# Patient Record
Sex: FEMALE | Race: ASIAN | NOT HISPANIC OR LATINO | Employment: UNEMPLOYED | URBAN - METROPOLITAN AREA
[De-identification: names, ages, dates, MRNs, and addresses within clinical notes are randomized per-mention and may not be internally consistent; named-entity substitution may affect disease eponyms.]

---

## 2017-01-04 ENCOUNTER — TRANSFERRED RECORDS (OUTPATIENT)
Dept: HEALTH INFORMATION MANAGEMENT | Facility: CLINIC | Age: 52
End: 2017-01-04

## 2017-01-04 ENCOUNTER — TELEPHONE (OUTPATIENT)
Dept: FAMILY MEDICINE | Facility: CLINIC | Age: 52
End: 2017-01-04

## 2017-01-04 DIAGNOSIS — N83.291 COMPLEX CYST OF RIGHT OVARY: ICD-10-CM

## 2017-01-04 DIAGNOSIS — D25.9 UTERINE LEIOMYOMA, UNSPECIFIED LOCATION: Primary | ICD-10-CM

## 2017-01-04 NOTE — TELEPHONE ENCOUNTER
Pelvic ultrasound shows uterine fibroids and right ovary cyst.   I advise she consult with a gyn. I can refer if she doesn't have one.

## 2017-01-05 NOTE — TELEPHONE ENCOUNTER
Patient given the result message below per Yulisa Gibson.  Patient states that she will be traveling. Would it be OK to wait until the first week of February to follow up with gyn? Patient states that she does not want to be worrying while she is traveling.  Please advise. Triage to call the patient back.  Ruth Yi RN

## 2017-01-05 NOTE — TELEPHONE ENCOUNTER
Your provider has referred you to:  [facility]: [facility] [phone]  [email or web address]    Patient notified of test results and PCP's message, no further questions.  Isa Mccarthy RN  [facility]  [phone]

## 2017-01-09 ENCOUNTER — MYC MEDICAL ADVICE (OUTPATIENT)
Dept: FAMILY MEDICINE | Facility: CLINIC | Age: 52
End: 2017-01-09

## 2017-01-09 DIAGNOSIS — K21.9 GASTROESOPHAGEAL REFLUX DISEASE, ESOPHAGITIS PRESENCE NOT SPECIFIED: Primary | ICD-10-CM

## 2017-01-10 NOTE — TELEPHONE ENCOUNTER
omeprazole      Last Written Prescription Date: 6/29/16  Last Fill Quantity: 90,  # refills: 1   Last Office Visit with [facility], P or [facility] prescribing provider: 11/29/16                                         Next 5 appointments (look out 90 days)     Feb 09, 2017  8:30 AM   Return Visit with Rosa Chowdary LP   [facility] ([facility])    [address] [place] [id number] [phone]                  Patient is currently taking pantoprazole. luciernat message sent to patient clarifying which med she would like filled and if she is wanted to switch back to omeprazole. Will await response.   Aura Wright RN   [facility] - Triage

## 2017-01-10 NOTE — TELEPHONE ENCOUNTER
Please also see 1/9/17 refill encounter.   Aura Wright RN   [facility] - Triage

## 2017-01-11 RX ORDER — OMEPRAZOLE 40 MG/1
CAPSULE, DELAYED RELEASE ORAL
Qty: 90 CAPSULE | Refills: 1 | Status: SHIPPED | OUTPATIENT
Start: 2017-01-11 | End: 2017-08-30

## 2017-01-11 NOTE — TELEPHONE ENCOUNTER
Patient has not checked [employer] message.     Attempted to call patient, no answer and voicemail box full. Will try later.     Aura Wright RN   [facility] - Triage

## 2017-01-11 NOTE — TELEPHONE ENCOUNTER
Patient called the clinic back. She state that pantoprazole did not help her with her acidity. States that omeprazole really helps. Wants rx for omeprazole.   Patient is out of medication.  Ruth Yi RN

## 2017-01-13 ENCOUNTER — OFFICE VISIT (OUTPATIENT)
Dept: PSYCHOLOGY | Facility: CLINIC | Age: 52
End: 2017-01-13
Payer: COMMERCIAL

## 2017-01-13 DIAGNOSIS — F43.23 ADJUSTMENT DISORDER WITH MIXED ANXIETY AND DEPRESSED MOOD: Primary | ICD-10-CM

## 2017-01-13 PROCEDURE — 90791 PSYCH DIAGNOSTIC EVALUATION: CPT | Performed by: PSYCHOLOGIST

## 2017-01-13 NOTE — Clinical Note
Dulce,  I am looking forward to working with Ijeoma. Planning to start by teaching her some relaxation breathing exercises and stress management techniques. Will keep you posted with any relevant information. Thanks for the referral.  Rosa

## 2017-01-16 ASSESSMENT — ANXIETY QUESTIONNAIRES
1. FEELING NERVOUS, ANXIOUS, OR ON EDGE: NOT AT ALL
3. WORRYING TOO MUCH ABOUT DIFFERENT THINGS: SEVERAL DAYS
6. BECOMING EASILY ANNOYED OR IRRITABLE: SEVERAL DAYS
5. BEING SO RESTLESS THAT IT IS HARD TO SIT STILL: NOT AT ALL
2. NOT BEING ABLE TO STOP OR CONTROL WORRYING: SEVERAL DAYS
7. FEELING AFRAID AS IF SOMETHING AWFUL MIGHT HAPPEN: SEVERAL DAYS
GAD7 TOTAL SCORE: 5

## 2017-01-16 ASSESSMENT — PATIENT HEALTH QUESTIONNAIRE - PHQ9: 5. POOR APPETITE OR OVEREATING: SEVERAL DAYS
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
## 2017-01-17 ASSESSMENT — PATIENT HEALTH QUESTIONNAIRE - PHQ9: SUM OF ALL RESPONSES TO PHQ QUESTIONS 1-9: 5

## 2017-01-17 ASSESSMENT — ANXIETY QUESTIONNAIRES: GAD7 TOTAL SCORE: 5

## 2017-02-09 ENCOUNTER — OFFICE VISIT (OUTPATIENT)
Dept: PSYCHOLOGY | Facility: CLINIC | Age: 52
End: 2017-02-09
Payer: COMMERCIAL

## 2017-02-09 DIAGNOSIS — F43.23 ADJUSTMENT DISORDER WITH MIXED ANXIETY AND DEPRESSED MOOD: Primary | ICD-10-CM

## 2017-02-09 PROCEDURE — 90834 PSYTX W PT 45 MINUTES: CPT | Performed by: PSYCHOLOGIST

## 2017-02-09 NOTE — PROGRESS NOTES
Progress Note    Client Name: Ijeoma Avalos  Date: 2/9/17         Service Type: Individual      Session Start Time: 08:30  Session End Time: 09:20      Session Length: 45-50     Session #: 2     Attendees: Client attended alone    Treatment Plan Last Reviewed: 2/9/17  PHQ-9 / SOSA-7 Last Reviewed: 1/16/17     DATA      Progress Since Last Session (Related to Symptoms / Goals / Homework):   Symptoms: Stress, fatigue    Homework: N/A      Episode of Care Goals: New objectives established today     Current / Ongoing Stressors and Concerns:   Physical symptoms   Phase of life issues (full-time work and parent of young child)   Work demands   Family overseas     Treatment Objective(s) Addressed in This Session:   Build and make use of skills for stress management     Intervention:   Discussed and agreed upon goals for treatment.  Today's session focused on the physical component of stress. Provided rationale for self-regulation strategies such as deep breathing. Taught a basic relaxation breathing exercise in session; Client reported initial positive response. Provided online resources for further practice.         ASSESSMENT: Current Emotional / Mental Status (status of significant symptoms):   Risk status (Self / Other harm or suicidal ideation)   Client denies current fears or concerns for personal safety.   Client denies current or recent suicidal ideation or behaviors.   Client denies current or recent homicidal ideation or behaviors.   Client denies current or recent self injurious behavior or ideation.   Client denies other safety concerns.   A safety and risk management plan has not been developed at this time, however client was given the after-hours number / 911 should there be a change in any of these risk factors.     Appearance:   Appropriate    Eye Contact:   Good    Psychomotor Behavior: Normal    Attitude:   Cooperative     Orientation:   All   Speech    Rate / Production: Normal     Volume:  Normal    Mood:    Drained, tired    Affect:    Appropriate    Thought Content:  Clear    Thought Form:  Coherent  Logical    Insight:    Fair      Medication Review:   No current psychiatric medications prescribed     Medication Compliance:   NA     Changes in Health Issues:   None reported     Chemical Use Review:   Substance Use: Chemical use reviewed, no active concerns identified      Tobacco Use: No current tobacco use.       Collateral Reports Completed:   Not Applicable    PLAN: (Client Tasks / Therapist Tasks / Other)  Client will practice deep breathing exercise at least once/day and during times of increased stress. Online resources for further practice were provided. Plan to discuss cognitive component of stress next session. Return appointments scheduled.      Rosa Chowdary LP                                                   _______________________________________________________________________    Treatment Plan    Client's Name: Ijeoma Avalos  YOB: 1965    Date: 2/9/17    DSM-V Diagnoses: Adjustment Disorders  309.28 (F43.23) With mixed anxiety and depressed mood  Psychosocial / Contextual Factors: physical symptoms; phase of life stressors (full-time work and parent of young child); work demands; family overseas  WHODAS: 16    Referral / Collaboration:  Referral to another professional/service is not indicated at this time.    Anticipated number of session or this episode of care: will re-evaluate every 90 days      MeasurableTreatment Goal(s) related to diagnosis / functional impairment(s)  Goal 1: Client will build skills for stress management.    I will know I've met my goal when my blood pressure is lower and my reflux symptoms have decreased.      Objective #A (Client Action)    Client will use at least 2 coping skills for anxiety management in the next 6 weeks.  Status: New - Date: 2/9/17      Intervention(s)  Therapist will teach self-regulation strategies, CBT skills, mindfulness techniques.    Objective #B  Client will use cognitive strategies identified in therapy to challenge anxious thoughts.  Status: New - Date: 2/9/17     Intervention(s)  Therapist will teach cognitive strategies, provide education.      Goal 2: Client will increase self-confidence.    I will know I've met my goal when I think more positively about myself.      Objective #A (Client Action)    Status: New - Date: 2/9/17     Client will increase assertive communication.    Intervention(s)  Therapist will teach assertiveness skills.    Objective #B  Client will Identify negative self-talk and behaviors: challenge core beliefs, myths, and actions.    Status: New - Date: 2/9/17     Intervention(s)  Therapist will provide education, teach CBT skills.      Goal 3: Client will increase connection in close relationships.    I will know I've met my goal when I feel more close with loved ones.      Objective #A (Client Action)    Status: New - Date: 2/9/17     Client will prioritize time for meaningful relationships.    Intervention(s)  Therapist will provide support and accountability.    Objective #B  Client will make use of effective interpersonal communication skills.    Status: New - Date: 2/9/17     Intervention(s)  Therapist will teach communication skills.      Client has reviewed and agreed to the above plan.      Rosa Chowdary, KATHARINE  February 9, 2017

## 2017-02-21 ENCOUNTER — OFFICE VISIT (OUTPATIENT)
Dept: PSYCHOLOGY | Facility: CLINIC | Age: 52
End: 2017-02-21
Payer: COMMERCIAL

## 2017-02-21 DIAGNOSIS — F43.23 ADJUSTMENT DISORDER WITH MIXED ANXIETY AND DEPRESSED MOOD: Primary | ICD-10-CM

## 2017-02-21 PROCEDURE — 90834 PSYTX W PT 45 MINUTES: CPT | Performed by: PSYCHOLOGIST

## 2017-02-21 NOTE — MR AVS SNAPSHOT
MRN:[id number]                      After Visit Summary   2/21/2017    Ijeoma Avalos    MRN: [id number]           Visit Information        Provider Department      2/21/2017 2:00 PM Rosa Chowdary LP [facility]      Your next 10 appointments already scheduled     Mar 07, 2017  2:00 PM CST   Return Visit with Rosa Chowdary LP   [facility] ([facility])    [address] [place] [id number] [phone]            Mar 30, 2017  2:00 PM CDT   Return Visit with Rosa Chowdary LP   [facility] ([facility])    [address] [place] [phone] [phone]              MyChart Information     [employer] gives you secure access to your electronic health record. If you see a primary care provider, you can also send messages to your care team and make appointments. If you have questions, please call your primary care clinic.  If you do not have a primary care provider, please call [phone] and they will assist you.        Care EveryWhere ID     This is your Care EveryWhere ID. This could be used by other organizations to access your Likely medical records  [id number]

## 2017-02-21 NOTE — PROGRESS NOTES
Progress Note    Client Name: Ijeoma Avalos  Date: 2/21/17         Service Type: Individual      Session Start Time: 14:00  Session End Time: 14:50      Session Length: 45-50     Session #: 3     Attendees: Client attended alone    Treatment Plan Last Reviewed: 2/9/17  PHQ-9 / SOSA-7 Last Reviewed: 1/16/17     DATA      Progress Since Last Session (Related to Symptoms / Goals / Homework):   Symptoms: Slight improvement    Homework: Good progress      Episode of Care Goals: Satisfactory progress - ACTION (Actively working towards change); Intervened by reinforcing change plan / affirming steps taken     Current / Ongoing Stressors and Concerns:   Physical symptoms   Phase of life issues (full-time work and parent of young child)   Work demands   Family overseas     Treatment Objective(s) Addressed in This Session:   Build and make use of skills for stress management     Intervention:   Client reported good progress with practicing deep breathing and release of muscle tension for stress management. She has been able to use these skills in a variety of settings, often at work. She agreed to continue to practice. During today's session, introduced CBT concepts, particularly the central importance of thoughts on emotions and actions. Emphasized that more important than any external situation or circumstance is the interpretation that one makes about that situation. Talked about the impact of modifying interpretations or patterns of thoughts that have become distorted. I encouraged Client to begin increasing awareness of her internal monologue. Discussed Client's overall mindset at work and how this contributes to feelings of low confidence. Identified more neutral, adaptive self-talk that would change her emotional experience and likely her behavior as well. These statements (I feel good about the work I do; I have a lot to contribute, etc.) were written for Client to  take with her and review regularly.        ASSESSMENT: Current Emotional / Mental Status (status of significant symptoms):   Risk status (Self / Other harm or suicidal ideation)   Client denies current fears or concerns for personal safety.   Client denies current or recent suicidal ideation or behaviors.   Client denies current or recent homicidal ideation or behaviors.   Client denies current or recent self injurious behavior or ideation.   Client denies other safety concerns.   A safety and risk management plan has not been developed at this time, however client was given the after-hours number / 911 should there be a change in any of these risk factors.     Appearance:   Appropriate    Eye Contact:   Good    Psychomotor Behavior: Normal    Attitude:   Cooperative , engaged   Orientation:   All   Speech    Rate / Production: Normal     Volume:  Normal    Mood:    Slight improvement--less stressed & drained    Affect:    Appropriate    Thought Content:  Clear    Thought Form:  Coherent  Logical    Insight:    Fair      Medication Review:   No current psychiatric medications prescribed     Medication Compliance:   NA     Changes in Health Issues:   None reported     Chemical Use Review:   Substance Use: Chemical use reviewed, no active concerns identified      Tobacco Use: No current tobacco use.       Collateral Reports Completed:   Not Applicable    PLAN: (Client Tasks / Therapist Tasks / Other)  Handout on common thought distortions was provided. Will discuss in a future session. Client will work on increasing awareness of her internal monologue. She will make use of adaptive self-statements to cultivate a more positive mindset about her work. Continue to practice deep breathing exercise. Client is interested in working on assertive communication in future sessions. Return appointments scheduled.      Rosa Chowdary LP                                                    _______________________________________________________________________    Treatment Plan    Client's Name: Ijeoma Avalos  YOB: 1965    Date: 2/9/17    DSM-V Diagnoses: Adjustment Disorders  309.28 (F43.23) With mixed anxiety and depressed mood  Psychosocial / Contextual Factors: physical symptoms; phase of life stressors (full-time work and parent of young child); work demands; family overseas  WHODAS: 16    Referral / Collaboration:  Referral to another professional/service is not indicated at this time.    Anticipated number of session or this episode of care: will re-evaluate every 90 days      MeasurableTreatment Goal(s) related to diagnosis / functional impairment(s)  Goal 1: Client will build skills for stress management.    I will know I've met my goal when my blood pressure is lower and my reflux symptoms have decreased.      Objective #A (Client Action)    Client will use at least 2 coping skills for anxiety management in the next 6 weeks.  Status: New - Date: 2/9/17     Intervention(s)  Therapist will teach self-regulation strategies, CBT skills, mindfulness techniques.    Objective #B  Client will use cognitive strategies identified in therapy to challenge anxious thoughts.  Status: New - Date: 2/9/17     Intervention(s)  Therapist will teach cognitive strategies, provide education.      Goal 2: Client will increase self-confidence.    I will know I've met my goal when I think more positively about myself.      Objective #A (Client Action)    Status: New - Date: 2/9/17     Client will increase assertive communication.    Intervention(s)  Therapist will teach assertiveness skills.    Objective #B  Client will Identify negative self-talk and behaviors: challenge core beliefs, myths, and actions.    Status: New - Date: 2/9/17     Intervention(s)  Therapist will provide education, teach CBT skills.      Goal 3: Client will increase connection in close relationships.    I will know I've  met my goal when I feel more close with loved ones.      Objective #A (Client Action)    Status: New - Date: 2/9/17     Client will prioritize time for meaningful relationships.    Intervention(s)  Therapist will provide support and accountability.    Objective #B  Client will make use of effective interpersonal communication skills.    Status: New - Date: 2/9/17     Intervention(s)  Therapist will teach communication skills.      Client has reviewed and agreed to the above plan.      Rosa Chowdary, KATHARINE  February 9, 2017

## 2017-03-07 ENCOUNTER — OFFICE VISIT (OUTPATIENT)
Dept: PSYCHOLOGY | Facility: CLINIC | Age: 52
End: 2017-03-07
Payer: COMMERCIAL

## 2017-03-07 ENCOUNTER — TRANSFERRED RECORDS (OUTPATIENT)
Dept: HEALTH INFORMATION MANAGEMENT | Facility: CLINIC | Age: 52
End: 2017-03-07

## 2017-03-07 DIAGNOSIS — F43.23 ADJUSTMENT DISORDER WITH MIXED ANXIETY AND DEPRESSED MOOD: Primary | ICD-10-CM

## 2017-03-07 PROCEDURE — 90834 PSYTX W PT 45 MINUTES: CPT | Performed by: PSYCHOLOGIST

## 2017-03-07 NOTE — MR AVS SNAPSHOT
MRN:[id number]                      After Visit Summary   3/7/2017    Ijeoma Avalos    MRN: [id number]           Visit Information        Provider Department      3/7/2017 2:00 PM Rosa Chowdary LP [facility]      Your next 10 appointments already scheduled     Mar 30, 2017  2:00 PM CDT   Return Visit with Rosa Chowdary LP   [facility] ([facility])    [address] [place] [id number] [phone]              MyChart Information     [employer] gives you secure access to your electronic health record. If you see a primary care provider, you can also send messages to your care team and make appointments. If you have questions, please call your primary care clinic.  If you do not have a primary care provider, please call [phone] and they will assist you.        Care EveryWhere ID     This is your Care EveryWhere ID. This could be used by other organizations to access your [facility] medical records  [id number]

## 2017-03-07 NOTE — PROGRESS NOTES
"                                             Progress Note    Client Name: Ijeoma Avalos  Date: 3/7/17       Service Type: Individual      Session Start Time: 14:00  Session End Time: 14:55      Session Length: 55     Session #: 4     Attendees: Client attended alone    Treatment Plan Last Reviewed: 2/9/17  PHQ-9 / SOSA-7 Last Reviewed: 1/16/17     DATA      Progress Since Last Session (Related to Symptoms / Goals / Homework):   Symptoms: Slight improvement    Homework: Good progress      Episode of Care Goals: Satisfactory progress - ACTION (Actively working towards change); Intervened by reinforcing change plan / affirming steps taken     Current / Ongoing Stressors and Concerns:   Physical symptoms   Phase of life issues (full-time work and parent of young child)   Work demands   Family overseas     Treatment Objective(s) Addressed in This Session:   Build and make use of skills for stress management     Intervention:   Client stated that she has not yet read the handout on common thought distortions, although she still intends to. She reported that she had been able to gain some insight into her internal monologue during meetings at work and this was quite helpful in reducing her tendency to react emotionally. She was pleased to find that as she chose to respond differently, the overall interaction was more productive. Validated these good efforts. She continues to use deep breathing exercises with good results. Today's session focused on Client's difficulty prioritizing her own needs. Talked about working past \"all-or nothing\" thinking and the desire for perfect conditions and just taking steps in the direction she wishes to go. She identified exercise as her first priority for self-care. Discussed current barriers (including her belief that I'll exercise when x ends and I have more time--acknowledging that such a time never presents itself) and how to move beyond these. Client shared information about a " "potential medical issue currently causing her some concern. Worked on anxiety management around this uncertain situation. Encouraged Client to avoid \"pre-worrying\" about a problem that may never materialize. Talked about a visual strategy for \"putting away\" the worry and reminding herself that she does not yet have all of the necessary information. Also taught a strategy for \"changing the channel\" on repetitive worries. Client was open to all of these suggestions.      ASSESSMENT: Current Emotional / Mental Status (status of significant symptoms):   Risk status (Self / Other harm or suicidal ideation)   Client denies current fears or concerns for personal safety.   Client denies current or recent suicidal ideation or behaviors.   Client denies current or recent homicidal ideation or behaviors.   Client denies current or recent self injurious behavior or ideation.   Client denies other safety concerns.   A safety and risk management plan has not been developed at this time, however client was given the after-hours number / 911 should there be a change in any of these risk factors.     Appearance:   Appropriate    Eye Contact:   Good    Psychomotor Behavior: Normal    Attitude:   Cooperative , thoughtful   Orientation:   All   Speech    Rate / Production: Normal     Volume:  Normal    Mood:    Worry about medical issue    Affect:    Appropriate    Thought Content:  Clear    Thought Form:  Coherent  Logical    Insight:    Fair      Medication Review:   No current psychiatric medications prescribed     Medication Compliance:   NA     Changes in Health Issues:   None reported     Chemical Use Review:   Substance Use: Chemical use reviewed, no active concerns identified      Tobacco Use: No current tobacco use.       Collateral Reports Completed:   Not Applicable    PLAN: (Client Tasks / Therapist Tasks / Other)  Client will make use of anxiety management strategies during time of uncertainty about a medical issue. She " will move past perfectionism and all or nothing thinking about self-care in order to incorporate more physical activity into her usual routine. She will begin by looking into options for exercise classes. Client will read handout on common thought distortions as desired. Continue to practice deep breathing exercise. She is interested in working on assertive communication in future sessions. Return appointments scheduled.      Rosa Chowdary, LP                                                   _______________________________________________________________________    Treatment Plan    Client's Name: Ijeoma Avalos  YOB: 1965    Date: 2/9/17    DSM-V Diagnoses: Adjustment Disorders  309.28 (F43.23) With mixed anxiety and depressed mood  Psychosocial / Contextual Factors: physical symptoms; phase of life stressors (full-time work and parent of young child); work demands; family overseas  WHODAS: 16    Referral / Collaboration:  Referral to another professional/service is not indicated at this time.    Anticipated number of session or this episode of care: will re-evaluate every 90 days      MeasurableTreatment Goal(s) related to diagnosis / functional impairment(s)  Goal 1: Client will build skills for stress management.    I will know I've met my goal when my blood pressure is lower and my reflux symptoms have decreased.      Objective #A (Client Action)    Client will use at least 2 coping skills for anxiety management in the next 6 weeks.  Status: New - Date: 2/9/17     Intervention(s)  Therapist will teach self-regulation strategies, CBT skills, mindfulness techniques.    Objective #B  Client will use cognitive strategies identified in therapy to challenge anxious thoughts.  Status: New - Date: 2/9/17     Intervention(s)  Therapist will teach cognitive strategies, provide education.      Goal 2: Client will increase self-confidence.    I will know I've met my goal when I think more positively  about myself.      Objective #A (Client Action)    Status: New - Date: 2/9/17     Client will increase assertive communication.    Intervention(s)  Therapist will teach assertiveness skills.    Objective #B  Client will Identify negative self-talk and behaviors: challenge core beliefs, myths, and actions.    Status: New - Date: 2/9/17     Intervention(s)  Therapist will provide education, teach CBT skills.      Goal 3: Client will increase connection in close relationships.    I will know I've met my goal when I feel more close with loved ones.      Objective #A (Client Action)    Status: New - Date: 2/9/17     Client will prioritize time for meaningful relationships.    Intervention(s)  Therapist will provide support and accountability.    Objective #B  Client will make use of effective interpersonal communication skills.    Status: New - Date: 2/9/17     Intervention(s)  Therapist will teach communication skills.      Client has reviewed and agreed to the above plan.      Rosa Chowdary, KATHARINE  February 9, 2017

## 2017-03-23 ENCOUNTER — TRANSFERRED RECORDS (OUTPATIENT)
Dept: HEALTH INFORMATION MANAGEMENT | Facility: CLINIC | Age: 52
End: 2017-03-23

## 2017-03-30 ENCOUNTER — OFFICE VISIT (OUTPATIENT)
Dept: PSYCHOLOGY | Facility: CLINIC | Age: 52
End: 2017-03-30
Payer: COMMERCIAL

## 2017-03-30 DIAGNOSIS — F43.23 ADJUSTMENT DISORDER WITH MIXED ANXIETY AND DEPRESSED MOOD: Primary | ICD-10-CM

## 2017-03-30 PROCEDURE — 90834 PSYTX W PT 45 MINUTES: CPT | Performed by: PSYCHOLOGIST

## 2017-03-30 NOTE — MR AVS SNAPSHOT
MRN:[id number]                      After Visit Summary   3/30/2017    Ijeoma Avalos    MRN: [id number]           Visit Information        Provider Department      3/30/2017 2:00 PM Rosa Chowdary LP [facility]      Your next 10 appointments already scheduled     Apr 18, 2017  4:00 PM CDT   [facility] Dulce Hernandez [facility]   [facility]    [address] [place] [address]   [phone]            Apr 19, 2017  2:00 PM CDT   Return Visit with Rosa Chowdary LP   [facility] ([facility]    [address] [place] [id number]   [phone]            Apr 21, 2017   Procedure with Ru Cano MD   [facility] (--)    [address]  [facility] [address]   [phone]            May 11, 2017  8:30 AM CDT   Return Visit with Rosa Chowdary LP   [facility] ([facility])    [address] [place] [id number]   [phone]              MyChart Information     [employer] gives you secure access to your electronic health record. If you see a primary care provider, you can also send messages to your care team and make appointments. If you have questions, please call your primary care clinic.  If you do not have a primary care provider, please call [phone] and they will assist you.        Care EveryWhere ID     This is your Care EveryWhere ID. This could be used by other organizations to access your [facility] medical records  [id number]

## 2017-03-30 NOTE — PROGRESS NOTES
"                                             Progress Note    Client Name: Ijeoma Avalos  Date: 3/30/17       Service Type: Individual      Session Start Time: 14:00  Session End Time: 14:55      Session Length: 55     Session #: 5     Attendees: Client attended alone    Treatment Plan Last Reviewed: 2/9/17  PHQ-9 / SOSA-7 Last Reviewed: 1/16/17     DATA      Progress Since Last Session (Related to Symptoms / Goals / Homework):   Symptoms: Slight improvement    Homework: Good progress      Episode of Care Goals: Satisfactory progress - ACTION (Actively working towards change); Intervened by reinforcing change plan / affirming steps taken     Current / Ongoing Stressors and Concerns:   Physical symptoms   Phase of life issues (full-time work and parent of young child)   Work demands   Family overseas     Treatment Objective(s) Addressed in This Session:   Build and make use of skills for stress management     Intervention:   Client provided update on medical condition--hysterectomy and removal of 1 ovary scheduled for next month. She reported anxiety and mild distress about the upcoming procedure, particularly about the possibility that some other disease process will be discovered upon pathology. Acknowledged the stress associated with this time of waiting and uncertainty. Talked about creating a positive intention for Client to use in directing her thoughts in this interim period before surgery. She was open to this and decided on a present-focused intention (we have a good plan; today I can just focus on what I'm doing right now; all will be well). Client stated that although she did not start a regular exercise routine, she has made progress with increasing physical activity (e.g., taking a 15 minute walk for a break at work). Validated her efforts at moving past \"all or nothing\" thinking around exercise. At Client's request, we ended the session with a deep breathing exercise. Client reported positive " response.     ASSESSMENT: Current Emotional / Mental Status (status of significant symptoms):   Risk status (Self / Other harm or suicidal ideation)   Client denies current fears or concerns for personal safety.   Client denies current or recent suicidal ideation or behaviors.   Client denies current or recent homicidal ideation or behaviors.   Client denies current or recent self injurious behavior or ideation.   Client denies other safety concerns.   A safety and risk management plan has not been developed at this time, however client was given the after-hours number / 911 should there be a change in any of these risk factors.     Appearance:   Appropriate    Eye Contact:   Good    Psychomotor Behavior: Normal    Attitude:   Cooperative    Orientation:   All   Speech    Rate / Production: Normal     Volume:  Normal    Mood:    Anxious , drained   Affect:    Appropriate    Thought Content:  Frequent worries around medical issue    Thought Form:  Coherent  Logical    Insight:    Fair      Medication Review:   No current psychiatric medications prescribed     Medication Compliance:   NA     Changes in Health Issues:   None reported     Chemical Use Review:   Substance Use: Chemical use reviewed, no active concerns identified      Tobacco Use: No current tobacco use.       Collateral Reports Completed:   Not Applicable    PLAN: (Client Tasks / Therapist Tasks / Other)  As Client prepares for surgery in the coming weeks, she will make use of a positive intention to direct her thoughts (we have a good plan; today I can just focus on what I'm doing right now; all will be well). She will continue to practice deep breathing for relaxation and stress management. Continue with efforts to incorporate more physical activity into her day, avoiding all or nothing patterns around exercise. She is interested in working on assertive communication in future sessions. Return appointments scheduled.      Rosa Chowdary, KATHARINE                                                    _______________________________________________________________________    Treatment Plan    Client's Name: Ijeoma Avalos  YOB: 1965    Date: 2/9/17    DSM-V Diagnoses: Adjustment Disorders  309.28 (F43.23) With mixed anxiety and depressed mood  Psychosocial / Contextual Factors: physical symptoms; phase of life stressors (full-time work and parent of young child); work demands; family overseas  WHODAS: 16    Referral / Collaboration:  Referral to another professional/service is not indicated at this time.    Anticipated number of session or this episode of care: will re-evaluate every 90 days      MeasurableTreatment Goal(s) related to diagnosis / functional impairment(s)  Goal 1: Client will build skills for stress management.    I will know I've met my goal when my blood pressure is lower and my reflux symptoms have decreased.      Objective #A (Client Action)    Client will use at least 2 coping skills for anxiety management in the next 6 weeks.  Status: New - Date: 2/9/17     Intervention(s)  Therapist will teach self-regulation strategies, CBT skills, mindfulness techniques.    Objective #B  Client will use cognitive strategies identified in therapy to challenge anxious thoughts.  Status: New - Date: 2/9/17     Intervention(s)  Therapist will teach cognitive strategies, provide education.      Goal 2: Client will increase self-confidence.    I will know I've met my goal when I think more positively about myself.      Objective #A (Client Action)    Status: New - Date: 2/9/17     Client will increase assertive communication.    Intervention(s)  Therapist will teach assertiveness skills.    Objective #B  Client will Identify negative self-talk and behaviors: challenge core beliefs, myths, and actions.    Status: New - Date: 2/9/17     Intervention(s)  Therapist will provide education, teach CBT skills.      Goal 3: Client will increase connection in close  relationships.    I will know I've met my goal when I feel more close with loved ones.      Objective #A (Client Action)    Status: New - Date: 2/9/17     Client will prioritize time for meaningful relationships.    Intervention(s)  Therapist will provide support and accountability.    Objective #B  Client will make use of effective interpersonal communication skills.    Status: New - Date: 2/9/17     Intervention(s)  Therapist will teach communication skills.      Client has reviewed and agreed to the above plan.      Rosa Chowdary LP  February 9, 2017

## 2017-04-04 DIAGNOSIS — Z13.0 SCREENING FOR DISORDER OF BLOOD AND BLOOD-FORMING ORGANS: Primary | ICD-10-CM

## 2017-04-04 LAB — HGB BLD-MCNC: 11 G/DL (ref 11.7–15.7)

## 2017-04-04 PROCEDURE — 85018 HEMOGLOBIN: CPT | Performed by: FAMILY MEDICINE

## 2017-04-04 PROCEDURE — 36415 COLL VENOUS BLD VENIPUNCTURE: CPT | Performed by: FAMILY MEDICINE

## 2017-04-10 ENCOUNTER — TRANSFERRED RECORDS (OUTPATIENT)
Dept: HEALTH INFORMATION MANAGEMENT | Facility: CLINIC | Age: 52
End: 2017-04-10

## 2017-04-18 ENCOUNTER — OFFICE VISIT (OUTPATIENT)
Dept: FAMILY MEDICINE | Facility: CLINIC | Age: 52
End: 2017-04-18
Payer: COMMERCIAL

## 2017-04-18 VITALS
BODY MASS INDEX: 28.86 KG/M2 | HEART RATE: 79 BPM | SYSTOLIC BLOOD PRESSURE: 130 MMHG | OXYGEN SATURATION: 98 % | WEIGHT: 147 LBS | TEMPERATURE: 98.6 F | HEIGHT: 60 IN | DIASTOLIC BLOOD PRESSURE: 87 MMHG

## 2017-04-18 DIAGNOSIS — D25.9 UTERINE LEIOMYOMA, UNSPECIFIED LOCATION: ICD-10-CM

## 2017-04-18 DIAGNOSIS — Z01.818 PREOP GENERAL PHYSICAL EXAM: Primary | ICD-10-CM

## 2017-04-18 DIAGNOSIS — N94.6 DYSMENORRHEA: ICD-10-CM

## 2017-04-18 DIAGNOSIS — D64.9 LOW HEMOGLOBIN: ICD-10-CM

## 2017-04-18 PROCEDURE — 99214 OFFICE O/P EST MOD 30 MIN: CPT | Performed by: FAMILY MEDICINE

## 2017-04-18 NOTE — PROGRESS NOTES
[facility] [place]  [address] [facility] [address]  [phone]  Dept: [phone]    PRE-OP EVALUATION:  Today's date: 2017    Ijeoma Avalos (: 1965) presents for pre-operative evaluation assessment as requested by Dr. Cano.  She requires evaluation and anesthesia risk assessment prior to undergoing surgery/procedure for treatment of total hysterectomy .  Proposed procedure: DAVINCI SINGLE SITE TOTAL HYSTERECTOMY    Date of Surgery/ Procedure: 2017  Time of Surgery/ Procedure: 1:25PM  Hospital[facility]Surgical [facility]   Primary Physician: Dulce Hernandez  Type of Anesthesia Anticipated: General    Patient has a Health Care Directive or Living Will:  NO    1. NO - Do you have a history of heart attack, stroke, stent, bypass or surgery on an artery in the head, neck, heart or legs?  2. NO - Do you ever have any pain or discomfort in your chest?  3. NO - Do you have a history of  Heart Failure?  4. NO - Are you troubled by shortness of breath when: walking on the level, up a slight hill or at night?  5. NO - Do you currently have a cold, bronchitis or other respiratory infection?  6. NO - Do you have a cough, shortness of breath or wheezing?  7. NO - Do you sometimes get pains in the calves of your legs when you walk?  8. NO - Do you or anyone in your family have previous history of blood clots?  9. NO - Do you or does anyone in your family have a serious bleeding problem such as prolonged bleeding following surgeries or cuts?  10. YES - Have you ever had problems with anemia or been told to take iron pills?   11. NO - Have you had any abnormal blood loss such as black, tarry or bloody stools, or abnormal vaginal bleeding?  12. NO - Have you ever had a blood transfusion?  13. NO - Have you or any of your relatives ever had problems with anesthesia?  14. NO - Do you have sleep apnea, excessive snoring or daytime drowsiness?  15.  NO - Do you have any prosthetic heart valves?  16. NO - Do you have prosthetic joints?  17. NO - Is there any chance that you may be pregnant?      HPI:                                                      Brief HPI related to upcoming procedure: Has ongoing  issue with uterine fibroid and also had possible complex RT. ovarian cyst , so now scheduled for surgery       See problem list for active medical problems.  Problems all longstanding and stable, except as noted/documented.  See ROS for pertinent symptoms related to these conditions.                                                                                                  .    MEDICAL HISTORY:                                                      Patient Active Problem List    Diagnosis Date Noted     Low hemoglobin 11/07/2016     Priority: Medium     Dysmenorrhea 11/07/2016     Priority: Medium     likely related to fibroid        Right knee pain, unspecified chronicity 11/07/2016     Priority: Medium     medial knee strain        Uterine leiomyoma, unspecified location 06/29/2016     Priority: Medium     Seeing gyn for that        Adjustment disorder with mixed anxiety and depressed mood 06/29/2016     Priority: Medium     Gastroesophageal reflux disease, esophagitis presence not specified 06/01/2016     Priority: Medium     TMJ (temporomandibular joint syndrome) 06/01/2016     Priority: Medium     Neck pain 06/01/2016     Priority: Medium     Other headache syndrome 06/01/2016     Priority: Medium     related to neck/ tmj        Hyperlipidemia LDL goal <130 10/20/2015     Priority: Medium     Plantar fasciitis 09/02/2015     Priority: Medium     Other specified hypothyroidism 08/04/2015     Priority: Medium     CARDIOVASCULAR SCREENING; LDL GOAL LESS THAN 160 12/04/2012     Gastric acidity       Past Medical History:   Diagnosis Date     Gastric acidity      Gestational diabetes mellitus     DIET CONTROL     Hx of previous reproductive problem     IVF      Hypothyroid      Past Surgical History:   Procedure Laterality Date     APPENDECTOMY        SECTION  2014    Procedure:  SECTION;  Surgeon: Ru Cano MD;  Location: [facility]     CHOLECYSTECTOMY  2007     cyst removed      left  lower leg on bone sheath     GASTROSCOPY,FL       MYOMECTOMY UTERUS  2012     Current Outpatient Prescriptions   Medication Sig Dispense Refill     omeprazole (PRILOSEC) 40 MG capsule TAKE 1 CAPSULE BY MOUTH DAILY, 30 TO 60 MINUTES BEFORE A MEAL. 90 capsule 1     guaiFENesin-codeine (ROBITUSSIN AC) 100-10 MG/5ML SOLN Take 5 mLs by mouth every 4 hours as needed for cough 120 mL 0     levothyroxine (SYNTHROID, LEVOTHROID) 112 MCG tablet Take 1 tablet (112 mcg) by mouth daily 90 tablet 3     ranitidine (ZANTAC) 300 MG tablet Take 1 tablet (300 mg) by mouth At Bedtime 90 tablet 0     nabumetone (RELAFEN) 500 MG tablet Take 1-2 tablets (500-1,000 mg) by mouth 2 times daily as needed for moderate pain 30 tablet 1     Multiple Vitamins-Minerals (MULTIVITAMIN PO) Take by mouth daily       OTC products: None, except as noted above    Allergies   Allergen Reactions     Cephalexin Hives     Hives on hands, face and stomach.       Latex Allergy: NO    Social History   Substance Use Topics     Smoking status: Never Smoker     Smokeless tobacco: Never Used     Alcohol use No      Comment: social     History   Drug Use No       REVIEW OF SYSTEMS:                                                    C: NEGATIVE for fever, chills, change in weight  I: NEGATIVE for worrisome rashes, moles or lesions  E: NEGATIVE for vision changes or irritation  E/M: NEGATIVE for ear, mouth and throat problems  R: NEGATIVE for significant cough or SOB  CV: NEGATIVE for chest pain, palpitations or peripheral edema  GI: NEGATIVE for nausea, abdominal pain, heartburn, or change in bowel habits  : NEGATIVE for frequency, dysuria, or hematuria  M: NEGATIVE for significant arthralgias or  myalgia  N: NEGATIVE for weakness, dizziness or paresthesias  E: NEGATIVE for temperature intolerance, skin/hair changes  H: NEGATIVE for bleeding problems  P: NEGATIVE for changes in mood or affect    EXAM:                                                    There were no vitals taken for this visit.    GENERAL APPEARANCE: healthy, alert and no distress     EYES: EOMI, PERRL     HENT: ear canals and TM's normal and nose and mouth without ulcers or lesions     NECK: no adenopathy, no asymmetry, masses, or scars and thyroid normal to palpation     RESP: lungs clear to auscultation - no rales, rhonchi or wheezes     CV: regular rates and rhythm, normal S1 S2, no S3 or S4 and no murmur,      ABDOMEN:  soft, nontender, no HSM or masses and bowel sounds normal     MS: no edema     SKIN: no suspicious lesions or rashes     NEURO: Normal strength and tone, sensory exam grossly normal, mentation intact and speech normal     PSYCH: mentation appears normal. and affect normal/bright    DIAGNOSTICS:                                                        Recent Labs   Lab Test  04/04/17   1415  11/07/16   0919  10/05/16   1114   08/05/15   0757  04/07/14   HGB  11.0*  11.7  11.6*   < >  13.0   < >   --    PLT   --   379  360   < >  408   < >   --    NA   --    --   137   --   138   < >   --    POTASSIUM   --    --   4.1   --   3.7   < >   --    CR   --    --   0.65   --   0.60   < >   --    A1C   --    --    --    --    --    --   5.5    < > = values in this interval not displayed.        IMPRESSION:                                                        The proposed surgical procedure is considered INTERMEDIATE risk.    REVISED CARDIAC RISK INDEX  The patient has the following serious cardiovascular risks for perioperative complications such as (MI, PE, VFib and 3  AV Block):  No serious cardiac risks  INTERPRETATION: 1 risks: Class II (low risk - 0.9% complication rate)    The patient has the following additional risks for  perioperative complications:  No identified additional risks        RECOMMENDATIONS:                                                      (Z01.818) Preop general physical exam  (primary encounter diagnosis)  Comment:   Plan:     (D25.9) Uterine leiomyoma, unspecified location  Comment:   Plan:     (N94.6) Dysmenorrhea  Comment:   Plan:     (D64.9) Low hemoglobin  Comment:   Plan: she is taking iron supplement         APPROVAL GIVEN to proceed with proposed procedure, without further diagnostic evaluation       Signed Electronically by: Dulce Hernandez MD    Copy of this evaluation report is provided to requesting physician.    [facility] Preop Guidelines

## 2017-04-18 NOTE — NURSING NOTE
Chief Complaint   Patient presents with     Pre-Op Exam       Initial /87  Pulse 79  Temp 98.6  F (37  C) (Oral)  Ht 5' (1.524 m)  Wt 147 lb (66.7 kg)  LMP 04/14/2017  SpO2 98%  BMI 28.71 kg/m2 Estimated body mass index is 28.71 kg/(m^2) as calculated from the following:    Height as of this encounter: 5' (1.524 m).    Weight as of this encounter: 147 lb (66.7 kg).  Medication Reconciliation: complete

## 2017-04-18 NOTE — MR AVS SNAPSHOT
After Visit Summary   4/18/2017    Ijeoma Avalos    MRN: [id number]           Patient Information     Date Of Birth          1965        Visit Information        Provider Department      4/18/2017 4:00 PM Dulce Hernandze MD [facility] [place]        Today's Diagnoses     Preop general physical exam    -  1    Uterine leiomyoma, unspecified location        Dysmenorrhea        Low hemoglobin          Care Instructions      Before Your Surgery      Call your surgeon if there is any change in your health. This includes signs of a cold or flu (such as a sore throat, runny nose, cough, rash or fever).    Do not smoke, drink alcohol or take over the counter medicine (unless your surgeon or primary care doctor tells you to) for the 24 hours before and after surgery.    If you take prescribed drugs: Follow your doctor s orders about which medicines to take and which to stop until after surgery.    Eating and drinking prior to surgery: follow the instructions from your surgeon    Take a shower or bath the night before surgery. Use the soap your surgeon gave you to gently clean your skin. If you do not have soap from your surgeon, use your regular soap. Do not shave or scrub the surgery site.  Wear clean pajamas and have clean sheets on your bed.         Follow-ups after your visit        Your next 10 appointments already scheduled     Apr 19, 2017  2:00 PM CDT   [place] Visit with Rosa Chowdary LP   [facility] [place] [address]   [phone]            Apr 21, 2017   Procedure with Ru Cano MD   [facility] (--)    [address]  [facility] [address]   [phone]            May 11, 2017  8:30 AM CDT   Return Visit with Rosa Chowdary LP   [facility] [place] [address]    [phone]              Who to contact     If you have questions or need follow up information about today's clinic visit or your schedule please contact [facility] [place] directly at [phone].  Normal or non-critical lab and imaging results will be communicated to you by MyChart, letter or phone within 4 business days after the clinic has received the results. If you do not hear from us within 7 days, please contact the clinic through MyChart or phone. If you have a critical or abnormal lab result, we will notify you by phone as soon as possible.  Submit refill requests through [employer] or call your pharmacy and they will forward the refill request to us. Please allow 3 business days for your refill to be completed.          Additional Information About Your Visit        [employer] Information     [employer] gives you secure access to your electronic health record. If you see a primary care provider, you can also send messages to your care team and make appointments. If you have questions, please call your primary care clinic.  If you do not have a primary care provider, please call [phone] and they will assist you.        Care EveryWhere ID     This is your Care EveryWhere ID. This could be used by other organizations to access your [place] medical records  [id number]        Your Vitals Were     Pulse Temperature Height Last Period Pulse Oximetry BMI (Body Mass Index)    79 98.6  F (37  C) (Oral) 5' (1.524 m) 04/14/2017 98% 28.71 kg/m2       Blood Pressure from Last 3 Encounters:   04/18/17 130/87   11/29/16 140/90   11/07/16 120/85    Weight from Last 3 Encounters:   04/18/17 147 lb (66.7 kg)   11/29/16 145 lb (65.8 kg)   11/07/16 143 lb (64.9 kg)              Today, you had the following     No orders found for display       Primary Care Provider Office Phone # Fax #    Dulce Hernandez [phone]       Lindsay [place] [address] [place] [address] DR  [place]  [address]        Thank you!     Thank you for choosing [facility] [place]  for your care. Our goal is always to provide you with excellent care. Hearing back from our patients is one way we can continue to improve our services. Please take a few minutes to complete the written survey that you may receive in the mail after your visit with us. Thank you!             Your Updated Medication List - Protect others around you: Learn how to safely use, store and throw away your medicines at www.disposemymeds.org.          This list is accurate as of: 4/18/17  4:45 PM.  Always use your most recent med list.                   Brand Name Dispense Instructions for use    levothyroxine 112 MCG tablet    SYNTHROID/LEVOTHROID    90 tablet    Take 1 tablet (112 mcg) by mouth daily       MULTIVITAMIN PO      Take by mouth daily       omeprazole 40 MG capsule    priLOSEC    90 capsule    TAKE 1 CAPSULE BY MOUTH DAILY, 30 TO 60 MINUTES BEFORE A MEAL.

## 2017-04-19 ENCOUNTER — OFFICE VISIT (OUTPATIENT)
Dept: PSYCHOLOGY | Facility: CLINIC | Age: 52
End: 2017-04-19
Payer: COMMERCIAL

## 2017-04-19 DIAGNOSIS — F43.23 ADJUSTMENT DISORDER WITH MIXED ANXIETY AND DEPRESSED MOOD: Primary | ICD-10-CM

## 2017-04-19 PROCEDURE — 90834 PSYTX W PT 45 MINUTES: CPT | Performed by: PSYCHOLOGIST

## 2017-04-19 NOTE — MR AVS SNAPSHOT
MRN:[id number]                      After Visit Summary   4/19/2017    Ijeoma Avalos    MRN: [id number]           Visit Information        Provider Department      4/19/2017 2:00 PM Rosa Chowdary LP [facility] [place] [facility]      Your next 10 appointments already scheduled     Apr 21, 2017   Procedure with Ru Cano MD   [facility] (--)    [address]  [facility] [id number] [phone]            May 11, 2017  8:30 AM CDT   Return Visit with Rosa Chowdary LP   [facility] [place] [facility])    [address] [facility] [address] [phone]              MyChart Information     [employer] gives you secure access to your electronic health record. If you see a primary care provider, you can also send messages to your care team and make appointments. If you have questions, please call your primary care clinic.  If you do not have a primary care provider, please call [phone] and they will assist you.        Care EveryWhere ID     This is your Care EveryWhere ID. This could be used by other organizations to access your [place] medical records  [id number]

## 2017-04-19 NOTE — PROGRESS NOTES
Progress Note    Client Name: Ijeoma Avalos  Date: 4/19/17       Service Type: Individual      Session Start Time: 14:00  Session End Time: 14:50      Session Length: 50     Session #: 6     Attendees: Client attended alone    Treatment Plan Last Reviewed: 2/9/17  PHQ-9 / SOSA-7 Last Reviewed: 1/16/17     DATA      Progress Since Last Session (Related to Symptoms / Goals / Homework):   Symptoms: Anxiety    Homework: Good progress      Episode of Care Goals: Satisfactory progress - ACTION (Actively working towards change); Intervened by reinforcing change plan / affirming steps taken     Current / Ongoing Stressors and Concerns:   Physical symptoms   Phase of life issues (full-time work and parent of young child)   Work demands   Family overseas     Treatment Objective(s) Addressed in This Session:   Build and make use of skills for stress management     Intervention:   Client reported increased anxiety, particularly over the past week, centered around her upcoming surgery. She stated that she has not been able to access the new coping skills she had been practicing--the anxiety has been too strong. Acknowledged that the lengthy anticipation presents some challenges for anxiety management. Worked on some basic coping in session--finding calming thoughts, using a visualization to help guide slow breathing. She will imagine the best possible outcome of her surgery and allow this to be the thought she brings her mind back to when the worries begin multiplying. Discussed a plan for how she would like to spend her time over the next 2 nights (until surgery) in order to distract herself in a helpful way. She decided that she will engage in activities with her daughter. Encouraged continued practice with deep breathing, even if she is able to concentrate for only a short time.     ASSESSMENT: Current Emotional / Mental Status (status of significant symptoms):   Risk status  (Self / Other harm or suicidal ideation)   Client denies current fears or concerns for personal safety.   Client denies current or recent suicidal ideation or behaviors.   Client denies current or recent homicidal ideation or behaviors.   Client denies current or recent self injurious behavior or ideation.   Client denies other safety concerns.   A safety and risk management plan has not been developed at this time, however client was given the after-hours number / 911 should there be a change in any of these risk factors.     Appearance:   Appropriate    Eye Contact:   Good    Psychomotor Behavior: Normal    Attitude:   Cooperative    Orientation:   All   Speech    Rate / Production: Normal     Volume:  Normal    Mood:    Anxious , worried, fatigued   Affect:    Appropriate    Thought Content:  Frequent worries around medical issue ; difficulty accessing coping thoughts   Thought Form:  Coherent  Logical    Insight:    Fair      Medication Review:   No current psychiatric medications prescribed     Medication Compliance:   NA     Changes in Health Issues:   None reported     Chemical Use Review:   Substance Use: Chemical use reviewed, no active concerns identified      Tobacco Use: No current tobacco use.       Collateral Reports Completed:   Not Applicable    PLAN: (Client Tasks / Therapist Tasks / Other)  Focus on coping with anxiety around upcoming surgery. Encouraged use of deep breathing and calming self-talk. She will actively engage with her daughter during the next 2 evenings as a way of directing her thoughts away from worries about the surgery. Plan to meet again in a few weeks. Client will contact me in the meantime to adjust that plan as needed. She is interested in working on assertive communication in future sessions. Return appointments scheduled.      Rosa Chowdary LP                                                    _______________________________________________________________________    Treatment Plan    Client's Name: Ijeoma Avalos  YOB: 1965    Date: 2/9/17    DSM-V Diagnoses: Adjustment Disorders  309.28 (F43.23) With mixed anxiety and depressed mood  Psychosocial / Contextual Factors: physical symptoms; phase of life stressors (full-time work and parent of young child); work demands; family overseas  WHODAS: 18    Referral / Collaboration:  Referral to another professional/service is not indicated at this time.    Anticipated number of session or this episode of care: will re-evaluate every 90 days      MeasurableTreatment Goal(s) related to diagnosis / functional impairment(s)  Goal 1: Client will build skills for stress management.    I will know I've met my goal when my blood pressure is lower and my reflux symptoms have decreased.      Objective #A (Client Action)    Client will use at least 2 coping skills for anxiety management in the next 6 weeks.  Status: New - Date: 2/9/17     Intervention(s)  Therapist will teach self-regulation strategies, CBT skills, mindfulness techniques.    Objective #B  Client will use cognitive strategies identified in therapy to challenge anxious thoughts.  Status: New - Date: 2/9/17     Intervention(s)  Therapist will teach cognitive strategies, provide education.      Goal 2: Client will increase self-confidence.    I will know I've met my goal when I think more positively about myself.      Objective #A (Client Action)    Status: New - Date: 2/9/17     Client will increase assertive communication.    Intervention(s)  Therapist will teach assertiveness skills.    Objective #B  Client will Identify negative self-talk and behaviors: challenge core beliefs, myths, and actions.    Status: New - Date: 2/9/17     Intervention(s)  Therapist will provide education, teach CBT skills.      Goal 3: Client will increase connection in close relationships.    I will know I've  met my goal when I feel more close with loved ones.      Objective #A (Client Action)    Status: New - Date: 2/9/17     Client will prioritize time for meaningful relationships.    Intervention(s)  Therapist will provide support and accountability.    Objective #B  Client will make use of effective interpersonal communication skills.    Status: New - Date: 2/9/17     Intervention(s)  Therapist will teach communication skills.      Client has reviewed and agreed to the above plan.      Rosa Chowdary, KATHARINE  February 9, 2017

## 2017-04-19 NOTE — H&P (VIEW-ONLY)
[facility]  [address] [address]  [phone]  Dept: [phone]    PRE-OP EVALUATION:  Today's date: 2017    Ijeoma Avalos (: 1965) presents for pre-operative evaluation assessment as requested by Dr. Cano.  She requires evaluation and anesthesia risk assessment prior to undergoing surgery/procedure for treatment of total hysterectomy .  Proposed procedure: DAVINCI SINGLE SITE TOTAL HYSTERECTOMY    Date of Surgery/ Procedure: 2017  Time of Surgery/ Procedure: 1:25PM  Hospital[facility]Surgical [facility]   Primary Physician: Dulce Hernandez  Type of Anesthesia Anticipated: General    Patient has a Health Care Directive or Living Will:  NO    1. NO - Do you have a history of heart attack, stroke, stent, bypass or surgery on an artery in the head, neck, heart or legs?  2. NO - Do you ever have any pain or discomfort in your chest?  3. NO - Do you have a history of  Heart Failure?  4. NO - Are you troubled by shortness of breath when: walking on the level, up a slight hill or at night?  5. NO - Do you currently have a cold, bronchitis or other respiratory infection?  6. NO - Do you have a cough, shortness of breath or wheezing?  7. NO - Do you sometimes get pains in the calves of your legs when you walk?  8. NO - Do you or anyone in your family have previous history of blood clots?  9. NO - Do you or does anyone in your family have a serious bleeding problem such as prolonged bleeding following surgeries or cuts?  10. YES - Have you ever had problems with anemia or been told to take iron pills?   11. NO - Have you had any abnormal blood loss such as black, tarry or bloody stools, or abnormal vaginal bleeding?  12. NO - Have you ever had a blood transfusion?  13. NO - Have you or any of your relatives ever had problems with anesthesia?  14. NO - Do you have sleep apnea, excessive snoring or daytime drowsiness?  15.  NO - Do you have any prosthetic heart valves?  16. NO - Do you have prosthetic joints?  17. NO - Is there any chance that you may be pregnant?      HPI:                                                      Brief HPI related to upcoming procedure: Has ongoing  issue with uterine fibroid and also had possible complex RT. ovarian cyst , so now scheduled for surgery       See problem list for active medical problems.  Problems all longstanding and stable, except as noted/documented.  See ROS for pertinent symptoms related to these conditions.                                                                                                  .    MEDICAL HISTORY:                                                      Patient Active Problem List    Diagnosis Date Noted     Low hemoglobin 11/07/2016     Priority: Medium     Dysmenorrhea 11/07/2016     Priority: Medium     likely related to fibroid        Right knee pain, unspecified chronicity 11/07/2016     Priority: Medium     medial knee strain        Uterine leiomyoma, unspecified location 06/29/2016     Priority: Medium     Seeing gyn for that        Adjustment disorder with mixed anxiety and depressed mood 06/29/2016     Priority: Medium     Gastroesophageal reflux disease, esophagitis presence not specified 06/01/2016     Priority: Medium     TMJ (temporomandibular joint syndrome) 06/01/2016     Priority: Medium     Neck pain 06/01/2016     Priority: Medium     Other headache syndrome 06/01/2016     Priority: Medium     related to neck/ tmj        Hyperlipidemia LDL goal <130 10/20/2015     Priority: Medium     Plantar fasciitis 09/02/2015     Priority: Medium     Other specified hypothyroidism 08/04/2015     Priority: Medium     CARDIOVASCULAR SCREENING; LDL GOAL LESS THAN 160 12/04/2012     Gastric acidity       Past Medical History:   Diagnosis Date     Gastric acidity      Gestational diabetes mellitus     DIET CONTROL     Hx of previous reproductive problem     IVF      Hypothyroid      Past Surgical History:   Procedure Laterality Date     APPENDECTOMY        SECTION  2014    Procedure:  SECTION;  Surgeon: Ru Cano MD;  Location: [facility]     CHOLECYSTECTOMY  2007     cyst removed      left  lower leg on bone sheath     GASTROSCOPY,FL       MYOMECTOMY UTERUS  2012     Current Outpatient Prescriptions   Medication Sig Dispense Refill     omeprazole (PRILOSEC) 40 MG capsule TAKE 1 CAPSULE BY MOUTH DAILY, 30 TO 60 MINUTES BEFORE A MEAL. 90 capsule 1     guaiFENesin-codeine (ROBITUSSIN AC) 100-10 MG/5ML SOLN Take 5 mLs by mouth every 4 hours as needed for cough 120 mL 0     levothyroxine (SYNTHROID, LEVOTHROID) 112 MCG tablet Take 1 tablet (112 mcg) by mouth daily 90 tablet 3     ranitidine (ZANTAC) 300 MG tablet Take 1 tablet (300 mg) by mouth At Bedtime 90 tablet 0     nabumetone (RELAFEN) 500 MG tablet Take 1-2 tablets (500-1,000 mg) by mouth 2 times daily as needed for moderate pain 30 tablet 1     Multiple Vitamins-Minerals (MULTIVITAMIN PO) Take by mouth daily       OTC products: None, except as noted above    Allergies   Allergen Reactions     Cephalexin Hives     Hives on hands, face and stomach.       Latex Allergy: NO    Social History   Substance Use Topics     Smoking status: Never Smoker     Smokeless tobacco: Never Used     Alcohol use No      Comment: social     History   Drug Use No       REVIEW OF SYSTEMS:                                                    C: NEGATIVE for fever, chills, change in weight  I: NEGATIVE for worrisome rashes, moles or lesions  E: NEGATIVE for vision changes or irritation  E/M: NEGATIVE for ear, mouth and throat problems  R: NEGATIVE for significant cough or SOB  CV: NEGATIVE for chest pain, palpitations or peripheral edema  GI: NEGATIVE for nausea, abdominal pain, heartburn, or change in bowel habits  : NEGATIVE for frequency, dysuria, or hematuria  M: NEGATIVE for significant arthralgias or  myalgia  N: NEGATIVE for weakness, dizziness or paresthesias  E: NEGATIVE for temperature intolerance, skin/hair changes  H: NEGATIVE for bleeding problems  P: NEGATIVE for changes in mood or affect    EXAM:                                                    There were no vitals taken for this visit.    GENERAL APPEARANCE: healthy, alert and no distress     EYES: EOMI, PERRL     HENT: ear canals and TM's normal and nose and mouth without ulcers or lesions     NECK: no adenopathy, no asymmetry, masses, or scars and thyroid normal to palpation     RESP: lungs clear to auscultation - no rales, rhonchi or wheezes     CV: regular rates and rhythm, normal S1 S2, no S3 or S4 and no murmur,      ABDOMEN:  soft, nontender, no HSM or masses and bowel sounds normal     MS: no edema     SKIN: no suspicious lesions or rashes     NEURO: Normal strength and tone, sensory exam grossly normal, mentation intact and speech normal     PSYCH: mentation appears normal. and affect normal/bright    DIAGNOSTICS:                                                        Recent Labs   Lab Test  04/04/17   1415  11/07/16   0919  10/05/16   1114   08/05/15   0757  04/07/14   HGB  11.0*  11.7  11.6*   < >  13.0   < >   --    PLT   --   379  360   < >  408   < >   --    NA   --    --   137   --   138   < >   --    POTASSIUM   --    --   4.1   --   3.7   < >   --    CR   --    --   0.65   --   0.60   < >   --    A1C   --    --    --    --    --    --   5.5    < > = values in this interval not displayed.        IMPRESSION:                                                        The proposed surgical procedure is considered INTERMEDIATE risk.    REVISED CARDIAC RISK INDEX  The patient has the following serious cardiovascular risks for perioperative complications such as (MI, PE, VFib and 3  AV Block):  No serious cardiac risks  INTERPRETATION: 1 risks: Class II (low risk - 0.9% complication rate)    The patient has the following additional risks for  perioperative complications:  No identified additional risks        RECOMMENDATIONS:                                                      (Z01.818) Preop general physical exam  (primary encounter diagnosis)  Comment:   Plan:     (D25.9) Uterine leiomyoma, unspecified location  Comment:   Plan:     (N94.6) Dysmenorrhea  Comment:   Plan:     (D64.9) Low hemoglobin  Comment:   Plan: she is taking iron supplement         APPROVAL GIVEN to proceed with proposed procedure, without further diagnostic evaluation       Signed Electronically by: Dulce Hernandez MD    Copy of this evaluation report is provided to requesting physician.    [facility] Preop Guidelines

## 2017-04-21 ENCOUNTER — ANESTHESIA (OUTPATIENT)
Dept: SURGERY | Facility: CLINIC | Age: 52
End: 2017-04-21
Payer: COMMERCIAL

## 2017-04-21 ENCOUNTER — ANESTHESIA EVENT (OUTPATIENT)
Dept: SURGERY | Facility: CLINIC | Age: 52
End: 2017-04-21
Payer: COMMERCIAL

## 2017-04-21 ENCOUNTER — HOSPITAL ENCOUNTER (OUTPATIENT)
Facility: CLINIC | Age: 52
Discharge: HOME | End: 2017-04-22
Attending: OBSTETRICS & GYNECOLOGY | Admitting: OBSTETRICS & GYNECOLOGY
Payer: COMMERCIAL

## 2017-04-21 DIAGNOSIS — Z90.710 S/P HYSTERECTOMY: ICD-10-CM

## 2017-04-21 DIAGNOSIS — D64.9 LOW HEMOGLOBIN: Primary | ICD-10-CM

## 2017-04-21 DIAGNOSIS — N83.202 OVARIAN CYST, LEFT: ICD-10-CM

## 2017-04-21 DIAGNOSIS — N94.6 DYSMENORRHEA: ICD-10-CM

## 2017-04-21 DIAGNOSIS — D25.9 UTERINE LEIOMYOMA, UNSPECIFIED LOCATION: ICD-10-CM

## 2017-04-21 PROBLEM — R11.2 POST-OPERATIVE NAUSEA AND VOMITING: Status: ACTIVE | Noted: 2017-04-21

## 2017-04-21 PROBLEM — Z98.890 POST-OPERATIVE NAUSEA AND VOMITING: Status: ACTIVE | Noted: 2017-04-21

## 2017-04-21 LAB
ABO + RH BLD: NORMAL
ABO + RH BLD: NORMAL
BLD GP AB SCN SERPL QL: NORMAL
BLOOD BANK CMNT PATIENT-IMP: NORMAL
HCG SERPL QL: NEGATIVE
HGB BLD-MCNC: 12.1 G/DL (ref 11.7–15.7)
SPECIMEN EXP DATE BLD: NORMAL

## 2017-04-21 PROCEDURE — 86900 BLOOD TYPING SEROLOGIC ABO: CPT | Performed by: OBSTETRICS & GYNECOLOGY

## 2017-04-21 PROCEDURE — 86901 BLOOD TYPING SEROLOGIC RH(D): CPT | Performed by: OBSTETRICS & GYNECOLOGY

## 2017-04-21 PROCEDURE — 85018 HEMOGLOBIN: CPT | Performed by: OBSTETRICS & GYNECOLOGY

## 2017-04-21 PROCEDURE — 25000128 H RX IP 250 OP 636: Performed by: ANESTHESIOLOGY

## 2017-04-21 PROCEDURE — 84703 CHORIONIC GONADOTROPIN ASSAY: CPT | Performed by: OBSTETRICS & GYNECOLOGY

## 2017-04-21 PROCEDURE — 40000936 ZZH STATISTIC OUTPATIENT (NON-OBS) NIGHT

## 2017-04-21 PROCEDURE — 36000087 ZZH SURGERY LEVEL 8 EA 15 ADDTL MIN: Performed by: OBSTETRICS & GYNECOLOGY

## 2017-04-21 PROCEDURE — 37000008 ZZH ANESTHESIA TECHNICAL FEE, 1ST 30 MIN: Performed by: OBSTETRICS & GYNECOLOGY

## 2017-04-21 PROCEDURE — 25000128 H RX IP 250 OP 636: Performed by: OBSTETRICS & GYNECOLOGY

## 2017-04-21 PROCEDURE — 25000125 ZZHC RX 250: Performed by: OBSTETRICS & GYNECOLOGY

## 2017-04-21 PROCEDURE — 71000013 ZZH RECOVERY PHASE 1 LEVEL 1 EA ADDTL HR: Performed by: OBSTETRICS & GYNECOLOGY

## 2017-04-21 PROCEDURE — 88307 TISSUE EXAM BY PATHOLOGIST: CPT | Mod: 26 | Performed by: OBSTETRICS & GYNECOLOGY

## 2017-04-21 PROCEDURE — 88307 TISSUE EXAM BY PATHOLOGIST: CPT | Performed by: OBSTETRICS & GYNECOLOGY

## 2017-04-21 PROCEDURE — 40000170 ZZH STATISTIC PRE-PROCEDURE ASSESSMENT II: Performed by: OBSTETRICS & GYNECOLOGY

## 2017-04-21 PROCEDURE — 36415 COLL VENOUS BLD VENIPUNCTURE: CPT | Performed by: OBSTETRICS & GYNECOLOGY

## 2017-04-21 PROCEDURE — 25000128 H RX IP 250 OP 636: Performed by: NURSE ANESTHETIST, CERTIFIED REGISTERED

## 2017-04-21 PROCEDURE — 27210995 ZZH RX 272: Performed by: OBSTETRICS & GYNECOLOGY

## 2017-04-21 PROCEDURE — 25000125 ZZHC RX 250: Performed by: NURSE ANESTHETIST, CERTIFIED REGISTERED

## 2017-04-21 PROCEDURE — 36000085 ZZH SURGERY LEVEL 8 1ST 30 MIN: Performed by: OBSTETRICS & GYNECOLOGY

## 2017-04-21 PROCEDURE — S0077 INJECTION, CLINDAMYCIN PHOSP: HCPCS | Performed by: OBSTETRICS & GYNECOLOGY

## 2017-04-21 PROCEDURE — 86850 RBC ANTIBODY SCREEN: CPT | Performed by: OBSTETRICS & GYNECOLOGY

## 2017-04-21 PROCEDURE — 25800025 ZZH RX 258: Performed by: OBSTETRICS & GYNECOLOGY

## 2017-04-21 PROCEDURE — 25800025 ZZH RX 258: Performed by: NURSE ANESTHETIST, CERTIFIED REGISTERED

## 2017-04-21 PROCEDURE — 40000935 ZZH STATISTIC OUTPATIENT (NON-OBS) EVE

## 2017-04-21 PROCEDURE — 25000125 ZZHC RX 250: Performed by: ANESTHESIOLOGY

## 2017-04-21 PROCEDURE — 37000009 ZZH ANESTHESIA TECHNICAL FEE, EACH ADDTL 15 MIN: Performed by: OBSTETRICS & GYNECOLOGY

## 2017-04-21 PROCEDURE — 71000012 ZZH RECOVERY PHASE 1 LEVEL 1 FIRST HR: Performed by: OBSTETRICS & GYNECOLOGY

## 2017-04-21 PROCEDURE — 27210794 ZZH OR GENERAL SUPPLY STERILE: Performed by: OBSTETRICS & GYNECOLOGY

## 2017-04-21 PROCEDURE — 25000566 ZZH SEVOFLURANE, EA 15 MIN: Performed by: OBSTETRICS & GYNECOLOGY

## 2017-04-21 RX ORDER — IBUPROFEN 600 MG/1
600 TABLET, FILM COATED ORAL EVERY 6 HOURS PRN
Qty: 60 TABLET | Refills: 0 | Status: SHIPPED | OUTPATIENT
Start: 2017-04-21 | End: 2017-07-03

## 2017-04-21 RX ORDER — SODIUM CHLORIDE, SODIUM LACTATE, POTASSIUM CHLORIDE, CALCIUM CHLORIDE 600; 310; 30; 20 MG/100ML; MG/100ML; MG/100ML; MG/100ML
INJECTION, SOLUTION INTRAVENOUS CONTINUOUS PRN
Status: DISCONTINUED | OUTPATIENT
Start: 2017-04-21 | End: 2017-04-21

## 2017-04-21 RX ORDER — IBUPROFEN 600 MG/1
600 TABLET, FILM COATED ORAL
Status: DISCONTINUED | OUTPATIENT
Start: 2017-04-21 | End: 2017-04-22 | Stop reason: HOSPADM

## 2017-04-21 RX ORDER — ONDANSETRON 4 MG/1
4 TABLET, ORALLY DISINTEGRATING ORAL EVERY 30 MIN PRN
Status: DISCONTINUED | OUTPATIENT
Start: 2017-04-21 | End: 2017-04-21 | Stop reason: HOSPADM

## 2017-04-21 RX ORDER — KETOROLAC TROMETHAMINE 30 MG/ML
30 INJECTION, SOLUTION INTRAMUSCULAR; INTRAVENOUS EVERY 6 HOURS PRN
Status: DISCONTINUED | OUTPATIENT
Start: 2017-04-21 | End: 2017-04-22 | Stop reason: HOSPADM

## 2017-04-21 RX ORDER — ONDANSETRON 2 MG/ML
4 INJECTION INTRAMUSCULAR; INTRAVENOUS ONCE
Status: COMPLETED | OUTPATIENT
Start: 2017-04-21 | End: 2017-04-21

## 2017-04-21 RX ORDER — GENTAMICIN SULFATE 100 MG/100ML
2 INJECTION, SOLUTION INTRAVENOUS
Status: COMPLETED | OUTPATIENT
Start: 2017-04-21 | End: 2017-04-21

## 2017-04-21 RX ORDER — SODIUM CHLORIDE, SODIUM LACTATE, POTASSIUM CHLORIDE, CALCIUM CHLORIDE 600; 310; 30; 20 MG/100ML; MG/100ML; MG/100ML; MG/100ML
INJECTION, SOLUTION INTRAVENOUS CONTINUOUS
Status: DISCONTINUED | OUTPATIENT
Start: 2017-04-21 | End: 2017-04-21 | Stop reason: HOSPADM

## 2017-04-21 RX ORDER — MEPERIDINE HYDROCHLORIDE 25 MG/ML
12.5 INJECTION INTRAMUSCULAR; INTRAVENOUS; SUBCUTANEOUS ONCE
Status: COMPLETED | OUTPATIENT
Start: 2017-04-21 | End: 2017-04-21

## 2017-04-21 RX ORDER — OXYCODONE AND ACETAMINOPHEN 5; 325 MG/1; MG/1
1-2 TABLET ORAL EVERY 4 HOURS PRN
Qty: 40 TABLET | Refills: 0 | Status: SHIPPED | OUTPATIENT
Start: 2017-04-21 | End: 2017-07-03

## 2017-04-21 RX ORDER — NALOXONE HYDROCHLORIDE 0.4 MG/ML
.1-.4 INJECTION, SOLUTION INTRAMUSCULAR; INTRAVENOUS; SUBCUTANEOUS
Status: DISCONTINUED | OUTPATIENT
Start: 2017-04-21 | End: 2017-04-22 | Stop reason: HOSPADM

## 2017-04-21 RX ORDER — KETOROLAC TROMETHAMINE 30 MG/ML
INJECTION, SOLUTION INTRAMUSCULAR; INTRAVENOUS PRN
Status: DISCONTINUED | OUTPATIENT
Start: 2017-04-21 | End: 2017-04-21

## 2017-04-21 RX ORDER — ONDANSETRON 2 MG/ML
INJECTION INTRAMUSCULAR; INTRAVENOUS
Status: DISPENSED
Start: 2017-04-21 | End: 2017-04-22

## 2017-04-21 RX ORDER — ONDANSETRON 2 MG/ML
INJECTION INTRAMUSCULAR; INTRAVENOUS PRN
Status: DISCONTINUED | OUTPATIENT
Start: 2017-04-21 | End: 2017-04-21

## 2017-04-21 RX ORDER — VECURONIUM BROMIDE 1 MG/ML
INJECTION, POWDER, LYOPHILIZED, FOR SOLUTION INTRAVENOUS PRN
Status: DISCONTINUED | OUTPATIENT
Start: 2017-04-21 | End: 2017-04-21

## 2017-04-21 RX ORDER — MAGNESIUM HYDROXIDE 1200 MG/15ML
LIQUID ORAL PRN
Status: DISCONTINUED | OUTPATIENT
Start: 2017-04-21 | End: 2017-04-21 | Stop reason: HOSPADM

## 2017-04-21 RX ORDER — DEXAMETHASONE SODIUM PHOSPHATE 4 MG/ML
INJECTION, SOLUTION INTRA-ARTICULAR; INTRALESIONAL; INTRAMUSCULAR; INTRAVENOUS; SOFT TISSUE PRN
Status: DISCONTINUED | OUTPATIENT
Start: 2017-04-21 | End: 2017-04-21

## 2017-04-21 RX ORDER — GLYCOPYRROLATE 0.2 MG/ML
INJECTION, SOLUTION INTRAMUSCULAR; INTRAVENOUS PRN
Status: DISCONTINUED | OUTPATIENT
Start: 2017-04-21 | End: 2017-04-21

## 2017-04-21 RX ORDER — SODIUM CHLORIDE, SODIUM LACTATE, POTASSIUM CHLORIDE, CALCIUM CHLORIDE 600; 310; 30; 20 MG/100ML; MG/100ML; MG/100ML; MG/100ML
INJECTION, SOLUTION INTRAVENOUS CONTINUOUS
Status: DISCONTINUED | OUTPATIENT
Start: 2017-04-21 | End: 2017-04-22 | Stop reason: HOSPADM

## 2017-04-21 RX ORDER — ONDANSETRON 4 MG/1
4 TABLET, ORALLY DISINTEGRATING ORAL EVERY 6 HOURS PRN
Status: DISCONTINUED | OUTPATIENT
Start: 2017-04-21 | End: 2017-04-22 | Stop reason: HOSPADM

## 2017-04-21 RX ORDER — OXYCODONE AND ACETAMINOPHEN 5; 325 MG/1; MG/1
1-2 TABLET ORAL
Status: DISCONTINUED | OUTPATIENT
Start: 2017-04-21 | End: 2017-04-21

## 2017-04-21 RX ORDER — ONDANSETRON 2 MG/ML
4 INJECTION INTRAMUSCULAR; INTRAVENOUS EVERY 30 MIN PRN
Status: DISCONTINUED | OUTPATIENT
Start: 2017-04-21 | End: 2017-04-21 | Stop reason: HOSPADM

## 2017-04-21 RX ORDER — ONDANSETRON 2 MG/ML
4 INJECTION INTRAMUSCULAR; INTRAVENOUS EVERY 6 HOURS PRN
Status: DISCONTINUED | OUTPATIENT
Start: 2017-04-21 | End: 2017-04-22 | Stop reason: HOSPADM

## 2017-04-21 RX ORDER — BUPIVACAINE HYDROCHLORIDE AND EPINEPHRINE 2.5; 5 MG/ML; UG/ML
INJECTION, SOLUTION EPIDURAL; INFILTRATION; INTRACAUDAL; PERINEURAL PRN
Status: DISCONTINUED | OUTPATIENT
Start: 2017-04-21 | End: 2017-04-21 | Stop reason: HOSPADM

## 2017-04-21 RX ORDER — OXYCODONE AND ACETAMINOPHEN 5; 325 MG/1; MG/1
1-2 TABLET ORAL EVERY 4 HOURS PRN
Status: DISCONTINUED | OUTPATIENT
Start: 2017-04-21 | End: 2017-04-22 | Stop reason: HOSPADM

## 2017-04-21 RX ORDER — FENTANYL CITRATE 50 UG/ML
INJECTION, SOLUTION INTRAMUSCULAR; INTRAVENOUS PRN
Status: DISCONTINUED | OUTPATIENT
Start: 2017-04-21 | End: 2017-04-21

## 2017-04-21 RX ORDER — NEOSTIGMINE METHYLSULFATE 1 MG/ML
VIAL (ML) INJECTION PRN
Status: DISCONTINUED | OUTPATIENT
Start: 2017-04-21 | End: 2017-04-21

## 2017-04-21 RX ORDER — LIDOCAINE HYDROCHLORIDE 20 MG/ML
INJECTION, SOLUTION INFILTRATION; PERINEURAL PRN
Status: DISCONTINUED | OUTPATIENT
Start: 2017-04-21 | End: 2017-04-21

## 2017-04-21 RX ORDER — PROPOFOL 10 MG/ML
INJECTION, EMULSION INTRAVENOUS CONTINUOUS PRN
Status: DISCONTINUED | OUTPATIENT
Start: 2017-04-21 | End: 2017-04-21

## 2017-04-21 RX ORDER — NALOXONE HYDROCHLORIDE 0.4 MG/ML
.1-.4 INJECTION, SOLUTION INTRAMUSCULAR; INTRAVENOUS; SUBCUTANEOUS
Status: DISCONTINUED | OUTPATIENT
Start: 2017-04-21 | End: 2017-04-21 | Stop reason: HOSPADM

## 2017-04-21 RX ORDER — PROPOFOL 10 MG/ML
INJECTION, EMULSION INTRAVENOUS PRN
Status: DISCONTINUED | OUTPATIENT
Start: 2017-04-21 | End: 2017-04-21

## 2017-04-21 RX ORDER — HYDROMORPHONE HYDROCHLORIDE 1 MG/ML
.3-.5 INJECTION, SOLUTION INTRAMUSCULAR; INTRAVENOUS; SUBCUTANEOUS
Status: DISCONTINUED | OUTPATIENT
Start: 2017-04-21 | End: 2017-04-22 | Stop reason: HOSPADM

## 2017-04-21 RX ORDER — CLINDAMYCIN PHOSPHATE 900 MG/50ML
900 INJECTION, SOLUTION INTRAVENOUS
Status: COMPLETED | OUTPATIENT
Start: 2017-04-21 | End: 2017-04-21

## 2017-04-21 RX ORDER — FENTANYL CITRATE 0.05 MG/ML
25-50 INJECTION, SOLUTION INTRAMUSCULAR; INTRAVENOUS
Status: DISCONTINUED | OUTPATIENT
Start: 2017-04-21 | End: 2017-04-21 | Stop reason: HOSPADM

## 2017-04-21 RX ORDER — HYDROMORPHONE HYDROCHLORIDE 1 MG/ML
INJECTION, SOLUTION INTRAMUSCULAR; INTRAVENOUS; SUBCUTANEOUS
Status: DISPENSED
Start: 2017-04-21 | End: 2017-04-22

## 2017-04-21 RX ORDER — CLINDAMYCIN PHOSPHATE 900 MG/50ML
900 INJECTION, SOLUTION INTRAVENOUS SEE ADMIN INSTRUCTIONS
Status: DISCONTINUED | OUTPATIENT
Start: 2017-04-21 | End: 2017-04-21 | Stop reason: HOSPADM

## 2017-04-21 RX ADMIN — KETOROLAC TROMETHAMINE 30 MG: 30 INJECTION, SOLUTION INTRAMUSCULAR at 21:49

## 2017-04-21 RX ADMIN — LIDOCAINE HYDROCHLORIDE 80 MG: 20 INJECTION, SOLUTION INFILTRATION; PERINEURAL at 13:58

## 2017-04-21 RX ADMIN — ONDANSETRON 4 MG: 2 SOLUTION INTRAMUSCULAR; INTRAVENOUS at 19:08

## 2017-04-21 RX ADMIN — FENTANYL CITRATE 100 MCG: 50 INJECTION, SOLUTION INTRAMUSCULAR; INTRAVENOUS at 14:16

## 2017-04-21 RX ADMIN — MEPERIDINE HYDROCHLORIDE 12.5 MG: 25 INJECTION, SOLUTION INTRAMUSCULAR; INTRAVENOUS; SUBCUTANEOUS at 16:24

## 2017-04-21 RX ADMIN — FENTANYL CITRATE 50 MCG: 50 INJECTION, SOLUTION INTRAMUSCULAR; INTRAVENOUS at 16:57

## 2017-04-21 RX ADMIN — CLINDAMYCIN PHOSPHATE 900 MG: 18 INJECTION, SOLUTION INTRAVENOUS at 14:05

## 2017-04-21 RX ADMIN — HYDROMORPHONE HYDROCHLORIDE 0.3 MG: 1 INJECTION, SOLUTION INTRAMUSCULAR; INTRAVENOUS; SUBCUTANEOUS at 20:47

## 2017-04-21 RX ADMIN — PROPOFOL 170 MG: 10 INJECTION, EMULSION INTRAVENOUS at 13:58

## 2017-04-21 RX ADMIN — FENTANYL CITRATE 50 MCG: 50 INJECTION, SOLUTION INTRAMUSCULAR; INTRAVENOUS at 17:15

## 2017-04-21 RX ADMIN — PHENYLEPHRINE HYDROCHLORIDE 100 MCG: 10 INJECTION, SOLUTION INTRAMUSCULAR; INTRAVENOUS; SUBCUTANEOUS at 14:39

## 2017-04-21 RX ADMIN — ONDANSETRON 4 MG: 2 INJECTION INTRAMUSCULAR; INTRAVENOUS at 20:48

## 2017-04-21 RX ADMIN — FENTANYL CITRATE 100 MCG: 50 INJECTION, SOLUTION INTRAMUSCULAR; INTRAVENOUS at 13:58

## 2017-04-21 RX ADMIN — GENTAMICIN SULFATE 100 MG: 100 INJECTION, SOLUTION INTRAVENOUS at 14:15

## 2017-04-21 RX ADMIN — PROPOFOL 50 MCG/KG/MIN: 10 INJECTION, EMULSION INTRAVENOUS at 14:05

## 2017-04-21 RX ADMIN — SODIUM CHLORIDE, POTASSIUM CHLORIDE, SODIUM LACTATE AND CALCIUM CHLORIDE: 600; 310; 30; 20 INJECTION, SOLUTION INTRAVENOUS at 13:54

## 2017-04-21 RX ADMIN — KETOROLAC TROMETHAMINE 30 MG: 30 INJECTION, SOLUTION INTRAMUSCULAR at 15:33

## 2017-04-21 RX ADMIN — GLYCOPYRROLATE 0.6 MG: 0.2 INJECTION, SOLUTION INTRAMUSCULAR; INTRAVENOUS at 15:43

## 2017-04-21 RX ADMIN — ROCURONIUM BROMIDE 50 MG: 10 INJECTION INTRAVENOUS at 13:58

## 2017-04-21 RX ADMIN — VECURONIUM BROMIDE 1 MG: 1 INJECTION, POWDER, LYOPHILIZED, FOR SOLUTION INTRAVENOUS at 15:15

## 2017-04-21 RX ADMIN — SODIUM CHLORIDE, POTASSIUM CHLORIDE, SODIUM LACTATE AND CALCIUM CHLORIDE: 600; 310; 30; 20 INJECTION, SOLUTION INTRAVENOUS at 15:24

## 2017-04-21 RX ADMIN — NEOSTIGMINE METHYLSULFATE 4 MG: 1 INJECTION INTRAMUSCULAR; INTRAVENOUS; SUBCUTANEOUS at 15:43

## 2017-04-21 RX ADMIN — ONDANSETRON 4 MG: 2 SOLUTION INTRAMUSCULAR; INTRAVENOUS at 17:11

## 2017-04-21 RX ADMIN — ONDANSETRON 4 MG: 2 INJECTION INTRAMUSCULAR; INTRAVENOUS at 15:30

## 2017-04-21 RX ADMIN — SODIUM CHLORIDE, POTASSIUM CHLORIDE, SODIUM LACTATE AND CALCIUM CHLORIDE: 600; 310; 30; 20 INJECTION, SOLUTION INTRAVENOUS at 21:50

## 2017-04-21 RX ADMIN — DEXAMETHASONE SODIUM PHOSPHATE 4 MG: 4 INJECTION, SOLUTION INTRA-ARTICULAR; INTRALESIONAL; INTRAMUSCULAR; INTRAVENOUS; SOFT TISSUE at 14:33

## 2017-04-21 RX ADMIN — MIDAZOLAM HYDROCHLORIDE 2 MG: 1 INJECTION, SOLUTION INTRAMUSCULAR; INTRAVENOUS at 13:55

## 2017-04-21 ASSESSMENT — ENCOUNTER SYMPTOMS: SEIZURES: 0

## 2017-04-21 NOTE — ANESTHESIA CARE TRANSFER NOTE
Patient: Ijeoma Avalos    Procedure(s):  DAVINCI SINGLE SITE HYSTERECTOMY, BILATERAL SALPINGECTOMY, LEFT OOPHORECTOMY, LYSIS OF ADHESIONS (LAPAROSCOPIC BAG TO RETREIVE OVARY) - Wound Class: II-Clean Contaminated   - Wound Class: II-Clean Contaminated   - Wound Class: II-Clean Contaminated    Diagnosis: excessive bleeding, ovarian cysts  Diagnosis Additional Information: No value filed.    Anesthesia Type:   General, ETT     Note:  Airway :Face Mask  Patient transferred to:PACU  Comments: To \[facility]\"" PACU: Arouses to stimuli, good airway, 02 face mask, VSS  Report to RN      Vitals: (Last set prior to Anesthesia Care Transfer)    CRNA VITALS  4/21/2017 1524 - 4/21/2017 1602      4/21/2017             Pulse: 87    SpO2: 100 %                Electronically Signed By: TINA Robins CRNA  April 21, 2017  4:02 PM

## 2017-04-21 NOTE — DISCHARGE INSTRUCTIONS
Same Day Surgery Discharge Instructions for  Sedation and General Anesthesia       It's not unusual to feel dizzy, light-headed or faint for up to 24 hours after surgery or while taking pain medication.  If you have these symptoms: sit for a few minutes before standing and have someone assist you when you get up to walk or use the bathroom.      You should rest and relax for the next 24 hours. We recommend you make arrangements to have an adult stay with you for at least 24 hours after your discharge.  Avoid hazardous and strenuous activity.      DO NOT DRIVE any vehicle or operate mechanical equipment for 24 hours following the end of your surgery.  Even though you may feel normal, your reactions may be affected by the medication you have received.      Do not drink alcoholic beverages for 24 hours following surgery.       Slowly progress to your regular diet as you feel able. It's not unusual to feel nauseated and/or vomit after receiving anesthesia.  If you develop these symptoms, drink clear liquids (apple juice, ginger ale, broth, 7-up, etc. ) until you feel better.  If your nausea and vomiting persists for 24 hours, please notify your surgeon.        All narcotic pain medications, along with inactivity and anesthesia, can cause constipation. Drinking plenty of liquids and increasing fiber intake will help.      For any questions of a medical nature, call your surgeon.      Do not make important decisions for 24 hours.      If you had general anesthesia, you may have a sore throat for a couple of days related to the breathing tube used during surgery.  You may use Cepacol lozenges to help with this discomfort.  If it worsens or if you develop a fever, contact your surgeon.       If you feel your pain is not well managed with the pain medications prescribed by your surgeon, please contact your surgeon's office to let them know so they can address your concerns.     Discharge Instructions for Laparoscopic or  Davinci Hysterectomy    Incisional Care  A small amount of drainage from your incisions is normal. You may wear Band Aids until the drainage stops. The day after surgery, if your incisions are dry, remove the Band Aids. Leave the Steri-Strips (small pieces of tape) in place. Let them fall off on their own, or gently remove them after 7 days.  Once your have removed your Band Aids, you may shower as usual. Wash your incisions with mild soap and water. Pat dry.  Don't use oils, powders, or lotions on your incisions.  General Care  Take your medications exactly as directed by your doctor.    You may have vaginal bleeding that can last for several weeks. Use pads only. Don't put anything in your vagina (tampons, douches or have sexual intercourse) until your doctor says it's safe to do so.  Avoid constipation.  Eat fruits, vegetables, and whole grains.  Drink 6-8 glasses of water a day, unless told to do otherwise.  Use a laxative or a mild stool softener if your doctor says it's okay.  Activity  Don't drive while you are still taking narcotic pain medications.  Don t do strenuous activities until the doctor says it's okay.  Walk as often as you feel able.    Pain  Your incisions may be tender or sore. You may also have pain in your upper back or shoulders. This is from the gas used to enlarge your abdomen to allow your doctor to see inside your pelvis and perform the procedure. This pain usually goes away in a day or two.   When to Call Your Doctor  Call your doctor right away if you have any of the following:  Fever above 100.4 F (38 C) or chills  Vaginal bleeding that soaks more than one sanitary pad per hour  A foul smelling discharge from the vagina  Difficulty urinating  Severe pain   Redness, swelling, or drainage at your incision sites  Follow-Up  Make a follow-up appointment as directed by your surgeon.      While you were at the hospital today you received Toradol, an antiinflammatory medication similar to  Ibuprofen.  You should not take other antiinflammatory medication, such as Ibuprofen, Motrin, Advil, Aleve, Naprosyn, etc, until 11:30 p.m.       **If you have questions or concerns about your procedure,   call Dr. Cano [phone]**

## 2017-04-21 NOTE — IP AVS SNAPSHOT
MRN:[id number]                      After Visit Summary   4/21/2017    Ijeoma Avalos    MRN: [id number]           Thank you!     Thank you for choosing [facility] for your care. Our goal is always to provide you with excellent care. Hearing back from our patients is one way we can continue to improve our services. Please take a few minutes to complete the written survey that you may receive in the mail after you visit with us. Thank you!        Patient Information     Date Of Birth          1965        About your hospital stay     You were admitted on:  April 21, 2017 You last received care in the:  [facility] Observation Unit    You were discharged on:  April 22, 2017       Who to Call     For medical emergencies, please call 911.  For non-urgent questions about your medical care, please call your primary care provider or clinic, [phone]  For questions related to your surgery, please call your surgery clinic        Attending Provider     Provider Ru Lima MD OB/Gyn       Primary Care Provider Office Phone # Fax #    Dulce Hernandez [phone]       [facility] [address] [place] [address] [address]  [place] [address]        After Care Instructions     Discharge Instructions       Resume pre procedure diet            Discharge Instructions       Pelvic Rest. No tampons, douching or intercourse for  6  weeks.            Ice to affected area       PRN as tolerated            No alcohol       NO ALCOHOL for 24 hours post procedure            No driving or operating machinery       No driving or operating machinery until day after procedure            No lifting       No lifting over 20 pounds and no strenuous physical activity.  For 6 weeks                  Your next 10 appointments already scheduled     May 11, 2017  8:30 AM CDT   Return Visit with Rosa Chowdary LP   [facility] [place] ([facility] [place]  [address]  [place] [address] [phone]              Further instructions from your care team       Same Day Surgery Discharge Instructions for  Sedation and General Anesthesia       It's not unusual to feel dizzy, light-headed or faint for up to 24 hours after surgery or while taking pain medication.  If you have these symptoms: sit for a few minutes before standing and have someone assist you when you get up to walk or use the bathroom.      You should rest and relax for the next 24 hours. We recommend you make arrangements to have an adult stay with you for at least 24 hours after your discharge.  Avoid hazardous and strenuous activity.      DO NOT DRIVE any vehicle or operate mechanical equipment for 24 hours following the end of your surgery.  Even though you may feel normal, your reactions may be affected by the medication you have received.      Do not drink alcoholic beverages for 24 hours following surgery.       Slowly progress to your regular diet as you feel able. It's not unusual to feel nauseated and/or vomit after receiving anesthesia.  If you develop these symptoms, drink clear liquids (apple juice, ginger ale, broth, 7-up, etc. ) until you feel better.  If your nausea and vomiting persists for 24 hours, please notify your surgeon.        All narcotic pain medications, along with inactivity and anesthesia, can cause constipation. Drinking plenty of liquids and increasing fiber intake will help.      For any questions of a medical nature, call your surgeon.      Do not make important decisions for 24 hours.      If you had general anesthesia, you may have a sore throat for a couple of days related to the breathing tube used during surgery.  You may use Cepacol lozenges to help with this discomfort.  If it worsens or if you develop a fever, contact your surgeon.       If you feel your pain is not well managed with the pain medications prescribed by your surgeon, please contact your  surgeon's office to let them know so they can address your concerns.     Discharge Instructions for Laparoscopic or Davinci Hysterectomy    Incisional Care  A small amount of drainage from your incisions is normal. You may wear Band Aids until the drainage stops. The day after surgery, if your incisions are dry, remove the Band Aids. Leave the Steri-Strips (small pieces of tape) in place. Let them fall off on their own, or gently remove them after 7 days.  Once your have removed your Band Aids, you may shower as usual. Wash your incisions with mild soap and water. Pat dry.  Don't use oils, powders, or lotions on your incisions.  General Care  Take your medications exactly as directed by your doctor.    You may have vaginal bleeding that can last for several weeks. Use pads only. Don't put anything in your vagina (tampons, douches or have sexual intercourse) until your doctor says it's safe to do so.  Avoid constipation.  Eat fruits, vegetables, and whole grains.  Drink 6-8 glasses of water a day, unless told to do otherwise.  Use a laxative or a mild stool softener if your doctor says it's okay.  Activity  Don't drive while you are still taking narcotic pain medications.  Don t do strenuous activities until the doctor says it's okay.  Walk as often as you feel able.    Pain  Your incisions may be tender or sore. You may also have pain in your upper back or shoulders. This is from the gas used to enlarge your abdomen to allow your doctor to see inside your pelvis and perform the procedure. This pain usually goes away in a day or two.   When to Call Your Doctor  Call your doctor right away if you have any of the following:  Fever above 100.4 F (38 C) or chills  Vaginal bleeding that soaks more than one sanitary pad per hour  A foul smelling discharge from the vagina  Difficulty urinating  Severe pain   Redness, swelling, or drainage at your incision sites  Follow-Up  Make a follow-up appointment as directed by your  surgeon.      While you were at the hospital today you received Toradol, an antiinflammatory medication similar to Ibuprofen.  You should not take other antiinflammatory medication, such as Ibuprofen, Motrin, Advil, Aleve, Naprosyn, etc, until 11:30 p.m.       **If you have questions or concerns about your procedure,   call Dr. Cano [phone]**    Pending Results     Date and Time Order Name Status Description    4/21/2017 1530 Surgical pathology exam In process             Statement of Approval     Ordered          04/22/17 1008  I have reviewed and agree with all the recommendations and orders detailed in this document.  EFFECTIVE NOW     Approved and electronically signed by:  Florencia Musa MD             Admission Information     Date & Time Provider Department Dept. Phone    4/21/2017 Ru Cano MD [facility] [phone]      Your Vitals Were     Blood Pressure Temperature Respirations Height Weight Last Period    137/83 (BP Location: Right arm) 97.4  F (36.3  C) (Oral) 18 1.524 m (5') 65.3 kg (144 lb) 04/14/2017    Pulse Oximetry BMI (Body Mass Index)                97% 28.12 kg/m2          [employer] Information     [employer] gives you secure access to your electronic health record. If you see a primary care provider, you can also send messages to your care team and make appointments. If you have questions, please call your primary care clinic.  If you do not have a primary care provider, please call [phone] and they will assist you.        Care EveryWhere ID     This is your Care EveryWhere ID. This could be used by other organizations to access your [facility] medical records  [id number]           Review of your medicines      START taking        Dose / Directions    ibuprofen 600 MG tablet   Commonly known as:  ADVIL/MOTRIN   Used for:  Low hemoglobin, Dysmenorrhea, Uterine leiomyoma, unspecified location, Ovarian cyst, left        Dose:  600 mg   Take 1 tablet (600 mg) by  mouth every 6 hours as needed for pain (mild)   Quantity:  60 tablet   Refills:  0       oxyCODONE-acetaminophen 5-325 MG per tablet   Commonly known as:  PERCOCET   Used for:  Low hemoglobin, Dysmenorrhea, Uterine leiomyoma, unspecified location        Dose:  1-2 tablet   Take 1-2 tablets by mouth every 4 hours as needed for pain (moderate to severe)   Quantity:  40 tablet   Refills:  0         CONTINUE these medicines which have NOT CHANGED        Dose / Directions    IRON SUPPLEMENT PO        Take by mouth daily (with breakfast)   Refills:  0       levothyroxine 112 MCG tablet   Commonly known as:  SYNTHROID/LEVOTHROID   Used for:  Other specified hypothyroidism        Dose:  112 mcg   Take 1 tablet (112 mcg) by mouth daily   Quantity:  90 tablet   Refills:  3       MULTIVITAMIN PO        Take by mouth daily   Refills:  0       omeprazole 40 MG capsule   Commonly known as:  priLOSEC   Used for:  Gastroesophageal reflux disease, esophagitis presence not specified        TAKE 1 CAPSULE BY MOUTH DAILY, 30 TO 60 MINUTES BEFORE A MEAL.   Quantity:  90 capsule   Refills:  1       VITAMIN C PO        Take by mouth daily   Refills:  0            Where to get your medicines      Some of these will need a paper prescription and others can be bought over the counter. Ask your nurse if you have questions.     Bring a paper prescription for each of these medications     ibuprofen 600 MG tablet    oxyCODONE-acetaminophen 5-325 MG per tablet                Protect others around you: Learn how to safely use, store and throw away your medicines at www.disposemymeds.org.             Medication List: This is a list of all your medications and when to take them. Check marks below indicate your daily home schedule. Keep this list as a reference.      Medications           Morning Afternoon Evening Bedtime As Needed    ibuprofen 600 MG tablet   Commonly known as:  ADVIL/MOTRIN   Take 1 tablet (600 mg) by mouth every 6 hours as needed  for pain (mild)                            Available again at 420pm       IRON SUPPLEMENT PO   Take by mouth daily (with breakfast)                                levothyroxine 112 MCG tablet   Commonly known as:  SYNTHROID/LEVOTHROID   Take 1 tablet (112 mcg) by mouth daily                                MULTIVITAMIN PO   Take by mouth daily                                omeprazole 40 MG capsule   Commonly known as:  priLOSEC   TAKE 1 CAPSULE BY MOUTH DAILY, 30 TO 60 MINUTES BEFORE A MEAL.                                oxyCODONE-acetaminophen 5-325 MG per tablet   Commonly known as:  PERCOCET   Take 1-2 tablets by mouth every 4 hours as needed for pain (moderate to severe)   Last time this was given:  1 tablet on 4/22/2017  9:23 AM                            1 tablet available again anytime.        VITAMIN C PO   Take by mouth daily

## 2017-04-21 NOTE — OR NURSING
Pt up to bathroom to void again.  Pt feeling more awake now.  Does not wish to take a pain pill at this time because it will make her sleepy.  Will progress to phase II.

## 2017-04-21 NOTE — IP AVS SNAPSHOT
[facility] Observation Unit    [address] [place] [id number]    Phone:  [phone]                                       After Visit Summary   4/21/2017    Ijeoma Avalos    MRN: [id number]           After Visit Summary Signature Page     I have received my discharge instructions, and my questions have been answered. I have discussed any challenges I see with this plan with the nurse or doctor.    ..........................................................................................................................................  Patient/Patient Representative Signature      ..........................................................................................................................................  Patient Representative Print Name and Relationship to Patient    ..................................................               ................................................  Date                                            Time    ..........................................................................................................................................  Reviewed by Signature/Title    ...................................................              ..............................................  Date                                                            Time

## 2017-04-21 NOTE — BRIEF OP NOTE
[facility] Brief Operative Note    Pre-operative diagnosis: excessive bleeding, ovarian cysts   Post-operative diagnosis Multi-fibroid uterus, adhesions of left ovary and bowel.     Procedure: Procedure(s):  DAVINCI SINGLE SITE HYSTERECTOMY, BILATERAL SALPINGECTOMY, LEFT OOPHORECTOMY, LYSIS OF ADHESIONS (LAPAROSCOPIC BAG TO RETREIVE OVARY) - Wound Class: II-Clean Contaminated   - Wound Class: II-Clean Contaminated   - Wound Class: II-Clean Contaminated   Surgeon(s): Surgeon(s) and Role:     * Ru Cano MD - Primary     * Loretta Heller PA-C - Assisting   Estimated blood loss: * No values recorded between 4/21/2017  2:14 PM and 4/21/2017  3:36 PM *    Specimens:   ID Type Source Tests Collected by Time Destination   A : UTERUS, CERVIX, BILATERAL FALLOPIN TUBES, LEFT OVARY Tissue Uterus, Cervix and Bilateral Fallopian Tubes SURGICAL PATHOLOGY EXAM Ru Cano MD 4/21/2017  3:29 PM       Findings: Left ovary enwrapped in adhesions with some peritoneal fluid entrapped as well. Right ovary looked fine so I suspected the imaging may have reversed sides? Left was so much more suspicious in appearance that I took it. Bagged and morcellated.

## 2017-04-21 NOTE — OR NURSING
Pt up to bathroom to void.  Pt able to urinate without difficulty.  Pt dizzy when up.  Assisted baack to bed.  Will allow pt to rest and then try to progress to phase II.

## 2017-04-21 NOTE — ANESTHESIA PREPROCEDURE EVALUATION
Anesthesia Evaluation     . Pt has had prior anesthetic.     No history of anesthetic complications          ROS/MED HX    ENT/Pulmonary:      (-) sleep apnea   Neurologic:      (-) seizures and CVA   Cardiovascular:         METS/Exercise Tolerance:     Hematologic:         Musculoskeletal:         GI/Hepatic:     (+) GERD Asymptomatic on medication,       Renal/Genitourinary:         Endo:     (+) type II DM (gestational) thyroid problem hypothyroidism, .      Psychiatric:         Infectious Disease:         Malignancy:         Other:                     Physical Exam  Normal systems: dental    Airway   Mallampati: II  TM distance: >3 FB  Neck ROM: full    Dental     Cardiovascular   Rhythm and rate: regular      Pulmonary    breath sounds clear to auscultation                    Anesthesia Plan      History & Physical Review  History and physical reviewed and following examination; no interval change.    ASA Status:  2 .        Plan for General and ETT with Intravenous induction. Maintenance will be Balanced.    PONV prophylaxis:  Ondansetron (or other 5HT-3) and Dexamethasone or Solumedrol  Additional equipment: Videolaryngoscope Zofran, decadron, propofol infusion      Postoperative Care  Postoperative pain management:  IV analgesics.      Consents  Anesthetic plan, risks, benefits and alternatives discussed with:  Patient.  Use of blood products discussed: Yes.   Consented to blood products.  .                          .

## 2017-04-22 VITALS
OXYGEN SATURATION: 97 % | TEMPERATURE: 97.4 F | DIASTOLIC BLOOD PRESSURE: 83 MMHG | WEIGHT: 144 LBS | HEIGHT: 60 IN | BODY MASS INDEX: 28.27 KG/M2 | SYSTOLIC BLOOD PRESSURE: 137 MMHG | RESPIRATION RATE: 18 BRPM

## 2017-04-22 LAB — GLUCOSE BLDC GLUCOMTR-MCNC: 145 MG/DL (ref 70–99)

## 2017-04-22 PROCEDURE — 40000934 ZZH STATISTIC OUTPATIENT (NON-OBS) DAY

## 2017-04-22 PROCEDURE — 25000128 H RX IP 250 OP 636: Performed by: OBSTETRICS & GYNECOLOGY

## 2017-04-22 PROCEDURE — 82962 GLUCOSE BLOOD TEST: CPT

## 2017-04-22 PROCEDURE — 25000132 ZZH RX MED GY IP 250 OP 250 PS 637: Performed by: OBSTETRICS & GYNECOLOGY

## 2017-04-22 RX ADMIN — KETOROLAC TROMETHAMINE 30 MG: 30 INJECTION, SOLUTION INTRAMUSCULAR at 03:17

## 2017-04-22 RX ADMIN — KETOROLAC TROMETHAMINE 30 MG: 30 INJECTION, SOLUTION INTRAMUSCULAR at 10:17

## 2017-04-22 RX ADMIN — OXYCODONE HYDROCHLORIDE AND ACETAMINOPHEN 1 TABLET: 5; 325 TABLET ORAL at 09:23

## 2017-04-22 NOTE — OR NURSING
Report called to observation unit.  Pt. Transferred to room #17.  Patient's friend Emilio has signed for and received pt's discharge medications and discharge instructions from [facility].

## 2017-04-22 NOTE — ANESTHESIA POSTPROCEDURE EVALUATION
Patient: Ijeoma Avalos    Procedure(s):  DAVINCI SINGLE SITE HYSTERECTOMY, BILATERAL SALPINGECTOMY, LEFT OOPHORECTOMY, LYSIS OF ADHESIONS (LAPAROSCOPIC BAG TO RETREIVE OVARY) - Wound Class: II-Clean Contaminated   - Wound Class: II-Clean Contaminated   - Wound Class: II-Clean Contaminated    Diagnosis:excessive bleeding, ovarian cysts  Diagnosis Additional Information: No value filed.    Anesthesia Type:  General, ETT    Note:  Anesthesia Post Evaluation    Patient location during evaluation: PACU  Patient participation: Able to fully participate in evaluation  Level of consciousness: awake, awake and alert and responsive to verbal stimuli  Pain management: adequate  Airway patency: patent  Cardiovascular status: acceptable  Respiratory status: acceptable  Hydration status: acceptable  PONV: none     Anesthetic complications: None          Last vitals:  Vitals:    04/21/17 2000 04/21/17 2028 04/21/17 2053   BP:  149/87 (!) 162/96   Resp:  20 18   Temp:  36.2  C (97.1  F) 36.6  C (97.8  F)   SpO2: 97% 97% 96%         Electronically Signed By: Jennifer Machuca  April 21, 2017  9:08 PM

## 2017-04-22 NOTE — PROGRESS NOTES
[facility] Gynecology Post-Op / Progress Note    Subjective:   Post-operative day #1  Davinci single site hysterectomy, bilateral salpingectomy, left oophorectomy, lysis of adhesions     Doing well.  No immediate surgical complications identified.  No excessive bleeding  Pain well-controlled.          Interval History:   Doing well.  Continues to improve.  Pain is well-controlled.  No fevers.  Unable to void last night.  Straight cath x 2.  Now able to void spontaneously.  Bladder scan 450 - voided 300+ and continues to void normally.          Significant Problems:    None          Review of Systems:    The patient denies any chest pain, shortness of breath, excessive pain, fever, chills, purulent drainage from the wound, nausea or vomiting.          Medications:   All medications related to the patient's surgery have been reviewed          Physical Exam:   All vitals stable  Temp: 97.4  F (36.3  C) Temp src: Oral BP: 137/83   Heart Rate: 84 Resp: 18 SpO2: 97 % O2 Device: None (Room air) Oxygen Delivery: 1 LPM   GEN: NAD  CHEST: bilat CTA  HEART: RRR nml S1, S2  Abd: Wound clean and dry with minimal or no drainage.  Surrounding skin with minimal erythema.  Ext: No edema, no CT          Data:     All laboratory data related to this surgery reviewed  Hemoglobin   Date Value Ref Range Status   04/21/2017 12.1 11.7 - 15.7 g/dL Final   04/04/2017 11.0 (L) 11.7 - 15.7 g/dL Final   11/07/2016 11.7 11.7 - 15.7 g/dL Final   10/05/2016 11.6 (L) 11.7 - 15.7 g/dL Final   12/23/2015 13.0 11.7 - 15.7 g/dL Final     No imaging studies have been ordered         Assessment and Plan:    Assessment:   Post-operative day #1  Davinci single site hysterectomy, bilateral salpingectomy, left oophorectomy, lysis of adhesions     Doing well.  No immediate surgical complications identified.  No excessive bleeding  Pain well-controlled.      Plan:   Discharge home.  F/U 2 wks for incision check  Discharge instructions reviewed with the  patient.        Florencia Musa MD

## 2017-04-22 NOTE — OP NOTE
DATE OF PROCEDURE:  04/21/2017.      PREOPERATIVE DIAGNOSES:   1.  Symptomatic fibroid uterus.   2.  Dysmenorrhea.   3.  Low hemoglobin.   4.  Complex adnexal mass.      POSTOPERATIVE DIAGNOSES:   1.  Symptomatic fibroid uterus.   2.  Dysmenorrhea.   3.  Low hemoglobin.   4.  Complex adnexal mass.      PROCEDURES:   1.  Da Zoltan single-site total laparoscopic hysterectomy.   2.  Bilateral salpingectomy.   3.  Left oophorectomy.      SURGEON:  Ru Cano MD      ASSISTANT:  Loretta Heller PA-C      ANESTHESIA:  General.      ESTIMATED BLOOD LOSS:  20 mL.      COMPLICATIONS:  None.      SPECIMENS:   1.  Left ovary.   2.  Bilateral fallopian tubes.   3.  Uterus and cervix sent to pathology.      FINDINGS:  On ultrasound, we felt Ijeoma Avalos had a complex right adnexal mass, but it ended up being a posterior cul-de-sac collection of adhesions with some peritoneal fluid entrapped.  Her left ovary actually seemed to be more of a potential for carcinoma, so I ended up taking the left ovary and bagging the ovary and the uterus and morcellating through the abdominal wall.  I palpated the ovary and I will await pathology, but it does not seem intensely diseased.      DESCRIPTION OF OPERATIVE PROCEDURE:  After obtaining informed consent, the patient was prepped and draped in the usual manner for abdominal vaginal procedure.  A VCare device and a Salinas catheter were inserted in the lower field.  Gloves were changed, attention turned to the abdomen.  We created a 2.5 cm umbilical skin incision and carried the dissection through to the fascia.  Fascia was nicked, undermined and extended superiorly and inferiorly.  Parietoperitoneum was entered bluntly.  We placed an Tim retractor and the silicone Silastic port.  Camera port was placed.  The patient was placed in steep Trendelenburg and the da Zoltan robot was docked.  I placed my #2 and #1 single-site arms through the port.  We used a fenestrated bipolar and a  permanent cautery hook to begin the case.  We cut down filmy adhesions to separate the rectum from the posterior aspect of the uterus.  There were some omental adhesions at the umbilical site that were stripped free with my finger.  I believe this is from a prior gallbladder removal the patient had.  She also seemed to have other associated adhesions in the lower pelvis.  We were able to cut the fallopian tube at the right ovary and separate it from the mesosalpinx, leaving the right ovary behind.  We used a fenestrated bipolar grasper to electro-coagulate the round ligament on the right, the utero-ovarian ligament on the right and we worked our way through the broad ligament,  the anterior and posterior leaves cutting down to the cervical isthmus, thus denuding the right uterine vasculature.  This was electrocoagulated at the cuff x2 and cut with a permanent cautery hook.  We performed a similar procedure after cutting through a number of adhesions that attached the left fallopian tube to the bowel.  We eventually electrocoagulated the left infundibulopelvic artery and vein and cut with a permanent cautery hook.  We cut the left round ligament after electrocoagulating with a fenestrated bipolar.  We then split the broad ligament, anterior and posterior leaves and we cut the anterior leaf down to the apex of the bladder.  We cut the posterior aspect on the cervical isthmus.  The left uterine artery and vein were electrocoagulated x2 and cut with a permanent cautery hook.  The cuff was then denuded and we cut all the way around, cutting over the VCare cup until the specimen was freed.  We placed a Pastor bag through the vagina and I placed the specimen inside.  We were able to pull the ovary through the vagina, but the rest of the uterus was much too bulky.  We tied the Pastor bag shut and left it in the abdomen.  At this point, I took my place back at the operative console and we switched out to wristed  needle .  With 2-0 Covidien V-Loc 180 stitch, I closed the cuff in a running stitch going left to right, then I imbricated and closed peritoneum right to left.  Excess stitch was cut.  The needle was sent off the field.  I then passed the Pastor bag string to my assistant who grabbed it with a locking laparoscopic trocar.  I then scrubbed back into the case, took my place at the patient's bedside.      Removed the silicone Silastic port and pulled the Pastor bag through the opening of the Tim retractor.  The Tim was then removed, placed inside the Pastor bag and repositioned in the abdominal wall of the patient.  We then used a Pastor grasper to grab the specimen and we used 11 blade scalpel to morcellate the uterus until the remaining tissue was removed.  The Tim and the bag were removed without incident.  The fascia was closed with 0 looped Maxon in a running stitch.  Skin was closed with 4-0 Vicryl in a subcuticular stitch and the wound was sealed with Dermabond.  Needle, sponge and instrument counts were correct.  The patient tolerated the procedure well.      DISPOSITION:  The patient is in satisfactory stable condition and is in recovery.         EFRA SAUER MD             D: 2017 15:45   T: 2017 10:42   MT: TS      Name:     SEN KELSEY   MRN:      [id number]        Account:        [id number]   :      1965           Procedure Date: 2017      Document: [id number]       cc: Efra Sauer MD

## 2017-04-22 NOTE — PLAN OF CARE
"Problem: Goal Outcome Summary  Goal: Goal Outcome Summary  Vss, RA.  Pt is alert and oriented.  LS clear. BS present.  Pt c/o intermittent nausea at beginning of shift and \"tiredness.\"  Zofran given.  Nausea has since resolved.  Toradol controlling pain well.  Pt declining to take oral analgesics until able to eat a full meal due to her acid reflux.  Urinary retention, straight cath for 1000ml at 2200.  Due to void this AM.  IV fluids infusing.  Belly button incision intact with liquid bandage.  No obdulio drainage present.  Possible d/c today.        "

## 2017-04-22 NOTE — OR NURSING
Pt up and dressed.  Had moved onto phase II.  Pt up to  Bathroom to void.  Pt became very weak and nauseas.  Pt assisted back into recliner.  Anti emetic given.  Dr. Machuca at bedside to check on pt.  Dr. Cano contacted to obtain admission orders.  See new orders.

## 2017-04-24 ENCOUNTER — TELEPHONE (OUTPATIENT)
Dept: FAMILY MEDICINE | Facility: CLINIC | Age: 52
End: 2017-04-24

## 2017-04-24 LAB — COPATH REPORT: NORMAL

## 2017-04-24 NOTE — TELEPHONE ENCOUNTER
Patient is calling in because she had a hysterectomy completed on 04/21/17. She is now having some symptoms after surgery and questions. Please call patient back on cell phone at [phone], if she does not answer there please call her home at [phone]. OK to leave a detailed message on both lines.    Harriet Ellis  Patient Rep

## 2017-04-24 NOTE — TELEPHONE ENCOUNTER
Spoke with patient who report that she has not able to have a proper BM since post surgery on Friday.     Having cramps and feeling bloated.   Patient currently taking oxycodone Q4 hours.   Advised that she contact surgeon office for further recommendations.    Agree with plan    Christel Hung RN

## 2017-06-07 ENCOUNTER — OFFICE VISIT (OUTPATIENT)
Dept: PSYCHOLOGY | Facility: CLINIC | Age: 52
End: 2017-06-07
Payer: COMMERCIAL

## 2017-06-07 DIAGNOSIS — F43.23 ADJUSTMENT DISORDER WITH MIXED ANXIETY AND DEPRESSED MOOD: Primary | ICD-10-CM

## 2017-06-07 PROCEDURE — 90834 PSYTX W PT 45 MINUTES: CPT | Performed by: PSYCHOLOGIST

## 2017-06-07 NOTE — MR AVS SNAPSHOT
MRN:[id number]                      After Visit Summary   6/7/2017    Ijeoma Avalos    MRN: [id number]           Visit Information        Provider Department      6/7/2017 2:00 PM Rosa Chowdary LP [facility]      MyChart Information     MyChart gives you secure access to your electronic health record. If you see a primary care provider, you can also send messages to your care team and make appointments. If you have questions, please call your primary care clinic.  If you do not have a primary care provider, please call [phone] and they will assist you.        Care EveryWhere ID     This is your Care EveryWhere ID. This could be used by other organizations to access your [facility] medical records  [id number]

## 2017-06-07 NOTE — PROGRESS NOTES
"                                             Progress Note    Client Name: Ijeoma Avalos  Date: 6/7/17       Service Type: Individual      Session Start Time: 14:00  Session End Time: 14:50      Session Length: 50     Session #: 7     Attendees: Client attended alone    Treatment Plan Last Reviewed: 2/9/17  PHQ-9 / SOSA-7 Last Reviewed: 1/16/17     DATA      Progress Since Last Session (Related to Symptoms / Goals / Homework):   Symptoms: Worry    Homework: Good progress      Episode of Care Goals: Satisfactory progress - ACTION (Actively working towards change); Intervened by reinforcing change plan / affirming steps taken     Current / Ongoing Stressors and Concerns:   Physical symptoms   Phase of life issues (full-time work and parent of young child)   Work demands   Family overseas     Treatment Objective(s) Addressed in This Session:   Build and make use of skills for stress management     Intervention:   Discussed Client's surgery and recovery thus far. She has made the transition back to work and noted that confidence at work remains solid, even after her medical leave. She did note overall increased worry, focused primarily around her health and her 's health. She has been ruminating about how she would make it as a single parent, what will happen to their young daughter if something happens to both of them, etc. Her recent surgery seems to have heightened this anxiety. Discussed some of the typical patterns around worry--can be a superstitious \"warding off\" of problems by not letting down her guard; worry as a cognitive \"habit\" that develops. Also reviewed the cost of worry: missing out on enjoyment in the present and expending time and energy on something that may never come to pass. Worked on strategies for catching ruminative worry as it is happening and then refocusing on the present moment. I suggested that Client may find some relief from creating contingency plans (e.g., who could help " "with our daughter if one of us is sick?); then she can feel more at ease knowing that she has a plan if needed. We also talked about activities that allow Client to be more in her body (walking, yoga, hot bath, gardening) than in her mind. These can be helpful for shifting past ruminations as well.     ASSESSMENT: Current Emotional / Mental Status (status of significant symptoms):   Risk status (Self / Other harm or suicidal ideation)   Client denies current fears or concerns for personal safety.   Client denies current or recent suicidal ideation or behaviors.   Client denies current or recent homicidal ideation or behaviors.   Client denies current or recent self injurious behavior or ideation.   Client denies other safety concerns.   A safety and risk management plan has not been developed at this time, however client was given the after-hours number / 911 should there be a change in any of these risk factors.     Appearance:   Appropriate    Eye Contact:   Good    Psychomotor Behavior: Normal    Attitude:   Cooperative    Orientation:   All   Speech    Rate / Production: Normal     Volume:  Normal    Mood:    Anxious    Affect:    Appropriate    Thought Content:  Frequent repetitive worries     Thought Form:  Coherent  Logical    Insight:    Fair      Medication Review:   No current psychiatric medications prescribed     Medication Compliance:   NA     Changes in Health Issues:   None reported     Chemical Use Review:   Substance Use: Chemical use reviewed, no active concerns identified      Tobacco Use: No current tobacco use.       Collateral Reports Completed:   Not Applicable    PLAN: (Client Tasks / Therapist Tasks / Other)  Practice strategies for shifting away from patterns of habitual worry. Client will work on catching herself in \"worry mode\" and then refocusing in the present. She will consider activities that allow her to be more in her body (walking, yoga, hot bath, gardening) than in her mind. She " "may wish to develop contingency plans for her fears about what she would do in the case of significant health concerns in herself or her ; then she can ease her mind from spinning through \"what if\" scenarios. She is interested in working on assertive communication in future sessions. She will call to schedule future appointments.      Rosa Chowdary, LP                                                   _______________________________________________________________________    Treatment Plan    Client's Name: Ijeoma Avalos  YOB: 1965    Date: 2/9/17    DSM-V Diagnoses: Adjustment Disorders  309.28 (F43.23) With mixed anxiety and depressed mood  Psychosocial / Contextual Factors: physical symptoms; phase of life stressors (full-time work and parent of young child); work demands; family overseas  WHODAS: 16    Referral / Collaboration:  Referral to another professional/service is not indicated at this time.    Anticipated number of session or this episode of care: will re-evaluate every 90 days      MeasurableTreatment Goal(s) related to diagnosis / functional impairment(s)  Goal 1: Client will build skills for stress management.    I will know I've met my goal when my blood pressure is lower and my reflux symptoms have decreased.      Objective #A (Client Action)    Client will use at least 2 coping skills for anxiety management in the next 6 weeks.  Status: New - Date: 2/9/17     Intervention(s)  Therapist will teach self-regulation strategies, CBT skills, mindfulness techniques.    Objective #B  Client will use cognitive strategies identified in therapy to challenge anxious thoughts.  Status: New - Date: 2/9/17     Intervention(s)  Therapist will teach cognitive strategies, provide education.      Goal 2: Client will increase self-confidence.    I will know I've met my goal when I think more positively about myself.      Objective #A (Client Action)    Status: New - Date: 2/9/17     Client " will increase assertive communication.    Intervention(s)  Therapist will teach assertiveness skills.    Objective #B  Client will Identify negative self-talk and behaviors: challenge core beliefs, myths, and actions.    Status: New - Date: 2/9/17     Intervention(s)  Therapist will provide education, teach CBT skills.      Goal 3: Client will increase connection in close relationships.    I will know I've met my goal when I feel more close with loved ones.      Objective #A (Client Action)    Status: New - Date: 2/9/17     Client will prioritize time for meaningful relationships.    Intervention(s)  Therapist will provide support and accountability.    Objective #B  Client will make use of effective interpersonal communication skills.    Status: New - Date: 2/9/17     Intervention(s)  Therapist will teach communication skills.      Client has reviewed and agreed to the above plan.      Rosa Chowdary LP  February 9, 2017

## 2017-07-03 ENCOUNTER — OFFICE VISIT (OUTPATIENT)
Dept: FAMILY MEDICINE | Facility: CLINIC | Age: 52
End: 2017-07-03
Payer: COMMERCIAL

## 2017-07-03 VITALS
DIASTOLIC BLOOD PRESSURE: 80 MMHG | OXYGEN SATURATION: 98 % | HEART RATE: 82 BPM | BODY MASS INDEX: 28.86 KG/M2 | WEIGHT: 147 LBS | HEIGHT: 60 IN | TEMPERATURE: 98.7 F | SYSTOLIC BLOOD PRESSURE: 118 MMHG

## 2017-07-03 DIAGNOSIS — M25.561 ACUTE PAIN OF BOTH KNEES: ICD-10-CM

## 2017-07-03 DIAGNOSIS — M25.562 ACUTE PAIN OF BOTH KNEES: ICD-10-CM

## 2017-07-03 DIAGNOSIS — M26.69 TMJ INFLAMMATION: Primary | ICD-10-CM

## 2017-07-03 PROCEDURE — 99214 OFFICE O/P EST MOD 30 MIN: CPT | Performed by: PHYSICIAN ASSISTANT

## 2017-07-03 NOTE — NURSING NOTE
Chief Complaint   Patient presents with     Jaw Pain       Initial /80 (BP Location: Left arm, Patient Position: Chair, Cuff Size: Adult Regular)  Pulse 82  Temp 98.7  F (37.1  C) (Tympanic)  Ht 5' (1.524 m)  Wt 147 lb (66.7 kg)  SpO2 98%  BMI 28.71 kg/m2 Estimated body mass index is 28.71 kg/(m^2) as calculated from the following:    Height as of this encounter: 5' (1.524 m).    Weight as of this encounter: 147 lb (66.7 kg).  Medication Reconciliation: complete     Negar Loza CMA

## 2017-07-03 NOTE — PATIENT INSTRUCTIONS
Self-Care for Temporomandibular Disorders (TMD)  You have temporomandibular disorder (TMD). This term describes a group of problems related to the temporomandibular joint (TMJ) and nearby muscles. The TMJ is located where the upper and lower jaws meet. Treatment will get your jaw back to normal function. But your care doesn t end there. Once you ve had TMD, it s important to avoid reinjury. Get in the habit of doing self-checks. This can make you aware of any symptoms that begin to return, so you can take action right away.    Doing self-checks  Make it a habit to assess your body a few times each day. Try writing yourself a reminder. Or set an alarm on your watch or computer. When doing a self-check, ask yourself:    Do I feel stressed?    Are my muscles tense?    Am I grinding or clenching my teeth?    Is my posture healthy for my body?    Is there anything I can do to make myself more comfortable?  If you answer  yes  to any of the questions above, you need to take action. Adjusting your posture or taking a short break can help prevent or relieve TMD symptoms.  Listening to your body  Many people get used to ignoring pain. But pain is a signal that your body needs care. To maintain your TMJ health:    Avoid hard or chewy foods. Even if you feel fine, eating such foods can trigger symptoms again.    Be aware of your body. Don t ignore TMD symptoms. The nagging pain in your neck or jaw may be a sign that you need care.    Be sure to keep follow-up appointments with your health care team.  Managing stress  Stress is a key factor in TMD. Stress can cause you to clench your muscles or grind your teeth. It can also affect your sleep, reducing your body s ability to heal. Here are a few tips to manage stress:    Learn ways to relax. Try listening to music or gently stretching. Take a few slow deep breaths. Or, close your eyes and imagine a place or object that is calming.    Get plenty of rest and sleep.    Set goals  you know you can attain.    Make time for people and things you enjoy.    Ask for help if you need it. Friends and family can run errands and cook meals for you.   Staying active  Activity helps the body in many ways. You stay looser and more relaxed. It also helps keep muscles and tissues conditioned. That way you can heal faster and make reinjury less likely. Here are some tips to get you started:    Talk to your health care provider before starting an exercise program.    Always warm up and stretch before each activity. This helps prevent injury.    Try walking or swimming. These activities are easy on your joints. They also benefit your heart and lungs.    Try yoga or jose maria chi. These are relaxing activities known for reducing stress.  Date Last Reviewed: 7/13/2015 2000-2017 [employer]. [address] [place] [address], [place] [address]. All rights reserved. This information is not intended as a substitute for professional medical care. Always follow your healthcare professional's instructions.

## 2017-07-03 NOTE — MR AVS SNAPSHOT
After Visit Summary   7/3/2017    Ijeoma Avalos    MRN: [id number]           Patient Information     Date Of Birth          1965        Visit Information        Provider Department      7/3/2017 3:40 PM Jessenia Glover PA-C [facility] [place]        Today's Diagnoses     TMJ inflammation    -  1      Care Instructions      Self-Care for Temporomandibular Disorders (TMD)  You have temporomandibular disorder (TMD). This term describes a group of problems related to the temporomandibular joint (TMJ) and nearby muscles. The TMJ is located where the upper and lower jaws meet. Treatment will get your jaw back to normal function. But your care doesn t end there. Once you ve had TMD, it s important to avoid reinjury. Get in the habit of doing self-checks. This can make you aware of any symptoms that begin to return, so you can take action right away.    Doing self-checks  Make it a habit to assess your body a few times each day. Try writing yourself a reminder. Or set an alarm on your watch or computer. When doing a self-check, ask yourself:    Do I feel stressed?    Are my muscles tense?    Am I grinding or clenching my teeth?    Is my posture healthy for my body?    Is there anything I can do to make myself more comfortable?  If you answer  yes  to any of the questions above, you need to take action. Adjusting your posture or taking a short break can help prevent or relieve TMD symptoms.  Listening to your body  Many people get used to ignoring pain. But pain is a signal that your body needs care. To maintain your TMJ health:    Avoid hard or chewy foods. Even if you feel fine, eating such foods can trigger symptoms again.    Be aware of your body. Don t ignore TMD symptoms. The nagging pain in your neck or jaw may be a sign that you need care.    Be sure to keep follow-up appointments with your health care team.  Managing stress  Stress is a key factor in TMD. Stress can cause  you to clench your muscles or grind your teeth. It can also affect your sleep, reducing your body s ability to heal. Here are a few tips to manage stress:    Learn ways to relax. Try listening to music or gently stretching. Take a few slow deep breaths. Or, close your eyes and imagine a place or object that is calming.    Get plenty of rest and sleep.    Set goals you know you can attain.    Make time for people and things you enjoy.    Ask for help if you need it. Friends and family can run errands and cook meals for you.   Staying active  Activity helps the body in many ways. You stay looser and more relaxed. It also helps keep muscles and tissues conditioned. That way you can heal faster and make reinjury less likely. Here are some tips to get you started:    Talk to your health care provider before starting an exercise program.    Always warm up and stretch before each activity. This helps prevent injury.    Try walking or swimming. These activities are easy on your joints. They also benefit your heart and lungs.    Try yoga or jose maria chi. These are relaxing activities known for reducing stress.  Date Last Reviewed: 7/13/2015 2000-2017 [employer]. [address] [place], [place] [address]. All rights reserved. This information is not intended as a substitute for professional medical care. Always follow your healthcare professional's instructions.                Follow-ups after your visit        Who to contact     If you have questions or need follow up information about today's clinic visit or your schedule please contact [facility] [place] directly at [phone].  Normal or non-critical lab and imaging results will be communicated to you by MyChart, letter or phone within 4 business days after the clinic has received the results. If you do not hear from us within 7 days, please contact the clinic through MyChart or phone. If you have a critical or abnormal lab result, we will notify  you by phone as soon as possible.  Submit refill requests through [employer] or call your pharmacy and they will forward the refill request to us. Please allow 3 business days for your refill to be completed.          Additional Information About Your Visit        [employer] Information     [employer] gives you secure access to your electronic health record. If you see a primary care provider, you can also send messages to your care team and make appointments. If you have questions, please call your primary care clinic.  If you do not have a primary care provider, please call [phone] and they will assist you.        Care EveryWhere ID     This is your Care EveryWhere ID. This could be used by other organizations to access your [facility] medical records  [id number]        Your Vitals Were     Pulse Temperature Height Pulse Oximetry BMI (Body Mass Index)       82 98.7  F (37.1  C) (Tympanic) 5' (1.524 m) 98% 28.71 kg/m2        Blood Pressure from Last 3 Encounters:   07/03/17 118/80   04/22/17 137/83   04/18/17 130/87    Weight from Last 3 Encounters:   07/03/17 147 lb (66.7 kg)   04/21/17 144 lb (65.3 kg)   04/18/17 147 lb (66.7 kg)              Today, you had the following     No orders found for display       Primary Care Provider Office Phone # Fax #    Dulce Terrence Hernandez [phone]       [place] [address]  [place] [address]        Equal Access to Services     [facility]: Hadii aad ku hadasho Soomaali, waaxda luqadaha, qaybta kaalmada adeegyada, wax[employer] pavanin hayleonie adetavo dooley'leonie . So [facility] [phone].    ATENCIÓN: Si habla giovanyañol, tiene a irene disposición servicios gratuitos de asistencia lingüística. Llame al [phone].    We comply with applicable federal civil rights laws and [place] laws. We do not discriminate on the basis of race, color, national origin, age, disability sex, sexual orientation or gender identity.            Thank you!     Thank  you for choosing [facility] [place]  for your care. Our goal is always to provide you with excellent care. Hearing back from our patients is one way we can continue to improve our services. Please take a few minutes to complete the written survey that you may receive in the mail after your visit with us. Thank you!             Your Updated Medication List - Protect others around you: Learn how to safely use, store and throw away your medicines at www.disposemymeds.org.          This list is accurate as of: 7/3/17  3:58 PM.  Always use your most recent med list.                   Brand Name Dispense Instructions for use Diagnosis    levothyroxine 112 MCG tablet    SYNTHROID/LEVOTHROID    90 tablet    Take 1 tablet (112 mcg) by mouth daily    Other specified hypothyroidism       MULTIVITAMIN PO      Take by mouth daily        omeprazole 40 MG capsule    priLOSEC    90 capsule    TAKE 1 CAPSULE BY MOUTH DAILY, 30 TO 60 MINUTES BEFORE A MEAL.    Gastroesophageal reflux disease, esophagitis presence not specified

## 2017-07-03 NOTE — PROGRESS NOTES
SUBJECTIVE:                                                    Ijeoma Avalos is a 51 year old female who presents to clinic today for the following health issues:      Concern - left side jaw pain  Onset: x one day    Description:   Left side jaw pain pretty painful ear is bothering her too    Intensity: severe    Progression of Symptoms:  same    Accompanying Signs & Symptoms:  none    Previous history of similar problem:   never    Precipitating factors:   Worsened by: opening mouth    Alleviating factors:  Improved by: ibuprofen helped a small amount    Therapies Tried and outcome: ibuprofen    Wondering if it is her ear causing the pain. No injury, fever. Has a dentist apptmt on Wednesday.     #2- Both knees are sore, no injury, feels the make a crunching noise when bending. No numbness, tingling, weakness.     -------------------------------------    Problem list and histories reviewed & adjusted, as indicated.  Additional history: as documented    Patient Active Problem List   Diagnosis     Gastric acidity     CARDIOVASCULAR SCREENING; LDL GOAL LESS THAN 160     Other specified hypothyroidism     Plantar fasciitis     Hyperlipidemia LDL goal <130     Gastroesophageal reflux disease, esophagitis presence not specified     TMJ (temporomandibular joint syndrome)     Neck pain     Other headache syndrome     Uterine leiomyoma, unspecified location     Adjustment disorder with mixed anxiety and depressed mood     Low hemoglobin     Dysmenorrhea     Right knee pain, unspecified chronicity     Post-operative nausea and vomiting     Past Surgical History:   Procedure Laterality Date     APPENDECTOMY        SECTION  2014    Procedure:  SECTION;  Surgeon: Ru Cano MD;  Location:  L+D     CHOLECYSTECTOMY       cyst removed      left  lower leg on bone sheath     DAVINCI HYSTERECTOMY TOTAL, SALPINGECTOMY BILATERAL N/A 2017    Procedure: DAVINCI HYSTERECTOMY TOTAL,  SALPINGECTOMY BILATERAL;  DAVINCI SINGLE SITE HYSTERECTOMY, BILATERAL SALPINGECTOMY, LEFT OOPHORECTOMY, LYSIS OF ADHESIONS (LAPAROSCOPIC BAG TO RETREIVE OVARY);  Surgeon: Ru Cano MD;  Location:  OR     DAVINCI LYSIS OF ADHESIONS N/A 4/21/2017    Procedure: DAVINCI LYSIS OF ADHESIONS;;  Surgeon: Ru Cano MD;  Location: [facility] OR     DAVINCI OOPHORECTOMY N/A 4/21/2017    Procedure: DAVINCI OOPHORECTOMY;;  Surgeon: Ru Cano MD;  Location: [facility] OR     GASTROSCOPY,FL       GI SURGERY       MYOMECTOMY UTERUS  sept 2012       Social History   Substance Use Topics     Smoking status: Never Smoker     Smokeless tobacco: Never Used     Alcohol use No      Comment: social     Family History   Problem Relation Age of Onset     Hypertension Father          Current Outpatient Prescriptions   Medication Sig Dispense Refill     omeprazole (PRILOSEC) 40 MG capsule TAKE 1 CAPSULE BY MOUTH DAILY, 30 TO 60 MINUTES BEFORE A MEAL. 90 capsule 1     levothyroxine (SYNTHROID, LEVOTHROID) 112 MCG tablet Take 1 tablet (112 mcg) by mouth daily 90 tablet 3     Multiple Vitamins-Minerals (MULTIVITAMIN PO) Take by mouth daily       Allergies   Allergen Reactions     Cephalexin Hives     Hives on hands, face and stomach.      Labs reviewed in EPIC    Reviewed and updated as needed this visit by clinical staff       Reviewed and updated as needed this visit by Provider         Social History     Social History     Marital status:      Spouse name: N/A     Number of children: 1     Years of education: N/A     Occupational History           Social History Main Topics     Smoking status: Never Smoker     Smokeless tobacco: Never Used     Alcohol use No      Comment: social     Drug use: No     Sexual activity: Yes     Partners: Male     Other Topics Concern     None     Social History Narrative       10 point review of systems negative other than symptoms noted above.   Constitutional, HEENT, CV,  pulmonary, GI, , MS, Endo, Psych systems are all negative, except as otherwise noted.       OBJECTIVE:  /80 (BP Location: Left arm, Patient Position: Chair, Cuff Size: Adult Regular)  Pulse 82  Temp 98.7  F (37.1  C) (Tympanic)  Ht 5' (1.524 m)  Wt 147 lb (66.7 kg)  SpO2 98%  BMI 28.71 kg/m2  CONSTITUTIONAL: Alert, well-nourished, well-groomed, NAD  RESP: Lungs CTA. No wheeze, rhonchi, rales. Normal effort on room air. Equal lung sounds bilaterally.   CV: HRRR, normal S1, S2. No MRG. No peripheral edema.  DERM: No rashes or suspicious lesions  Head: Normocephalic, atraumatic.  Eyes: Conjunctiva clear, non icteric. PERRLA.  Ears: External ears and TMs normal BL.  Nose: Septum midline, nasal mucosa pink and moist. No discharge.  Mouth / Throat: Normal dentition.  No oral lesions. Pharynx non erythematous, tonsils without hypertrophy. Left TMJ mildly sore. Able to fully open mouth, no dental tenderness. No trismus.   Neck: Supple, no enlarged LN, trachea midline.  MUSCULOSKELETAL:  KNEE PAIN:  Appearance: No visible abnormalities.   Palpation: No palpable abnormalities. With flexion while standing, can feel some crepitus.   Tenderness: bilateral knee bilateral joint lines.   Movement: Full range of motion intact.  Circulation: Distal circulation intact, pulses palpable and good capillary refill.  Strength: 5/5  Sensation: Sensation grossly intact.  Normal gait; negative ant/post drawer, neg lachmans and valgus/varus stress testing.         Diagnostic Tests:  none    ASSESSMENT/PLAN:  (M26.69) TMJ inflammation  (primary encounter diagnosis)  Comment:   Plan: Left TMJ irritated. She noted improvement w/ 2 motrin - recommend 600 mg TID with food. Cool compresses and avoid chewy foods.     (M25.561,  M25.562) Acute pain of both knees  Comment: Likely OA, offered Xrays, she declines at this time. She is interested in trying physical therapy. Can try otc chondroitin and motrin will help as above.    Plan: [facility]  PT, HAND, AND CHIROPRACTIC REFERRAL              FOLLOW-UP: Routinely and sooner as needed.  The patient agrees with this assessment and plan and agrees to call or return to the clinic with any questions or concerns or if their condition worsens.    PEYTON Oliva, PA-C  [facility]

## 2017-07-13 ENCOUNTER — OFFICE VISIT (OUTPATIENT)
Dept: ORTHOPEDICS | Facility: CLINIC | Age: 52
End: 2017-07-13
Payer: COMMERCIAL

## 2017-07-13 ENCOUNTER — RADIANT APPOINTMENT (OUTPATIENT)
Dept: GENERAL RADIOLOGY | Facility: CLINIC | Age: 52
End: 2017-07-13
Attending: FAMILY MEDICINE
Payer: COMMERCIAL

## 2017-07-13 VITALS
BODY MASS INDEX: 28.86 KG/M2 | WEIGHT: 147 LBS | HEIGHT: 60 IN | DIASTOLIC BLOOD PRESSURE: 88 MMHG | SYSTOLIC BLOOD PRESSURE: 136 MMHG

## 2017-07-13 DIAGNOSIS — M25.562 ARTHRALGIA OF BOTH LOWER LEGS: ICD-10-CM

## 2017-07-13 DIAGNOSIS — M22.2X2 PATELLOFEMORAL SYNDROME OF BOTH KNEES: Primary | ICD-10-CM

## 2017-07-13 DIAGNOSIS — M25.561 ARTHRALGIA OF BOTH LOWER LEGS: ICD-10-CM

## 2017-07-13 DIAGNOSIS — M22.2X1 PATELLOFEMORAL SYNDROME OF BOTH KNEES: Primary | ICD-10-CM

## 2017-07-13 PROCEDURE — 99213 OFFICE O/P EST LOW 20 MIN: CPT | Performed by: FAMILY MEDICINE

## 2017-07-13 PROCEDURE — 73562 X-RAY EXAM OF KNEE 3: CPT | Mod: RT | Performed by: FAMILY MEDICINE

## 2017-07-13 NOTE — NURSING NOTE
Chief Complaint   Patient presents with     Knee Pain     bilateral L>R       Initial /88  Ht 5' (1.524 m)  Wt 147 lb (66.7 kg)  BMI 28.71 kg/m2 Estimated body mass index is 28.71 kg/(m^2) as calculated from the following:    Height as of this encounter: 5' (1.524 m).    Weight as of this encounter: 147 lb (66.7 kg).  Medication Reconciliation: complete

## 2017-07-13 NOTE — MR AVS SNAPSHOT
After Visit Summary   7/13/2017    Ijeoma Avalos    MRN: [id number]           Patient Information     Date Of Birth          1965        Visit Information        Provider Department      7/13/2017 4:20 PM Sandor Porter MD [facility] [facility]        Today's Diagnoses     Patellofemoral syndrome of both knees    -  1    Arthralgia of both lower legs           Follow-ups after your visit        Additional Services     [facility] PT, HAND, AND CHIROPRACTIC REFERRAL       **This order will print in the [facility] Scheduling Office**    Physical Therapy, Hand Therapy and Chiropractic Care are available through:    *[facility]  *[facility]  *[facility]    Call one number to schedule at any of the above locations: [phone].    Your provider has referred you to: Physical Therapy at [facility] or [facility]    Indication/Reason for Referral:    Patellofemoral syndrome of both knees  Hypermobile patella    Onset of Illness:   Therapy Orders: Evaluate and Treat  Special Programs: None  Special Request: None    Tayo Cr      Additional Comments for the Therapist or Chiropractor:     Please be aware that coverage of these services is subject to the terms and limitations of your health insurance plan.  Call member services at your health plan with any benefit or coverage questions.      Please bring the following to your appointment:    *Your personal calendar for scheduling future appointments  *Comfortable clothing                  Your next 10 appointments already scheduled     Jul 17, 2017  5:10 PM CDT   [facility] Extremity with Obi Shay PT   [facility] [place] [facility] ([facility] [place])    [address] [place] [id number]  [place] [id number] [phone]              Who to contact     If you have questions or need follow up information about today's clinic visit or your  schedule please contact [place] [facility] [facility] directly at [phone].  Normal or non-critical lab and imaging results will be communicated to you by MyChart, letter or phone within 4 business days after the clinic has received the results. If you do not hear from us within 7 days, please contact the clinic through [employer] or phone. If you have a critical or abnormal lab result, we will notify you by phone as soon as possible.  Submit refill requests through [employer] or call your pharmacy and they will forward the refill request to us. Please allow 3 business days for your refill to be completed.          Additional Information About Your Visit        [employer] Information     [employer] gives you secure access to your electronic health record. If you see a primary care provider, you can also send messages to your care team and make appointments. If you have questions, please call your primary care clinic.  If you do not have a primary care provider, please call [phone] and they will assist you.        Care EveryWhere ID     This is your Care EveryWhere ID. This could be used by other organizations to access your [facility] medical records  [id number]        Your Vitals Were     Height BMI (Body Mass Index)                5' (1.524 m) 28.71 kg/m2           Blood Pressure from Last 3 Encounters:   07/13/17 136/88   07/03/17 118/80   04/22/17 137/83    Weight from Last 3 Encounters:   07/13/17 147 lb (66.7 kg)   07/03/17 147 lb (66.7 kg)   04/21/17 144 lb (65.3 kg)              We Performed the Following     [facility] PT, HAND, AND CHIROPRACTIC REFERRAL        Primary Care Provider Office Phone # Fax #    Dulce Hernandez [phone]       [place] [address]  [place] [address]        Equal Access to Services     [facility]EL[facility] VIVAS [facility]: Hadii mario armaso Soomaali, waaxda luqadaha, qaybta kaalmada adetavoyakami, shannon thurman  ah. So [facility] [phone].    ATENCIÓN: Si lisette mai, tiene a irene disposición servicios gratuitos de asistencia lingüística. Lilian al [phone].    We comply with applicable federal civil rights laws and [place] laws. We do not discriminate on the basis of race, color, national origin, age, disability sex, sexual orientation or gender identity.            Thank you!     Thank you for choosing [facility]  for your care. Our goal is always to provide you with excellent care. Hearing back from our patients is one way we can continue to improve our services. Please take a few minutes to complete the written survey that you may receive in the mail after your visit with us. Thank you!             Your Updated Medication List - Protect others around you: Learn how to safely use, store and throw away your medicines at www.disposemymeds.org.          This list is accurate as of: 7/13/17 11:59 PM.  Always use your most recent med list.                   Brand Name Dispense Instructions for use Diagnosis    Glucosamine-Chondroitin--200-150 MG Tabs           levothyroxine 112 MCG tablet    SYNTHROID/LEVOTHROID    90 tablet    Take 1 tablet (112 mcg) by mouth daily    Other specified hypothyroidism       MULTIVITAMIN PO      Take by mouth daily        omeprazole 40 MG capsule    priLOSEC    90 capsule    TAKE 1 CAPSULE BY MOUTH DAILY, 30 TO 60 MINUTES BEFORE A MEAL.    Gastroesophageal reflux disease, esophagitis presence not specified

## 2017-07-13 NOTE — PROGRESS NOTES
[facility]   Clinic Visit s Jul 13, 2017    PCP: Dulce Hernandez Sikander    Ijeoma is a 51 year old female who is seen in consultation at the request of Dr. Jessenia Glover PA-C   Chief Complaint   Patient presents with     Knee Pain     bilateral L>R     Injury: Reports insidious onset without acute precipitating event. patient reports she has had a 10# increases in weight over the past year.        Location of Pain: bilateral inferolateral patella  Duration of Pain: 6 month(s)  Rating of Pain at worst: 6/10  Rating of Pain Currently: 5/10  Pain is worse with: mornings, squatting, going up stairs  Pain is better with: rest  Treatment so far consists of: no treatment tried to date  Associated symptoms: no distal numbness or tingling; denies swelling or warmth  Prior History of related problems: none    Social History: works at [employer]     Past Medical History:   Diagnosis Date     Gastric acidity      Gestational diabetes mellitus     DIET CONTROL     Hx of previous reproductive problem     IVF     Hypothyroid      Motion sickness      Ovarian cyst      Post-operative nausea and vomiting 4/21/2017       Patient Active Problem List    Diagnosis Date Noted     Post-operative nausea and vomiting 04/21/2017     Priority: Medium     Low hemoglobin 11/07/2016     Priority: Medium     Dysmenorrhea 11/07/2016     Priority: Medium     likely related to fibroid        Right knee pain, unspecified chronicity 11/07/2016     Priority: Medium     medial knee strain        Uterine leiomyoma, unspecified location 06/29/2016     Priority: Medium     Seeing gyn for that        Adjustment disorder with mixed anxiety and depressed mood 06/29/2016     Priority: Medium     Gastroesophageal reflux disease, esophagitis presence not specified 06/01/2016     Priority: Medium     TMJ (temporomandibular joint syndrome) 06/01/2016     Priority: Medium     Neck pain 06/01/2016     Priority: Medium     Other  headache syndrome 2016     Priority: Medium     related to neck/ tmj        Hyperlipidemia LDL goal <130 10/20/2015     Priority: Medium     Plantar fasciitis 2015     Priority: Medium     Other specified hypothyroidism 2015     Priority: Medium     CARDIOVASCULAR SCREENING; LDL GOAL LESS THAN 160 2012     Priority: Medium     Gastric acidity      Priority: Medium       Family History   Problem Relation Age of Onset     Hypertension Father        Social History     Social History     Marital status:      Spouse name: N/A     Number of children: 1     Years of education: N/A     Occupational History           Social History Main Topics     Smoking status: Never Smoker     Smokeless tobacco: Never Used     Alcohol use No      Comment: social       Past Surgical History:   Procedure Laterality Date     APPENDECTOMY        SECTION  2014    Procedure:  SECTION;  Surgeon: Ru Cano MD;  Location:  L+D     CHOLECYSTECTOMY       cyst removed      left  lower leg on bone sheath     DAVINCI HYSTERECTOMY TOTAL, SALPINGECTOMY BILATERAL N/A 2017    Procedure: DAVINCI HYSTERECTOMY TOTAL, SALPINGECTOMY BILATERAL;  DAVINCI SINGLE SITE HYSTERECTOMY, BILATERAL SALPINGECTOMY, LEFT OOPHORECTOMY, LYSIS OF ADHESIONS (LAPAROSCOPIC BAG TO RETREIVE OVARY);  Surgeon: Ru Cano MD;  Location:  OR     DAVINCI LYSIS OF ADHESIONS N/A 2017    Procedure: DAVINCI LYSIS OF ADHESIONS;;  Surgeon: Ru Cano MD;  Location:  OR     DAVINCI OOPHORECTOMY N/A 2017    Procedure: DAVINCI OOPHORECTOMY;;  Surgeon: Ru Cano MD;  Location:  OR     GASTROSCOPY,FL       GI SURGERY       MYOMECTOMY UTERUS  2012       Review of Systems   Musculoskeletal: Positive for joint pain.   All other systems reviewed and are negative.        Physical Exam  /88  Ht 5' (1.524 m)  Wt 147 lb (66.7 kg)  BMI 28.71  kg/m2  Constitutional:well-developed, well-nourished, and in no distress.   Cardiovascular: Intact distal pulses.    Neurological: alert. Gait Normal:   Gait, station, stance, and balance appear normal for age  Skin: Skin is warm and dry.   Psychiatric: Mood and affect normal.   Respiratory: unlabored, speaks in full sentences  Lymph: no LAD, no lymphangitis    Left Knee Exam   Swelling: None  Effusion: No    Tenderness   The patient is experiencing tenderness in the medial retinaculum, patella facets.    Range of Motion   Extension: -20  Flexion:     Normal    Tests   McMurrays:  Medial - Negative      Lateral - Negative  Lachman:  Anterior - Negative    Posterior - Negative  Drawer:       Anterior - Negative     Posterior - Negative  Varus:  Negative  Valgus: Negative  Pivot Shift: Negative  Patellar Apprehension: No    Comments:  Crepitus with range of motion      Right Knee Exam   Swelling: None  Effusion: No    Tenderness   The patient is experiencing tenderness in the patella facets.    Range of Motion   Extension: -20  Flexion:     Normal    Tests   McMurrays:  Medial - Negative      Lateral - Negative  Lachman:  Anterior - Negative    Posterior - Negative  Drawer:       Anterior - Negative    Posterior - Negative  Varus:  Negative  Valgus: Negative  Pivot Shift: Negative  Patellar Apprehension: No    Comments:  Crepitus with ROM          X-ray images Ordered and independently reviewed by me in the office today with the patient. X-ray shows:   Recent Results (from the past 48 hour(s))   XR Knee Bilateral 3 Views    Narrative    7/13/2017    Minimal peripatellar osteophyte formation, otherwise unremarkable knees.        ASSESSMENT/PLAN    ICD-10-CM    1. Patellofemoral syndrome of both knees M22.2X1 [facility] PT, HAND, AND CHIROPRACTIC REFERRAL    M22.2X2    2. Arthralgia of both lower legs M25.561 XR Knee Bilateral 3 Views    M25.562        Explained nature of syndrome as a patella maltracking issue, now seemingly  more related to poor hip mechanics affecting the biomechanical function of the entire leg, leaving the patella to move in an inappropriate or dysfunctional manner in the patellar groove. Genu recurvatum and patella hypermobility contributing.  Physical therapy aimed at restoring correct patella tracking by restoring overall normal leg function recommended.  This includes avoidance of quad exercises especially leg extensions and emphasis on hip abductor and extensor strengthening. Noted mechanism for hip instability causing altered patellofemoral mechanics.  Recheck 4-6 weeks if not better.

## 2017-07-17 ENCOUNTER — THERAPY VISIT (OUTPATIENT)
Dept: PHYSICAL THERAPY | Facility: CLINIC | Age: 52
End: 2017-07-17
Payer: COMMERCIAL

## 2017-07-17 DIAGNOSIS — M22.2X2 BILATERAL PATELLOFEMORAL SYNDROME: Primary | ICD-10-CM

## 2017-07-17 DIAGNOSIS — M22.2X1 BILATERAL PATELLOFEMORAL SYNDROME: Primary | ICD-10-CM

## 2017-07-17 PROCEDURE — 97110 THERAPEUTIC EXERCISES: CPT | Mod: GP | Performed by: PHYSICAL THERAPIST

## 2017-07-17 PROCEDURE — 97161 PT EVAL LOW COMPLEX 20 MIN: CPT | Mod: GP | Performed by: PHYSICAL THERAPIST

## 2017-07-17 ASSESSMENT — ACTIVITIES OF DAILY LIVING (ADL)
SQUAT: ACTIVITY IS FAIRLY DIFFICULT
GIVING WAY, BUCKLING OR SHIFTING OF KNEE: I DO NOT HAVE THE SYMPTOM
KNEE_ACTIVITY_OF_DAILY_LIVING_SCORE: 78.57
KNEE_ACTIVITY_OF_DAILY_LIVING_SUM: 55
STIFFNESS: THE SYMPTOM AFFECTS MY ACTIVITY SLIGHTLY
HOW_WOULD_YOU_RATE_THE_OVERALL_FUNCTION_OF_YOUR_KNEE_DURING_YOUR_USUAL_DAILY_ACTIVITIES?: NEARLY NORMAL
SWELLING: I DO NOT HAVE THE SYMPTOM
STAND: ACTIVITY IS NOT DIFFICULT
RAW_SCORE: 55
WALK: ACTIVITY IS MINIMALLY DIFFICULT
KNEEL ON THE FRONT OF YOUR KNEE: ACTIVITY IS MINIMALLY DIFFICULT
HOW_WOULD_YOU_RATE_THE_CURRENT_FUNCTION_OF_YOUR_KNEE_DURING_YOUR_USUAL_DAILY_ACTIVITIES_ON_A_SCALE_FROM_0_TO_100_WITH_100_BEING_YOUR_LEVEL_OF_KNEE_FUNCTION_PRIOR_TO_YOUR_INJURY_AND_0_BEING_THE_INABILITY_TO_PERFORM_ANY_OF_YOUR_USUAL_DAILY_ACTIVITIES?: 50
AS_A_RESULT_OF_YOUR_KNEE_INJURY,_HOW_WOULD_YOU_RATE_YOUR_CURRENT_LEVEL_OF_DAILY_ACTIVITY?: NEARLY NORMAL
GO UP STAIRS: ACTIVITY IS SOMEWHAT DIFFICULT
SIT WITH YOUR KNEE BENT: ACTIVITY IS NOT DIFFICULT
WEAKNESS: I HAVE THE SYMPTOM BUT IT DOES NOT AFFECT MY ACTIVITY
LIMPING: I DO NOT HAVE THE SYMPTOM
GO DOWN STAIRS: ACTIVITY IS MINIMALLY DIFFICULT
PAIN: THE SYMPTOM AFFECTS MY ACTIVITY MODERATELY
RISE FROM A CHAIR: ACTIVITY IS MINIMALLY DIFFICULT

## 2017-07-17 NOTE — MR AVS SNAPSHOT
After Visit Summary   7/17/2017    Ijeoma Avalos    MRN: [id number]           Patient Information     Date Of Birth          1965        Visit Information        Provider Department      7/17/2017 5:10 PM Obi Shay PT [facility] [place] [facility]        Today's Diagnoses     Bilateral patellofemoral syndrome    -  1       Follow-ups after your visit        Your next 10 appointments already scheduled     Jul 21, 2017  1:10 PM CDT   [facility] Extremity with Obi Shay PT   [facility] [place] [facility] ([facility] [place])    [address] [place] [address]  [address]  [place] [address]   [phone]            Aug 02, 2017  4:30 PM CDT   FAVIO Extremity with Obi Shay PT   [facility] [place] [facility] ([facility] [place])    [address] [place] [address]  [address]  [place] [address]   [phone]              Who to contact     If you have questions or need follow up information about today's clinic visit or your schedule please contact [facility] directly at [phone].  Normal or non-critical lab and imaging results will be communicated to you by PDVhart, letter or phone within 4 business days after the clinic has received the results. If you do not hear from us within 7 days, please contact the clinic through PDVhart or phone. If you have a critical or abnormal lab result, we will notify you by phone as soon as possible.  Submit refill requests through Narvii or call your pharmacy and they will forward the refill request to us. Please allow 3 business days for your refill to be completed.          Additional Information About Your Visit        PDVhart Information     Narvii gives you secure access to your electronic health record. If you see a primary care provider, you can also send messages to your care team and make appointments. If you have  questions, please call your primary care clinic.  If you do not have a primary care provider, please call [phone] and they will assist you.        Care EveryWhere ID     This is your Care EveryWhere ID. This could be used by other organizations to access your [place] medical records  [id number]         Blood Pressure from Last 3 Encounters:   07/13/17 136/88   07/03/17 118/80   04/22/17 137/83    Weight from Last 3 Encounters:   07/13/17 66.7 kg (147 lb)   07/03/17 66.7 kg (147 lb)   04/21/17 65.3 kg (144 lb)              We Performed the Following     HC PT EVAL, LOW COMPLEXITY     [facility] INITIAL EVAL REPORT     THERAPEUTIC EXERCISES        Primary Care Provider Office Phone # Fax #    Dulce Hernandez [phone]       [facility] [address]  [place] [address]        Equal Access to Services     ADELINE VIVAS AH: Hadii aad ku hadasho Soomaali, waaxda luqadaha, qaybta kaalmada adeegyada, waxay idiin haydeborahn nichole thurman . So [facility] [phone].    ATENCIÓN: Si lisette mai, tiene a irene disposición servicios gratuitos de asistencia lingüística. Llame al [phone].    We comply with applicable federal civil rights laws and [place] laws. We do not discriminate on the basis of race, color, national origin, age, disability sex, sexual orientation or gender identity.            Thank you!     Thank you for choosing [facility]  for your care. Our goal is always to provide you with excellent care. Hearing back from our patients is one way we can continue to improve our services. Please take a few minutes to complete the written survey that you may receive in the mail after your visit with us. Thank you!             Your Updated Medication List - Protect others around you: Learn how to safely use, store and throw away your medicines at www.disposemymeds.org.          This list is accurate as of: 7/17/17 11:59 PM.   Always use your most recent med list.                   Brand Name Dispense Instructions for use Diagnosis    Glucosamine-Chondroitin--200-150 MG Tabs           levothyroxine 112 MCG tablet    SYNTHROID/LEVOTHROID    90 tablet    Take 1 tablet (112 mcg) by mouth daily    Other specified hypothyroidism       MULTIVITAMIN PO      Take by mouth daily        omeprazole 40 MG capsule    priLOSEC    90 capsule    TAKE 1 CAPSULE BY MOUTH DAILY, 30 TO 60 MINUTES BEFORE A MEAL.    Gastroesophageal reflux disease, esophagitis presence not specified

## 2017-07-18 ENCOUNTER — OFFICE VISIT (OUTPATIENT)
Dept: FAMILY MEDICINE | Facility: CLINIC | Age: 52
End: 2017-07-18
Payer: COMMERCIAL

## 2017-07-18 VITALS
TEMPERATURE: 98.6 F | SYSTOLIC BLOOD PRESSURE: 127 MMHG | BODY MASS INDEX: 29.25 KG/M2 | OXYGEN SATURATION: 98 % | HEIGHT: 60 IN | HEART RATE: 85 BPM | WEIGHT: 149 LBS | DIASTOLIC BLOOD PRESSURE: 86 MMHG

## 2017-07-18 DIAGNOSIS — M25.512 ACUTE PAIN OF LEFT SHOULDER: Primary | ICD-10-CM

## 2017-07-18 DIAGNOSIS — G56.92 NEUROPATHY, ARM, LEFT: ICD-10-CM

## 2017-07-18 PROBLEM — M22.2X2 BILATERAL PATELLOFEMORAL SYNDROME: Status: ACTIVE | Noted: 2017-07-18

## 2017-07-18 PROBLEM — M22.2X1 BILATERAL PATELLOFEMORAL SYNDROME: Status: ACTIVE | Noted: 2017-07-18

## 2017-07-18 PROCEDURE — 99214 OFFICE O/P EST MOD 30 MIN: CPT | Performed by: FAMILY MEDICINE

## 2017-07-18 RX ORDER — CYCLOBENZAPRINE HCL 10 MG
5 TABLET ORAL
Qty: 20 TABLET | Refills: 0 | Status: SHIPPED | OUTPATIENT
Start: 2017-07-18 | End: 2017-09-25

## 2017-07-18 NOTE — MR AVS SNAPSHOT
After Visit Summary   7/18/2017    Ijeoma Avalos    MRN: [id number]           Patient Information     Date Of Birth          1965        Visit Information        Provider Department      7/18/2017 2:40 PM Melany Ramos MD [facility] [place]        Today's Diagnoses     Acute pain of left shoulder    -  1    Neuropathy, arm, left           Follow-ups after your visit        Additional Services     [facility] PT, HAND, AND CHIROPRACTIC REFERRAL       **This order will print in the [facility] Scheduling Office**    Physical Therapy, Hand Therapy and Chiropractic Care are available through:    *[facility]  *[facility]  *[facility]    Call one number to schedule at any of the above locations: [phone].    Your provider has referred you to: Hand Therapy    Indication/Reason for Referral: left shoulder and upper back pain and radiating pain in the left arm.   Onset of Illness: chronic  Therapy Orders: Evaluate and Treat  Special Programs:   Special Request:     Tayo Cr      Additional Comments for the Therapist or Chiropractor:     Please be aware that coverage of these services is subject to the terms and limitations of your health insurance plan.  Call member services at your health plan with any benefit or coverage questions.      Please bring the following to your appointment:    *Your personal calendar for scheduling future appointments  *Comfortable clothing                  Your next 10 appointments already scheduled     Jul 21, 2017  1:10 PM CDT   FAVIO Extremity with Obi Shay PT   [facility] [place] [facility] ([facility] [place])    [address] [place] [address]  [address]  [place] [address]   [phone]            Aug 02, 2017  4:30 PM CDT   FAVIO Extremity with Obi Shay PT   [facility] [place] [facility] ([facility] [place])    [address] [place] [address]   [address]  [place] [id number] [phone]              Who to contact     If you have questions or need follow up information about today's clinic visit or your schedule please contact [facility] [place] directly at [phone].  Normal or non-critical lab and imaging results will be communicated to you by MyChart, letter or phone within 4 business days after the clinic has received the results. If you do not hear from us within 7 days, please contact the clinic through SureSpeakhart or phone. If you have a critical or abnormal lab result, we will notify you by phone as soon as possible.  Submit refill requests through [employer] or call your pharmacy and they will forward the refill request to us. Please allow 3 business days for your refill to be completed.          Additional Information About Your Visit        MyChart Information     [employer] gives you secure access to your electronic health record. If you see a primary care provider, you can also send messages to your care team and make appointments. If you have questions, please call your primary care clinic.  If you do not have a primary care provider, please call [phone] and they will assist you.        Care EveryWhere ID     This is your Care EveryWhere ID. This could be used by other organizations to access your [place] medical records  [id number]        Your Vitals Were     Pulse Temperature Height Last Period Pulse Oximetry BMI (Body Mass Index)    85 98.6  F (37  C) (Tympanic) 5' (1.524 m) 04/14/2017 98% 29.1 kg/m2       Blood Pressure from Last 3 Encounters:   07/18/17 127/86   07/13/17 136/88   07/03/17 118/80    Weight from Last 3 Encounters:   07/18/17 149 lb (67.6 kg)   07/13/17 147 lb (66.7 kg)   07/03/17 147 lb (66.7 kg)              We Performed the Following     [facility] PT, HAND, AND CHIROPRACTIC REFERRAL          Today's Medication Changes          These changes are accurate as of: 7/18/17  3:01 PM.  If you have any questions, ask  your nurse or doctor.               Start taking these medicines.        Dose/Directions    cyclobenzaprine 10 MG tablet   Commonly known as:  FLEXERIL   Used for:  Acute pain of left shoulder   Started by:  Melany Ramos MD        Dose:  5 mg   Take 0.5 tablets (5 mg) by mouth nightly as needed for muscle spasms   Quantity:  20 tablet   Refills:  0            Where to get your medicines      These medications were sent to [facility] [place] - [place], [place] - [address]  [address], [place] [address]     Phone:  [phone]     cyclobenzaprine 10 MG tablet                Primary Care Provider Office Phone # Fax #    Dulce Hernandez [phone]       [facility] [address] [place] [address] [address]  [place] [address]        Equal Access to Services     [facility]: Hadii aad ku hadasho Soomaali, waaxda luqadaha, qaybta kaalmada adeegyada, waxay idiin hayaan nichole thurman . So [facility] [phone].    ATENCIÓN: Si habla español, tiene a irene disposición servicios gratuitos de asistencia lingüística. Llame al [phone].    We comply with applicable federal civil rights laws and [place] laws. We do not discriminate on the basis of race, color, national origin, age, disability sex, sexual orientation or gender identity.            Thank you!     Thank you for choosing [facility] [place]  for your care. Our goal is always to provide you with excellent care. Hearing back from our patients is one way we can continue to improve our services. Please take a few minutes to complete the written survey that you may receive in the mail after your visit with us. Thank you!             Your Updated Medication List - Protect others around you: Learn how to safely use, store and throw away your medicines at www.disposemymeds.org.          This list is accurate as of: 7/18/17  3:01 PM.  Always use your most recent med list.                   Brand  Name Dispense Instructions for use Diagnosis    cyclobenzaprine 10 MG tablet    FLEXERIL    20 tablet    Take 0.5 tablets (5 mg) by mouth nightly as needed for muscle spasms    Acute pain of left shoulder       Glucosamine-Chondroitin--200-150 MG Tabs           levothyroxine 112 MCG tablet    SYNTHROID/LEVOTHROID    90 tablet    Take 1 tablet (112 mcg) by mouth daily    Other specified hypothyroidism       MULTIVITAMIN PO      Take by mouth daily        omeprazole 40 MG capsule    priLOSEC    90 capsule    TAKE 1 CAPSULE BY MOUTH DAILY, 30 TO 60 MINUTES BEFORE A MEAL.    Gastroesophageal reflux disease, esophagitis presence not specified

## 2017-07-18 NOTE — NURSING NOTE
Chief Complaint   Patient presents with     Tingling       Initial /86  Pulse 85  Temp 98.6  F (37  C) (Tympanic)  Ht 5' (1.524 m)  Wt 149 lb (67.6 kg)  LMP 04/14/2017  SpO2 98%  BMI 29.1 kg/m2 Estimated body mass index is 29.1 kg/(m^2) as calculated from the following:    Height as of this encounter: 5' (1.524 m).    Weight as of this encounter: 149 lb (67.6 kg).  Medication Reconciliation: complete

## 2017-07-18 NOTE — PROGRESS NOTES
Subjective:    Patient is a 51 year old female presenting with rehab right knee hpi.   Ijeoma Avalos is a 51 year old female with a bilateral knees condition.  Condition occurred with:  Repetition/overuse.  Condition occurred: for unknown reasons.  This is a new condition  Patient is referred with a 2-3 month hx of B anterior knee pain, L>R. Pain is worse with moving sit to stand, squatting, kneeling, walking up hills and ascending stairs. She started working with a  a while ago to improve her fitness and feels that some of those exercises may have exacerbated her knee pain. Her activity level has decreased over the past few years. She notes crepitus in both knees..    Patient reports pain:  Anterior and sub patellar.    Pain is described as aching and is intermittent and reported as 6/10.  Associated symptoms:  Loss of motion/stiffness and loss of strength. Pain is worse during the day.  Symptoms are exacerbated by activity, transfers, bending/squatting, kneeling and ascending stairs and relieved by rest.  Since onset symptoms are unchanged.  Special tests:  X-ray.      General health as reported by patient is fair.  Pertinent medical history includes:  Overweight and thyroid problems.    Surgical history: .    Current occupation is .  Patient is working in normal job without restrictions.  Primary job tasks include:  Prolonged sitting.    Barriers include:  None as reported by the patient.    Red flags:  None as reported by the patient.                        Objective:    Standing Alignment:                Ankle/Foot:  Pes planus L and pes planus R      Non-Weight Bearing:        Knee:  Patellar lateral tilt L and patellar lateral tilt R                                                      Knee Evaluation:  ROM:      PROM    Hyperextension: Left: 17    Right:  15    Flexion: Left: 140    Right:  135    Endfeel: Extreme hypermobility noted.  Strength:     Extension:  Left: 4/5     Pain:+      Right: 4/5    Pain:+  Flexion:  Left: 5/5   Pain:      Right: 5/5   Pain:    Quad Set Left:  Good    Pain: +   Quad Set Right:  Good    Pain: +  Ligament Testing:  Not Assessed                Special Tests:   Left knee positive for the following special tests:  Patellar Compression  Right knee positive for the following tests:  Patellar Compression  Palpation:  Normal      Edema:  Normal    Mobility Testing:      Patellofemoral Medial:  Left: hypermobile    Right: hypermobile  Patellofemoral Lateral:  Left: hypermobile    Right: hypermobile  Patellofemoral Superior:  Left: hypermobile    Right: hypermobile  Patellofemoral Inferior:  Left: hypermobile    Right: hypermobile  Functional Testing:          Quad:    Single Leg Squat:  Left:       Significant loss of control, excessive anterior knee excursion and femoral IR  Right:       Significant loss of control, femoral IR and excessive anterior knee excursion  Bilateral Leg Squat:   Moderate loss of control and excessive anterior knee excursion              General     ROS    Assessment/Plan:      Patient is a 51 year old female with both sides knee complaints.    Patient has the following significant findings with corresponding treatment plan.                Diagnosis 1:  Bilateral patellofemoral pain  Pain -  hot/cold therapy, splint/taping/bracing/orthotics, self management, education and home program  Decreased ROM/flexibility - therapeutic exercise and home program  Decreased strength - therapeutic exercise, therapeutic activities and home program  Decreased proprioception - neuro re-education, therapeutic activities and home program  Impaired muscle performance - neuro re-education and home program  Decreased function - therapeutic activities and home program  Instability -  Therapeutic Activity  Therapeutic Exercise  home program    Therapy Evaluation Codes:   1) History comprised of:   Personal factors that impact the plan of care:      None.     Comorbidity factors that impact the plan of care are:      None.     Medications impacting care: None.  2) Examination of Body Systems comprised of:   Body structures and functions that impact the plan of care:      Knee.   Activity limitations that impact the plan of care are:      Squatting/kneeling and Stairs.  3) Clinical presentation characteristics are:   Stable/Uncomplicated.  4) Decision-Making    Low complexity using standardized patient assessment instrument and/or measureable assessment of functional outcome.  Cumulative Therapy Evaluation is: Low complexity.    Previous and current functional limitations:  (See Goal Flow Sheet for this information)    Short term and Long term goals: (See Goal Flow Sheet for this information)     Communication ability:  Patient appears to be able to clearly communicate and understand verbal and written communication and follow directions correctly.  Treatment Explanation - The following has been discussed with the patient:   RX ordered/plan of care  Anticipated outcomes  Possible risks and side effects  This patient would benefit from PT intervention to resume normal activities.   Rehab potential is good.    Frequency:  2 X week, once daily  Duration:  for 4 weeks  Discharge Plan:  Achieve all LTG.  Independent in home treatment program.  Reach maximal therapeutic benefit.    Please refer to the daily flowsheet for treatment today, total treatment time and time spent performing 1:1 timed codes.

## 2017-07-18 NOTE — PROGRESS NOTES
SUBJECTIVE:                                                    Ijeoma Avalos is a 51 year old female who presents to clinic today for the following health issues:      Concern - Tingling Sensation   Onset: this AM     Description:   Left arm.     Intensity: moderate    Progression of Symptoms:  same    Accompanying Signs & Symptoms:  Pain and tightness in the left shoulder.         Problem list and histories reviewed & adjusted, as indicated.  Additional history: as documented    Patient Active Problem List   Diagnosis     Gastric acidity     CARDIOVASCULAR SCREENING; LDL GOAL LESS THAN 160     Other specified hypothyroidism     Plantar fasciitis     Hyperlipidemia LDL goal <130     Gastroesophageal reflux disease, esophagitis presence not specified     TMJ (temporomandibular joint syndrome)     Neck pain     Other headache syndrome     Uterine leiomyoma, unspecified location     Adjustment disorder with mixed anxiety and depressed mood     Low hemoglobin     Dysmenorrhea     Right knee pain, unspecified chronicity     Post-operative nausea and vomiting     Bilateral patellofemoral syndrome     Past Surgical History:   Procedure Laterality Date     APPENDECTOMY        SECTION  2014    Procedure:  SECTION;  Surgeon: Ru Cano MD;  Location:  L+D     CHOLECYSTECTOMY       cyst removed      left  lower leg on bone sheath     DAVINCI HYSTERECTOMY TOTAL, SALPINGECTOMY BILATERAL N/A 2017    Procedure: DAVINCI HYSTERECTOMY TOTAL, SALPINGECTOMY BILATERAL;  DAVINCI SINGLE SITE HYSTERECTOMY, BILATERAL SALPINGECTOMY, LEFT OOPHORECTOMY, LYSIS OF ADHESIONS (LAPAROSCOPIC BAG TO RETREIVE OVARY);  Surgeon: Ru Cano MD;  Location:  OR     DAVINCI LYSIS OF ADHESIONS N/A 2017    Procedure: DAVINCI LYSIS OF ADHESIONS;;  Surgeon: Ru Cano MD;  Location:  OR     DAVINCI OOPHORECTOMY N/A 2017    Procedure: DAVINCI OOPHORECTOMY;;  Surgeon: Ru Cano  MD Pascual;  Location:  OR     GASTROSCOPY,FL       GI SURGERY       MYOMECTOMY UTERUS  sept 2012       Social History   Substance Use Topics     Smoking status: Never Smoker     Smokeless tobacco: Never Used     Alcohol use No      Comment: social     Family History   Problem Relation Age of Onset     Hypertension Father          Current Outpatient Prescriptions   Medication Sig Dispense Refill     cyclobenzaprine (FLEXERIL) 10 MG tablet Take 0.5 tablets (5 mg) by mouth nightly as needed for muscle spasms 20 tablet 0     Glucosamine-Chondroitin--200-150 MG TABS        omeprazole (PRILOSEC) 40 MG capsule TAKE 1 CAPSULE BY MOUTH DAILY, 30 TO 60 MINUTES BEFORE A MEAL. 90 capsule 1     levothyroxine (SYNTHROID, LEVOTHROID) 112 MCG tablet Take 1 tablet (112 mcg) by mouth daily 90 tablet 3     Multiple Vitamins-Minerals (MULTIVITAMIN PO) Take by mouth daily       Allergies   Allergen Reactions     Cephalexin Hives     Hives on hands, face and stomach.          Reviewed and updated as needed this visit by clinical staff       Reviewed and updated as needed this visit by Provider         ROS:  C: NEGATIVE for fever, chills, change in weight  E/M: NEGATIVE for ear, mouth and throat problems  R: NEGATIVE for significant cough or SOB  CV: NEGATIVE for chest pain, palpitations or peripheral edema    OBJECTIVE:                                                    /86  Pulse 85  Temp 98.6  F (37  C) (Tympanic)  Ht 5' (1.524 m)  Wt 149 lb (67.6 kg)  LMP 04/14/2017  SpO2 98%  BMI 29.1 kg/m2  Body mass index is 29.1 kg/(m^2).   GENERAL: healthy, alert, well nourished, well hydrated, no distress  HENT: ear canals- normal; TMs- normal; Nose- normal; Mouth- no ulcers, no lesions  NECK: no tenderness, no adenopathy, no asymmetry, no masses, no stiffness; thyroid- normal to palpation  RESP: lungs clear to auscultation - no rales, no rhonchi, no wheezes  CV: regular rates and rhythm, normal S1 S2, no S3 or S4 and no  murmur, no click or rub -  MS: ROM at the left shoulder is normal. Mild tenderness to palpate over the posterior left shoulder. No ecchymosis or erythema noted.  NEURO: strength and tone- normal, sensory exam- grossly normal, mentation- intact, speech- normal, reflexes- symmetric. Negative Tinels and Phalens tests         ASSESSMENT/PLAN:                                                        ICD-10-CM    1. Acute pain of left shoulder M25.512 cyclobenzaprine (FLEXERIL) 10 MG tablet     [facility]   2. Neuropathy, arm, left G56.92 [facility]     Symptoms are likely due to inflammation in the left shoulder area, causing some neuropathy symptoms.   Recommended to use ibuprofen prn pain. Flexeril prn may be tried. Side effect profile of the medication discussed.   Hand therapy referral given.   If any worsening noted, instructed to contact us back.   Follow up with Provider - as needed     Melany Ramos MD  [facility]

## 2017-08-02 ENCOUNTER — THERAPY VISIT (OUTPATIENT)
Dept: PHYSICAL THERAPY | Facility: CLINIC | Age: 52
End: 2017-08-02
Payer: COMMERCIAL

## 2017-08-02 DIAGNOSIS — M22.2X1 BILATERAL PATELLOFEMORAL SYNDROME: ICD-10-CM

## 2017-08-02 DIAGNOSIS — M22.2X2 BILATERAL PATELLOFEMORAL SYNDROME: ICD-10-CM

## 2017-08-02 PROCEDURE — 97112 NEUROMUSCULAR REEDUCATION: CPT | Mod: GP | Performed by: PHYSICAL THERAPIST

## 2017-08-02 PROCEDURE — 97110 THERAPEUTIC EXERCISES: CPT | Mod: GP | Performed by: PHYSICAL THERAPIST

## 2017-08-30 DIAGNOSIS — K21.9 GASTROESOPHAGEAL REFLUX DISEASE, ESOPHAGITIS PRESENCE NOT SPECIFIED: ICD-10-CM

## 2017-08-30 RX ORDER — OMEPRAZOLE 40 MG/1
CAPSULE, DELAYED RELEASE ORAL
Qty: 90 CAPSULE | Refills: 3 | Status: SHIPPED | OUTPATIENT
Start: 2017-08-30 | End: 2017-09-26

## 2017-08-30 NOTE — TELEPHONE ENCOUNTER
Prescription approved per [facility], [facility] or [facility] refill protocol.  Marylou Shields RN - Triage  [facility]

## 2017-08-30 NOTE — TELEPHONE ENCOUNTER
Omeprazole      Last Written Prescription Date: 1/11/17  Last Fill Quantity: 90,  # refills: 1   Last Office Visit with [facility], [facility] or [facility] prescribing provider: 7/18/17                                         Next 5 appointments (look out 90 days)     Sep 19, 2017 10:30 AM CDT   Return Visit with Rosa Chowdary LP   [facility] ([facility])    [address] [place] [id number] [phone]                  Negar Loza CMA

## 2017-09-12 ENCOUNTER — OFFICE VISIT (OUTPATIENT)
Dept: PSYCHOLOGY | Facility: CLINIC | Age: 52
End: 2017-09-12
Payer: COMMERCIAL

## 2017-09-12 DIAGNOSIS — F43.23 ADJUSTMENT DISORDER WITH MIXED ANXIETY AND DEPRESSED MOOD: Primary | ICD-10-CM

## 2017-09-12 PROCEDURE — 90834 PSYTX W PT 45 MINUTES: CPT | Performed by: PSYCHOLOGIST

## 2017-09-12 NOTE — MR AVS SNAPSHOT
MRN:[id number]                      After Visit Summary   9/12/2017    Ijeoma Avalos    MRN: [id number]           Visit Information        Provider Department      9/12/2017 2:00 PM Rosa Chowdary LP [facility]      Your next 10 appointments already scheduled     Oct 10, 2017  9:30 AM CDT   Return Visit with Rosa Chowdary LP   [facility])    [address] [place] [id number]   [phone]            Oct 24, 2017  2:00 PM CDT   Return Visit with Rosa Chowdary LP   [facility])    [address] [place] [id number]   [phone]            Nov 14, 2017  2:00 PM CST   Return Visit with Rosa Chowdary LP   [facility])    [address] [place] [id number]   [phone]              MyChart Information     Mr. Number gives you secure access to your electronic health record. If you see a primary care provider, you can also send messages to your care team and make appointments. If you have questions, please call your primary care clinic.  If you do not have a primary care provider, please call [phone] and they will assist you.        Care EveryWhere ID     This is your Care EveryWhere ID. This could be used by other organizations to access your [facility] medical records  [id number]        Equal Access to Services     [facility] HospitalGLENN : Hadii aad ku hadasho Soomaali, waaxda luqadaha, qaybta kaalmada adeegyakami, shannon thurman . So [facility] [phone].    ATENCIÓN: Si habla español, tiene a irene disposición servicios gratuitos de asistencia lingüística. Llame al [phone].    We comply with applicable federal civil rights laws and [place] laws. We do not discriminate on the basis of race, color, national origin, age, disability sex,  sexual orientation or gender identity.

## 2017-09-12 NOTE — PROGRESS NOTES
Progress Note    Client Name: Ijeoma Avalos  Date: 9/12/17       Service Type: Individual      Session Start Time: 14:00  Session End Time: 14:50      Session Length: 50     Session #: 8     Attendees: Client attended alone    Treatment Plan Last Reviewed: 2/9/17  PHQ-9 / SOSA-7 Last Reviewed: 1/16/17     DATA      Progress Since Last Session (Related to Symptoms / Goals / Homework):   Symptoms: Worry, anxiety    Homework: Good progress      Episode of Care Goals: Satisfactory progress - ACTION (Actively working towards change); Intervened by reinforcing change plan / affirming steps taken     Current / Ongoing Stressors and Concerns:   Physical symptoms   Phase of life issues (full-time work and parent of young child)   Work demands   Family overseas     Treatment Objective(s) Addressed in This Session:   Build and make use of skills for stress management     Intervention:   This was my first meeting with Client in ~3 months. She reported that she has completely recovered from surgery and her only lingering concern is a desire to lose the weight she gained after the procedure. She believes she is making progress toward this goal. Session focused on managing stressors at work. Client acknowledged that her current workload is unmanageable and impacts her mood, energy, ability to enjoy the positive things in her life. Discussed the reasons why she is reluctant to speak up about this imbalance and acknowledge her limitations. Talked about issues related to external vs. internal validation. If Client can use her own internal standards to decide if she has done a good job, then she is not at the mercy of her environment which may or may not recognize her accomplishments. This becomes less important if Client can internally acknowledge them. Discussed how she would choose to reconfigure her life and what conversations at work could move her in that direction. Continue to  encourage Client to make time for activities that allow her to be more in her body (walking, yoga, hot bath, gardening) than in her mind.      ASSESSMENT: Current Emotional / Mental Status (status of significant symptoms):   Risk status (Self / Other harm or suicidal ideation)   Client denies current fears or concerns for personal safety.   Client denies current or recent suicidal ideation or behaviors.   Client denies current or recent homicidal ideation or behaviors.   Client denies current or recent self injurious behavior or ideation.   Client denies other safety concerns.   A safety and risk management plan has not been developed at this time, however client was given the after-hours number / 911 should there be a change in any of these risk factors.     Appearance:   Appropriate    Eye Contact:   Good    Psychomotor Behavior: Normal    Attitude:   Cooperative , engaged   Orientation:   All   Speech    Rate / Production: Normal     Volume:  Normal    Mood:    Anxious    Affect:    Appropriate    Thought Content:  Frequent repetitive worries     Thought Form:  Coherent  Logical    Insight:    Fair      Medication Review:   No current psychiatric medications prescribed     Medication Compliance:   NA     Changes in Health Issues:   None reported     Chemical Use Review:   Substance Use: Chemical use reviewed, no active concerns identified      Tobacco Use: No current tobacco use.       Collateral Reports Completed:   Not Applicable    PLAN: (Client Tasks / Therapist Tasks / Other)  Client will work on shifting away from reliance on external validation and instead use her own standards and values to evaluate herself, building her skill for internal validation. She is thinking about options in the workplace that might result in a more manageable life overall. She will consider activities that allow her to be more in her body (walking, yoga, hot bath, gardening) than in her mind. She may wish to develop contingency  "plans for her fears about what she would do in the case of significant health concerns in herself or her ; then she can ease her mind from spinning through \"what if\" scenarios. She is interested in working on assertive communication in future sessions. Return appointments scheduled.      Rosa Chowdary, LP                                                   _______________________________________________________________________    Treatment Plan    Client's Name: Ijeoma Avalos  YOB: 1965    Date: 2/9/17    DSM-V Diagnoses: Adjustment Disorders  309.28 (F43.23) With mixed anxiety and depressed mood  Psychosocial / Contextual Factors: physical symptoms; phase of life stressors (full-time work and parent of young child); work demands; family overseas  WHODAS: 16    Referral / Collaboration:  Referral to another professional/service is not indicated at this time.    Anticipated number of session or this episode of care: will re-evaluate every 90 days      MeasurableTreatment Goal(s) related to diagnosis / functional impairment(s)  Goal 1: Client will build skills for stress management.    I will know I've met my goal when my blood pressure is lower and my reflux symptoms have decreased.      Objective #A (Client Action)    Client will use at least 2 coping skills for anxiety management in the next 6 weeks.  Status: New - Date: 2/9/17     Intervention(s)  Therapist will teach self-regulation strategies, CBT skills, mindfulness techniques.    Objective #B  Client will use cognitive strategies identified in therapy to challenge anxious thoughts.  Status: New - Date: 2/9/17     Intervention(s)  Therapist will teach cognitive strategies, provide education.      Goal 2: Client will increase self-confidence.    I will know I've met my goal when I think more positively about myself.      Objective #A (Client Action)    Status: New - Date: 2/9/17     Client will increase assertive " communication.    Intervention(s)  Therapist will teach assertiveness skills.    Objective #B  Client will Identify negative self-talk and behaviors: challenge core beliefs, myths, and actions.    Status: New - Date: 2/9/17     Intervention(s)  Therapist will provide education, teach CBT skills.      Goal 3: Client will increase connection in close relationships.    I will know I've met my goal when I feel more close with loved ones.      Objective #A (Client Action)    Status: New - Date: 2/9/17     Client will prioritize time for meaningful relationships.    Intervention(s)  Therapist will provide support and accountability.    Objective #B  Client will make use of effective interpersonal communication skills.    Status: New - Date: 2/9/17     Intervention(s)  Therapist will teach communication skills.      Client has reviewed and agreed to the above plan.      Rosa Chowdary LP  February 9, 2017

## 2017-09-25 ENCOUNTER — OFFICE VISIT (OUTPATIENT)
Dept: FAMILY MEDICINE | Facility: CLINIC | Age: 52
End: 2017-09-25
Payer: COMMERCIAL

## 2017-09-25 VITALS
HEIGHT: 60 IN | HEART RATE: 87 BPM | TEMPERATURE: 99 F | OXYGEN SATURATION: 98 % | WEIGHT: 146 LBS | DIASTOLIC BLOOD PRESSURE: 90 MMHG | BODY MASS INDEX: 28.66 KG/M2 | SYSTOLIC BLOOD PRESSURE: 132 MMHG

## 2017-09-25 DIAGNOSIS — M54.2 NECK PAIN: ICD-10-CM

## 2017-09-25 DIAGNOSIS — E03.8 OTHER SPECIFIED HYPOTHYROIDISM: ICD-10-CM

## 2017-09-25 DIAGNOSIS — G44.89 OTHER HEADACHE SYNDROME: Primary | ICD-10-CM

## 2017-09-25 DIAGNOSIS — K21.9 GASTROESOPHAGEAL REFLUX DISEASE, ESOPHAGITIS PRESENCE NOT SPECIFIED: ICD-10-CM

## 2017-09-25 DIAGNOSIS — F43.23 ADJUSTMENT DISORDER WITH MIXED ANXIETY AND DEPRESSED MOOD: ICD-10-CM

## 2017-09-25 DIAGNOSIS — M26.609 TMJ (TEMPOROMANDIBULAR JOINT SYNDROME): ICD-10-CM

## 2017-09-25 PROCEDURE — 99214 OFFICE O/P EST MOD 30 MIN: CPT | Performed by: FAMILY MEDICINE

## 2017-09-25 RX ORDER — LORAZEPAM 0.5 MG/1
0.5 TABLET ORAL EVERY 8 HOURS PRN
Qty: 20 TABLET | Refills: 0 | Status: SHIPPED | OUTPATIENT
Start: 2017-09-25 | End: 2018-02-21

## 2017-09-25 RX ORDER — NABUMETONE 500 MG/1
500-1000 TABLET, FILM COATED ORAL 2 TIMES DAILY PRN
Qty: 30 TABLET | Refills: 0 | Status: SHIPPED | OUTPATIENT
Start: 2017-09-25 | End: 2018-12-24

## 2017-09-25 ASSESSMENT — ANXIETY QUESTIONNAIRES
2. NOT BEING ABLE TO STOP OR CONTROL WORRYING: SEVERAL DAYS
7. FEELING AFRAID AS IF SOMETHING AWFUL MIGHT HAPPEN: SEVERAL DAYS
5. BEING SO RESTLESS THAT IT IS HARD TO SIT STILL: NOT AT ALL
GAD7 TOTAL SCORE: 4
1. FEELING NERVOUS, ANXIOUS, OR ON EDGE: NOT AT ALL
IF YOU CHECKED OFF ANY PROBLEMS ON THIS QUESTIONNAIRE, HOW DIFFICULT HAVE THESE PROBLEMS MADE IT FOR YOU TO DO YOUR WORK, TAKE CARE OF THINGS AT HOME, OR GET ALONG WITH OTHER PEOPLE: NOT DIFFICULT AT ALL
6. BECOMING EASILY ANNOYED OR IRRITABLE: NOT AT ALL
3. WORRYING TOO MUCH ABOUT DIFFERENT THINGS: SEVERAL DAYS

## 2017-09-25 ASSESSMENT — PATIENT HEALTH QUESTIONNAIRE - PHQ9
SUM OF ALL RESPONSES TO PHQ QUESTIONS 1-9: 4
5. POOR APPETITE OR OVEREATING: SEVERAL DAYS

## 2017-09-25 NOTE — NURSING NOTE
Chief Complaint   Patient presents with     Headache       Initial BP (!) 144/99  Pulse 87  Temp 99  F (37.2  C) (Tympanic)  Ht 5' (1.524 m)  Wt 146 lb (66.2 kg)  LMP 04/14/2017  SpO2 98%  BMI 28.51 kg/m2 Estimated body mass index is 28.51 kg/(m^2) as calculated from the following:    Height as of this encounter: 5' (1.524 m).    Weight as of this encounter: 146 lb (66.2 kg).  Medication Reconciliation: complete

## 2017-09-25 NOTE — PROGRESS NOTES
SUBJECTIVE:   Ijeoma Avalos is a 51 year old female who presents to clinic today for the following health issues:    Headaches      Duration: this weekend     Description  Location: bilateral in the frontal area, bilateral in the temporal area , back of head/ neck   Character: squeezing pain  Frequency:  Constant throughout the whole weekend   Duration:  Constant     Intensity:  moderate, severe    Accompanying signs and symptoms:    Precipitating or Alleviating factors:  Nausea/vomiting:  Always nausea , no vomiting , but has stomach upset feeling   Dizziness: sometimes  Weakness or numbness: no  Visual changes: none  Fever: no   Sinus or URI symptoms no     History  Head trauma: no   Family history of migraines: no   Previous tests for headaches: no   Neurologist evaluations: no   Able to do daily activities when headache present: YES  Wake with headaches: YES  Daily pain medication use: YES- tylenol  for a short period of time 3x last 24 hours   Any changes in: n/a    Precipitating or Alleviating factors (light/sound/sleep/caffeine):     Therapies tried and outcome: Tylenol    Outcome - not effective  Frequent/daily pain medication use: YES          Depression and Anxiety Follow-Up    Status since last visit: , seeing psychologist so it helps.  was sick recently so may have made her more stressed although he she  thinks she is better but sometimes feels very anxious. reluctant to take med's.     Has TMJ and neck issues, psychologist also though it could be stress related     Other associated symptoms:None    Complicating factors:     Significant life event: No     Current substance abuse: None      PROBLEMS TO ADD ON...  Has GERD , but her gi sx have been ok except nausea and not sure if could be related to HA/ anxiety etc. , but no heart burn or other gi sx  , Prilosec works well for her GERD      PHQ-9 SCORE 6/29/2016 1/16/2017   Total Score 6 5     SOSA-7 SCORE 6/29/2016 1/16/2017   Total Score 9  5       PHQ-9  English  PHQ-9   Any Language  GAD7  Hypothyroidism Follow-up      Since last visit, patient describes the following symptoms: Weight stable, no hair loss, no skin changes, no constipation, no loose stools        Problem list and histories reviewed & adjusted, as indicated.  Additional history: as documented    Patient Active Problem List   Diagnosis     Gastric acidity     CARDIOVASCULAR SCREENING; LDL GOAL LESS THAN 160     Other specified hypothyroidism     Plantar fasciitis     Hyperlipidemia LDL goal <130     Gastroesophageal reflux disease, esophagitis presence not specified     TMJ (temporomandibular joint syndrome)     Neck pain     Other headache syndrome     Uterine leiomyoma, unspecified location     Adjustment disorder with mixed anxiety and depressed mood     Low hemoglobin     Dysmenorrhea     Right knee pain, unspecified chronicity     Post-operative nausea and vomiting     Bilateral patellofemoral syndrome     Past Surgical History:   Procedure Laterality Date     APPENDECTOMY        SECTION  2014    Procedure:  SECTION;  Surgeon: Ru Cano MD;  Location:  L+D     CHOLECYSTECTOMY       cyst removed      left  lower leg on bone sheath     DAVINCI HYSTERECTOMY TOTAL, SALPINGECTOMY BILATERAL N/A 2017    Procedure: DAVINCI HYSTERECTOMY TOTAL, SALPINGECTOMY BILATERAL;  DAVINCI SINGLE SITE HYSTERECTOMY, BILATERAL SALPINGECTOMY, LEFT OOPHORECTOMY, LYSIS OF ADHESIONS (LAPAROSCOPIC BAG TO RETREIVE OVARY);  Surgeon: Ru Cano MD;  Location:  OR     DAVINCI LYSIS OF ADHESIONS N/A 2017    Procedure: DAVINCI LYSIS OF ADHESIONS;;  Surgeon: Ru Cano MD;  Location:  OR     DAVINCI OOPHORECTOMY N/A 2017    Procedure: DAVINCI OOPHORECTOMY;;  Surgeon: Ru Cano MD;  Location:  OR     GASTROSCOPY,FL       GI SURGERY       MYOMECTOMY UTERUS  2012       Social History   Substance Use Topics     Smoking status:  Never Smoker     Smokeless tobacco: Never Used     Alcohol use No      Comment: social     Family History   Problem Relation Age of Onset     Hypertension Father              Reviewed and updated as needed this visit by clinical staff     Reviewed and updated as needed this visit by Provider         ROS:  Constitutional, HEENT, cardiovascular, pulmonary, GI, , musculoskeletal, neuro, skin, endocrine and psych systems are negative, except as otherwise noted.      OBJECTIVE:   BP (!) 144/99  Pulse 87  Temp 99  F (37.2  C) (Tympanic)  Ht 5' (1.524 m)  Wt 146 lb (66.2 kg)  LMP 04/14/2017  SpO2 98%  BMI 28.51 kg/m2  Body mass index is 28.51 kg/(m^2).  GENERAL: healthy, alert and no distress  EYES: Eyes grossly normal to inspection, PERRL and conjunctivae and sclerae normal  HENT: ear canals and TM's normal, nose and mouth without ulcers or lesions  NECK: no adenopathy, no asymmetry, masses, or scars and thyroid normal to palpation  RESP: lungs clear to auscultation - no rales, rhonchi or wheezes  CV: regular rate and rhythm, normal S1 S2, no S3 or S4,  ABDOMEN: soft, nontender, no hepatosplenomegaly, no masses and bowel sounds normal  MS: neck with decreased range of motion  and tenderness to palpation in paracervical and trapezius area bilaterally,  , also TMJ is tender bilaterally   NEURO: Normal strength and tone, mentation intact and speech normal  PSYCH: mentation appears normal, affect uptight  anxious, fatigued, speech pressured, judgement and insight intact and appearance well groomed        ASSESSMENT/PLAN:       (G44.89) Other headache syndrome  (primary encounter diagnosis)  Comment: multifactorial , neck// TMJ, stress related   Plan: nabumetone (RELAFEN) 500 MG tablet      (M54.2) Neck pain  Comment:   Plan: nabumetone (RELAFEN) 500 MG tablet           discussed  neck cares and symptomatic treatment including  adequate pain control, heat,  stretches etc.      she will do follow up if no improvement  or problem . Consider further evaluation and  physical therapy if needed.       (M26.609) TMJ (temporomandibular joint syndrome)  Comment:   Plan: nabumetone (RELAFEN) 500 MG tablet            (F43.23) Adjustment disorder with mixed anxiety and depressed mood  Comment:   Plan: nabumetone (RELAFEN) 500 MG tablet, LORazepam         (ATIVAN) 0.5 MG tablet        Discussed possible differential diagnosis for her symptoms. Gave few lorazepam to help since she is reluctant to start any  Other long term med's . Will see psychologist . F/u in 4-6 weeks sooner if problem     (K21.9) Gastroesophageal reflux disease, esophagitis presence not specified  Comment: stress could be aggravating sx   Plan: continue with Prilosec 40,     Patient expressed understanding and agreement with treatment plan. All patient's questions were answered, will let me know if has more later.  Medications: Rx's: Reviewed the potential side effects/complications of medications prescribed.     Dulce Hernandez MD  [facility]

## 2017-09-25 NOTE — MR AVS SNAPSHOT
After Visit Summary   9/25/2017    Ijeoma Avalos    MRN: [id number]           Patient Information     Date Of Birth          1965        Visit Information        Provider Department      9/25/2017 3:00 PM Dulce Hernandez MD [facility]        Today's Diagnoses     Other headache syndrome    -  1    Gastroesophageal reflux disease, esophagitis presence not specified        Other specified hypothyroidism        Neck pain        TMJ (temporomandibular joint syndrome)        Adjustment disorder with mixed anxiety and depressed mood          Care Instructions    Take medications as directed.  Cares and symptomatic cares discussed   Follow up in 3-4 weeks sooner ,  if problem or concern             Follow-ups after your visit        Your next 10 appointments already scheduled     Oct 10, 2017  9:30 AM CDT   Return Visit with Rosa Chowdary LP   [facility] [address]   [phone]            Oct 24, 2017  2:00 PM CDT   Return Visit with Rosa Chowdary LP   [facility]    [address] [place] [id number]   [phone]            Nov 14, 2017  2:00 PM CST   Return Visit with Rosa Chowdary LP   [facility]    [address] [place] [id number]   [phone]              Who to contact     If you have questions or need follow up information about today's clinic visit or your schedule please contact [facility] directly at [phone].  Normal or non-critical lab and imaging results will be communicated to you by MyChart, letter or phone within 4 business days after the clinic has received the results. If you do not hear from us within 7 days, please contact the clinic through MyChart or phone. If you have a critical or abnormal lab  result, we will notify you by phone as soon as possible.  Submit refill requests through SAVO or call your pharmacy and they will forward the refill request to us. Please allow 3 business days for your refill to be completed.          Additional Information About Your Visit        Slinghart Information     SAVO gives you secure access to your electronic health record. If you see a primary care provider, you can also send messages to your care team and make appointments. If you have questions, please call your primary care clinic.  If you do not have a primary care provider, please call [phone] and they will assist you.        Care EveryWhere ID     This is your Care EveryWhere ID. This could be used by other organizations to access your [place] medical records  [id number]        Your Vitals Were     Pulse Temperature Height Last Period Pulse Oximetry BMI (Body Mass Index)    87 99  F (37.2  C) (Tympanic) 5' (1.524 m) 04/14/2017 98% 28.51 kg/m2       Blood Pressure from Last 3 Encounters:   09/25/17 132/90   07/18/17 127/86   07/13/17 136/88    Weight from Last 3 Encounters:   09/25/17 146 lb (66.2 kg)   07/18/17 149 lb (67.6 kg)   07/13/17 147 lb (66.7 kg)              Today, you had the following     No orders found for display         Today's Medication Changes          These changes are accurate as of: 9/25/17  3:56 PM.  If you have any questions, ask your nurse or doctor.               Start taking these medicines.        Dose/Directions    LORazepam 0.5 MG tablet   Commonly known as:  ATIVAN   Used for:  Adjustment disorder with mixed anxiety and depressed mood   Started by:  Dulce Hernandez MD        Dose:  0.5 mg   Take 1 tablet (0.5 mg) by mouth every 8 hours as needed for anxiety   Quantity:  20 tablet   Refills:  0       nabumetone 500 MG tablet   Commonly known as:  RELAFEN   Used for:  Neck pain, TMJ (temporomandibular joint syndrome), Adjustment disorder with mixed anxiety and  depressed mood   Started by:  Dulce Hernandez MD        Dose:  500-1000 mg   Take 1-2 tablets (500-1,000 mg) by mouth 2 times daily as needed for moderate pain   Quantity:  30 tablet   Refills:  0            Where to get your medicines      These medications were sent to [facility] [place] - [place] - [address] [place] [address] Drive  [address], [place] [address]     Phone:  [phone]     nabumetone 500 MG tablet         Some of these will need a paper prescription and others can be bought over the counter.  Ask your nurse if you have questions.     Bring a paper prescription for each of these medications     LORazepam 0.5 MG tablet                Primary Care Provider Office Phone # Fax #    Dulce Hernandez [phone]       [address] [place] [address] [address]  [place] [address]        Equal Access to Services     [facility] AH: Hadii mario mendez hadasho Soomaali, waaxda luqadaha, qaybta kaalmada adeegyada, waxay pavanin haydeborahn nichole thurman . So [facility] [phone].    ATENCIÓN: Si habla español, tiene a irene disposición servicios gratuitos de asistencia lingüística. Llame al [phone].    We comply with applicable federal civil rights laws and [place] laws. We do not discriminate on the basis of race, color, national origin, age, disability sex, sexual orientation or gender identity.            Thank you!     [facility]. Our goal is always to provide you with excellent care. Hearing back from our patients is one way we can continue to improve our services. Please take a few minutes to complete the written survey that you may receive in the mail after your visit with us. Thank you!             Your Updated Medication List - Protect others around you: Learn how to safely use, store and throw away your medicines at www.disposemymeds.org.          This list is accurate as of: 9/25/17  3:56 PM.  Always use  your most recent med list.                   Brand Name Dispense Instructions for use Diagnosis    Glucosamine-Chondroitin--200-150 MG Tabs           levothyroxine 112 MCG tablet    SYNTHROID/LEVOTHROID    90 tablet    Take 1 tablet (112 mcg) by mouth daily    Other specified hypothyroidism       LORazepam 0.5 MG tablet    ATIVAN    20 tablet    Take 1 tablet (0.5 mg) by mouth every 8 hours as needed for anxiety    Adjustment disorder with mixed anxiety and depressed mood       MULTIVITAMIN PO      Take by mouth daily        nabumetone 500 MG tablet    RELAFEN    30 tablet    Take 1-2 tablets (500-1,000 mg) by mouth 2 times daily as needed for moderate pain    Neck pain, TMJ (temporomandibular joint syndrome), Adjustment disorder with mixed anxiety and depressed mood       omeprazole 40 MG capsule    priLOSEC    90 capsule    TAKE ONE CAPSULE BY MOUTH EVERY DAY 30 TO 60 MINUTES BEFORE A MEAL    Gastroesophageal reflux disease, esophagitis presence not specified

## 2017-09-25 NOTE — PATIENT INSTRUCTIONS
Take medications as directed.  Cares and symptomatic cares discussed   Follow up in 3-4 weeks sooner ,  if problem or concern

## 2017-09-26 RX ORDER — OMEPRAZOLE 40 MG/1
40 CAPSULE, DELAYED RELEASE ORAL DAILY
Qty: 90 CAPSULE | Refills: 3
Start: 2017-09-26 | End: 2018-09-14

## 2017-09-26 ASSESSMENT — ANXIETY QUESTIONNAIRES: GAD7 TOTAL SCORE: 4

## 2017-10-05 NOTE — PLAN OF CARE
Problem: Discharge Planning  Goal: Discharge Planning (Adult, OB, Behavioral, Peds)  Outcome: Adequate for Discharge Date Met:  04/22/17  A&Ox4. Up independently. Tolerating regular diet, denies nausea. VSS on RA. C/o 3-5/10 low abd pain, given percocet and toradol prn. Incision site closed with liquid bandage, C/D/I. No vaginal bleeding present per pt. Up to BR voiding spontaneously.      Pt given discharge instructions. Questions answered. Discharge medications reviewed with patient. Follow up appointment scheduled per pt. Pt to DC home with .            no

## 2017-10-10 ENCOUNTER — OFFICE VISIT (OUTPATIENT)
Dept: PSYCHOLOGY | Facility: CLINIC | Age: 52
End: 2017-10-10
Payer: COMMERCIAL

## 2017-10-10 DIAGNOSIS — F41.9 ANXIETY DISORDER: Primary | ICD-10-CM

## 2017-10-10 PROCEDURE — 90834 PSYTX W PT 45 MINUTES: CPT | Performed by: PSYCHOLOGIST

## 2017-10-10 NOTE — MR AVS SNAPSHOT
MRN:[id number]                      After Visit Summary   10/10/2017    Ijeoma Avalos    MRN: [id number]           Visit Information        Provider Department      10/10/2017 9:30 AM Rosa Chowdary LP [facility]      Your next 10 appointments already scheduled     Nov 14, 2017  2:00 PM CST   Return Visit with Rosa Chowdary LP   [facility] [place] [facility]    [address] [place] [id number] [phone]            Dec 12, 2017  1:00 PM CST   Return Visit with Rosa ANGELA Chowdary LP   [facility] ([facility])    [address] [place] [id number] [phone]              MyChart Information     MetaCDNhart gives you secure access to your electronic health record. If you see a primary care provider, you can also send messages to your care team and make appointments. If you have questions, please call your primary care clinic.  If you do not have a primary care provider, please call [phone] and they will assist you.        Care EveryWhere ID     This is your Care EveryWhere ID. This could be used by other organizations to access your [place] medical records  [id number]        Equal Access to Services     ADELINE VIVAS : Hadii mario wright Sojaclyn, waaxda luqadaha, qaybta kaalmada adeegyada, shannon blackwood. So [facility] [phone].    ATENCIÓN: Si habla español, tiene a irene disposición servicios gratuitos de asistencia lingüística. Llame al [phone].    We comply with applicable federal civil rights laws and [place] laws. We do not discriminate on the basis of race, color, national origin, age, disability, sex, sexual orientation, or gender identity.

## 2017-10-10 NOTE — PROGRESS NOTES
"                                             Progress Note    Client Name: Ijeoma Avalos  Date: 10/10/17       Service Type: Individual      Session Start Time: 09:35  Session End Time: 10:25      Session Length: 50     Session #: 9     Attendees: Client attended alone    Treatment Plan Last Reviewed: 2/9/17  PHQ-9 / SOSA-7 Last Reviewed: 9/25/17     DATA      Progress Since Last Session (Related to Symptoms / Goals / Homework):   Symptoms: Worry, anxiety    Homework: Good progress      Episode of Care Goals: Satisfactory progress - ACTION (Actively working towards change); Intervened by reinforcing change plan / affirming steps taken     Current / Ongoing Stressors and Concerns:   Physical symptoms   Phase of life issues (full-time work and parent of young child)   Work demands   Family overseas     Treatment Objective(s) Addressed in This Session:   Build and make use of skills for stress management     Intervention:   Client reported good progress with making better boundaries around work tasks. She stated that she is no longer bringing work home to do in the evening most nights. Instead she is taking time to spend on the creative writing she enjoys. She was surprised to find that no one at work seems to have even noticed that she is no longer working \"around the clock.\" Discussed the liberation that she has found as a result of this change. She did report an episode of significant reflux and gastric pain since our last session. This has motivated her to increase exercise, and she has already located a program she believes will be a good fit. Reviewed how deep breathing can be helpful during times when the body is very activated. Discussed communication issues in the marriage. Client will work on not personalizing her 's moods, also on communicating directly about her perceptions.        ASSESSMENT: Current Emotional / Mental Status (status of significant symptoms):   Risk status (Self / Other harm " or suicidal ideation)   Client denies current fears or concerns for personal safety.   Client denies current or recent suicidal ideation or behaviors.   Client denies current or recent homicidal ideation or behaviors.   Client denies current or recent self injurious behavior or ideation.   Client denies other safety concerns.   A safety and risk management plan has not been developed at this time, however client was given the after-hours number / 911 should there be a change in any of these risk factors.     Appearance:   Appropriate    Eye Contact:   Good    Psychomotor Behavior: Normal    Attitude:   Cooperative , engaged   Orientation:   All   Speech    Rate / Production: Normal     Volume:  Normal    Mood:    Anxious , but outlook more positive overall   Affect:    Appropriate    Thought Content:  More balanced    Thought Form:  Coherent  Logical    Insight:    Fair      Medication Review:   No current psychiatric medications prescribed     Medication Compliance:   NA     Changes in Health Issues:   None reported     Chemical Use Review:   Substance Use: Chemical use reviewed, no active concerns identified      Tobacco Use: No current tobacco use.       Collateral Reports Completed:   Not Applicable    PLAN: (Client Tasks / Therapist Tasks / Other)  Client plans to start regular exercise routine. She will continue to use self-regulation strategies to help with anxiety and calming physical symptoms of stress. She will practice not personalizing the moods of others. Continue with shift away from external validation, striving instead to build her skill for internal validation. Return appointments scheduled.      Rosa Chowdary, KATHARINE                                                   _______________________________________________________________________    Treatment Plan    Client's Name: Ijeoma Avalos  YOB: 1965    Date: 2/9/17    DSM-V Diagnoses: Adjustment Disorders  309.28 (F43.23) With mixed  anxiety and depressed mood  Psychosocial / Contextual Factors: physical symptoms; phase of life stressors (full-time work and parent of young child); work demands; family overseas  WHODAS: 16    Referral / Collaboration:  Referral to another professional/service is not indicated at this time.    Anticipated number of session or this episode of care: will re-evaluate every 90 days      MeasurableTreatment Goal(s) related to diagnosis / functional impairment(s)  Goal 1: Client will build skills for stress management.    I will know I've met my goal when my blood pressure is lower and my reflux symptoms have decreased.      Objective #A (Client Action)    Client will use at least 2 coping skills for anxiety management in the next 6 weeks.  Status: Continued - Date(s):10/10/17     Intervention(s)  Therapist will teach self-regulation strategies, CBT skills, mindfulness techniques.    Objective #B  Client will use cognitive strategies identified in therapy to challenge anxious thoughts.  Status: Continued - Date(s):10/10/17     Intervention(s)  Therapist will teach cognitive strategies, provide education.      Goal 2: Client will increase self-confidence.    I will know I've met my goal when I think more positively about myself.      Objective #A (Client Action)    Status: Continued - Date(s):10/10/17     Client will increase assertive communication.    Intervention(s)  Therapist will teach assertiveness skills.    Objective #B  Client will Identify negative self-talk and behaviors: challenge core beliefs, myths, and actions.    Status: Continued - Date(s):10/10/17     Intervention(s)  Therapist will provide education, teach CBT skills.      Goal 3: Client will increase connection in close relationships.    I will know I've met my goal when I feel more close with loved ones.      Objective #A (Client Action)    Status: Continued - Date(s):10/10/17     Client will prioritize time for meaningful  relationships.    Intervention(s)  Therapist will provide support and accountability.    Objective #B  Client will make use of effective interpersonal communication skills.    Status: Continued - Date(s):10/10/17     Intervention(s)  Therapist will teach communication skills.      Client has reviewed and agreed to the above plan.      Rosa Chowdary LP  February 9, 2017

## 2017-11-08 ENCOUNTER — TELEPHONE (OUTPATIENT)
Dept: FAMILY MEDICINE | Facility: CLINIC | Age: 52
End: 2017-11-08

## 2017-11-14 ENCOUNTER — OFFICE VISIT (OUTPATIENT)
Dept: PSYCHOLOGY | Facility: CLINIC | Age: 52
End: 2017-11-14
Payer: COMMERCIAL

## 2017-11-14 DIAGNOSIS — F41.9 ANXIETY DISORDER: Primary | ICD-10-CM

## 2017-11-14 PROCEDURE — 90834 PSYTX W PT 45 MINUTES: CPT | Performed by: PSYCHOLOGIST

## 2017-11-14 NOTE — MR AVS SNAPSHOT
MRN:[id number]                      After Visit Summary   11/14/2017    Ijeoma Avalos    MRN: [id number]           Visit Information        Provider Department      11/14/2017 2:00 PM [facility]      Your next 10 appointments already scheduled     Dec 12, 2017  1:00 PM CST   Return Visit with Rosa MILLER KATHARINE Chowdary   [facility] [place] ([facility])    [address] [place] [id number] [phone]            Jan 16, 2018  2:00 PM CST   Return Visit with Rosa MILLER EpiKATHARINE   [facility]    [address] [place] [id number] [phone]              MyChart Information     NORCAThart gives you secure access to your electronic health record. If you see a primary care provider, you can also send messages to your care team and make appointments. If you have questions, please call your primary care clinic.  If you do not have a primary care provider, please call [phone] and they will assist you.        Care EveryWhere ID     This is your Care EveryWhere ID. This could be used by other organizations to access your [place] medical records  [id number]        Equal Access to Services     ADELINE VIVAS : Hadii mario wright Sojaclyn, waaxda luqadaha, qaybta kaalmada adeegyada, shannon blackwood. So [facility] [phone].    ATENCIÓN: Si habla español, tiene a irene disposición servicios gratuitos de asistencia lingüística. Llame al [phone].    We comply with applicable federal civil rights laws and [place] laws. We do not discriminate on the basis of race, color, national origin, age, disability, sex, sexual orientation, or gender identity.

## 2017-11-15 NOTE — PROGRESS NOTES
"                                             Progress Note    Client Name: Ijeoma Avalos  Date: 11/14/17       Service Type: Individual      Session Start Time: 14:00  Session End Time: 14:50      Session Length: 50     Session #: 10     Attendees: Client attended alone    Treatment Plan Last Reviewed: 2/9/17  PHQ-9 / SOSA-7 Last Reviewed: 9/25/17     DATA      Progress Since Last Session (Related to Symptoms / Goals / Homework):   Symptoms: Worry, anxiety    Homework: Good progress      Episode of Care Goals: Satisfactory progress - ACTION (Actively working towards change); Intervened by reinforcing change plan / affirming steps taken     Current / Ongoing Stressors and Concerns:   Physical symptoms   Phase of life issues (full-time work and parent of young child)   Work demands   Family overseas     Treatment Objective(s) Addressed in This Session:   Build and make use of skills for stress management     Intervention:   Client continues to make progress with therapeutic goals. She has found benefit in remembering to separate from the emotions of others (not personalizing her 's moods, for example). 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 Work stressors persist. She feels frustrated at her difficulty finding better balance between work/home. Discussed how she can be more intentional in her choices, rather than drifting along with the default or \"how it's always been.\" I also encouraged Client to begin dreaming and pondering what her ideal life and ideal job would look like (e.g., I want to find a book club or I want to be able to go on field trips with my daughter's class or I want to have 2 vacations per year, etc.). This will help her in making those intentional choices.        ASSESSMENT: Current Emotional / Mental Status (status of significant symptoms):   Risk status (Self / Other harm or suicidal ideation)   Client denies current " fears or concerns for personal safety.   Client denies current or recent suicidal ideation or behaviors.   Client denies current or recent homicidal ideation or behaviors.   Client denies current or recent self injurious behavior or ideation.   Client denies other safety concerns.   A safety and risk management plan has not been developed at this time, however client was given the after-hours number / 911 should there be a change in any of these risk factors.     Appearance:   Appropriate    Eye Contact:   Good    Psychomotor Behavior: Normal    Attitude:   Cooperative    Orientation:   All   Speech    Rate / Production: Normal     Volume:  Normal    Mood:    Anxious , frustrated when she does not meet high expectations for herself   Affect:    Appropriate    Thought Content:  More balanced , less likely to personalize   Thought Form:  Coherent  Logical    Insight:    Fair      Medication Review:   No current psychiatric medications prescribed     Medication Compliance:   NA     Changes in Health Issues:   None reported     Chemical Use Review:   Substance Use: Chemical use reviewed, no active concerns identified      Tobacco Use: No current tobacco use.       Collateral Reports Completed:   Not Applicable    PLAN: (Client Tasks / Therapist Tasks / Other)  Work toward more intentional choices about how she spends her time, what is important to have in her life. Begin imagining what her ideal day/job/life would look like. Client is walking more regularly--considering joining a gym as well. She will continue to use self-regulation strategies to help with anxiety and calming physical symptoms of stress. Continue with shift away from external validation, striving instead to build her skill for internal validation. Return appointments scheduled.      Rosa Chowdary LP                                                   _______________________________________________________________________    Treatment Plan    Client's  Name: Ijeoma Avalos  YOB: 1965    Date: 2/9/17    DSM-V Diagnoses: Adjustment Disorders  309.28 (F43.23) With mixed anxiety and depressed mood  Psychosocial / Contextual Factors: physical symptoms; phase of life stressors (full-time work and parent of young child); work demands; family overseas  WHODAS: 18    Referral / Collaboration:  Referral to another professional/service is not indicated at this time.    Anticipated number of session or this episode of care: will re-evaluate every 90 days      MeasurableTreatment Goal(s) related to diagnosis / functional impairment(s)  Goal 1: Client will build skills for stress management.    I will know I've met my goal when my blood pressure is lower and my reflux symptoms have decreased.      Objective #A (Client Action)    Client will use at least 2 coping skills for anxiety management in the next 6 weeks.  Status: Continued - Date(s):10/10/17     Intervention(s)  Therapist will teach self-regulation strategies, CBT skills, mindfulness techniques.    Objective #B  Client will use cognitive strategies identified in therapy to challenge anxious thoughts.  Status: Continued - Date(s):10/10/17     Intervention(s)  Therapist will teach cognitive strategies, provide education.      Goal 2: Client will increase self-confidence.    I will know I've met my goal when I think more positively about myself.      Objective #A (Client Action)    Status: Continued - Date(s):10/10/17     Client will increase assertive communication.    Intervention(s)  Therapist will teach assertiveness skills.    Objective #B  Client will Identify negative self-talk and behaviors: challenge core beliefs, myths, and actions.    Status: Continued - Date(s):10/10/17     Intervention(s)  Therapist will provide education, teach CBT skills.      Goal 3: Client will increase connection in close relationships.    I will know I've met my goal when I feel more close with loved ones.       Objective #A (Client Action)    Status: Continued - Date(s):10/10/17     Client will prioritize time for meaningful relationships.    Intervention(s)  Therapist will provide support and accountability.    Objective #B  Client will make use of effective interpersonal communication skills.    Status: Continued - Date(s):10/10/17     Intervention(s)  Therapist will teach communication skills.      Client has reviewed and agreed to the above plan.      Rosa Chowdary, KATHARINE  February 9, 2017

## 2017-11-16 NOTE — TELEPHONE ENCOUNTER
Looks like last couple of visit her BP was higher, so best is to have her do follow up check to review form and complete after evaluation  If she is absolutely feeling well, have her at least come for BP check  , then I feel more comfortable filling out for her fitness program, to make sure she will be ok

## 2017-11-21 NOTE — TELEPHONE ENCOUNTER
Form received again via fax and TC couldn't find original on Provider desk  2nd form placed with Provider Rooming sheets on MA desk  Ivory Sow TC

## 2017-11-22 ENCOUNTER — OFFICE VISIT (OUTPATIENT)
Dept: FAMILY MEDICINE | Facility: CLINIC | Age: 52
End: 2017-11-22
Payer: COMMERCIAL

## 2017-11-22 VITALS
WEIGHT: 144 LBS | OXYGEN SATURATION: 98 % | DIASTOLIC BLOOD PRESSURE: 84 MMHG | TEMPERATURE: 99.5 F | SYSTOLIC BLOOD PRESSURE: 126 MMHG | HEART RATE: 88 BPM | BODY MASS INDEX: 28.27 KG/M2 | HEIGHT: 60 IN

## 2017-11-22 DIAGNOSIS — E03.8 OTHER SPECIFIED HYPOTHYROIDISM: Primary | ICD-10-CM

## 2017-11-22 DIAGNOSIS — J02.9 SORE THROAT: ICD-10-CM

## 2017-11-22 DIAGNOSIS — M25.561 RIGHT KNEE PAIN, UNSPECIFIED CHRONICITY: ICD-10-CM

## 2017-11-22 LAB
DEPRECATED S PYO AG THROAT QL EIA: NORMAL
SPECIMEN SOURCE: NORMAL

## 2017-11-22 PROCEDURE — 36415 COLL VENOUS BLD VENIPUNCTURE: CPT | Performed by: FAMILY MEDICINE

## 2017-11-22 PROCEDURE — 87880 STREP A ASSAY W/OPTIC: CPT | Performed by: FAMILY MEDICINE

## 2017-11-22 PROCEDURE — 84443 ASSAY THYROID STIM HORMONE: CPT | Performed by: FAMILY MEDICINE

## 2017-11-22 PROCEDURE — 87081 CULTURE SCREEN ONLY: CPT | Performed by: FAMILY MEDICINE

## 2017-11-22 PROCEDURE — 99214 OFFICE O/P EST MOD 30 MIN: CPT | Performed by: FAMILY MEDICINE

## 2017-11-22 RX ORDER — LEVOTHYROXINE SODIUM 112 UG/1
112 TABLET ORAL DAILY
Qty: 90 TABLET | Refills: 3 | Status: SHIPPED | OUTPATIENT
Start: 2017-11-22 | End: 2018-12-16

## 2017-11-22 NOTE — NURSING NOTE
Chief Complaint   Patient presents with     Forms     medical clearance        Initial BP (!) 135/91  Pulse 88  Temp 99.5  F (37.5  C) (Tympanic)  Ht 5' (1.524 m)  Wt 144 lb (65.3 kg)  LMP 04/14/2017  SpO2 98%  BMI 28.12 kg/m2 Estimated body mass index is 28.12 kg/(m^2) as calculated from the following:    Height as of this encounter: 5' (1.524 m).    Weight as of this encounter: 144 lb (65.3 kg).  Medication Reconciliation: complete

## 2017-11-22 NOTE — PROGRESS NOTES
SUBJECTIVE:   Ijeoma Avalos is a 51 year old female who presents to clinic today for the following health issues:      Concern - Medical Clearance Form       Description:   To  Exercise  . She is trying to loose weight and just wants to feel better, so she will be working with /  to help her, so need form completed   She was having knee issue and she thins she is feeling much better now although still hurts some times but she is comfortable starting her exercises bc she will be supervised         PROBLEMS TO ADD ON...  Has sore throat for couple of days, neck  glands feel tender, no fever Ro chills or other uri sx. She is around people bu no know exposure to  Strep, but  just worried , so wish to get checked     Hypothyroidism Follow-up      Since last visit, patient describes the following symptoms: Weight stable, no hair loss, no skin changes, no constipation, no loose stools      Due for labs and need refill       Problem list and histories reviewed & adjusted, as indicated.  Additional history: as documented    Patient Active Problem List   Diagnosis     Gastric acidity     CARDIOVASCULAR SCREENING; LDL GOAL LESS THAN 160     Other specified hypothyroidism     Plantar fasciitis     Hyperlipidemia LDL goal <130     Gastroesophageal reflux disease, esophagitis presence not specified     TMJ (temporomandibular joint syndrome)     Neck pain     Other headache syndrome     Uterine leiomyoma, unspecified location     Adjustment disorder with mixed anxiety and depressed mood     Low hemoglobin     Dysmenorrhea     Right knee pain, unspecified chronicity     Post-operative nausea and vomiting     Bilateral patellofemoral syndrome     Past Surgical History:   Procedure Laterality Date     APPENDECTOMY        SECTION  2014    Procedure:  SECTION;  Surgeon: Ru Cano MD;  Location:  [facility]     CHOLECYSTECTOMY       cyst removed      left  lower leg on bone sheath      DAVINCI HYSTERECTOMY TOTAL, SALPINGECTOMY BILATERAL N/A 4/21/2017    Procedure: DAVINCI HYSTERECTOMY TOTAL, SALPINGECTOMY BILATERAL;  DAVINCI SINGLE SITE HYSTERECTOMY, BILATERAL SALPINGECTOMY, LEFT OOPHORECTOMY, LYSIS OF ADHESIONS (LAPAROSCOPIC BAG TO RETREIVE OVARY);  Surgeon: Ru Cano MD;  Location[facility]     DAVINCI LYSIS OF ADHESIONS N/A 4/21/2017    Procedure: DAVINCI LYSIS OF ADHESIONS;;  Surgeon: Ru Cano MD;  Location[facility]     DAVINCI OOPHORECTOMY N/A 4/21/2017    Procedure: DAVINCI OOPHORECTOMY;;  Surgeon: Ru Cano MD;  Location: [facility]     GASTROSCOPY,FL       GI SURGERY       MYOMECTOMY UTERUS  sept 2012       Social History   Substance Use Topics     Smoking status: Never Smoker     Smokeless tobacco: Never Used     Alcohol use No      Comment: social     Family History   Problem Relation Age of Onset     Hypertension Father              Reviewed and updated as needed this visit by clinical staff     Reviewed and updated as needed this visit by Provider         ROS:  Constitutional, HEENT, cardiovascular, pulmonary, GI, , musculoskeletal, neuro, skin, endocrine and psych systems are negative, except as otherwise noted.      OBJECTIVE:   BP (!) 135/91  Pulse 88  Temp 99.5  F (37.5  C) (Tympanic)  Ht 5' (1.524 m)  Wt 144 lb (65.3 kg)  LMP 04/14/2017  SpO2 98%  BMI 28.12 kg/m2  Body mass index is 28.12 kg/(m^2).  GENERAL: healthy, alert and no distress  HE ENT , TM , normal  Throat with mild pharyngeal erythema, no sinus  tenderness  NECK: no adenopathy, although slightly tender along anterior cervical area especially on left but no asymmetry, masses, or scars and thyroid normal to palpation  RESP: lungs clear to auscultation - no rales, rhonchi or wheezes  CV: regular rate and rhythm, normal S1 S2, no S3 or S4,  ABDOMEN: soft, nontender, no hepatosplenomegaly, no masses and bowel sounds normal  MS: knee with good  range of motion  And no acute  tenderness to  palpation   NEURO: Normal strength and tone,   PSYCH: mentation appears normal, affect normal        ASSESSMENT/PLAN:     (E03.8) Other specified hypothyroidism  (primary encounter diagnosis)  Comment:   Plan: TSH with free T4 reflex        Check labs. adjust med's if needed     (J02.9) Sore throat  Comment:   Plan: Strep, Rapid Screen     Rapid strep negative, strep culture pending. Call and treat only if positive. In the meantime cares and symptomatic treatment discussed follow up if problem       (M25.561) Right knee pain, unspecified chronicity  Comment:   Plan: improved     Form completed fro her health club       Patient expressed understanding and agreement with treatment plan. All patient's questions were answered, will let me know if has more later.  Medications: Rx's: Reviewed the potential side effects/complications of medications prescribed.       Dulce Hernandez MD  [facility]

## 2017-11-22 NOTE — MR AVS SNAPSHOT
After Visit Summary   11/22/2017    Ijeoma Avalos    MRN: [id number]           Patient Information     Date Of Birth          1965        Visit Information        Provider Department      11/22/2017 4:00 PM Dulce Hernandez MD [facility]        Today's Diagnoses     Other specified hypothyroidism    -  1    Sore throat        Right knee pain, unspecified chronicity          Care Instructions    Check labs  Follow up as needed           Follow-ups after your visit        Your next 10 appointments already scheduled     Dec 12, 2017  1:00 PM CST   Return Visit with Rosa Chowdary LP   [facility]    [address] [place] [id number] [phone]            Jan 16, 2018  2:00 PM CST   Return Visit with Rosa Chowdary LP   [facility] ([facility])    [address] [place] [id number] [phone]              Who to contact     If you have questions or need follow up information about today's clinic visit or your schedule please contact [facility] directly at [phone].  Normal or non-critical lab and imaging results will be communicated to you by MyChart, letter or phone within 4 business days after the clinic has received the results. If you do not hear from us within 7 days, please contact the clinic through MyChart or phone. If you have a critical or abnormal lab result, we will notify you by phone as soon as possible.  Submit refill requests through [employer] or call your pharmacy and they will forward the refill request to us. Please allow 3 business days for your refill to be completed.          Additional Information About Your Visit        MyChart Information     [employer] gives you secure access to your electronic health record. If you see a primary care provider, you can also send messages to your care team and make  appointments. If you have questions, please call your primary care clinic.  If you do not have a primary care provider, please call [phone] and they will assist you.        Care EveryWhere ID     This is your Care EveryWhere ID. This could be used by other organizations to access your [place] medical records  [id number]        Your Vitals Were     Pulse Temperature Height Last Period Pulse Oximetry BMI (Body Mass Index)    88 99.5  F (37.5  C) (Tympanic) 5' (1.524 m) 04/14/2017 98% 28.12 kg/m2       Blood Pressure from Last 3 Encounters:   11/22/17 126/84   09/25/17 132/90   07/18/17 127/86    Weight from Last 3 Encounters:   11/22/17 144 lb (65.3 kg)   09/25/17 146 lb (66.2 kg)   07/18/17 149 lb (67.6 kg)              We Performed the Following     Strep, Rapid Screen     TSH with free T4 reflex        Primary Care Provider Office Phone # Fax #    Dulce Terrence Hernandez [phone]       [address]  [place] [address]        Equal Access to Services     [facility] AH: Hadii aad ku hadasho Soomaali, waaxda luqadaha, qaybta kaalmada adeegyada, waxay pavanin haydeborahn nichole mejia latreen ah. So [facility] [phone].    ATENCIÓN: Si habla español, tiene a irene disposición servicios gratuitos de asistencia lingüística. Llame al [phone].    We comply with applicable federal civil rights laws and [place] laws. We do not discriminate on the basis of race, color, national origin, age, disability, sex, sexual orientation, or gender identity.            Thank you!     Thank you for choosing [facility] [place]  for your care. Our goal is always to provide you with excellent care. Hearing back from our patients is one way we can continue to improve our services. Please take a few minutes to complete the written survey that you may receive in the mail after your visit with us. Thank you!             Your Updated Medication List - Protect others around you: Learn how to safely use,  store and throw away your medicines at www.disposemymeds.org.          This list is accurate as of: 11/22/17  4:35 PM.  Always use your most recent med list.                   Brand Name Dispense Instructions for use Diagnosis    Glucosamine-Chondroitin--200-150 MG Tabs           levothyroxine 112 MCG tablet    SYNTHROID/LEVOTHROID    90 tablet    Take 1 tablet (112 mcg) by mouth daily    Other specified hypothyroidism       LORazepam 0.5 MG tablet    ATIVAN    20 tablet    Take 1 tablet (0.5 mg) by mouth every 8 hours as needed for anxiety    Adjustment disorder with mixed anxiety and depressed mood       MULTIVITAMIN PO      Take by mouth daily        nabumetone 500 MG tablet    RELAFEN    30 tablet    Take 1-2 tablets (500-1,000 mg) by mouth 2 times daily as needed for moderate pain    Neck pain, TMJ (temporomandibular joint syndrome), Adjustment disorder with mixed anxiety and depressed mood       omeprazole 40 MG capsule    priLOSEC    90 capsule    Take 1 capsule (40 mg) by mouth daily    Gastroesophageal reflux disease, esophagitis presence not specified

## 2017-11-23 DIAGNOSIS — E03.8 OTHER SPECIFIED HYPOTHYROIDISM: ICD-10-CM

## 2017-11-24 LAB
BACTERIA SPEC CULT: NORMAL
SPECIMEN SOURCE: NORMAL
TSH SERPL DL<=0.005 MIU/L-ACNC: 0.63 MU/L (ref 0.4–4)

## 2017-11-24 RX ORDER — LEVOTHYROXINE SODIUM 112 UG/1
TABLET ORAL
Qty: 30 TABLET | Refills: 2 | OUTPATIENT
Start: 2017-11-24

## 2017-11-24 NOTE — TELEPHONE ENCOUNTER
TSH lab on 11/22/17 still in process at this time  Last OV 11/22/17 and med refilled  levothyroxine (SYNTHROID/LEVOTHROID) 112 MCG tablet 90 tablet 3 11/22/2017  Edith Hung RN

## 2017-12-01 ENCOUNTER — OFFICE VISIT (OUTPATIENT)
Dept: FAMILY MEDICINE | Facility: CLINIC | Age: 52
End: 2017-12-01
Payer: COMMERCIAL

## 2017-12-01 DIAGNOSIS — B96.89 BACTERIAL VAGINOSIS: ICD-10-CM

## 2017-12-01 DIAGNOSIS — N76.0 BACTERIAL VAGINOSIS: ICD-10-CM

## 2017-12-01 DIAGNOSIS — N89.8 VAGINAL ITCHING: Primary | ICD-10-CM

## 2017-12-01 LAB
SPECIMEN SOURCE: ABNORMAL
WET PREP SPEC: ABNORMAL

## 2017-12-01 PROCEDURE — 99213 OFFICE O/P EST LOW 20 MIN: CPT | Performed by: PHYSICIAN ASSISTANT

## 2017-12-01 PROCEDURE — 87210 SMEAR WET MOUNT SALINE/INK: CPT | Performed by: PHYSICIAN ASSISTANT

## 2017-12-01 RX ORDER — METRONIDAZOLE 7.5 MG/G
1 GEL VAGINAL 2 TIMES DAILY
Qty: 70 G | Refills: 0 | Status: SHIPPED | OUTPATIENT
Start: 2017-12-01 | End: 2017-12-06

## 2017-12-01 NOTE — MR AVS SNAPSHOT
After Visit Summary   12/1/2017    Ijeoma Avalos    MRN: [id number]           Patient Information     Date Of Birth          1965        Visit Information        Provider Department      12/1/2017 2:40 PM David Waldrop PA-C [facility]        Today's Diagnoses     Vaginal itching    -  1    Bacterial vaginosis           Follow-ups after your visit        Follow-up notes from your care team     Return if symptoms worsen or fail to improve.      Your next 10 appointments already scheduled     Dec 11, 2017  7:40 AM CST   PHYSICAL with Dulce Hernandez MD   [facility]    [address] [place] [id number]   [phone]            Dec 12, 2017  1:00 PM CST   Return Visit with Rosa Chowdary LP   [facility] [place] ([facility] [place])    [address] [place] [id number]   [phone]            Jan 16, 2018  2:00 PM CST   Return Visit with Rosa Chowdary LP   [facility] [place] ([facility] [place])    [address] [place] [address] [id number] [phone]              Who to contact     If you have questions or need follow up information about today's clinic visit or your schedule please contact [facility] directly at [phone].  Normal or non-critical lab and imaging results will be communicated to you by MyChart, letter or phone within 4 business days after the clinic has received the results. If you do not hear from us within 7 days, please contact the clinic through MyChart or phone. If you have a critical or abnormal lab result, we will notify you by phone as soon as possible.  Submit refill requests through [employer] or call your pharmacy and they will forward the refill request to us. Please allow 3 business days for your refill to be completed.          Additional Information About Your Visit         [employer] Information     [employer] gives you secure access to your electronic health record. If you see a primary care provider, you can also send messages to your care team and make appointments. If you have questions, please call your primary care clinic.  If you do not have a primary care provider, please call [phone] and they will assist you.        Care EveryWhere ID     This is your Care EveryWhere ID. This could be used by other organizations to access your [place] medical records  [id number]        Your Vitals Were     Last Period                   04/14/2017            Blood Pressure from Last 3 Encounters:   11/22/17 126/84   09/25/17 132/90   07/18/17 127/86    Weight from Last 3 Encounters:   11/22/17 144 lb (65.3 kg)   09/25/17 146 lb (66.2 kg)   07/18/17 149 lb (67.6 kg)              We Performed the Following     Wet prep          Today's Medication Changes          These changes are accurate as of: 12/1/17  3:10 PM.  If you have any questions, ask your nurse or doctor.               Start taking these medicines.        Dose/Directions    metroNIDAZOLE 0.75 % vaginal gel   Commonly known as:  METROGEL   Used for:  Bacterial vaginosis        Dose:  1 applicator   Place 1 applicator (5 g) vaginally 2 times daily for 5 days   Quantity:  70 g   Refills:  0            Where to get your medicines      These medications were sent to [facility] [place] [facility] [place], [place] [address]  [address] [facility] [place] [address]     Phone:  [phone]     metroNIDAZOLE 0.75 % vaginal gel                Primary Care Provider Office Phone # Fax #    Dulce Hernandez [phone]       [address] [place] [address] [address]  [place] [address]        Equal Access to Services     CHRIST[facility] VIVAS AH: Hadii mario armaso Sojaclyn, waaxda luqadaha, qaybta kaalmada nicholeyakami, shannon blackwood. So [facility] [phone].    ATENCIÓN: yazmin Henson  irene disposición servicios gratuitos de asistencia lingüística. Lilian sharma [phone].    We comply with applicable federal civil rights laws and [place] laws. We do not discriminate on the basis of race, color, national origin, age, disability, sex, sexual orientation, or gender identity.            Thank you!     Thank you for choosing [facility] [place]  for your care. Our goal is always to provide you with excellent care. Hearing back from our patients is one way we can continue to improve our services. Please take a few minutes to complete the written survey that you may receive in the mail after your visit with us. Thank you!             Your Updated Medication List - Protect others around you: Learn how to safely use, store and throw away your medicines at www.disposemymeds.org.          This list is accurate as of: 12/1/17  3:10 PM.  Always use your most recent med list.                   Brand Name Dispense Instructions for use Diagnosis    Glucosamine-Chondroitin--200-150 MG Tabs           levothyroxine 112 MCG tablet    SYNTHROID/LEVOTHROID    90 tablet    Take 1 tablet (112 mcg) by mouth daily    Other specified hypothyroidism       LORazepam 0.5 MG tablet    ATIVAN    20 tablet    Take 1 tablet (0.5 mg) by mouth every 8 hours as needed for anxiety    Adjustment disorder with mixed anxiety and depressed mood       metroNIDAZOLE 0.75 % vaginal gel    METROGEL    70 g    Place 1 applicator (5 g) vaginally 2 times daily for 5 days    Bacterial vaginosis       MULTIVITAMIN PO      Take by mouth daily        nabumetone 500 MG tablet    RELAFEN    30 tablet    Take 1-2 tablets (500-1,000 mg) by mouth 2 times daily as needed for moderate pain    Neck pain, TMJ (temporomandibular joint syndrome), Adjustment disorder with mixed anxiety and depressed mood       omeprazole 40 MG capsule    priLOSEC    90 capsule    Take 1 capsule (40 mg) by mouth daily    Gastroesophageal reflux disease,  esophagitis presence not specified

## 2017-12-01 NOTE — PROGRESS NOTES
SUBJECTIVE:   Ijeoma Avalos is a 51 year old female who presents to clinic today for the following health issues:      Vaginal Symptoms  Onset: x 1 week    Description:  Vaginal Discharge: white not sure if it was otc medication  Itching (Pruritis): YES  Burning sensation:  YES  Odor: no     Accompanying Signs & Symptoms:  Pain with Urination: no   Abdominal Pain: no   Fever: no     History:   Sexually active: YES  New Partner: no   Possibility of Pregnancy:  No    Precipitating factors:   Recent Antibiotic Use: no     Alleviating factors:  None    Therapies Tried and outcome: OTC monistat, Vagisil    Patient with 1 week of white vaginal discharge, vaginal burning and itching. No vaginal bleeding, vaginal odor, urinary symptoms, abdominal pain. No concern for STD or pregnancy. Tried Vagisil with some temporary improvement, used OTC monistat last night which made the vaginal burning worse.      Problem list and histories reviewed & adjusted, as indicated.  Additional history: as documented    Patient Active Problem List   Diagnosis     Gastric acidity     CARDIOVASCULAR SCREENING; LDL GOAL LESS THAN 160     Other specified hypothyroidism     Plantar fasciitis     Hyperlipidemia LDL goal <130     Gastroesophageal reflux disease, esophagitis presence not specified     TMJ (temporomandibular joint syndrome)     Neck pain     Other headache syndrome     Uterine leiomyoma, unspecified location     Adjustment disorder with mixed anxiety and depressed mood     Low hemoglobin     Dysmenorrhea     Right knee pain, unspecified chronicity     Post-operative nausea and vomiting     Bilateral patellofemoral syndrome     Past Surgical History:   Procedure Laterality Date     APPENDECTOMY        SECTION  2014    Procedure:  SECTION;  Surgeon: Ru Cano MD;  Location:  [facility]D     CHOLECYSTECTOMY       cyst removed      left  lower leg on bone sheath     DAVINCI HYSTERECTOMY TOTAL,  SALPINGECTOMY BILATERAL N/A 4/21/2017    Procedure: DAVINCI HYSTERECTOMY TOTAL, SALPINGECTOMY BILATERAL;  DAVINCI SINGLE SITE HYSTERECTOMY, BILATERAL SALPINGECTOMY, LEFT OOPHORECTOMY, LYSIS OF ADHESIONS (LAPAROSCOPIC BAG TO RETREIVE OVARY);  Surgeon: Ru Cano MD;  Location: [facility]     DAVINCI LYSIS OF ADHESIONS N/A 4/21/2017    Procedure: DAVINCI LYSIS OF ADHESIONS;;  Surgeon: Ru Cano MD;  Location[facility]     DAVINCI OOPHORECTOMY N/A 4/21/2017    Procedure: DAVINCI OOPHORECTOMY;;  Surgeon: Ru Cano MD;  Location: [facility]     GASTROSCOPY,FL       GI SURGERY       MYOMECTOMY UTERUS  sept 2012       Social History   Substance Use Topics     Smoking status: Never Smoker     Smokeless tobacco: Never Used     Alcohol use No      Comment: social     Family History   Problem Relation Age of Onset     Hypertension Father          Current Outpatient Prescriptions   Medication Sig Dispense Refill     metroNIDAZOLE (METROGEL) 0.75 % vaginal gel Place 1 applicator (5 g) vaginally 2 times daily for 5 days 70 g 0     levothyroxine (SYNTHROID/LEVOTHROID) 112 MCG tablet Take 1 tablet (112 mcg) by mouth daily 90 tablet 3     omeprazole (PRILOSEC) 40 MG capsule Take 1 capsule (40 mg) by mouth daily 90 capsule 3     nabumetone (RELAFEN) 500 MG tablet Take 1-2 tablets (500-1,000 mg) by mouth 2 times daily as needed for moderate pain 30 tablet 0     LORazepam (ATIVAN) 0.5 MG tablet Take 1 tablet (0.5 mg) by mouth every 8 hours as needed for anxiety 20 tablet 0     Glucosamine-Chondroitin--200-150 MG TABS        Multiple Vitamins-Minerals (MULTIVITAMIN PO) Take by mouth daily       Allergies   Allergen Reactions     Cephalexin Hives     Hives on hands, face and stomach.          Reviewed and updated as needed this visit by clinical staff       Reviewed and updated as needed this visit by Provider         ROS:  Constitutional, HEENT, cardiovascular, pulmonary, gi and gu systems are negative, except as  otherwise noted.      OBJECTIVE:   LMP 04/14/2017  There is no height or weight on file to calculate BMI.  GENERAL: healthy, alert and no distress  ABDOMEN: soft, nontender, no hepatosplenomegaly, no masses and bowel sounds normal  PSYCH: mentation appears normal, affect normal/bright  ;  Self wet prep done  Diagnostic Test Results:  Results for orders placed or performed in visit on 12/01/17 (from the past 24 hour(s))   Wet prep   Result Value Ref Range    Specimen Description Vagina     Wet Prep No Trichomonas seen     Wet Prep No yeast seen     Wet Prep Clue cells seen (A)        ASSESSMENT/PLAN:     1. Bacterial vaginosis  - metroNIDAZOLE (METROGEL) 0.75 % vaginal gel; Place 1 applicator (5 g) vaginally 2 times daily for 5 days  Dispense: 70 g; Refill: 0    2. Vaginal itching  - Wet prep    See Patient Instructions    David Waldrop PA-C  [facility]

## 2017-12-13 ENCOUNTER — MYC MEDICAL ADVICE (OUTPATIENT)
Dept: FAMILY MEDICINE | Facility: CLINIC | Age: 52
End: 2017-12-13

## 2017-12-13 ENCOUNTER — OFFICE VISIT (OUTPATIENT)
Dept: FAMILY MEDICINE | Facility: CLINIC | Age: 52
End: 2017-12-13
Payer: COMMERCIAL

## 2017-12-13 VITALS
OXYGEN SATURATION: 98 % | BODY MASS INDEX: 27.48 KG/M2 | WEIGHT: 140 LBS | HEART RATE: 75 BPM | DIASTOLIC BLOOD PRESSURE: 95 MMHG | HEIGHT: 60 IN | TEMPERATURE: 98.4 F | SYSTOLIC BLOOD PRESSURE: 136 MMHG

## 2017-12-13 DIAGNOSIS — N89.8 VAGINAL ITCHING: ICD-10-CM

## 2017-12-13 DIAGNOSIS — E78.5 HYPERLIPIDEMIA LDL GOAL <130: ICD-10-CM

## 2017-12-13 DIAGNOSIS — Z00.00 ROUTINE HISTORY AND PHYSICAL EXAMINATION OF ADULT: Primary | ICD-10-CM

## 2017-12-13 DIAGNOSIS — R73.09 ELEVATED GLUCOSE: Primary | ICD-10-CM

## 2017-12-13 DIAGNOSIS — Z12.11 COLON CANCER SCREENING: ICD-10-CM

## 2017-12-13 DIAGNOSIS — Z12.39 SCREENING FOR BREAST CANCER: ICD-10-CM

## 2017-12-13 LAB
CHOLEST SERPL-MCNC: 212 MG/DL
GLUCOSE SERPL-MCNC: 265 MG/DL (ref 70–99)
HDLC SERPL-MCNC: 48 MG/DL
LDLC SERPL CALC-MCNC: 126 MG/DL
NONHDLC SERPL-MCNC: 164 MG/DL
SPECIMEN SOURCE: NORMAL
TRIGL SERPL-MCNC: 192 MG/DL
WET PREP SPEC: NORMAL

## 2017-12-13 PROCEDURE — 82947 ASSAY GLUCOSE BLOOD QUANT: CPT | Performed by: FAMILY MEDICINE

## 2017-12-13 PROCEDURE — 87210 SMEAR WET MOUNT SALINE/INK: CPT | Performed by: FAMILY MEDICINE

## 2017-12-13 PROCEDURE — 99396 PREV VISIT EST AGE 40-64: CPT | Performed by: FAMILY MEDICINE

## 2017-12-13 PROCEDURE — 36415 COLL VENOUS BLD VENIPUNCTURE: CPT | Performed by: FAMILY MEDICINE

## 2017-12-13 PROCEDURE — 80061 LIPID PANEL: CPT | Performed by: FAMILY MEDICINE

## 2017-12-13 PROCEDURE — 99212 OFFICE O/P EST SF 10 MIN: CPT | Mod: 25 | Performed by: FAMILY MEDICINE

## 2017-12-13 ASSESSMENT — ANXIETY QUESTIONNAIRES
1. FEELING NERVOUS, ANXIOUS, OR ON EDGE: NOT AT ALL
6. BECOMING EASILY ANNOYED OR IRRITABLE: NOT AT ALL
GAD7 TOTAL SCORE: 2
2. NOT BEING ABLE TO STOP OR CONTROL WORRYING: NOT AT ALL
7. FEELING AFRAID AS IF SOMETHING AWFUL MIGHT HAPPEN: NOT AT ALL
3. WORRYING TOO MUCH ABOUT DIFFERENT THINGS: SEVERAL DAYS
5. BEING SO RESTLESS THAT IT IS HARD TO SIT STILL: NOT AT ALL

## 2017-12-13 ASSESSMENT — PATIENT HEALTH QUESTIONNAIRE - PHQ9
SUM OF ALL RESPONSES TO PHQ QUESTIONS 1-9: 3
5. POOR APPETITE OR OVEREATING: SEVERAL DAYS

## 2017-12-13 NOTE — PROGRESS NOTES
SUBJECTIVE:   CC: Ijeoma Avalos is an 52 year old woman who presents for preventive health visit.     Physical   Annual:     Getting at least 3 servings of Calcium per day::  NO    Bi-annual eye exam::  Yes    Dental care twice a year::  Yes    Sleep apnea or symptoms of sleep apnea::  None    Diet::  Regular (no restrictions)    Frequency of exercise::  1 day/week    Duration of exercise::  15-30 minutes    Taking medications regularly::  Yes    Medication side effects::  None                  PROBLEMS TO ADD ON...  Was treated for BV recently. Felt slightly better but now has itch and burning again and worried about yeast infection. no urinary sx otherwise . No abdominal  pain back ate         Today's PHQ-2 Score: PHQ-2 ( 1999 [employer]) 12/13/2017   Q1: Little interest or pleasure in doing things 0   Q2: Feeling down, depressed or hopeless 1   PHQ-2 Score 1   Q1: Little interest or pleasure in doing things Not at all   Q2: Feeling down, depressed or hopeless Several days   PHQ-2 Score 1     Answers for HPI/ROS submitted by the patient on 12/13/2017   PHQ-2 Score: 1    Abuse: Current or Past(Physical, Sexual or Emotional)- no  Do you feel safe in your environment - Yes    Social History   Substance Use Topics     Smoking status: Never Smoker     Smokeless tobacco: Never Used     Alcohol use No      Comment: social     The patient does not drink >3 drinks per day nor >7 drinks per week.    Reviewed orders with patient.  Reviewed health maintenance and updated orders accordingly - Yes  BP Readings from Last 3 Encounters:   12/13/17 (!) 146/91   11/22/17 126/84   09/25/17 132/90    Wt Readings from Last 3 Encounters:   12/13/17 140 lb (63.5 kg)   11/22/17 144 lb (65.3 kg)   09/25/17 146 lb (66.2 kg)                      Patient over age 50, mutual decision to screen reflected in health maintenance.      Pertinent mammograms are reviewed under the imaging tab.  History of abnormal Pap smear: NO - age 30- 65 PAP  every 3 years recommended    Reviewed and updated as needed this visit by clinical staff         Reviewed and updated as needed this visit by Provider        Past Medical History:   Diagnosis Date     Gastric acidity      Gestational diabetes mellitus     DIET CONTROL     Hx of previous reproductive problem     IVF     Hypothyroid      Motion sickness      Ovarian cyst      Post-operative nausea and vomiting 4/21/2017    Review of Systems  C: NEGATIVE for fever, chills, change in weight  I: NEGATIVE for worrisome rashes, moles or lesions  E: NEGATIVE for vision changes or irritation  ENT: NEGATIVE for ear, mouth and throat problems  R: NEGATIVE for significant cough or SOB  B: NEGATIVE for masses, tenderness or discharge  CV: NEGATIVE for chest pain, palpitations or peripheral edema  GI: NEGATIVE for nausea, abdominal pain, heartburn, or change in bowel habits  : NEGATIVE for unusual urinary or vaginal symptoms. Periods are regular.  M: NEGATIVE for significant arthralgias or myalgia  N: NEGATIVE for weakness, dizziness or paresthesias  E: NEGATIVE for temperature intolerance, skin/hair changes  H: NEGATIVE for bleeding problems  P: NEGATIVE for changes in mood or affect     OBJECTIVE:   LMP 04/14/2017  Physical Exam  GENERAL: healthy, alert and no distress  EYES: Eyes grossly normal to inspection, PERRL and conjunctivae and sclerae normal  HENT: ear canals and TM's normal, nose and mouth without ulcers or lesions  NECK: no adenopathy, no asymmetry, masses, or scars and thyroid normal to palpation  RESP: lungs clear to auscultation - no rales, rhonchi or wheezes  BREAST: normal without masses, tenderness or nipple discharge and no palpable axillary masses or adenopathy  CV: regular rate and rhythm, normal S1 S2, no S3 or S4, no murmur, click or rub, no peripheral edema and peripheral pulses strong  ABDOMEN: soft, nontender, no hepatosplenomegaly, no masses and bowel sounds normal   (female): normal female  external genitalia, normal urethral meatus , vaginal mucosa pink, moist, well rugated and normal cervix, adnexae, and uterus without masses. Minimal normal discharge   MS: no gross musculoskeletal defects noted, no edema  SKIN: no suspicious lesions or rashes  NEURO: Normal strength and tone, mentation intact and speech normal  PSYCH: mentation appears normal, affect normal/bright    ASSESSMENT/PLAN:   (Z00.00) Routine history and physical examination of adult  (primary encounter diagnosis)  Comment:   Plan:         Glucose, *MA Screening Digital Bilateral,         DEPRESSION ACTION PLAN (DAP)            (L29.8) Vaginal itching  Comment:   Plan: Wet prep        Treat if +ve     (Z12.11) Colon cancer screening  Comment:   Plan: Fecal colorectal cancer screen (FIT),         GASTROENTEROLOGY ADULT REF PROCEDURE ONLY            (Z12.31) Screening for breast cancer  Comment:   Plan: *MA Screening Digital Bilateral            (E78.5) Hyperlipidemia LDL goal <130  Comment:   Plan: Lipid Profile (Chol, Trig, HDL, LDL calc),     COUNSELING:  Reviewed preventive health counseling, as reflected in patient instructions       Regular exercise       Healthy diet/nutrition       Vision screening         reports that she has never smoked. She has never used smokeless tobacco.    Estimated body mass index is 28.12 kg/(m^2) as calculated from the following:    Height as of 11/22/17: 5' (1.524 m).    Weight as of 11/22/17: 144 lb (65.3 kg).   Weight management plan: Discussed healthy diet and exercise guidelines and patient will follow up in 12 months in clinic to re-evaluate.    Counseling Resources:  ATP IV Guidelines  Pooled Cohorts Equation Calculator  Breast Cancer Risk Calculator  FRAX Risk Assessment  ICSI Preventive Guidelines  Dietary Guidelines for Americans, 2010  USDA's MyPlate  ASA Prophylaxis  Lung CA Screening    Dulce Hernandez MD  [facility] [place]

## 2017-12-13 NOTE — NURSING NOTE
Chief Complaint   Patient presents with     Physical       Initial BP (!) 146/91  Pulse 75  Temp 98.4  F (36.9  C) (Tympanic)  Ht 5' (1.524 m)  Wt 140 lb (63.5 kg)  LMP 04/14/2017  SpO2 98%  BMI 27.34 kg/m2 Estimated body mass index is 27.34 kg/(m^2) as calculated from the following:    Height as of this encounter: 5' (1.524 m).    Weight as of this encounter: 140 lb (63.5 kg).  Medication Reconciliation: complete

## 2017-12-13 NOTE — LETTER
My Depression Action Plan  Name: Ijeoma Avalos   Date of Birth 1965  Date: 12/13/2017    My doctor: Dulce Hernandez   My clinic: [address] [address]  [phone]          GREEN    ZONE   Good Control    What it looks like:     Things are going generally well. You have normal up s and down s. You may even feel depressed from time to time, but bad moods usually last less than a day.   What you need to do:  1. Continue to care for yourself (see self care plan)  2. Check your depression survival kit and update it as needed  3. Follow your physician s recommendations including any medication.  4. Do not stop taking medication unless you consult with your physician first.           YELLOW         ZONE Getting Worse    What it looks like:     Depression is starting to interfere with your life.     It may be hard to get out of bed; you may be starting to isolate yourself from others.    Symptoms of depression are starting to last most all day and this has happened for several days.     You may have suicidal thoughts but they are not constant.   What you need to do:     1. Call your care team, your response to treatment will improve if you keep your care team informed of your progress. Yellow periods are signs an adjustment may need to be made.     2. Continue your self-care, even if you have to fake it!    3. Talk to someone in your support network    4. Open up your depression survival kit           RED    ZONE Medical Alert - Get Help    What it looks like:     Depression is seriously interfering with your life.     You may experience these or other symptoms: You can t get out of bed most days, can t work or engage in other necessary activities, you have trouble taking care of basic hygiene, or basic responsibilities, thoughts of suicide or death that will not go away, self-injurious behavior.     What you need to do:  1. Call  your care team and request a same-day appointment. If they are not available (weekends or after hours) call your local crisis line, emergency room or 911.      Electronically signed by: Dulce Hernandez, December 13, 2017    Depression Self Care Plan / Survival Kit    Self-Care for Depression  Here s the deal. Your body and mind are really not as separate as most people think.  What you do and think affects how you feel and how you feel influences what you do and think. This means if you do things that people who feel good do, it will help you feel better.  Sometimes this is all it takes.  There is also a place for medication and therapy depending on how severe your depression is, so be sure to consult with your medical provider and/ or Behavioral Health Consultant if your symptoms are worsening or not improving.     In order to better manage my stress, I will:    Exercise  Get some form of exercise, every day. This will help reduce pain and release endorphins, the  feel good  chemicals in your brain. This is almost as good as taking antidepressants!  This is not the same as joining a gym and then never going! (they count on that by the way ) It can be as simple as just going for a walk or doing some gardening, anything that will get you moving.      Hygiene   Maintain good hygiene (Get out of bed in the morning, Make your bed, Brush your teeth, Take a shower, and Get dressed like you were going to work, even if you are unemployed).  If your clothes don't fit try to get ones that do.    Diet  I will strive to eat foods that are good for me, drink plenty of water, and avoid excessive sugar, caffeine, alcohol, and other mood-altering substances.  Some foods that are helpful in depression are: complex carbohydrates, B vitamins, flaxseed, fish or fish oil, fresh fruits and vegetables.    Psychotherapy  I agree to participate in Individual Therapy (if recommended).    Medication  If prescribed medications, I agree to  take them.  Missing doses can result in serious side effects.  I understand that drinking alcohol, or other illicit drug use, may cause potential side effects.  I will not stop my medication abruptly without first discussing it with my provider.    Staying Connected With Others  I will stay in touch with my friends, family members, and my primary care provider/team.    Use your imagination  Be creative.  We all have a creative side; it doesn t matter if it s oil painting, sand castles, or mud pies! This will also kick up the endorphins.    Witness Beauty  (AKA stop and smell the roses) Take a look outside, even in mid-winter. Notice colors, textures. Watch the squirrels and birds.     Service to others  Be of service to others.  There is always someone else in need.  By helping others we can  get out of ourselves  and remember the really important things.  This also provides opportunities for practicing all the other parts of the program.    Humor  Laugh and be silly!  Adjust your TV habits for less news and crime-drama and more comedy.    Control your stress  Try breathing deep, massage therapy, biofeedback, and meditation. Find time to relax each day.     My support system    Clinic Contact:  Phone number:    Contact 1:  Phone number:    Contact 2:  Phone number:    Restoration/:  Phone number:    Therapist:  Phone number:    Local crisis center:    Phone number:    Other community support:  Phone number:

## 2017-12-13 NOTE — MR AVS SNAPSHOT
After Visit Summary   12/13/2017    Ijeoma Avalos    MRN: [id number]           Patient Information     Date Of Birth          1965        Visit Information        Provider Department      12/13/2017 7:40 AM Dulce Hernandez MD [facility]        Today's Diagnoses     Routine history and physical examination of adult    -  1    Vaginal itching        Colon cancer screening        Screening for breast cancer        Hyperlipidemia LDL goal <130          Care Instructions      Prevention Guidelines, Women Ages 40 to 49  Screening tests and vaccines are an important part of managing your health. Health counseling is essential, too. Below are guidelines for these, for women ages 40 to 49. Talk with your healthcare provider to make sure you re up-to-date on what you need.  Screening Who needs it How often   Type 2 diabetes or prediabetes All adults beginning at age 45 and adults without symptoms at any age who are overweight or obese and have 1 or more additional risk factors for diabetes At least every 3 years   Alcohol misuse All women in this age group At routine exams   Blood pressure All women in this age group Every 2 years if your blood pressure is less than 120/80 mm Hg; yearly if your systolic blood pressure is 120 to 139 mm Hg, or your diastolic blood pressure reading is 80 to 89 mm Hg   Breast cancer All women in this age group Yearly mammogram and clinical breast exam2  Each woman should make her own decision. If a woman decides to have mammograms before age 50, they should be done every 2 years.3   Cervical cancer All women in this age group, except women who have had a complete hysterectomy Pap test every 3 years or Pap test plus human papilloma virus (HPV) test every 5 years   Chlamydia Women at increased risk for infection At routine exams if you're at risk or have symptoms   Depression All women in this age group At routine exams   Gonorrhea Sexually active  women at increased risk for infection At routine exams   Hepatitis C Anyone at increased risk; 1 time for those born between 1945 and 1965 At routine exams   High cholesterol or triglycerides All women ages 45 and older who are at risk for coronary artery disease; younger women, talk with your healthcare provider At least every 5 years   HIV All women At routine exams   Obesity All women in this age group At routine exams   Syphilis Women at increased risk for infection - talk with your healthcare provider At routine exams   Tuberculosis Women at increased risk for infection - talk with your healthcare provider Ask your healthcare provider   Vision All women in this age group Complete exam at age 40 and eye exams every 2 to 4 years. If you have a chronic disease, ask your healthcare provider how often you should have your eyes examined.4   Vaccine Who needs it How often   Chickenpox (varicella) All women in this age group who have no record of this infection or vaccine 2 doses; the second dose should be given at least 4 weeks after the first dose   Hepatitis A Women at increased risk for infection - talk with your healthcare provider 2 doses given 6 months apart   Hepatitis B Women at increased risk for infection - talk with your healthcare provider 3 doses over 6 months; second dose should be given 1 month after the first dose; the third dose should be given at least 2 months after the second dose and at least 4 months after the first dose   Haemophilus influenzae Type B (HIB) Women at increased risk 1 to 3 doses   Influenza (flu) All women in this age group Once a year   Measles, mumps, rubella (MMR) All women in this age group who have no record of these infections or vaccines 1 or 2 doses   Meningococcal Women at increased risk for infection - talk with your healthcare provider 1 or more doses   Pneumococcal conjugate vaccine (PCV13) and pneumococcal polysaccharide vaccine (PPSV23) Women at increased risk for  infection - talk with your healthcare provider 1 or 2 doses   Tetanus/diphtheria/pertussis (Td/Tdap) booster All women in this age group A one-time dose of Tdap instead of a Td booster after age 18, then Td every 10 years   Counseling Who needs it How often   BRCA gene mutation testing for breast and ovarian cancer susceptibility Women with increased risk for having gene mutation When your risk is known   Breast cancer and chemoprevention Women at high risk for breast cancer When your risk is known   Diet and exercise Women who are overweight or obese When diagnosed, and then at routine exams   Domestic violence Women at the age in which they are able to have children At routine exams   Sexually transmitted infection prevention Women at increased risk for infection - talk with your healthcare provider At routine exams   Use of tobacco and the health effects it can cause All women in this age group Every exam   1American Diabetes Association  2American Cancer Society  3U.S. Preventive Services Task Force  4[facility] Academy of Ophthalmology  Date Last Reviewed: 8/27/2015 2000-2017 [employer]. [address] [place] [address]. All rights reserved. This information is not intended as a substitute for professional medical care. Always follow your healthcare professional's instructions.                Follow-ups after your visit        Additional Services     GASTROENTEROLOGY ADULT REF PROCEDURE ONLY       Last Lab Result: Creatinine (mg/dL)       Date                     Value                 10/05/2016               0.65             ----------  Body mass index is 27.34 kg/(m^2).     Needed:  No  Language:  English    Patient will be contacted to schedule procedure.     Please be aware that coverage of these services is subject to the terms and limitations of your health insurance plan.  Call member services at your health plan with any benefit or coverage questions.  Any procedures  must be performed at a [facility] facility OR coordinated by your clinic's referral office.    Please bring the following with you to your appointment:    (1) Any X-Rays, CTs or MRIs which have been performed.  Contact the facility where they were done to arrange for  prior to your scheduled appointment.    (2) List of current medications   (3) This referral request   (4) Any documents/labs given to you for this referral                  Your next 10 appointments already scheduled     Jan 16, 2018  2:00 PM CST   Return Visit with Rosa Chowdary LP   [facility] [place] ([facility])    [address] [place], [place] [address] [id number] [phone]              Future tests that were ordered for you today     Open Future Orders        Priority Expected Expires Ordered    *MA Screening Digital Bilateral Routine  12/13/2018 12/13/2017    Fecal colorectal cancer screen (FIT) Routine 1/3/2018 3/7/2018 12/13/2017            Who to contact     If you have questions or need follow up information about today's clinic visit or your schedule please contact [facility] directly at [phone].  Normal or non-critical lab and imaging results will be communicated to you by MyChart, letter or phone within 4 business days after the clinic has received the results. If you do not hear from us within 7 days, please contact the clinic through MyChart or phone. If you have a critical or abnormal lab result, we will notify you by phone as soon as possible.  Submit refill requests through [employer] or call your pharmacy and they will forward the refill request to us. Please allow 3 business days for your refill to be completed.          Additional Information About Your Visit        [employer] Information     [employer] gives you secure access to your electronic health record. If you see a primary care provider, you can also send messages to your care team and make appointments. If you have  questions, please call your primary care clinic.  If you do not have a primary care provider, please call [phone] and they will assist you.        Care EveryWhere ID     This is your Care EveryWhere ID. This could be used by other organizations to access your [facility] medical records  [id number]        Your Vitals Were     Pulse Temperature Height Last Period Pulse Oximetry BMI (Body Mass Index)    75 98.4  F (36.9  C) (Tympanic) 5' (1.524 m) 04/14/2017 98% 27.34 kg/m2       Blood Pressure from Last 3 Encounters:   12/13/17 (!) 146/91   11/22/17 126/84   09/25/17 132/90    Weight from Last 3 Encounters:   12/13/17 140 lb (63.5 kg)   11/22/17 144 lb (65.3 kg)   09/25/17 146 lb (66.2 kg)              We Performed the Following     DEPRESSION ACTION PLAN (DAP)     GASTROENTEROLOGY ADULT REF PROCEDURE ONLY     Glucose     Lipid Profile (Chol, Trig, HDL, LDL calc)     Wet prep        Primary Care Provider Office Phone # Fax #    Dulce Hernandez [phone]       [address]  [place] [address]        Equal Access to Services     CHRISTINA [facility] AH: Hadii aad ku hadasho Soomaali, waaxda luqadaha, qaybta kaalmada adeegyada, waxay idiin haydeborahn nichole mejia lachace ah. So [facility] [phone].    ATENCIÓN: Si habla español, tiene a irene disposición servicios gratuitos de asistencia lingüística. Llame al [phone].    We comply with applicable federal civil rights laws and [place] laws. We do not discriminate on the basis of race, color, national origin, age, disability, sex, sexual orientation, or gender identity.            Thank you!     Thank you for choosing [facility] [place]  for your care. Our goal is always to provide you with excellent care. Hearing back from our patients is one way we can continue to improve our services. Please take a few minutes to complete the written survey that you may receive in the mail after your visit with us. Thank you!             Your  Updated Medication List - Protect others around you: Learn how to safely use, store and throw away your medicines at www.disposemymeds.org.          This list is accurate as of: 12/13/17  8:42 AM.  Always use your most recent med list.                   Brand Name Dispense Instructions for use Diagnosis    Glucosamine-Chondroitin--200-150 MG Tabs           levothyroxine 112 MCG tablet    SYNTHROID/LEVOTHROID    90 tablet    Take 1 tablet (112 mcg) by mouth daily    Other specified hypothyroidism       LORazepam 0.5 MG tablet    ATIVAN    20 tablet    Take 1 tablet (0.5 mg) by mouth every 8 hours as needed for anxiety    Adjustment disorder with mixed anxiety and depressed mood       MULTIVITAMIN PO      Take by mouth daily        nabumetone 500 MG tablet    RELAFEN    30 tablet    Take 1-2 tablets (500-1,000 mg) by mouth 2 times daily as needed for moderate pain    Neck pain, TMJ (temporomandibular joint syndrome), Adjustment disorder with mixed anxiety and depressed mood       omeprazole 40 MG capsule    priLOSEC    90 capsule    Take 1 capsule (40 mg) by mouth daily    Gastroesophageal reflux disease, esophagitis presence not specified

## 2017-12-13 NOTE — PATIENT INSTRUCTIONS
Prevention Guidelines, Women Ages 40 to 49  Screening tests and vaccines are an important part of managing your health. Health counseling is essential, too. Below are guidelines for these, for women ages 40 to 49. Talk with your healthcare provider to make sure you re up-to-date on what you need.  Screening Who needs it How often   Type 2 diabetes or prediabetes All adults beginning at age 45 and adults without symptoms at any age who are overweight or obese and have 1 or more additional risk factors for diabetes At least every 3 years   Alcohol misuse All women in this age group At routine exams   Blood pressure All women in this age group Every 2 years if your blood pressure is less than 120/80 mm Hg; yearly if your systolic blood pressure is 120 to 139 mm Hg, or your diastolic blood pressure reading is 80 to 89 mm Hg   Breast cancer All women in this age group Yearly mammogram and clinical breast exam2  Each woman should make her own decision. If a woman decides to have mammograms before age 50, they should be done every 2 years.3   Cervical cancer All women in this age group, except women who have had a complete hysterectomy Pap test every 3 years or Pap test plus human papilloma virus (HPV) test every 5 years   Chlamydia Women at increased risk for infection At routine exams if you're at risk or have symptoms   Depression All women in this age group At routine exams   Gonorrhea Sexually active women at increased risk for infection At routine exams   Hepatitis C Anyone at increased risk; 1 time for those born between 1945 and 1965 At routine exams   High cholesterol or triglycerides All women ages 45 and older who are at risk for coronary artery disease; younger women, talk with your healthcare provider At least every 5 years   HIV All women At routine exams   Obesity All women in this age group At routine exams   Syphilis Women at increased risk for infection   talk with your healthcare provider At routine  exams   Tuberculosis Women at increased risk for infection   talk with your healthcare provider Ask your healthcare provider   Vision All women in this age group Complete exam at age 40 and eye exams every 2 to 4 years. If you have a chronic disease, ask your healthcare provider how often you should have your eyes examined.4   Vaccine Who needs it How often   Chickenpox (varicella) All women in this age group who have no record of this infection or vaccine 2 doses; the second dose should be given at least 4 weeks after the first dose   Hepatitis A Women at increased risk for infection   talk with your healthcare provider 2 doses given 6 months apart   Hepatitis B Women at increased risk for infection   talk with your healthcare provider 3 doses over 6 months; second dose should be given 1 month after the first dose; the third dose should be given at least 2 months after the second dose and at least 4 months after the first dose   Haemophilus influenzae Type B (HIB) Women at increased risk 1 to 3 doses   Influenza (flu) All women in this age group Once a year   Measles, mumps, rubella (MMR) All women in this age group who have no record of these infections or vaccines 1 or 2 doses   Meningococcal Women at increased risk for infection   talk with your healthcare provider 1 or more doses   Pneumococcal conjugate vaccine (PCV13) and pneumococcal polysaccharide vaccine (PPSV23) Women at increased risk for infection   talk with your healthcare provider 1 or 2 doses   Tetanus/diphtheria/pertussis (Td/Tdap) booster All women in this age group A one-time dose of Tdap instead of a Td booster after age 18, then Td every 10 years   Counseling Who needs it How often   BRCA gene mutation testing for breast and ovarian cancer susceptibility Women with increased risk for having gene mutation When your risk is known   Breast cancer and chemoprevention Women at high risk for breast cancer When your risk is known   Diet and exercise  Women who are overweight or obese When diagnosed, and then at routine exams   Domestic violence Women at the age in which they are able to have children At routine exams   Sexually transmitted infection prevention Women at increased risk for infection   talk with your healthcare provider At routine exams   Use of tobacco and the health effects it can cause All women in this age group Every exam   1[facility] [employer]  2American Cancer Society  3[place] Preventive Services Task Force  4[facility] [employer] of [employer]  Date Last Reviewed: 8/27/2015 2000-2017 [employer] [address] [place], [place] [address]. All rights reserved. This information is not intended as a substitute for professional medical care. Always follow your healthcare professional's instructions.

## 2017-12-14 ASSESSMENT — ANXIETY QUESTIONNAIRES: GAD7 TOTAL SCORE: 2

## 2017-12-15 NOTE — TELEPHONE ENCOUNTER
Patient called and scheduled follow up appointment for Tuesday with provider however in afternoon so she will not be fasting.  If provider wants fasting labs please order them and we can set up a lab only appointment with patient prior to appointment.  Marylou Shields RN - Triage  [facility]

## 2017-12-18 DIAGNOSIS — R73.09 ELEVATED GLUCOSE: ICD-10-CM

## 2017-12-18 DIAGNOSIS — E11.9 NEW ONSET TYPE 2 DIABETES MELLITUS (H): Primary | ICD-10-CM

## 2017-12-18 LAB — HBA1C MFR BLD: 12.2 % (ref 4.3–6)

## 2017-12-18 PROCEDURE — 80053 COMPREHEN METABOLIC PANEL: CPT | Performed by: FAMILY MEDICINE

## 2017-12-18 PROCEDURE — 36415 COLL VENOUS BLD VENIPUNCTURE: CPT | Performed by: FAMILY MEDICINE

## 2017-12-18 PROCEDURE — 83036 HEMOGLOBIN GLYCOSYLATED A1C: CPT | Performed by: FAMILY MEDICINE

## 2017-12-19 ENCOUNTER — OFFICE VISIT (OUTPATIENT)
Dept: FAMILY MEDICINE | Facility: CLINIC | Age: 52
End: 2017-12-19
Payer: COMMERCIAL

## 2017-12-19 VITALS — DIASTOLIC BLOOD PRESSURE: 106 MMHG | SYSTOLIC BLOOD PRESSURE: 151 MMHG | HEART RATE: 91 BPM

## 2017-12-19 DIAGNOSIS — E11.9 NEW ONSET TYPE 2 DIABETES MELLITUS (H): Primary | ICD-10-CM

## 2017-12-19 DIAGNOSIS — E78.5 HYPERLIPIDEMIA LDL GOAL <130: ICD-10-CM

## 2017-12-19 DIAGNOSIS — R35.0 URINARY FREQUENCY: ICD-10-CM

## 2017-12-19 DIAGNOSIS — I10 ESSENTIAL HYPERTENSION: ICD-10-CM

## 2017-12-19 LAB
ALBUMIN SERPL-MCNC: 3.9 G/DL (ref 3.4–5)
ALBUMIN UR-MCNC: NEGATIVE MG/DL
ALP SERPL-CCNC: 76 U/L (ref 40–150)
ALT SERPL W P-5'-P-CCNC: 35 U/L (ref 0–50)
ANION GAP SERPL CALCULATED.3IONS-SCNC: 11 MMOL/L (ref 3–14)
APPEARANCE UR: CLEAR
AST SERPL W P-5'-P-CCNC: 18 U/L (ref 0–45)
BACTERIA #/AREA URNS HPF: ABNORMAL /HPF
BILIRUB SERPL-MCNC: 0.6 MG/DL (ref 0.2–1.3)
BILIRUB UR QL STRIP: NEGATIVE
BUN SERPL-MCNC: 13 MG/DL (ref 7–30)
CALCIUM SERPL-MCNC: 9.1 MG/DL (ref 8.5–10.1)
CHLORIDE SERPL-SCNC: 100 MMOL/L (ref 94–109)
CO2 SERPL-SCNC: 23 MMOL/L (ref 20–32)
COLOR UR AUTO: YELLOW
CREAT SERPL-MCNC: 0.63 MG/DL (ref 0.52–1.04)
GFR SERPL CREATININE-BSD FRML MDRD: >90 ML/MIN/1.7M2
GLUCOSE SERPL-MCNC: 221 MG/DL (ref 70–99)
GLUCOSE UR STRIP-MCNC: >=1000 MG/DL
HGB UR QL STRIP: ABNORMAL
KETONES UR STRIP-MCNC: NEGATIVE MG/DL
LEUKOCYTE ESTERASE UR QL STRIP: NEGATIVE
NITRATE UR QL: NEGATIVE
NON-SQ EPI CELLS #/AREA URNS LPF: ABNORMAL /LPF
PH UR STRIP: 6.5 PH (ref 5–7)
POTASSIUM SERPL-SCNC: 4 MMOL/L (ref 3.4–5.3)
PROT SERPL-MCNC: 7.8 G/DL (ref 6.8–8.8)
RBC #/AREA URNS AUTO: ABNORMAL /HPF
SODIUM SERPL-SCNC: 134 MMOL/L (ref 133–144)
SOURCE: ABNORMAL
SP GR UR STRIP: 1.01 (ref 1–1.03)
UROBILINOGEN UR STRIP-ACNC: 0.2 EU/DL (ref 0.2–1)
WBC #/AREA URNS AUTO: ABNORMAL /HPF

## 2017-12-19 PROCEDURE — 99215 OFFICE O/P EST HI 40 MIN: CPT | Performed by: FAMILY MEDICINE

## 2017-12-19 PROCEDURE — 81001 URINALYSIS AUTO W/SCOPE: CPT | Performed by: FAMILY MEDICINE

## 2017-12-19 PROCEDURE — 99207 C FOOT EXAM  NO CHARGE: CPT | Performed by: FAMILY MEDICINE

## 2017-12-19 PROCEDURE — 82043 UR ALBUMIN QUANTITATIVE: CPT | Performed by: FAMILY MEDICINE

## 2017-12-19 RX ORDER — LISINOPRIL 5 MG/1
5 TABLET ORAL DAILY
Qty: 30 TABLET | Refills: 1 | Status: SHIPPED | OUTPATIENT
Start: 2017-12-19 | End: 2018-02-19

## 2017-12-19 RX ORDER — ATORVASTATIN CALCIUM 10 MG/1
10 TABLET, FILM COATED ORAL DAILY
Qty: 30 TABLET | Refills: 3 | Status: SHIPPED | OUTPATIENT
Start: 2017-12-19 | End: 2018-07-24

## 2017-12-19 NOTE — MR AVS SNAPSHOT
"              After Visit Summary   12/19/2017    Ijeoma Avalos    MRN: [id number]           Patient Information     Date Of Birth          1965        Visit Information        Provider Department      12/19/2017 1:00 PM Dulce Hernandez MD [facility] [place] [facility]        Today's Diagnoses     New onset type 2 diabetes mellitus (H)    -  1    Essential hypertension        Hyperlipidemia LDL goal <130        Urinary frequency          Care Instructions      Controlling Your Cholesterol  Cholesterol is a waxy substance. It travels in your blood through the blood vessels. When you have high cholesterol, it can build up along the walls of the blood vessels. This makes the vessels narrower and decreases blood flow. You are then at greater risk for having a heart attack or a stroke.  Good and bad cholesterol  Lipids are fats and blood is mostly water. Fat and water don't mix. So our bodies need lipoproteins (lipids inside a protein shell) to carry the lipids. The protein shell carries its lipids through the bloodstream. There are two main kinds of lipoproteins:    LDL (low-density lipoprotein) is known as \"bad cholesterol.\" It mainly carries cholesterol. It delivers this cholesterol to body cells. Excess LDL cholesterol will build up in artery walls. This increases your risk for heart disease and stroke.    HDL (high-density lipoprotein) is known as \"good cholesterol.\" This protein shell collects excess cholesterol that LDLs have left behind on blood vessel walls. That's why high levels of HDL cholesterol can decrease your risk of heart disease and stroke.  Controlling cholesterol levels  Total cholesterol includes LDL and HDL cholesterol, as well as other fats in the bloodstream. If your total cholesterol is high, follow the steps below to help lower your total cholesterol level:    Eat less unhealthy fat:    Cut back on saturated fats and trans fats (also called hydrogenated) by selecting lean cuts " of meat, low-fat dairy, and using oils instead of solid fats. Limit baked goods, processed meats, and fried foods. A diet that s high in these fats increases your bad cholesterol. It's not enough to just cut back on foods containing cholesterol.    Eat about 2 servings of fish per week. Most fish contain omega-3 fatty acids. These help lower blood cholesterol.    Eat more whole grains and soluble fiber (such as oat bran). These lower overall cholesterol.    Be active:    Choose an activity you enjoy. Walking, swimming, and riding a bike are some good ways to be active.    Start at a level where you feel comfortable. Increase your time and pace a little each week.    Work up to 30 to 40 minutes of moderate to high intensity physical activity at least 3 to 4 days per week.    Remember, some activity is better than none.    If you haven't been exercising regularly, start slowly. Check with your healthcare provider to make sure the exercise plan is right for you.    Quit smoking. Quitting smoking can improve your lipid levels. It also lowers your risk for heart disease and stroke.    Weight management. If you are overweight or obese, your healthcare provider will work with you to lose weight and lower your BMI (body mass index) to a normal or near-normal level. Making diet changes and increasing physical activity can help.    Take medicine as directed. Many people need medicine to get their LDL levels to a safe level. Medicine to lower cholesterol levels is effective and safe. Taking medicine is not a substitute for exercise or watching your diet! Your healthcare provider can tell you whether you might benefit from a cholesterol-lowering medicine.  Date Last Reviewed: 6/1/2017 2000-2017 [employer]. [address] [place] [address], [place] [address]. All rights reserved. This information is not intended as a substitute for professional medical care. Always follow your healthcare professional's  instructions.        Diet: Diabetes  Food is an important tool that you can use to control diabetes and stay healthy. Eating well-balanced meals in the correct amounts will help you control your blood glucose levels and prevent low blood sugar reactions. It will also help you reduce the health risks of diabetes. There is no one specific diet that is right for everyone with diabetes. But there are general guidelines to follow. A registered dietitian (RD) will create a tailored diet approach that s just right for you. He or she will also help you plan healthy meals and snacks. If you have any questions, call your dietitian for advice.     Guidelines for success  Talk with your healthcare provider before starting a diabetes diet or weight loss program. If you haven't talked with a dietitian yet, ask your provider for a referral. The following guidelines can help you succeed:    Select foods from the 6 food groups below. Your dietitian will help you find food choices within each group. He or she will also show you serving sizes and how many servings you can have at each meal.    Grains, beans, and starchy vegetables    Vegetables    Fruit    Milk or yogurt    Meat, poultry, fish, or tofu    Healthy fats    Check your blood sugar levels as directed by your provider. Take any medicine as prescribed by your provider.    Learn to read food labels and pick the right portion sizes.    Eat only the amount of food in your meal plan. Eat about the same amount of food at regular times each day. Don t skip meals. Eat meals 4 to 5 hours apart, with snacks in between.    Limit alcohol. It raises blood sugar levels. Drink water or calorie-free diet drinks that use safe sweeteners.    Eat less fat to help lower your risk of heart disease. Use nonfat or low-fat dairy products and lean meats. Avoid fried foods. Use cooking oils that are unsaturated, such as olive, canola, or peanut oil.    Talk with your dietitian about safe sugar  substitutes.    Avoid added salt. It can contribute to high blood pressure, which can cause heart disease. People with diabetes already have a risk of high blood pressure and heart disease.    Stay at a healthy weight. If you need to lose weight, cut down on your portion sizes. But don t skip meals. Exercise is an important part of any weight management program. Talk with your provider about an exercise program that s right for you.    For more information about the best diet plan for you, talk with a registered dietitian (RD). To find an RD in your area, contact:    Academy of Nutrition and Dietetics www.eatright.org    The American Diabetes Association [phone] www.diabetes.org  Date Last Reviewed: 8/1/2016 2000-2017 [employer]. [address] [place] [address]. All rights reserved. This information is not intended as a substitute for professional medical care. Always follow your healthcare professional's instructions.        Healthy Meals for Diabetes     A healthcare provider will help you develop a meal plan that fits your needs.   Ask your healthcare team to help you make a meal plan that fits your needs. Your meal plan tells you when to eat your meals and snacks, what kinds of foods to eat, and how much of each food to eat. You don t have to give up all the foods you like. But you do need to follow some guidelines.  Choose healthy carbohydrates  Starches, sugars, and fiber are all types of carbohydrates. Fiber can help lower your cholesterol and triglycerides. Fiber is also healthy for your heart. You should have 20 to 35 grams of total fiber each day. Fiber-rich foods include:    Whole-grain breads and cereals    Bulgur wheat    Brown rice       Whole-wheat pasta    Fruits and vegetables    Dry beans, and peas   Keep track of the amount of carbohydrates you eat. This can help you keep the right balance of physical activity and medicine. The amount of carbohydrates needed will  vary for each person. It depends on many things such as your health, the medicines you take, and how active you are. Your healthcare team will help you figure out the right amount of carbohydrates for you. You may start with around 45 to 60 grams of carbohydrates per meal, depending on your situation.   Here are some examples of foods containing about 15 grams of carbohydrates (1 serving of carbohydrates):    1/2 cup of canned or frozen fruit    A small piece of fresh fruit (4 ounces)    1 slice of bread    1/2 cup of oatmeal    1/3 cup of rice    4 to 6 crackers    1/2 English muffin    1/2 cup of black beans    1/4 of a large baked potato (3 ounces)    2/3 cup of plain fat-free yogurt    1 cup of soup    1/2 cup of casserole    6 chicken nuggets    2-inch-square brownie or cake without frosting    2 small cookies    1/2 cup of ice cream or sherbet  Choose healthy protein foods  Eating protein that is low in fat can help you control your weight. It also helps keep your heart healthy. Low-fat protein foods include:    Fish    Plant proteins, such as dry beans and peas, nuts, and soy products like tofu and soymilk    Lean meat with all visible fat removed    Poultry with the skin removed    Low-fat or nonfat milk, cheese, and yogurt  Limit unhealthy fats and sugar  Saturated and trans fats are unhealthy for your heart. They raise LDL (bad) cholesterol. Fat is also high in calories, so it can make you gain weight. To cut down on unhealthy fats and sugar, limit these foods:    Butter or margarine    Palm and palm kernel oils and coconut oil    Cream    Cheese    Ramirez    Lunch meats       Ice cream    Sweet bakery goods such as pies, muffins, and donuts    Jams and jellies    Candy bars    Regular sodas   How much to eat  The amount of food you eat affects your blood sugar. It also affects your weight. Your healthcare team will tell you how much of each type of food you should eat.    Use measuring cups and spoons and  a food scale to measure serving sizes.    Learn what a correct serving size looks like on your plate. This will help when you are away from home and can t measure your servings.    Eat only the number of servings given on your meal plan for each food. Don t take seconds.    Learn to read food labels. Be sure to look at serving size, total carbohydrates, fiber, calories, sugar, and saturated and trans fats. Look for healthier alternatives to foods that have added sugar.    Plan ahead for parties so you can still have a good time without going overboard with unhealthy food choices. Set a good example yourself by bringing a healthy dish to pot lucks.   Choose healthy snacks  When it comes to snacks, we usually think about foods with added sugar and fats. But there are many other options for healthier snack choices. Here are a few snack ideas to choose from:  Snacks with less than 5 grams of carbohydrates    1 piece of string cheese    3 celery sticks plus 1 tablespoon of peanut butter    5 cherry tomatoes plus 1 tablespoon of ranch dressing    1 hard-boiled egg    1/4 cup of fresh blueberries     5 baby carrots    1 cup of light popcorn    1/2 cup of sugar-free gelatin    15 almonds  Snacks with about 10 to 20 grams of carbohydrates    1/3 cup of hummus plus 1 cup of fresh cut nonstarchy vegetables (carrots, green peppers, broccoli, celery, or a combination)    1/2 cup of fresh or canned fruit plus 1/4 cup of cottage cheese    1/2 cup of tuna salad with 4 crackers    2 rice cakes and a tablespoon of peanut butter    1 small apple or orange    3 cups light popcorn    1/2 of a turkey sandwich (1 slice of whole-wheat bread, 2 ounces of turkey, and mustard)  Portion sizes are important to controlling your blood sugar and staying at a healthy weight. Stock up on healthy snack items so you always have them on hand.  When to eat  Your meal plan will likely include breakfast, lunch, dinner, and some snacks.    Try to eat your  meals and snacks at about the same times each day.    Eat all your meals and snacks. Skipping a meal or snack can make your blood sugar drop too low. It can also cause you to eat too much at the next meal or snack. Then your blood sugar could get too high.  Date Last Reviewed: 7/1/2016 2000-2017 [employer]. [address] [place] [address], [place] [address]. All rights reserved. This information is not intended as a substitute for professional medical care. Always follow your healthcare professional's instructions.        Diabetes: Meal Planning    You can help keep your blood sugar level in your target range by eating healthy foods. Your healthcare team can help you create a low-fat, nutritious meal plan. Take an active role in your diabetes management by following your meal plan and working with your healthcare team.  Make your meal plan  A meal plan gives guidelines for the types and amounts of food you should eat. The goal is to balance food and insulin (or other diabetes medications) so your blood sugars will be in your target range. Your dietitian will help you make a flexible meal plan that includes many foods that you like.  Watch serving sizes  Your meal plan will group foods by servings. To learn how much a serving is, start by measuring food portions at each meal. Soon you ll know what a serving looks like on your plate. Ask your healthcare provider about how to balance servings of different foods.  Eat from all the food groups  The basis of a healthy meal plan is variety (eating lots of different foods). Choose lean meats, fresh fruits and vegetables, whole grains, and low-fat or nonfat dairy products. Eating a wide variety of foods provides the nutrients your body needs. It can also keep you from getting bored with your meal plan.  Learn about carbohydrates, fats, and protein    Carbohydrates are starches, sugars, and fiber. They are found in many foods, including fruit, bread, pasta, milk, and  sweets. Of all the foods you eat, carbohydrates have the most effect on your blood sugar. Your dietitian may teach you about carb counting, a way to figure out the number of carbohydrates in a meal.    Fats have the most calories. They also have the most effect on your weight and your risk of heart disease. When you have diabetes, it s important to control your weight and protect your heart. Foods that are high in fat include whole milk, cheese, snack foods, and desserts.    Protein is important for building and repairing muscles and bones. Choose low-fat protein sources, such as fish, egg whites, and skinless chicken.  Reduce liquid sugars  Extra calories from sodas, sports drinks, and fruit drinks make it hard to keep blood sugar in range. Cut as many liquid sugars from your meal plan as you can.  This includes most fruit juices, which are often high in natural or added sugar. Instead, drink plenty of water and other sugar-free beverages.  Eat less fat  If you need to lose weight, try to reduce the amount of fat in your diet. This can also help lower your cholesterol level to keep blood vessels healthier. Cut fat by using only small amounts of liquid oil for cooking. Read food labels carefully to avoid foods with unhealthy trans fats.  Timing your meals  When it comes to blood sugar control, when you eat is as important as what you eat. You may need to eat several small meals spaced evenly throughout the day to stay in your target range. So don t skip breakfast or wait until late in the day to get most of your calories. Doing so can cause your blood sugar to rise too high or fall too low.   Date Last Reviewed: 3/1/2016    2337-9141 [employer]. [address] [place] [address], [place] [address]. All rights reserved. This information is not intended as a substitute for professional medical care. Always follow your healthcare professional's instructions.                Follow-ups after your visit         Additional Services     DIABETES EDUCATOR REFERRAL       DIABETES SELF MANAGEMENT TRAINING (DSMT)      Your provider has referred you to Diabetes Education: FMG: Diabetes Education - All [facility] [phone]   [email or web address]     If an urgent visit is needed or A1C is above 12, Care Team to call the Diabetes  Education Team at [phone] or send an In Basket message to the Diabetes Education Pool (P DIAB ED-PATIENT CARE).    A  will call you to make your appointment. If it has been more than 3 business days since your referral was placed, please call the above phone number to schedule.    Type of training and number of hours: New Diagnosis: Initial group DSMT - 10 hours.      Medicare covers: 10 hours of initial DSMT in 12 month period from the time of first visit, plus 2 hours of follow-up DSMT annually, and additional hours as requested for insulin training.    Diabetes Type: Type 2 - On Oral Medication             Diabetes Co-Morbidities: dyslipidemia and hypertension               A1C Goal:  <8.0       A1C is: Lab Results       Component                Value               Date                       A1C                      12.2                12/18/2017              Diabetes Education Topics: Comprehensive Knowledge Assessment and Instruction, Knowledge: Healthy Eating, Being Active, Monitoring Blood Sugar, Taking Medication, Problem Solving/Goal Setting, Reducing Risks (Preventing Acute and Chronic Diabetes Complications) and Healthy Coping, Blood glucose meter instruction , Medication Start: Oral Medication:  govind strt med's and need monitoring  and Other: has know hx of gestation DM     Special Educational Needs Requiring Individual DSMT: None       MEDICAL NUTRITION THERAPY (MNT) for Diabetes    Medical Nutrition Therapy with a Registered Dietitian can be provided in coordination with Diabetes Self-Management Training to assist in  achieving optimal diabetes management.    MNT Type and Hours: New diagnosis: Initial MNT - 3 hours                       Medicare will cover: 3 hours initial MNT in 12 month period after first visit, plus 2 hours of follow-up MNT annually    Please be aware that coverage of these services is subject to the terms and limitations of your health insurance plan.  Call member services at your health plan to determine Diabetes Self-Management Training (Codes  &amp; ) and Medical Nutrition Therapy (Codes 50961 & 29521) benefits and ask which blood glucose monitor brands are covered by your plan.  Please bring the following with you to your appointment:    (1)  List of current medications   (2)  List of Blood Glucose Monitor brands that are covered by your insurance plan  (3)  Blood Glucose Monitor and log book  (4)   Food records for the 3 days prior to your visit    The Certified Diabetes Educator may make diabetes medication adjustments per the CDE Protocol and Collaborative Practice Agreement.                  Your next 10 appointments already scheduled     Jan 16, 2018  2:00 PM CST   Return Visit with Rosa Chowdary LP   [facility] [place] ([facility] [place])    [address] [place] [id number] [phone]              Who to contact     If you have questions or need follow up information about today's clinic visit or your schedule please contact [facility] directly at [phone].  Normal or non-critical lab and imaging results will be communicated to you by MyChart, letter or phone within 4 business days after the clinic has received the results. If you do not hear from us within 7 days, please contact the clinic through MyChart or phone. If you have a critical or abnormal lab result, we will notify you by phone as soon as possible.  Submit refill requests through [employer] or call your pharmacy and they will forward the refill request to us.  Please allow 3 business days for your refill to be completed.          Additional Information About Your Visit        MyChart Information     [employer] gives you secure access to your electronic health record. If you see a primary care provider, you can also send messages to your care team and make appointments. If you have questions, please call your primary care clinic.  If you do not have a primary care provider, please call [phone] and they will assist you.        Care EveryWhere ID     This is your Care EveryWhere ID. This could be used by other organizations to access your [place] medical records  [id number]        Your Vitals Were     Pulse Last Period                91 04/14/2017           Blood Pressure from Last 3 Encounters:   12/19/17 (!) 151/106   12/13/17 (!) 136/95   11/22/17 126/84    Weight from Last 3 Encounters:   12/19/17 (P) 142 lb (64.4 kg)   12/13/17 140 lb (63.5 kg)   11/22/17 144 lb (65.3 kg)              We Performed the Following     *UA reflex to Microscopic and Culture ([place] and [facility] (except [place] and [place])     Albumin Random Urine Quantitative with Creat Ratio     DIABETES EDUCATOR REFERRAL          Today's Medication Changes          These changes are accurate as of: 12/19/17  1:54 PM.  If you have any questions, ask your nurse or doctor.               Start taking these medicines.        Dose/Directions    atorvastatin 10 MG tablet   Commonly known as:  LIPITOR   Used for:  Hyperlipidemia LDL goal <130   Started by:  Dulce Hernandez MD        Dose:  10 mg   Take 1 tablet (10 mg) by mouth daily   Quantity:  30 tablet   Refills:  3       blood glucose monitoring meter device kit   Commonly known as:  no brand specified   Used for:  New onset type 2 diabetes mellitus (H)   Started by:  Dulce Hernandez MD        Use to test blood sugar  2-3  times daily or as directed.   Quantity:  1 kit   Refills:  0       lisinopril 5 MG tablet   Commonly known  as:  PRINIVIL/ZESTRIL   Used for:  Essential hypertension, New onset type 2 diabetes mellitus (H)   Started by:  Dulce Hernandez MD        Dose:  5 mg   Take 1 tablet (5 mg) by mouth daily   Quantity:  30 tablet   Refills:  1       metFORMIN 500 MG tablet   Commonly known as:  GLUCOPHAGE   Used for:  New onset type 2 diabetes mellitus (H)   Started by:  Dulce Hernandez MD        Dose:  500 mg   Take 1 tablet (500 mg) by mouth 2 times daily (with meals)   Quantity:  60 tablet   Refills:  1            Where to get your medicines      These medications were sent to [facility] [place] - [place], [place] - [address]  [address], [place] [address]     Phone:  [phone]     atorvastatin 10 MG tablet    blood glucose monitoring meter device kit    lisinopril 5 MG tablet    metFORMIN 500 MG tablet                Primary Care Provider Office Phone # Fax #    Dulce Hernandez [phone]       [address] [place] [address] [address]  [place] [address]        Equal Access to Services     [facility]: Hadii mario mendez hadasho Sojaclyn, waaxda luqadaha, qaybta kaalmada adejarred, shannon blackwood. So [facility] [phone].    ATENCIÓN: Si habla español, tiene a irene disposición servicios gratuitos de asistencia lingüística. [facility] [phone].    We comply with applicable federal civil rights laws and [place] laws. We do not discriminate on the basis of race, color, national origin, age, disability, sex, sexual orientation, or gender identity.            Thank you!     Thank you for choosing [facility] [place]  for your care. Our goal is always to provide you with excellent care. Hearing back from our patients is one way we can continue to improve our services. Please take a few minutes to complete the written survey that you may receive in the mail after your visit with us. Thank you!             Your Updated Medication List -  Protect others around you: Learn how to safely use, store and throw away your medicines at www.disposemymeds.org.          This list is accurate as of: 12/19/17  1:54 PM.  Always use your most recent med list.                   Brand Name Dispense Instructions for use Diagnosis    aspirin 81 MG tablet     90 tablet    Take 1 tablet (81 mg) by mouth daily    New onset type 2 diabetes mellitus (H)       atorvastatin 10 MG tablet    LIPITOR    30 tablet    Take 1 tablet (10 mg) by mouth daily    Hyperlipidemia LDL goal <130       blood glucose monitoring meter device kit    no brand specified    1 kit    Use to test blood sugar  2-3  times daily or as directed.    New onset type 2 diabetes mellitus (H)       Glucosamine-Chondroitin--200-150 MG Tabs           levothyroxine 112 MCG tablet    SYNTHROID/LEVOTHROID    90 tablet    Take 1 tablet (112 mcg) by mouth daily    Other specified hypothyroidism       lisinopril 5 MG tablet    PRINIVIL/ZESTRIL    30 tablet    Take 1 tablet (5 mg) by mouth daily    Essential hypertension, New onset type 2 diabetes mellitus (H)       LORazepam 0.5 MG tablet    ATIVAN    20 tablet    Take 1 tablet (0.5 mg) by mouth every 8 hours as needed for anxiety    Adjustment disorder with mixed anxiety and depressed mood       metFORMIN 500 MG tablet    GLUCOPHAGE    60 tablet    Take 1 tablet (500 mg) by mouth 2 times daily (with meals)    New onset type 2 diabetes mellitus (H)       MULTIVITAMIN PO      Take by mouth daily        nabumetone 500 MG tablet    RELAFEN    30 tablet    Take 1-2 tablets (500-1,000 mg) by mouth 2 times daily as needed for moderate pain    Neck pain, TMJ (temporomandibular joint syndrome), Adjustment disorder with mixed anxiety and depressed mood       omeprazole 40 MG capsule    priLOSEC    90 capsule    Take 1 capsule (40 mg) by mouth daily    Gastroesophageal reflux disease, esophagitis presence not specified

## 2017-12-19 NOTE — PROGRESS NOTES
SUBJECTIVE:   Ijeoma Avalos is a 52 year old female who presents to clinic today for the following health issues:      Concern - Recheck lab    Description:   Discuss results . Recently had labs and got diagnosed with DM, So here to to discuss results and treatment           Diabetes  New onset - Follow-up      Patient is checking blood sugars: not at all    Diabetic concerns:  other - had labs recently suggesting DM, so here to discuss results . She has hx of gestation DM, although she thinks she was doing ok for a while  after child birth      Symptoms of hypoglycemia (low blood sugar): weak, lethargy, thirsty . Urinating lot although she is trying to drink more fluids ,      Paresthesias (numbness or burning in feet) or sores: No     Date of last diabetic eye exam: earlier this  year , due in January again     PROBLEMS TO ADD ON...    Hyperlipidemia Follow-Up      Rate your low fat/cholesterol diet?: good    Taking statin?  No. Lipid has bene high for a while,so willing to start treatment     Other lipid medications/supplements?:  none    Hypertension Follow-up      Outpatient blood pressures are being checked at home.  Results are higher.     Had elevated BP during pregnancy bc she also had  pre- eclampsia so end up having c- section. Her BP did improve after child birth but last month or so it has been fluctuating more and she can sometimes feel HA , so willing to start med's. Has FAM hx of htn in dad as well     Low Salt Diet: no added salt    BP Readings from Last 2 Encounters:   12/19/17 (!) 151/106   12/13/17 (!) 136/95     Hemoglobin A1C (%)   Date Value   12/18/2017 12.2 (H)   04/07/2014 5.5     LDL Cholesterol Calculated (mg/dL)   Date Value   12/13/2017 126 (H)   11/07/2016 109 (H)           Problem list and histories reviewed & adjusted, as indicated.  Additional history: as documented    Patient Active Problem List   Diagnosis     Gastric acidity     Other specified hypothyroidism     Plantar  fasciitis     Hyperlipidemia LDL goal <130     Gastroesophageal reflux disease, esophagitis presence not specified     TMJ (temporomandibular joint syndrome)     Neck pain     Other headache syndrome     Uterine leiomyoma, unspecified location     Adjustment disorder with mixed anxiety and depressed mood     Low hemoglobin     Dysmenorrhea     Right knee pain, unspecified chronicity     Post-operative nausea and vomiting     Bilateral patellofemoral syndrome     New onset type 2 diabetes mellitus (H)     Past Surgical History:   Procedure Laterality Date     APPENDECTOMY        SECTION  2014    Procedure:  SECTION;  Surgeon: Ru Cano MD;  Location:  [facility]     CHOLECYSTECTOMY       cyst removed      left  lower leg on bone sheath     DAVINCI HYSTERECTOMY TOTAL, SALPINGECTOMY BILATERAL N/A 2017    Procedure: DAVINCI HYSTERECTOMY TOTAL, SALPINGECTOMY BILATERAL;  DAVINCI SINGLE SITE HYSTERECTOMY, BILATERAL SALPINGECTOMY, LEFT OOPHORECTOMY, LYSIS OF ADHESIONS (LAPAROSCOPIC BAG TO RETREIVE OVARY);  Surgeon: Ru Cano MD;  Location:  OR     DAVINCI LYSIS OF ADHESIONS N/A 2017    Procedure: DAVINCI LYSIS OF ADHESIONS;;  Surgeon: Ru Cano MD;  Location:  OR     DAVINCI OOPHORECTOMY N/A 2017    Procedure: DAVINCI OOPHORECTOMY;;  Surgeon: Ru Cano MD;  Location:  OR     GASTROSCOPY,FL       GI SURGERY       MYOMECTOMY UTERUS  2012       Social History   Substance Use Topics     Smoking status: Never Smoker     Smokeless tobacco: Never Used     Alcohol use No      Comment: social     Family History   Problem Relation Age of Onset     Hypertension Father              Reviewed and updated as needed this visit by clinical staff     Reviewed and updated as needed this visit by Provider         ROS:  Constitutional, HEENT, cardiovascular, pulmonary, GI, , musculoskeletal, neuro, skin, endocrine and psych systems are negative, except as  otherwise noted.      OBJECTIVE:   BP (!) 151/106  Pulse 91  Temp (P) 98.1  F (36.7  C) (Tympanic)  Ht (P) 5' (1.524 m)  Wt (P) 142 lb (64.4 kg)  LMP 04/14/2017  SpO2 (P) 100%  BMI (P) 27.73 kg/m2  Body mass index is 27.73 kg/(m^2) (pended).  GENERAL: healthy, alert and no distress  EYES: Eyes grossly normal to inspection, PERRL and conjunctivae and sclerae normal  NECK: no adenopathy, no asymmetry, masses, or scars and thyroid normal to palpation  RESP: lungs clear to auscultation - no rales, rhonchi or wheezes  CV: regular rate and rhythm, normal S1 S2, no S3 or S4, no murmur,   ABDOMEN: soft, nontender, no hepatosplenomegaly, no masses and bowel sounds normal  MS: no edema  NEURO: Normal strength and tone, mentation intact and speech normal  PSYCH: mentation appears normal, affect normal/bright  Foot exam : No lesion , normal sensation by monofilament. Normal peripheral pulses  .     Diagnostic Test Results:  HgbA1C  - 12.2     ASSESSMENT/PLAN:     (E11.9) New onset type 2 diabetes mellitus (H)  (primary encounter diagnosis)  Comment:   Plan: Albumin Random Urine Quantitative with Creat         Ratio, DIABETES EDUCATOR REFERRAL, metFORMIN         (GLUCOPHAGE) 500 MG tablet, lisinopril         (PRINIVIL/ZESTRIL) 5 MG tablet, blood glucose         monitoring (NO BRAND SPECIFIED) meter device         Kit, FOOT EXAM            (I10) Essential hypertension  Comment:   Plan: DIABETES EDUCATOR REFERRAL, lisinopril         (PRINIVIL/ZESTRIL) 5 MG tablet            (E78.5) Hyperlipidemia LDL goal <130  Comment:   Plan: DIABETES EDUCATOR REFERRAL, atorvastatin         (LIPITOR) 10 MG tablet            (R35.0) Urinary frequency  Comment: likely related to DM ,   Plan: *UA reflex to Microscopic and Culture ([place]         and [facility] (except [place] and         [place])        HAS +VE GLUCOSURIA BUT NO KETONES AND NO INFECTION        Discussed cares, diet/ exercise . Talked about DM management , health  risk etc. Willing to start  meds. Check lab, call pt with results.   Referral given to see diabetic educator. Follow up as needed  Patient expressed understanding and agreement with treatment plan. All patient's questions were answered, will let me know if has more later.  Medications: Rx's: Reviewed the potential side effects/complications of medications prescribed.   Spent 40+ min with patient and more then 50% of the time spent counseling and coordination of cares     .       Dulce Hernandez MD  [facility] [place]

## 2017-12-19 NOTE — PATIENT INSTRUCTIONS
"  Controlling Your Cholesterol  Cholesterol is a waxy substance. It travels in your blood through the blood vessels. When you have high cholesterol, it can build up along the walls of the blood vessels. This makes the vessels narrower and decreases blood flow. You are then at greater risk for having a heart attack or a stroke.  Good and bad cholesterol  Lipids are fats and blood is mostly water. Fat and water don't mix. So our bodies need lipoproteins (lipids inside a protein shell) to carry the lipids. The protein shell carries its lipids through the bloodstream. There are two main kinds of lipoproteins:    LDL (low-density lipoprotein) is known as \"bad cholesterol.\" It mainly carries cholesterol. It delivers this cholesterol to body cells. Excess LDL cholesterol will build up in artery walls. This increases your risk for heart disease and stroke.    HDL (high-density lipoprotein) is known as \"good cholesterol.\" This protein shell collects excess cholesterol that LDLs have left behind on blood vessel walls. That's why high levels of HDL cholesterol can decrease your risk of heart disease and stroke.  Controlling cholesterol levels  Total cholesterol includes LDL and HDL cholesterol, as well as other fats in the bloodstream. If your total cholesterol is high, follow the steps below to help lower your total cholesterol level:    Eat less unhealthy fat:    Cut back on saturated fats and trans fats (also called hydrogenated) by selecting lean cuts of meat, low-fat dairy, and using oils instead of solid fats. Limit baked goods, processed meats, and fried foods. A diet that s high in these fats increases your bad cholesterol. It's not enough to just cut back on foods containing cholesterol.    Eat about 2 servings of fish per week. Most fish contain omega-3 fatty acids. These help lower blood cholesterol.    Eat more whole grains and soluble fiber (such as oat bran). These lower overall cholesterol.    Be " active:    Choose an activity you enjoy. Walking, swimming, and riding a bike are some good ways to be active.    Start at a level where you feel comfortable. Increase your time and pace a little each week.    Work up to 30 to 40 minutes of moderate to high intensity physical activity at least 3 to 4 days per week.    Remember, some activity is better than none.    If you haven't been exercising regularly, start slowly. Check with your healthcare provider to make sure the exercise plan is right for you.    Quit smoking. Quitting smoking can improve your lipid levels. It also lowers your risk for heart disease and stroke.    Weight management. If you are overweight or obese, your healthcare provider will work with you to lose weight and lower your BMI (body mass index) to a normal or near-normal level. Making diet changes and increasing physical activity can help.    Take medicine as directed. Many people need medicine to get their LDL levels to a safe level. Medicine to lower cholesterol levels is effective and safe. Taking medicine is not a substitute for exercise or watching your diet! Your healthcare provider can tell you whether you might benefit from a cholesterol-lowering medicine.  Date Last Reviewed: 6/1/2017 2000-2017 [employer]. [address] [place], [place] [id number]. All rights reserved. This information is not intended as a substitute for professional medical care. Always follow your healthcare professional's instructions.        Diet: Diabetes  Food is an important tool that you can use to control diabetes and stay healthy. Eating well-balanced meals in the correct amounts will help you control your blood glucose levels and prevent low blood sugar reactions. It will also help you reduce the health risks of diabetes. There is no one specific diet that is right for everyone with diabetes. But there are general guidelines to follow. A registered dietitian (STACI) will create a tailored  diet approach that s just right for you. He or she will also help you plan healthy meals and snacks. If you have any questions, call your dietitian for advice.     Guidelines for success  Talk with your healthcare provider before starting a diabetes diet or weight loss program. If you haven't talked with a dietitian yet, ask your provider for a referral. The following guidelines can help you succeed:    Select foods from the 6 food groups below. Your dietitian will help you find food choices within each group. He or she will also show you serving sizes and how many servings you can have at each meal.    Grains, beans, and starchy vegetables    Vegetables    Fruit    Milk or yogurt    Meat, poultry, fish, or tofu    Healthy fats    Check your blood sugar levels as directed by your provider. Take any medicine as prescribed by your provider.    Learn to read food labels and pick the right portion sizes.    Eat only the amount of food in your meal plan. Eat about the same amount of food at regular times each day. Don t skip meals. Eat meals 4 to 5 hours apart, with snacks in between.    Limit alcohol. It raises blood sugar levels. Drink water or calorie-free diet drinks that use safe sweeteners.    Eat less fat to help lower your risk of heart disease. Use nonfat or low-fat dairy products and lean meats. Avoid fried foods. Use cooking oils that are unsaturated, such as olive, canola, or peanut oil.    Talk with your dietitian about safe sugar substitutes.    Avoid added salt. It can contribute to high blood pressure, which can cause heart disease. People with diabetes already have a risk of high blood pressure and heart disease.    Stay at a healthy weight. If you need to lose weight, cut down on your portion sizes. But don t skip meals. Exercise is an important part of any weight management program. Talk with your provider about an exercise program that s right for you.    For more information about the best diet plan  for you, talk with a registered dietitian (RD). To find an RD in your area, contact:    Academy of Nutrition and Dietetics www.eatright.org    The American Diabetes Association 903-985-7543 www.diabetes.org  Date Last Reviewed: 8/1/2016 2000-2017 [employer]. [address] [place] [id number]. All rights reserved. This information is not intended as a substitute for professional medical care. Always follow your healthcare professional's instructions.        Healthy Meals for Diabetes     A healthcare provider will help you develop a meal plan that fits your needs.   Ask your healthcare team to help you make a meal plan that fits your needs. Your meal plan tells you when to eat your meals and snacks, what kinds of foods to eat, and how much of each food to eat. You don t have to give up all the foods you like. But you do need to follow some guidelines.  Choose healthy carbohydrates  Starches, sugars, and fiber are all types of carbohydrates. Fiber can help lower your cholesterol and triglycerides. Fiber is also healthy for your heart. You should have 20 to 35 grams of total fiber each day. Fiber-rich foods include:    Whole-grain breads and cereals    Bulgur wheat    Brown rice       Whole-wheat pasta    Fruits and vegetables    Dry beans, and peas   Keep track of the amount of carbohydrates you eat. This can help you keep the right balance of physical activity and medicine. The amount of carbohydrates needed will vary for each person. It depends on many things such as your health, the medicines you take, and how active you are. Your healthcare team will help you figure out the right amount of carbohydrates for you. You may start with around 45 to 60 grams of carbohydrates per meal, depending on your situation.   Here are some examples of foods containing about 15 grams of carbohydrates (1 serving of carbohydrates):    1/2 cup of canned or frozen fruit    A small piece of fresh fruit (4  ounces)    1 slice of bread    1/2 cup of oatmeal    1/3 cup of rice    4 to 6 crackers    1/2 English muffin    1/2 cup of black beans    1/4 of a large baked potato (3 ounces)    2/3 cup of plain fat-free yogurt    1 cup of soup    1/2 cup of casserole    6 chicken nuggets    2-inch-square brownie or cake without frosting    2 small cookies    1/2 cup of ice cream or sherbet  Choose healthy protein foods  Eating protein that is low in fat can help you control your weight. It also helps keep your heart healthy. Low-fat protein foods include:    Fish    Plant proteins, such as dry beans and peas, nuts, and soy products like tofu and soymilk    Lean meat with all visible fat removed    Poultry with the skin removed    Low-fat or nonfat milk, cheese, and yogurt  Limit unhealthy fats and sugar  Saturated and trans fats are unhealthy for your heart. They raise LDL (bad) cholesterol. Fat is also high in calories, so it can make you gain weight. To cut down on unhealthy fats and sugar, limit these foods:    Butter or margarine    Palm and palm kernel oils and coconut oil    Cream    Cheese    Ramirez    Lunch meats       Ice cream    Sweet bakery goods such as pies, muffins, and donuts    Jams and jellies    Candy bars    Regular sodas   How much to eat  The amount of food you eat affects your blood sugar. It also affects your weight. Your healthcare team will tell you how much of each type of food you should eat.    Use measuring cups and spoons and a food scale to measure serving sizes.    Learn what a correct serving size looks like on your plate. This will help when you are away from home and can t measure your servings.    Eat only the number of servings given on your meal plan for each food. Don t take seconds.    Learn to read food labels. Be sure to look at serving size, total carbohydrates, fiber, calories, sugar, and saturated and trans fats. Look for healthier alternatives to foods that have added sugar.    Plan  ahead for parties so you can still have a good time without going overboard with unhealthy food choices. Set a good example yourself by bringing a healthy dish to pot abby.   Choose healthy snacks  When it comes to snacks, we usually think about foods with added sugar and fats. But there are many other options for healthier snack choices. Here are a few snack ideas to choose from:  Snacks with less than 5 grams of carbohydrates    1 piece of string cheese    3 celery sticks plus 1 tablespoon of peanut butter    5 cherry tomatoes plus 1 tablespoon of ranch dressing    1 hard-boiled egg    1/4 cup of fresh blueberries     5 baby carrots    1 cup of light popcorn    1/2 cup of sugar-free gelatin    15 almonds  Snacks with about 10 to 20 grams of carbohydrates    1/3 cup of hummus plus 1 cup of fresh cut nonstarchy vegetables (carrots, green peppers, broccoli, celery, or a combination)    1/2 cup of fresh or canned fruit plus 1/4 cup of cottage cheese    1/2 cup of tuna salad with 4 crackers    2 rice cakes and a tablespoon of peanut butter    1 small apple or orange    3 cups light popcorn    1/2 of a turkey sandwich (1 slice of whole-wheat bread, 2 ounces of turkey, and mustard)  Portion sizes are important to controlling your blood sugar and staying at a healthy weight. Stock up on healthy snack items so you always have them on hand.  When to eat  Your meal plan will likely include breakfast, lunch, dinner, and some snacks.    Try to eat your meals and snacks at about the same times each day.    Eat all your meals and snacks. Skipping a meal or snack can make your blood sugar drop too low. It can also cause you to eat too much at the next meal or snack. Then your blood sugar could get too high.  Date Last Reviewed: 7/1/2016 2000-2017 [employer]. [address] [facility], [place] [address]. All rights reserved. This information is not intended as a substitute for professional medical care. Always  follow your healthcare professional's instructions.        Diabetes: Meal Planning    You can help keep your blood sugar level in your target range by eating healthy foods. Your healthcare team can help you create a low-fat, nutritious meal plan. Take an active role in your diabetes management by following your meal plan and working with your healthcare team.  Make your meal plan  A meal plan gives guidelines for the types and amounts of food you should eat. The goal is to balance food and insulin (or other diabetes medications) so your blood sugars will be in your target range. Your dietitian will help you make a flexible meal plan that includes many foods that you like.  Watch serving sizes  Your meal plan will group foods by servings. To learn how much a serving is, start by measuring food portions at each meal. Soon you ll know what a serving looks like on your plate. Ask your healthcare provider about how to balance servings of different foods.  Eat from all the food groups  The basis of a healthy meal plan is variety (eating lots of different foods). Choose lean meats, fresh fruits and vegetables, whole grains, and low-fat or nonfat dairy products. Eating a wide variety of foods provides the nutrients your body needs. It can also keep you from getting bored with your meal plan.  Learn about carbohydrates, fats, and protein    Carbohydrates are starches, sugars, and fiber. They are found in many foods, including fruit, bread, pasta, milk, and sweets. Of all the foods you eat, carbohydrates have the most effect on your blood sugar. Your dietitian may teach you about carb counting, a way to figure out the number of carbohydrates in a meal.    Fats have the most calories. They also have the most effect on your weight and your risk of heart disease. When you have diabetes, it s important to control your weight and protect your heart. Foods that are high in fat include whole milk, cheese, snack foods, and  desserts.    Protein is important for building and repairing muscles and bones. Choose low-fat protein sources, such as fish, egg whites, and skinless chicken.  Reduce liquid sugars  Extra calories from sodas, sports drinks, and fruit drinks make it hard to keep blood sugar in range. Cut as many liquid sugars from your meal plan as you can.  This includes most fruit juices, which are often high in natural or added sugar. Instead, drink plenty of water and other sugar-free beverages.  Eat less fat  If you need to lose weight, try to reduce the amount of fat in your diet. This can also help lower your cholesterol level to keep blood vessels healthier. Cut fat by using only small amounts of liquid oil for cooking. Read food labels carefully to avoid foods with unhealthy trans fats.  Timing your meals  When it comes to blood sugar control, when you eat is as important as what you eat. You may need to eat several small meals spaced evenly throughout the day to stay in your target range. So don t skip breakfast or wait until late in the day to get most of your calories. Doing so can cause your blood sugar to rise too high or fall too low.   Date Last Reviewed: 3/1/2016    3022-8392 [employer]. [address] [place] [address], [place] [address]. All rights reserved. This information is not intended as a substitute for professional medical care. Always follow your healthcare professional's instructions.

## 2017-12-19 NOTE — NURSING NOTE
Chief Complaint   Patient presents with     RECHECK       Initial BP (!) 151/106  Pulse 91  Temp (P) 98.1  F (36.7  C) (Tympanic)  Ht (P) 5' (1.524 m)  Wt (P) 142 lb (64.4 kg)  LMP 04/14/2017  SpO2 (P) 100%  BMI (P) 27.73 kg/m2 Estimated body mass index is 27.73 kg/(m^2) (pended) as calculated from the following:    Height as of this encounter: (P) 5' (1.524 m).    Weight as of this encounter: (P) 142 lb (64.4 kg).  Medication Reconciliation: complete

## 2017-12-20 LAB
CREAT UR-MCNC: 29 MG/DL
MICROALBUMIN UR-MCNC: 25 MG/L
MICROALBUMIN/CREAT UR: 86.25 MG/G CR (ref 0–25)

## 2017-12-29 ENCOUNTER — ALLIED HEALTH/NURSE VISIT (OUTPATIENT)
Dept: EDUCATION SERVICES | Facility: CLINIC | Age: 52
End: 2017-12-29
Payer: COMMERCIAL

## 2017-12-29 DIAGNOSIS — E11.9 NEW ONSET TYPE 2 DIABETES MELLITUS (H): Primary | ICD-10-CM

## 2017-12-29 PROCEDURE — G0108 DIAB MANAGE TRN  PER INDIV: HCPCS

## 2017-12-29 NOTE — MR AVS SNAPSHOT
After Visit Summary   12/29/2017    Ijeoma Avalos    MRN: [id number]           Patient Information     Date Of Birth          1965        Visit Information        Provider Department      12/29/2017 8:00 AM  DIABETIC ED RESOURCE [facility]   [place]        Today's Diagnoses     New onset type 2 diabetes mellitus (H)    -  1      Care Instructions    My Diabetes Care Goals:    Monitoring: goal for fasting blood sugar is  and if 2 hours after a meal then goal is less than 180.     Snack Ideas for your Meal Plan     Protein (1 serving = 6-8 grams of protein each)    Beef Jerky ( 2 pieces)    Beef Sticks, plain (1 stick)    Cheese/Cheese Stick (1 oz)    Chicken breast (1 oz)    Cottage cheese, low fat (1/4 cup)    Crab, Imitation (2 oz)    Deli Meat (2 oz)    Edamame, boiled (1/2 cup)    Egg, hardboiled (1)    Shrimp, small (10 pieces)     Turkey Breast (1 oz)    Tuna, canned in water (1 oz)    Fairlife Milk (1/2 cup)    Yogurt, Greek : Siggis, Oikos Triple Zero, Dannon Light and Fit, etc (6 oz)    Carb (1 carb choice/serving = 15 grams)     Apple (medium)    Applesauce, unsweetened (1/2 cup)    Apricots, dried (4 whole)    Banana, medium (1/2)    Bread, 1 slice     Cantaloupe/Melon (1 cup)    Crackers 4-6 (varies by brand)    Cherries (15)    Cranberries, Dried (2 tbsp)    Dark Chocolate (1 oz)    Dates, dried (2-3 pieces)    Fruit cocktail, in own juice (1/2 cup)    Granola Bar (vary by brand)    Grapes (1/2 cup)    Ice cream, slow churned/low fat (1/2 cup)    Kiwi (~2)    Mixed Berries (1 cup)    Orange (1 medium)    Peach (1 medium)    Pear (1/2 medium)    Plums (2)    Pomegranate (1 small)    Popcorn, stove popped (2 cups)    Pretzels (3/4 oz)    Pudding, sugar free (1/2 cup)    Raisins (2 Tbsp)    Rice Cake, (2)    Skim or 1% milk (1 cup)    Tortilla Chips (1 oz)    Yogurt (greek or plain)   cup    Fat (1 serving = 100 calories)    Almonds (2 Tbsp)    Avocado  (1/3 fruit OR 3 Tbsp)    Cashews (11 whole)    Cheese (1 oz)    Chocolate, dark (3/4 oz)    Coconut, unsweetened (1/3 c shredded)    Hummus (3 Tbsp)    Peanuts (20 pieces)    Peanut/Nut Butter (1 Tbsp)    Pecans (10 halves)    Pumpkin seeds, unshelled (2 Tbsp)    Salad dressing  (1-2 Tbsp)    Sunflower seeds (2 Tbsp)    Vegetable Dip (1-2 Tbsp)    Walnuts (8 halves)    Free Foods    Bell Peppers    Broccoli    Carrots     Cauliflower     Celery    Coffee, black (regular or decaf)    Cucumber    Gelatin, Sugar Free    Herbal Tea, unsweetened     Pea Pods    Pickles (high in sodium)     Popsicle, Sugar Free    Salsa (2 Tbsp)    Tomatoes    Combination Snacks    Apple + 1 Tbsp peanut butter (carbohydrate + fat)    Handful crackers + 1 oz cheese (carbohydrate + fat or protein)    Carrot sticks + Hummus (free food + fat)    1 piece of peanut butter toast (carbohydrate + fat)    Greek yogurt + 2 Tbsp sunflower seeds (carbohydrate + fat)    Hard-boiled egg +   cup grapes (protein + carbohydrate)    1 piece of avocado toast (carbohydrate + fat)    Cucumbers + dip (free food + fat)    Follow up:  Call ([phone]), e-mail ([email or web address]), or send EDUonGot message with questions, concerns or if follow-up is needed.     Bring blood glucose meter and logbook with you to all doctor and follow-up appointments.     [facility] for the [facility] Area:  For Your Diabetes Education and Nutrition Appointments Call:  [phone]   For Diabetes Education or Nutrition Related Questions:   Phone: [phone]  E-mail: [email or web address]  Fax: [phone]   If you need a medication refill please contact your pharmacy. Please allow 3 business days for your refills to be completed.    Instructions for emailing the Diabetes Educators    If you need to communicate a non-urgent message to a Diabetes Educator via email, please send to [email or web address].    Please follow the  following email guidelines:    Subject line: Secure: your clinic name (example: Secure: [facility])  In the email please include: First name, middle initial, last name and date of birth.    We will be in touch with you within one (1) business day.             Follow-ups after your visit        Your next 10 appointments already scheduled     Jan 12, 2018  7:30 AM CST   Diabetic Education with  DIABETIC ED RESOURCE   [facility]   [facility]    [address] [place] [address] [place] [address]   [phone]            Jan 16, 2018  2:00 PM CST   Return Visit with Rosa Chowdary LP   [facility] [place] ([facility] [place])    [address] [place], [place] [address] [place] [address] [phone]              Who to contact     If you have questions or need follow up information about today's clinic visit or your schedule please contact [facility]   [place] directly at [phone].  Normal or non-critical lab and imaging results will be communicated to you by "[employer]hart, letter or phone within 4 business days after the clinic has received the results. If you do not hear from us within 7 days, please contact the clinic through "[employer]hart or phone. If you have a critical or abnormal lab result, we will notify you by phone as soon as possible.  Submit refill requests through [employer] or call your pharmacy and they will forward the refill request to us. Please allow 3 business days for your refill to be completed.          Additional Information About Your Visit        "[employer] Information     [employer] gives you secure access to your electronic health record. If you see a primary care provider, you can also send messages to your care team and make appointments. If you have questions, please call your primary care clinic.  If you do not have a primary care provider, please call [phone] and they will assist you.        Care EveryWhere ID      This is your Care EveryWhere ID. This could be used by other organizations to access your [facility] medical records  [id number]        Your Vitals Were     Last Period                   04/14/2017            Blood Pressure from Last 3 Encounters:   12/19/17 (!) 151/106   12/13/17 (!) 136/95   11/22/17 126/84    Weight from Last 3 Encounters:   12/19/17 (P) 64.4 kg (142 lb)   12/13/17 63.5 kg (140 lb)   11/22/17 65.3 kg (144 lb)              Today, you had the following     No orders found for display       Primary Care Provider Office Phone # Fax #    Dulce Terrence Hernandez [phone]       [address]  [place] [address]        Equal Access to Services     [facility]: Hadii aad andrea hadasho Soomaali, waaxda luqadaha, qaybta kaalmada adeegyada, shannon thurman . So [facility] [phone].    ATENCIÓN: Si habla español, tiene a irene disposición servicios gratuitos de asistencia lingüística. [facility] [phone].    We comply with applicable federal civil rights laws and [place] laws. We do not discriminate on the basis of race, color, national origin, age, disability, sex, sexual orientation, or gender identity.            Thank you!     Thank you for choosing [facility]   [place]  for your care. Our goal is always to provide you with excellent care. Hearing back from our patients is one way we can continue to improve our services. Please take a few minutes to complete the written survey that you may receive in the mail after your visit with us. Thank you!             Your Updated Medication List - Protect others around you: Learn how to safely use, store and throw away your medicines at www.disposemymeds.org.          This list is accurate as of: 12/29/17  9:00 AM.  Always use your most recent med list.                   Brand Name Dispense Instructions for use Diagnosis    aspirin 81 MG tablet     90 tablet    Take 1 tablet (81 mg) by mouth  daily    New onset type 2 diabetes mellitus (H)       atorvastatin 10 MG tablet    LIPITOR    30 tablet    Take 1 tablet (10 mg) by mouth daily    Hyperlipidemia LDL goal <130       blood glucose monitoring lancets     100 each    Use to test blood sugars  1-2 times daily or as directed.    New onset type 2 diabetes mellitus (H)       blood glucose monitoring meter device kit    no brand specified    1 kit    Use to test blood sugar  2-3  times daily or as directed.    New onset type 2 diabetes mellitus (H)       blood glucose monitoring test strip    no brand specified    1 Box    Use to test blood sugar 1-2  times daily or as directed.    New onset type 2 diabetes mellitus (H)       Glucosamine-Chondroitin--200-150 MG Tabs           levothyroxine 112 MCG tablet    SYNTHROID/LEVOTHROID    90 tablet    Take 1 tablet (112 mcg) by mouth daily    Other specified hypothyroidism       lisinopril 5 MG tablet    PRINIVIL/ZESTRIL    30 tablet    Take 1 tablet (5 mg) by mouth daily    Essential hypertension, New onset type 2 diabetes mellitus (H)       LORazepam 0.5 MG tablet    ATIVAN    20 tablet    Take 1 tablet (0.5 mg) by mouth every 8 hours as needed for anxiety    Adjustment disorder with mixed anxiety and depressed mood       metFORMIN 500 MG tablet    GLUCOPHAGE    60 tablet    Take 1 tablet (500 mg) by mouth 2 times daily (with meals)    New onset type 2 diabetes mellitus (H)       MULTIVITAMIN PO      Take by mouth daily        nabumetone 500 MG tablet    RELAFEN    30 tablet    Take 1-2 tablets (500-1,000 mg) by mouth 2 times daily as needed for moderate pain    Neck pain, TMJ (temporomandibular joint syndrome), Adjustment disorder with mixed anxiety and depressed mood       omeprazole 40 MG capsule    priLOSEC    90 capsule    Take 1 capsule (40 mg) by mouth daily    Gastroesophageal reflux disease, esophagitis presence not specified

## 2017-12-29 NOTE — PATIENT INSTRUCTIONS
My Diabetes Care Goals:    Monitoring: goal for fasting blood sugar is  and if 2 hours after a meal then goal is less than 180.     Snack Ideas for your Meal Plan     Protein (1 serving = 6-8 grams of protein each)    Beef Jerky ( 2 pieces)    Beef Sticks, plain (1 stick)    Cheese/Cheese Stick (1 oz)    Chicken breast (1 oz)    Cottage cheese, low fat (1/4 cup)    Crab, Imitation (2 oz)    Deli Meat (2 oz)    Edamame, boiled (1/2 cup)    Egg, hardboiled (1)    Shrimp, small (10 pieces)     Turkey Breast (1 oz)    Tuna, canned in water (1 oz)    Fairlife Milk (1/2 cup)    Yogurt, Greek : Siggis, Oikos Triple Zero, Dannon Light and Fit, etc (6 oz)    Carb (1 carb choice/serving = 15 grams)     Apple (medium)    Applesauce, unsweetened (1/2 cup)    Apricots, dried (4 whole)    Banana, medium (1/2)    Bread, 1 slice     Cantaloupe/Melon (1 cup)    Crackers 4-6 (varies by brand)    Cherries (15)    Cranberries, Dried (2 tbsp)    Dark Chocolate (1 oz)    Dates, dried (2-3 pieces)    Fruit cocktail, in own juice (1/2 cup)    Granola Bar (vary by brand)    Grapes (1/2 cup)    Ice cream, slow churned/low fat (1/2 cup)    Kiwi (~2)    Mixed Berries (1 cup)    Orange (1 medium)    Peach (1 medium)    Pear (1/2 medium)    Plums (2)    Pomegranate (1 small)    Popcorn, stove popped (2 cups)    Pretzels (3/4 oz)    Pudding, sugar free (1/2 cup)    Raisins (2 Tbsp)    Rice Cake, (2)    Skim or 1% milk (1 cup)    Tortilla Chips (1 oz)    Yogurt (greek or plain)   cup    Fat (1 serving = 100 calories)    Almonds (2 Tbsp)    Avocado (1/3 fruit OR 3 Tbsp)    Cashews (11 whole)    Cheese (1 oz)    Chocolate, dark (3/4 oz)    Coconut, unsweetened (1/3 c shredded)    Hummus (3 Tbsp)    Peanuts (20 pieces)    Peanut/Nut Butter (1 Tbsp)    Pecans (10 halves)    Pumpkin seeds, unshelled (2 Tbsp)    Salad dressing  (1-2 Tbsp)    Sunflower seeds (2 Tbsp)    Vegetable Dip (1-2 Tbsp)    Walnuts (8 halves)    Free Foods    Bell  Peppers    Broccoli    Carrots     Cauliflower     Celery    Coffee, black (regular or decaf)    Cucumber    Gelatin, Sugar Free    Herbal Tea, unsweetened     Pea Pods    Pickles (high in sodium)     Popsicle, Sugar Free    Salsa (2 Tbsp)    Tomatoes    Combination Snacks    Apple + 1 Tbsp peanut butter (carbohydrate + fat)    Handful crackers + 1 oz cheese (carbohydrate + fat or protein)    Carrot sticks + Hummus (free food + fat)    1 piece of peanut butter toast (carbohydrate + fat)    Greek yogurt + 2 Tbsp sunflower seeds (carbohydrate + fat)    Hard-boiled egg +   cup grapes (protein + carbohydrate)    1 piece of avocado toast (carbohydrate + fat)    Cucumbers + dip (free food + fat)    Follow up:  Call ([phone]), e-mail ([email or web address]), or send [employer] message with questions, concerns or if follow-up is needed.     Bring blood glucose meter and logbook with you to all doctor and follow-up appointments.     [facility] for the [facility] Area:  For Your Diabetes Education and Nutrition Appointments Call:  [phone]   For Diabetes Education or Nutrition Related Questions:   Phone: [phone]  E-mail: [email or web address]  Fax: [phone]   If you need a medication refill please contact your pharmacy. Please allow 3 business days for your refills to be completed.    Instructions for emailing the Diabetes Educators    If you need to communicate a non-urgent message to a Diabetes Educator via email, please send to [email or web address].    Please follow the following email guidelines:    Subject line: Secure: your clinic name (example: Secure: [facility])  In the email please include: First name, middle initial, last name and date of birth.    We will be in touch with you within one (1) business day.

## 2017-12-29 NOTE — PROGRESS NOTES
Diabetes Self Management Training: Initial Assessment Visit for Newly Diagnosed Patients (Complete AADE Goals Flowsheet)    Ijeoma Avalos presents today for education related to Type 2 diabetes.    She is accompanied by self    Patient's diabetes management related comments/concerns: how to test blood sugar    Patient's emotional response to diabetes: expresses readiness to learn and concern for health and well-being    Patient would like this visit to be focused around the following diabetes-related behaviors and goals: Healthy Eating    ASSESSMENT:  Patient Problem List and Family Medical History reviewed for relevant medical history, current medical status, and diabetes risk factors.    Current Diabetes Management per Patient:  Taking diabetes medications?   yes:     Diabetes Medication(s)     Biguanides Sig    metFORMIN (GLUCOPHAGE) 500 MG tablet Take 1 tablet (500 mg) by mouth 2 times daily (with meals)      Problems taking diabetes medications regularly? No     Past Diabetes Education: Newly diagnosed - has hx of GDM    Patient glucose self monitoring as follows: never.   BG meter: One Touch Ultra 2 meter  BG results: not available     BG values are: unable to assess  Patient's most recent   Lab Results   Component Value Date    A1C 12.2 12/18/2017    is not meeting goal of <8.0    Nutrition:  Patient eats 3 meals per day    Breakfast - egg whites, 1 toast, coffee (with almond milk unsweetened or stevia sweetener)  Lunch - salmon with side of vegetables and little brown rice (bite or two)   Dinner (6:30 pm) - protein and vegetable - last night had butternut squash with meat and lentils and bread   Snacks - (9:30 pm) milk w/chickpeas     Beverages: coffee, milk - almond milk, water    Cultural/Evangelical diet restrictions: No     Biggest Challenge to Healthy Eating: knowing what to eat    Physical Activity:    Trying to work on this. Has been sedentary the past 2 years. Starting to do walks and just joined  the gym (2x/wk).     Diabetes Risk Factors:  age over 45 years, ethnicity, hyperlipidemia, overweight/obesity and inactivity    Diabetes Complications:  Not discussed today.    Vitals:  LMP 04/14/2017  Estimated body mass index is 27.73 kg/(m^2) (pended) as calculated from the following:    Height as of 12/19/17: (P) 1.524 m (5').    Weight as of 12/19/17: (P) 64.4 kg (142 lb).   Wt Readings from Last 5 Encounters:   12/19/17 (P) 64.4 kg (142 lb)   12/13/17 63.5 kg (140 lb)   11/22/17 65.3 kg (144 lb)   09/25/17 66.2 kg (146 lb)   07/18/17 67.6 kg (149 lb)     Last 3 BP:   BP Readings from Last 3 Encounters:   12/19/17 (!) 151/106   12/13/17 (!) 136/95   11/22/17 126/84       History   Smoking Status     Never Smoker   Smokeless Tobacco     Never Used       Labs:  Lab Results   Component Value Date    A1C 12.2 12/18/2017     Lab Results   Component Value Date     12/18/2017     Lab Results   Component Value Date     12/13/2017     HDL Cholesterol   Date Value Ref Range Status   12/13/2017 48 (L) >49 mg/dL Final   ]  GFR Estimate   Date Value Ref Range Status   12/18/2017 >90 >60 mL/min/1.7m2 Final     Comment:     Non  GFR Calc     GFR Estimate If Black   Date Value Ref Range Status   12/18/2017 >90 >60 mL/min/1.7m2 Final     Comment:      GFR Calc     Lab Results   Component Value Date    CR 0.63 12/18/2017     No results found for: MICROALBUMIN    Socio/Economic Considerations:    Support system: family    Health Beliefs and Attitudes:   Patient Activation Measure Survey Score:  CYNTHIA Score (Last Two) 12/4/2012   CYNTHIA Raw Score 50   Activation Score 86.3   CYNTHIA Level 4       Stage of Change: ACTION (Actively working towards change)      Diabetes knowledge and skills assessment:     Patient is knowledgeable in diabetes management concepts related to: Healthy Eating and Being Active  Patient needs further education on the following diabetes management concepts: Healthy  Eating, Being Active, Monitoring, Taking Medication, Problem Solving, Reducing Risks and Healthy Coping  Barriers to Learning Assessment: No Barriers identified  Based on learning assessment above, most appropriate setting for further diabetes education would be: Group class or Individual setting.    INTERVENTION:   Education provided today on:  AADE Self-Care Behaviors:  Healthy Eating: carbohydrate counting, consistency in amount, composition, and timing of food intake, weight reduction and portion control. She has lost 7# so far in 5 months. Encouraged her to keep this up. Reviewed benefits of weight loss.   Being Active: relationship to blood glucose. Praised her on starting to exercise and encouraged her to continue this.   Monitoring: purpose, proper technique, log and interpret results, individual blood glucose targets, frequency of monitoring and proper sharps disposal  Taking Medication: action of prescribed medication  Problem Solving: high blood glucose - causes, signs/symptoms, treatment and prevention  Healthy Coping: methods for coping with stress. Has a lot of stress with her job and works full time and then brings work home in the evenings some days as well.   Patient was instructed on One Touch Ultra 2 meter and was able to provide an accurate return demonstration. Patient's blood glucose reading today was 189 mg/dL (fasting).    Opportunities for ongoing education and support in diabetes-self management were discussed.    Pt verbalized understanding of concepts discussed and recommendations provided today.     Education Materials Provided:  [facility] Understanding Diabetes Booklet    PLAN:  See Patient Instructions for co-developed, patient-stated behavior change goals.  Meal Plan Recommendation: eat 3 meals a day, have small snacks between meals, if needed, use portion control and Aim for 2-3 carb servings at meals and 0-1 carb servings at snacks  Exercise / activity plan: continue to walk and use  gym at least 2 days per week.   Check blood sugars fasting and bedtime  Keep a blood glucose record for next visit.  If blood sugars above target goal at next visit, recommend to increase Metformin to 3 tabs per day.  AVS printed and provided to patient today.    FOLLOW-UP:  Follow-up appointment scheduled on 1/12/18.  Education topics to cover at the next diabetes education visit(s): complication prevention, sick days, heart health, review blood sugars  Chart routed to referring provider.    Ongoing plan for education and support: Written resources (magazines, books, etc.), Follow-up visit with diabetes educator in 2 weeks and Follow-up with primary care provider    STACI Man CDE    Time Spent: 60 minutes  Encounter Type: Individual    Any diabetes medication dose changes were made via the CDE Protocol and Collaborative Practice Agreement with the patient's referring provider. A copy of this encounter was shared with the provider.

## 2018-01-12 ENCOUNTER — ALLIED HEALTH/NURSE VISIT (OUTPATIENT)
Dept: EDUCATION SERVICES | Facility: CLINIC | Age: 53
End: 2018-01-12
Payer: COMMERCIAL

## 2018-01-12 DIAGNOSIS — E11.9 NEW ONSET TYPE 2 DIABETES MELLITUS (H): Primary | ICD-10-CM

## 2018-01-12 NOTE — Clinical Note
FYI - fasting blood sugars in the 150-180 range so increased metformin to 3 tabs per day and instructed to increase to 4/d in another 1-2 weeks if they are still high.  Ami Kim, STACI LD CDE

## 2018-01-12 NOTE — PROGRESS NOTES
Diabetes Self Management Training: Follow-up Visit    Ijeoma Avalos presents today for education related to Type 2 diabetes.    She is accompanied by self    Patient's diabetes management related comments/concerns: Still running high in the mornings and is traveling to [place] in a week for work.    Patient would like this visit to be focused around the following diabetes-related behaviors and goals: Healthy Eating    ASSESSMENT:  Patient Problem List reviewed for relevant medical history and current medical status.    Current Diabetes Management per Patient:  Taking diabetes medications?   yes:     Diabetes Medication(s)     Biguanides Sig    metFORMIN (GLUCOPHAGE) 500 MG tablet Take 1 tablet (500 mg) by mouth 2 times daily (with meals)      ** No side effects noted.     Patient glucose self monitoring as follows: two times daily.   BG meter:  meter  BG results: fasting glucose- reports fastings are all >130 and more like 150-180. During the day is reported to be better and usually under 180 after meals.      BG values are: Not in goal for fasting.    Patient's most recent   Lab Results   Component Value Date    A1C 12.2 12/18/2017    is not meeting goal of <8.0    Nutrition:  Patient eats 3 meals per day. Did not have time to discuss as she was late for appointment.    Physical Activity:    Not discussed    Diabetes Complications:  Not discussed today.    Vitals:  LMP 04/14/2017  Estimated body mass index is 27.73 kg/(m^2) (pended) as calculated from the following:    Height as of 12/19/17: (P) 1.524 m (5').    Weight as of 12/19/17: (P) 64.4 kg (142 lb).   Last 3 BP:   BP Readings from Last 3 Encounters:   12/19/17 (!) 151/106   12/13/17 (!) 136/95   11/22/17 126/84       History   Smoking Status     Never Smoker   Smokeless Tobacco     Never Used       Labs:  Lab Results   Component Value Date    A1C 12.2 12/18/2017     Lab Results   Component Value Date     12/18/2017     Lab Results   Component  Value Date     12/13/2017     HDL Cholesterol   Date Value Ref Range Status   12/13/2017 48 (L) >49 mg/dL Final   ]  GFR Estimate   Date Value Ref Range Status   12/18/2017 >90 >60 mL/min/1.7m2 Final     Comment:     Non  GFR Calc     GFR Estimate If Black   Date Value Ref Range Status   12/18/2017 >90 >60 mL/min/1.7m2 Final     Comment:      GFR Calc     Lab Results   Component Value Date    CR 0.63 12/18/2017     No results found for: MICROALBUMIN    Health Beliefs and Attitudes:   Patient Activation Measure Survey Score:  CYNTHIA Score (Last Two) 12/4/2012   CYNTHIA Raw Score 50   Activation Score 86.3   CYNTHIA Level 4       Stage of Change: ACTION (Actively working towards change)    Progress toward meeting diabetes-related behavioral goals:    GOALS % Met Goal   Healthy Eating 75   Physical Activity 0 - unknown   Monitoring 100   Medication Taking 100   Problem Solving 0   Healthy Coping 0   Risk Reduction 0           Diabetes knowledge and skills assessment:     Patient is knowledgeable in diabetes management concepts related to: Healthy Eating, Being Active, Monitoring and Taking Medication    Patient needs further education on the following diabetes management concepts: Healthy Eating, Monitoring, Problem Solving, Reducing Risks and Healthy Coping    Barriers to Learning Assessment: No Barriers identified    Based on learning assessment above, most appropriate setting for further diabetes education would be: Group class or Individual setting.    INTERVENTION:    Education provided today on:  AADE Self-Care Behaviors:  Healthy Eating: consistency in amount, composition, and timing of food intake, portion control and discussed briefly carb content in / foods.   Monitoring: log and interpret results and individual blood glucose targets  Taking Medication: side effects of prescribed medications    Opportunities for ongoing education and support in diabetes-self management  were discussed.    Pt verbalized understanding of concepts discussed and recommendations provided today.       Education Materials Provided:  Bhutanese Carb Counting Guide    PLAN:  Meal Plan Recommendation: eat 3 meals a day and Aim for 2-3 carb servings at meals and 0-1 carb servings at snacks  Exercise / activity plan: continue to walk and use gym at least 2 days per week.   Check blood sugars fasting and bedtime  Keep a blood glucose record for next visit.  Increase evening dose of Metformin to 2 tabs/d (total of 3 tabs per day which is 1500 mg).   AVS printed and provided to patient today.    FOLLOW-UP:  Follow-up appointment scheduled on 2/2/18.  Education topics to cover at the next diabetes education visit(s): complication prevention, sick days, heart health, review blood sugars  Chart routed to referring provider.    Ongoing plan for education and support: Written resources (magazines, books, etc.), Follow-up visit with diabetes educator in 3 weeks and Follow-up with primary care provider    STACI Man CDE    Time Spent: 15 minutes (came late)  Encounter Type: Individual    Any diabetes medication dose changes were made via the CDE Protocol and Collaborative Practice Agreement with the patient's referring provider. A copy of this encounter was shared with the provider.

## 2018-01-18 ENCOUNTER — OFFICE VISIT (OUTPATIENT)
Dept: PSYCHOLOGY | Facility: CLINIC | Age: 53
End: 2018-01-18
Payer: COMMERCIAL

## 2018-01-18 DIAGNOSIS — F41.9 ANXIETY DISORDER: Primary | ICD-10-CM

## 2018-01-18 PROCEDURE — 90834 PSYTX W PT 45 MINUTES: CPT | Performed by: PSYCHOLOGIST

## 2018-01-18 NOTE — MR AVS SNAPSHOT
MRN:[id number]                      After Visit Summary   1/18/2018    Ijeoma Avalos    MRN: [id number]           Visit Information        Provider Department      1/18/2018 1:00 PM Rosa Chowdary LP [facility]      Your next 10 appointments already scheduled     Feb 02, 2018  8:00 AM CST   Diabetic Education with [facility] DIABETIC ED RESOURCE   [facility] ([facility]    [address] [place] [id number]   [phone]            Feb 20, 2018  1:00 PM CST   Return Visit with Rosa Chowdary LP   [facility] [place] [facility] ([facility])    [address] [place] [id number]   [phone]            Mar 13, 2018  2:00 PM CDT   Return Visit with Rosa Chowdary LP   [facility] ([facility])    [address] [place] [id number]   [phone]              MyChart Information     [facility] gives you secure access to your electronic health record. If you see a primary care provider, you can also send messages to your care team and make appointments. If you have questions, please call your primary care clinic.  If you do not have a primary care provider, please call [phone] and they will assist you.        Care EveryWhere ID     This is your Care EveryWhere ID. This could be used by other organizations to access your [facility] medical records  [id number]        Equal Access to Services     ADELINE VIVAS : Hadii aad ku hadasho Soomaali, waaxda luqadaha, qaybta kaalmada adeegyada, shannon blackwood. So [facility] [phone].    ATENCIÓN: Si habla español, tiene a irene disposición servicios gratuitos de asistencia lingüística. Llame al [phone].    We comply with applicable federal civil rights laws and [place] laws. We do not discriminate on the basis of race, color, national origin, age,  disability, sex, sexual orientation, or gender identity.

## 2018-01-18 NOTE — PROGRESS NOTES
"                                             Progress Note    Client Name: Ijeoma Avalos  Date: 1/18/18       Service Type: Individual      Session Start Time: 13:00  Session End Time: 13:50      Session Length: 50     Session #: 11     Attendees: Client attended alone    Treatment Plan Last Reviewed: 2/9/17  PHQ-9 / SOSA-7 Last Reviewed: 9/25/17     DATA      Progress Since Last Session (Related to Symptoms / Goals / Homework):   Symptoms: Worry, anxiety    Homework: Good progress      Episode of Care Goals: Satisfactory progress - ACTION (Actively working towards change); Intervened by reinforcing change plan / affirming steps taken     Current / Ongoing Stressors and Concerns:   Physical symptoms   Phase of life issues (full-time work and parent of young child)   Work demands   Family overseas     Treatment Objective(s) Addressed in This Session:   Build and make use of skills for stress management     Intervention:   Client stated that since our last session, she was diagnosed with Type 2 diabetes. She described feeling worried and somewhat overwhelmed by this news, but it has also motivated her to make some changes. She is doing strength training twice/week and incorporating more walking into her daily routine. She is avoiding simple carbohydrates and taking medication as instructed. Reinforced her efforts to make these important changes in the interest of her health. Discussed her life balance overall, and she is aware that work continues to take more of her time than she'd like. She is still thinking about the question of what would constitute her ideal life/job. Also worked on increasing contentment by changing the way she evaluates herself and her days; productivity (how many things she checked off her to-do list) does not have to be the benchmark she uses. Talked about Client's ability to choose where to shine her \"spotlight\"--if she focuses on progress and successes and choices that are in line with " "her values, she is likely to feel more content than if she focuses on shortcomings and unfinished tasks and unmet expectations. This perspective resonated with Client. 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 She has also had some success with leaving work at \"good enough\" rather than \"perfection\" and recognizes the benefits of this approach in many situations.     ASSESSMENT: Current Emotional / Mental Status (status of significant symptoms):   Risk status (Self / Other harm or suicidal ideation)   Client denies current fears or concerns for personal safety.   Client denies current or recent suicidal ideation or behaviors.   Client denies current or recent homicidal ideation or behaviors.   Client denies current or recent self injurious behavior or ideation.   Client denies other safety concerns.   A safety and risk management plan has not been developed at this time, however client was given the after-hours number / 911 should there be a change in any of these risk factors.     Appearance:   Appropriate    Eye Contact:   Good    Psychomotor Behavior: Normal    Attitude:   Cooperative , engaged   Orientation:   All   Speech    Rate / Production: Normal     Volume:  Normal    Mood:    Anxious , frustrated when she does not meet high expectations for herself   Affect:    Appropriate    Thought Content:  More balanced ; still tendency toward catastrophizing & negative rumination   Thought Form:  Coherent  Logical    Insight:    Fair      Medication Review:   Changes to psychiatric medications, see updated Medication List in EPIC. Client has prescription for lorazepam, which she uses occasionally.     Medication Compliance:   Yes; infrequent use     Changes in Health Issues:   None reported     Chemical Use Review:   Substance Use: Chemical use reviewed, no active concerns identified      Tobacco Use: No current tobacco use.       Collateral Reports Completed:   Not Applicable    PLAN: " (Client Tasks / Therapist Tasks / Other)  Continue with increased physical activity, dietary changes, and medication for management of Type 2 diabetes (new diagnosis). She will work on choosing to focus on positives/progress rather than evaluating herself against unrealistic expectations. Work toward more intentional choices about how she spends her time, what is important to have in her life. Continue to work on articulating what her ideal day/job/life would look like. She will continue to use self-regulation strategies to help with anxiety and calming physical symptoms of stress. Continue with shift away from external validation, striving instead to build her skill for internal validation. Return appointments scheduled.      Rosa Chowdary, LP                                                   _______________________________________________________________________    Treatment Plan    Client's Name: Ijeoma Avalos  YOB: 1965    Date: 2/9/17    DSM-V Diagnoses: Adjustment Disorders  309.28 (F43.23) With mixed anxiety and depressed mood  Psychosocial / Contextual Factors: physical symptoms; phase of life stressors (full-time work and parent of young child); work demands; family overseas  WHODAS: 18    Referral / Collaboration:  Referral to another professional/service is not indicated at this time.    Anticipated number of session or this episode of care: will re-evaluate every 90 days      MeasurableTreatment Goal(s) related to diagnosis / functional impairment(s)  Goal 1: Client will build skills for stress management.    I will know I've met my goal when my blood pressure is lower and my reflux symptoms have decreased.      Objective #A (Client Action)    Client will use at least 2 coping skills for anxiety management in the next 6 weeks.  Status: Continued - Date(s):10/10/17     Intervention(s)  Therapist will teach self-regulation strategies, CBT skills, mindfulness techniques.    Objective  #B  Client will use cognitive strategies identified in therapy to challenge anxious thoughts.  Status: Continued - Date(s):10/10/17     Intervention(s)  Therapist will teach cognitive strategies, provide education.      Goal 2: Client will increase self-confidence.    I will know I've met my goal when I think more positively about myself.      Objective #A (Client Action)    Status: Continued - Date(s):10/10/17     Client will increase assertive communication.    Intervention(s)  Therapist will teach assertiveness skills.    Objective #B  Client will Identify negative self-talk and behaviors: challenge core beliefs, myths, and actions.    Status: Continued - Date(s):10/10/17     Intervention(s)  Therapist will provide education, teach CBT skills.      Goal 3: Client will increase connection in close relationships.    I will know I've met my goal when I feel more close with loved ones.      Objective #A (Client Action)    Status: Continued - Date(s):10/10/17     Client will prioritize time for meaningful relationships.    Intervention(s)  Therapist will provide support and accountability.    Objective #B  Client will make use of effective interpersonal communication skills.    Status: Continued - Date(s):10/10/17     Intervention(s)  Therapist will teach communication skills.      Client has reviewed and agreed to the above plan.      Rosa Chowdary, KATHARINE  February 9, 2017

## 2018-02-02 ENCOUNTER — ALLIED HEALTH/NURSE VISIT (OUTPATIENT)
Dept: EDUCATION SERVICES | Facility: CLINIC | Age: 53
End: 2018-02-02
Payer: COMMERCIAL

## 2018-02-02 VITALS — WEIGHT: 137 LBS | BODY MASS INDEX: 26.76 KG/M2

## 2018-02-02 DIAGNOSIS — E11.9 NEW ONSET TYPE 2 DIABETES MELLITUS (H): Primary | ICD-10-CM

## 2018-02-02 PROCEDURE — G0108 DIAB MANAGE TRN  PER INDIV: HCPCS

## 2018-02-02 NOTE — PROGRESS NOTES
Diabetes Self Management Training: Follow-up Visit    Ijeoma Avalos presents today for education related to Type 2 diabetes.    She is accompanied by self    Patient's diabetes management related comments/concerns: questions about labs    Patient would like this visit to be focused around the following diabetes-related behaviors and goals: Assistance with making lifestyle changes    ASSESSMENT:  Patient Problem List reviewed for relevant medical history and current medical status.    Current Diabetes Management per Patient:  Taking diabetes medications?   yes:     Diabetes Medication(s)     Biguanides Sig    metFORMIN (GLUCOPHAGE) 500 MG tablet Take 1 tablet (500 mg) by mouth 2 times daily (with meals)      , Problems taking diabetes medications regularly? No , Side effects? No     Patient glucose self monitoring as follows: one time daily.   BG meter: One Touch Ultra 2 meter  BG results: fasting glucose- 160, 128, 133, 148, 147, 120, 137, 130, 155, 156, 127, 146, 145, 138     BG values are: In goal about 30% of the time fasting    Patient's most recent   Lab Results   Component Value Date    A1C 12.2 12/18/2017    is not meeting goal of <8.0    Nutrition:  Patient eats 3 meals per day. Has reduced her carb intake and has stopped eating sugar.     Breakfast - egg whites with 1 WW toast and coffee with milk and stevia  Lunch - milk with tea protein with vegetables and a little brown rice and sometimes no rice   Dinner - vegetables and protein or vegetables and lentils and a little brown rice or 1 roti   Snacks - cheese or fruit or almonds    Beverages: water, coffee, milk    Cultural/Protestant diet restrictions: No     Biggest Challenge to Healthy Eating: none    Physical Activity:    Going to the exercise  twice a week and is walking on her off days.     Diabetes Complications:  Acute Complications: At risk for hypoglycemia? no  Chronic Complication Prevention: Eyes: exam within in the last year?  Yes  Nerve/Circulation: foot exam within the last year Yes  Heart Health: BP to goal No, LDL to goal No, Daily Aspirin No  Dental Health: brushing/flossing regularly Yes, dental exam within last year Yes  Immunizations (flu/pneumonia) up to date? No    Vitals:  LMP 04/14/2017  Estimated body mass index is 27.73 kg/(m^2) (pended) as calculated from the following:    Height as of 12/19/17: (P) 1.524 m (5').    Weight as of 12/19/17: (P) 64.4 kg (142 lb). Is 137# now so has lost 5#.     Last 3 BP:   BP Readings from Last 3 Encounters:   12/19/17 (!) 151/106   12/13/17 (!) 136/95   11/22/17 126/84       History   Smoking Status     Never Smoker   Smokeless Tobacco     Never Used       Labs:  Lab Results   Component Value Date    A1C 12.2 12/18/2017     Lab Results   Component Value Date     12/18/2017     Lab Results   Component Value Date     12/13/2017     HDL Cholesterol   Date Value Ref Range Status   12/13/2017 48 (L) >49 mg/dL Final   ]  GFR Estimate   Date Value Ref Range Status   12/18/2017 >90 >60 mL/min/1.7m2 Final     Comment:     Non  GFR Calc     GFR Estimate If Black   Date Value Ref Range Status   12/18/2017 >90 >60 mL/min/1.7m2 Final     Comment:      GFR Calc     Lab Results   Component Value Date    CR 0.63 12/18/2017     No results found for: MICROALBUMIN    Health Beliefs and Attitudes:   Patient Activation Measure Survey Score:  CYNTHIA Score (Last Two) 12/4/2012   CYNTHIA Raw Score 50   Activation Score 86.3   CYNTHIA Level 4       Stage of Change: ACTION (Actively working towards change)    Progress toward meeting diabetes-related behavioral goals:    GOALS % Met Goal   Healthy Eating 75   Physical Activity 75   Monitoring 100   Medication Taking 100   Problem Solving 50   Healthy Coping 50   Risk Reduction 50         Diabetes knowledge and skills assessment:     Patient is knowledgeable in diabetes management concepts related to: Healthy Eating, Being Active,  Monitoring and Taking Medication    Patient needs further education on the following diabetes management concepts: Healthy Eating, Monitoring, Taking Medication, Problem Solving, Reducing Risks and Healthy Coping    Barriers to Learning Assessment: No Barriers identified    Based on learning assessment above, most appropriate setting for further diabetes education would be: Group class or Individual setting.    INTERVENTION:  Ijeoma reports that she gets anxious about her diabetes and would feel more comfortable if she could meet with an endo provider as well. Placed this referral today.     Education provided today on:  AADE Self-Care Behaviors:  Healthy Eating: consistency in amount, composition, and timing of food intake, weight reduction and heart healthy diet. Praised her on her weight loss so far! Focused on increasing carbs slightly to 30 grams with meals and doing this via fruit or beans/lentils, etc.   Being Active: Praised her on her exercise and encouraged her to keep this up.  Monitoring: log and interpret results and individual blood glucose targets. She was wondering if her BGs were improving so showed her the difference in her blood sugars from her original A1c of 12% to now where she has about 30% of her fasting numbers between .   Taking Medication: side effects of prescribed medications  Problem Solving: high blood glucose - causes, signs/symptoms, treatment and prevention and when to call health care provider  Reducing Risks: major complications of diabetes, prevention, early diagnostic measures and treatment of complications, foot care, appropriate dental care, annual eye exam, smoking cessation, aspirin therapy, A1C - goals, relating to blood glucose levels, how often to check, lipids levels and goals and blood pressure and goals  Healthy Coping: benefits of making appropriate lifestyle changes, utilize support systems and methods for coping with stress    Opportunities for ongoing  education and support in diabetes-self management were discussed.    Pt verbalized understanding of concepts discussed and recommendations provided today.       Education Materials Provided:  Foot Care Tips, Goals for Your Diabetes Care    PLAN:  See Patient Instructions for co-developed, patient-stated behavior change goals.  Pt would like to see how continued weight loss, exercise, and eating healthy continue to help improve her BGs before increasing metformin any further.   She would like to meet with an endocrinologist so placed this referral for her today.  AVS printed and provided to patient today.    FOLLOW-UP:  Follow-up as needed. Encouraged f/u in 2-3 months (after she sees endo and her PCP next).  Follow-up yearly for diabetes education review.  Chart routed to referring provider.    Ongoing plan for education and support: Written resources (magazines, books, etc.) and Follow-up with primary care provider    STACI Man CDE    Time Spent: 50 minutes  Encounter Type: Individual    Any diabetes medication dose changes were made via the CDE Protocol and Collaborative Practice Agreement with the patient's referring provider. A copy of this encounter was shared with the provider.

## 2018-02-02 NOTE — PATIENT INSTRUCTIONS
My Diabetes Care Goals:    Healthy Eating: Try adding 1 cup fruit or 1/2 cup lentils to lunch and dinner meals.     Keep up the exercise and try to walk     Follow up:  Recommend for follow up in about 2-3 months.   Call ([phone]), e-mail ([email or web address]), or send MyChart message with questions, concerns or if follow-up is needed.     Bring blood glucose meter and logbook with you to all doctor and follow-up appointments.     [facility] for the [facility] [place]:  For Your Diabetes Education and Nutrition Appointments Call:  [phone]   For Diabetes Education or Nutrition Related Questions:   Phone: [phone]  E-mail: [email or web address]  Fax: [phone]   If you need a medication refill please contact your pharmacy. Please allow 3 business days for your refills to be completed.    Instructions for emailing the Diabetes Educators    If you need to communicate a non-urgent message to a Diabetes Educator via email, please send to [email or web address].    Please follow the following email guidelines:    Subject line: Secure: your clinic name (example: Secure: [place])  In the email please include: First name, middle initial, last name and date of birth.    We will be in touch with you within one (1) business day.

## 2018-02-02 NOTE — MR AVS SNAPSHOT
After Visit Summary   2/2/2018    Ijeoma Avalos    MRN: [id number]           Patient Information     Date Of Birth          1965        Visit Information        Provider Department      2/2/2018 8:00 AM  DIABETIC ED RESOURCE [facility] [place]        Today's Diagnoses     New onset type 2 diabetes mellitus (H)    -  1      Care Instructions    My Diabetes Care Goals:    Healthy Eating: Try adding 1 cup fruit or 1/2 cup lentils to lunch and dinner meals.     Keep up the exercise and try to walk     Follow up:  Recommend for follow up in about 2-3 months.   Call ([phone]), e-mail ([email or web address]), or send [employer] message with questions, concerns or if follow-up is needed.     Bring blood glucose meter and logbook with you to all doctor and follow-up appointments.     [facility] for [facility]:  For Your Diabetes Education and Nutrition Appointments Call:  [phone]   For Diabetes Education or Nutrition Related Questions:   Phone: [phone]  E-mail: [email or web address]  Fax: [phone]   If you need a medication refill please contact your pharmacy. Please allow 3 business days for your refills to be completed.    Instructions for emailing the Diabetes Educators    If you need to communicate a non-urgent message to a Diabetes Educator via email, please send to [email or web address].    Please follow the following email guidelines:    Subject line: Secure: your clinic name (example: Secure: [place])  In the email please include: First name, middle initial, last name and date of birth.    We will be in touch with you within one (1) business day.             Follow-ups after your visit        Additional Services     ENDOCRINOLOGY ADULT REFERRAL       Your provider has referred you to: [facility]:  [place]  [place]  [phone]   [email or web address]      Please be aware that coverage of these services is subject to the terms and limitations of your health insurance plan.  Call member services at your health plan with any benefit or coverage questions.      Please bring the following to your appointment:    >>   Any x-rays, CTs or MRIs which have been performed.  Contact the facility where they were done to arrange for  prior to your scheduled appointment.    >>   List of current medications   >>   This referral request   >>   Any documents/labs given to you for this referral                  Your next 10 appointments already scheduled     Feb 20, 2018  1:00 PM CST   Return Visit with Rosa Chowdary LP   [facility] [place] ([facility] [place] [address]   [phone]            Mar 13, 2018  2:00 PM CDT   Return Visit with Rosa Chowdary LP   [facility] [place] ([facility] [place] [address]   [phone]              Who to contact     If you have questions or need follow up information about today's clinic visit or your schedule please contact [facility] [place] directly at [phone].  Normal or non-critical lab and imaging results will be communicated to you by MyChart, letter or phone within 4 business days after the clinic has received the results. If you do not hear from us within 7 days, please contact the clinic through [employer] or phone. If you have a critical or abnormal lab result, we will notify you by phone as soon as possible.  Submit refill requests through [employer] or call your pharmacy and they will forward the refill request to us. Please allow 3 business days for your refill to be completed.          Additional Information About Your Visit        [employer] Information     [employer] gives you secure  access to your electronic health record. If you see a primary care provider, you can also send messages to your care team and make appointments. If you have questions, please call your primary care clinic.  If you do not have a primary care provider, please call [phone] and they will assist you.        Care EveryWhere ID     This is your Care EveryWhere ID. This could be used by other organizations to access your [place] medical records  [id number]        Your Vitals Were     Last Period BMI (Body Mass Index)                04/14/2017 26.76 kg/m2           Blood Pressure from Last 3 Encounters:   12/19/17 (!) 151/106   12/13/17 (!) 136/95   11/22/17 126/84    Weight from Last 3 Encounters:   02/02/18 62.1 kg (137 lb)   12/19/17 (P) 64.4 kg (142 lb)   12/13/17 63.5 kg (140 lb)              We Performed the Following     ENDOCRINOLOGY ADULT REFERRAL        Primary Care Provider Office Phone # Fax #    Dulce Terrence Hernandez [phone]       [address]  [place] [address]        Equal Access to Services     [facility]: Hadii aad ku hadasho Sowhitleyali, waaxda luqadaha, qaybta kaalmada adeegyada, shannon thurman . So [facility] [phone].    ATENCIÓN: Si habla español, tiene a irene disposición servicios gratuitos de asistencia lingüística. Llame al [phone].    We comply with applicable federal civil rights laws and [place] laws. We do not discriminate on the basis of race, color, national origin, age, disability, sex, sexual orientation, or gender identity.            Thank you!     Thank you for choosing [facility] [place]  for your care. Our goal is always to provide you with excellent care. Hearing back from our patients is one way we can continue to improve our services. Please take a few minutes to complete the written survey that you may receive in the mail after your visit with us. Thank you!             Your Updated Medication  List - Protect others around you: Learn how to safely use, store and throw away your medicines at www.disposemymeds.org.          This list is accurate as of 2/2/18  8:51 AM.  Always use your most recent med list.                   Brand Name Dispense Instructions for use Diagnosis    aspirin 81 MG tablet     90 tablet    Take 1 tablet (81 mg) by mouth daily    New onset type 2 diabetes mellitus (H)       atorvastatin 10 MG tablet    LIPITOR    30 tablet    Take 1 tablet (10 mg) by mouth daily    Hyperlipidemia LDL goal <130       blood glucose monitoring lancets     100 each    Use to test blood sugars  1-2 times daily or as directed.    New onset type 2 diabetes mellitus (H)       blood glucose monitoring meter device kit    no brand specified    1 kit    Use to test blood sugar  2-3  times daily or as directed.    New onset type 2 diabetes mellitus (H)       blood glucose monitoring test strip    no brand specified    1 Box    Use to test blood sugar 1-2  times daily or as directed.    New onset type 2 diabetes mellitus (H)       Glucosamine-Chondroitin--200-150 MG Tabs           levothyroxine 112 MCG tablet    SYNTHROID/LEVOTHROID    90 tablet    Take 1 tablet (112 mcg) by mouth daily    Other specified hypothyroidism       lisinopril 5 MG tablet    PRINIVIL/ZESTRIL    30 tablet    Take 1 tablet (5 mg) by mouth daily    Essential hypertension, New onset type 2 diabetes mellitus (H)       LORazepam 0.5 MG tablet    ATIVAN    20 tablet    Take 1 tablet (0.5 mg) by mouth every 8 hours as needed for anxiety    Adjustment disorder with mixed anxiety and depressed mood       metFORMIN 500 MG tablet    GLUCOPHAGE    60 tablet    Take 1 tablet (500 mg) by mouth 2 times daily (with meals)    New onset type 2 diabetes mellitus (H)       MULTIVITAMIN PO      Take by mouth daily        nabumetone 500 MG tablet    RELAFEN    30 tablet    Take 1-2 tablets (500-1,000 mg) by mouth 2 times daily as needed for moderate  pain    Neck pain, TMJ (temporomandibular joint syndrome), Adjustment disorder with mixed anxiety and depressed mood       omeprazole 40 MG capsule    priLOSEC    90 capsule    Take 1 capsule (40 mg) by mouth daily    Gastroesophageal reflux disease, esophagitis presence not specified

## 2018-02-02 NOTE — Clinical Note
FYI, placed endo referral for this pt today as she is feeling a bit anxious about her diabetes and requested this referral.  Ami Kim RD LD CDE

## 2018-02-13 ENCOUNTER — TRANSFERRED RECORDS (OUTPATIENT)
Dept: HEALTH INFORMATION MANAGEMENT | Facility: CLINIC | Age: 53
End: 2018-02-13

## 2018-02-20 ENCOUNTER — OFFICE VISIT (OUTPATIENT)
Dept: PSYCHOLOGY | Facility: CLINIC | Age: 53
End: 2018-02-20
Payer: COMMERCIAL

## 2018-02-20 DIAGNOSIS — F43.23 ADJUSTMENT DISORDER WITH MIXED ANXIETY AND DEPRESSED MOOD: ICD-10-CM

## 2018-02-20 DIAGNOSIS — F41.9 ANXIETY DISORDER: Primary | ICD-10-CM

## 2018-02-20 PROCEDURE — 90834 PSYTX W PT 45 MINUTES: CPT | Performed by: PSYCHOLOGIST

## 2018-02-20 RX ORDER — LORAZEPAM 0.5 MG/1
0.5 TABLET ORAL EVERY 8 HOURS PRN
Qty: 20 TABLET | Refills: 0 | Status: CANCELLED | OUTPATIENT
Start: 2018-02-20

## 2018-02-20 NOTE — MR AVS SNAPSHOT
MRN:[id number]                      After Visit Summary   2/20/2018    Ijeoma Avalos    MRN: [id number]           Visit Information        Provider Department      2/20/2018 1:00 PM Rosa Chowdary LP [facility]      Your next 10 appointments already scheduled     Feb 21, 2018  7:40 AM CST   Office Visit with Dulce Hernandez MD   [facility]    [address] [place] [address]   [phone]           Bring a current list of meds and any records pertaining to this visit. For Physicals, please bring immunization records and any forms needing to be filled out. Please arrive 10 minutes early to complete paperwork.            Mar 13, 2018  2:00 PM CDT   Return Visit with Rosa Chowdary LP   [facility] ([facility])    [address] [place] [address]   [phone]            Apr 13, 2018  2:00 PM CDT   Return Visit with Rosa Chowdary LP   [facility] ([facility])    [address] [place] [address]   [phone]            May 11, 2018  2:00 PM CDT   Return Visit with Rosa Chowdary LP   [facility] ([facility])    [address] [place] [address]   [phone]              MyChart Information     [employer] gives you secure access to your electronic health record. If you see a primary care provider, you can also send messages to your care team and make appointments. If you have questions, please call your primary care clinic.  If you do not have a primary care provider, please call [phone] and they will assist you.        Care EveryWhere ID     This is your Care EveryWhere ID. This could be used by other organizations to access your [place] medical records  [id number]        Equal Access to Services     ADELINE  GAAR : Hadii mario Pina, waalyssada luqadaha, qaybta kaalmada alex, shannon blackwood. [facility] [phone].    ATENCIÓN: Si habla español, tiene a irene disposición servicios gratuitos de asistencia lingüística. Llame al [phone].    We comply with applicable federal civil rights laws and [place] laws. We do not discriminate on the basis of race, color, national origin, age, disability, sex, sexual orientation, or gender identity.

## 2018-02-20 NOTE — PROGRESS NOTES
Progress Note    Client Name: Ijeoma Avalos  Date: 2/20/18       Service Type: Individual      Session Start Time: 13:00  Session End Time: 13:50      Session Length: 50     Session #: 12     Attendees: Client attended alone    Treatment Plan Last Reviewed: 2/9/17  PHQ-9 / SOSA-7 Last Reviewed: 9/25/17     DATA      Progress Since Last Session (Related to Symptoms / Goals / Homework):   Symptoms: Worry, anxiety    Homework: Good progress      Episode of Care Goals: Satisfactory progress - ACTION (Actively working towards change); Intervened by reinforcing change plan / affirming steps taken     Current / Ongoing Stressors and Concerns:   Physical symptoms   Phase of life issues (full-time work and parent of young child)   Work demands   Family overseas     Treatment Objective(s) Addressed in This Session:   Build and make use of skills for stress management     Intervention:   Client reported good progress with managing diabetes. She has lost 12 lbs, blood sugars and blood pressure have both been better controlled. She reported that she has been feeling anxious about an upcoming international trip (to celebrate a milestone birthday with her father). She is worried about her 's health and what will happen if he has a serious health problem while she is away and he is caring for their young daughter. She stated that these worries have been significant enough to interfere with her sleep. Helped Client identify that these worries come from her desire to control things that she cannot control. Pointed out the magical thinking inherent in these worries (e.g., as long as I'm there, everything will be OK; if I'm not there, something will go wrong). Encouraged Client to instead focus on the positive feelings of celebrating with her family and to imagine everything going well at home while she is away. Continued discussion about how Client can balance work  responsibilities with relaxation/leisure activities that she enjoys (such as writing).     ASSESSMENT: Current Emotional / Mental Status (status of significant symptoms):   Risk status (Self / Other harm or suicidal ideation)   Client denies current fears or concerns for personal safety.   Client denies current or recent suicidal ideation or behaviors.   Client denies current or recent homicidal ideation or behaviors.   Client denies current or recent self injurious behavior or ideation.   Client denies other safety concerns.   A safety and risk management plan has not been developed at this time, however client was given the after-hours number / 911 should there be a change in any of these risk factors.     Appearance:   Appropriate    Eye Contact:   Good    Psychomotor Behavior: Normal    Attitude:   Cooperative , thoughtful   Orientation:   All   Speech    Rate / Production: Normal     Volume:  Normal    Mood:    Anxious    Affect:    Appropriate    Thought Content:  More adaptive thoughts around work; still tendency toward catastrophizing & negative rumination about personal life   Thought Form:  Coherent  Logical    Insight:    Fair      Medication Review:   No changes to current psychiatric medication(s); Client has prescription for lorazepam, which she uses occasionally.     Medication Compliance:   Yes; infrequent use     Changes in Health Issues:   None reported     Chemical Use Review:   Substance Use: Chemical use reviewed, no active concerns identified      Tobacco Use: No current tobacco use.       Collateral Reports Completed:   Not Applicable    PLAN: (Client Tasks / Therapist Tasks / Other)  As Client prepares for an international trip, she will focus on the positive emotions of celebrating an important event with her family. She will imagine everything going well at home while she is away. She is considering ways that she can balance work responsibilities with leisure time activities that she  enjoys. Continue with increased physical activity, dietary changes, and medication for management of Type 2 diabetes (new diagnosis). She will work on choosing to focus on positives/progress rather than evaluating herself against unrealistic expectations. She will continue to use self-regulation strategies to help with anxiety and calming physical symptoms of stress. Continue with shift away from external validation, striving instead to build her skill for internal validation. Return appointments scheduled.      Rosa Chowdary, LP                                                   _______________________________________________________________________    Treatment Plan    Client's Name: Ijeoma Avalos  YOB: 1965    Date: 2/9/17    DSM-V Diagnoses: Adjustment Disorders  309.28 (F43.23) With mixed anxiety and depressed mood  Psychosocial / Contextual Factors: physical symptoms; phase of life stressors (full-time work and parent of young child); work demands; family overseas  WHODAS: 18    Referral / Collaboration:  Referral to another professional/service is not indicated at this time.    Anticipated number of session or this episode of care: will re-evaluate every 90 days      MeasurableTreatment Goal(s) related to diagnosis / functional impairment(s)  Goal 1: Client will build skills for stress management.    I will know I've met my goal when my blood pressure is lower and my reflux symptoms have decreased.      Objective #A (Client Action)    Client will use at least 2 coping skills for anxiety management in the next 6 weeks.  Status: Continued - Date(s):10/10/17     Intervention(s)  Therapist will teach self-regulation strategies, CBT skills, mindfulness techniques.    Objective #B  Client will use cognitive strategies identified in therapy to challenge anxious thoughts.  Status: Continued - Date(s):10/10/17     Intervention(s)  Therapist will teach cognitive strategies, provide  education.      Goal 2: Client will increase self-confidence.    I will know I've met my goal when I think more positively about myself.      Objective #A (Client Action)    Status: Continued - Date(s):10/10/17     Client will increase assertive communication.    Intervention(s)  Therapist will teach assertiveness skills.    Objective #B  Client will Identify negative self-talk and behaviors: challenge core beliefs, myths, and actions.    Status: Continued - Date(s):10/10/17     Intervention(s)  Therapist will provide education, teach CBT skills.      Goal 3: Client will increase connection in close relationships.    I will know I've met my goal when I feel more close with loved ones.      Objective #A (Client Action)    Status: Continued - Date(s):10/10/17     Client will prioritize time for meaningful relationships.    Intervention(s)  Therapist will provide support and accountability.    Objective #B  Client will make use of effective interpersonal communication skills.    Status: Continued - Date(s):10/10/17     Intervention(s)  Therapist will teach communication skills.      Client has reviewed and agreed to the above plan.      Rosa Chowdary, KATHARINE  February 9, 2017

## 2018-02-21 ENCOUNTER — OFFICE VISIT (OUTPATIENT)
Dept: FAMILY MEDICINE | Facility: CLINIC | Age: 53
End: 2018-02-21
Payer: COMMERCIAL

## 2018-02-21 VITALS
BODY MASS INDEX: 25.91 KG/M2 | HEIGHT: 60 IN | DIASTOLIC BLOOD PRESSURE: 89 MMHG | HEART RATE: 79 BPM | WEIGHT: 132 LBS | SYSTOLIC BLOOD PRESSURE: 130 MMHG | TEMPERATURE: 98.3 F

## 2018-02-21 DIAGNOSIS — F43.23 ADJUSTMENT DISORDER WITH MIXED ANXIETY AND DEPRESSED MOOD: ICD-10-CM

## 2018-02-21 DIAGNOSIS — E78.5 HYPERLIPIDEMIA LDL GOAL <130: Primary | ICD-10-CM

## 2018-02-21 DIAGNOSIS — Z23 NEED FOR VACCINATION: ICD-10-CM

## 2018-02-21 DIAGNOSIS — I10 ESSENTIAL HYPERTENSION: ICD-10-CM

## 2018-02-21 DIAGNOSIS — E11.9 NEW ONSET TYPE 2 DIABETES MELLITUS (H): ICD-10-CM

## 2018-02-21 LAB
ANION GAP SERPL CALCULATED.3IONS-SCNC: 6 MMOL/L (ref 3–14)
BUN SERPL-MCNC: 12 MG/DL (ref 7–30)
CALCIUM SERPL-MCNC: 9.1 MG/DL (ref 8.5–10.1)
CHLORIDE SERPL-SCNC: 103 MMOL/L (ref 94–109)
CO2 SERPL-SCNC: 28 MMOL/L (ref 20–32)
CREAT SERPL-MCNC: 0.63 MG/DL (ref 0.52–1.04)
GFR SERPL CREATININE-BSD FRML MDRD: >90 ML/MIN/1.7M2
GLUCOSE SERPL-MCNC: 110 MG/DL (ref 70–99)
HBA1C MFR BLD: 7.7 % (ref 4.3–6)
POTASSIUM SERPL-SCNC: 4.2 MMOL/L (ref 3.4–5.3)
SODIUM SERPL-SCNC: 137 MMOL/L (ref 133–144)

## 2018-02-21 PROCEDURE — 83036 HEMOGLOBIN GLYCOSYLATED A1C: CPT | Performed by: FAMILY MEDICINE

## 2018-02-21 PROCEDURE — 90732 PPSV23 VACC 2 YRS+ SUBQ/IM: CPT | Performed by: FAMILY MEDICINE

## 2018-02-21 PROCEDURE — 80048 BASIC METABOLIC PNL TOTAL CA: CPT | Performed by: FAMILY MEDICINE

## 2018-02-21 PROCEDURE — 99214 OFFICE O/P EST MOD 30 MIN: CPT | Mod: 25 | Performed by: FAMILY MEDICINE

## 2018-02-21 PROCEDURE — 36415 COLL VENOUS BLD VENIPUNCTURE: CPT | Performed by: FAMILY MEDICINE

## 2018-02-21 PROCEDURE — 90471 IMMUNIZATION ADMIN: CPT | Performed by: FAMILY MEDICINE

## 2018-02-21 RX ORDER — LISINOPRIL 5 MG/1
5 TABLET ORAL DAILY
Qty: 90 TABLET | Refills: 1 | Status: SHIPPED | OUTPATIENT
Start: 2018-02-21 | End: 2018-09-14

## 2018-02-21 RX ORDER — METFORMIN HCL 500 MG
1500 TABLET, EXTENDED RELEASE 24 HR ORAL
Qty: 90 TABLET | Refills: 0 | Status: SHIPPED | OUTPATIENT
Start: 2018-02-21 | End: 2018-03-17

## 2018-02-21 RX ORDER — LORAZEPAM 0.5 MG/1
0.5 TABLET ORAL EVERY 8 HOURS PRN
Qty: 14 TABLET | Refills: 0 | Status: SHIPPED | OUTPATIENT
Start: 2018-02-21 | End: 2018-12-24

## 2018-02-21 ASSESSMENT — PATIENT HEALTH QUESTIONNAIRE - PHQ9: 5. POOR APPETITE OR OVEREATING: SEVERAL DAYS

## 2018-02-21 ASSESSMENT — ANXIETY QUESTIONNAIRES
GAD7 TOTAL SCORE: 4
7. FEELING AFRAID AS IF SOMETHING AWFUL MIGHT HAPPEN: SEVERAL DAYS
5. BEING SO RESTLESS THAT IT IS HARD TO SIT STILL: NOT AT ALL
3. WORRYING TOO MUCH ABOUT DIFFERENT THINGS: SEVERAL DAYS
1. FEELING NERVOUS, ANXIOUS, OR ON EDGE: NOT AT ALL
6. BECOMING EASILY ANNOYED OR IRRITABLE: NOT AT ALL
2. NOT BEING ABLE TO STOP OR CONTROL WORRYING: SEVERAL DAYS
IF YOU CHECKED OFF ANY PROBLEMS ON THIS QUESTIONNAIRE, HOW DIFFICULT HAVE THESE PROBLEMS MADE IT FOR YOU TO DO YOUR WORK, TAKE CARE OF THINGS AT HOME, OR GET ALONG WITH OTHER PEOPLE: NOT DIFFICULT AT ALL

## 2018-02-21 NOTE — TELEPHONE ENCOUNTER
LORazepam (ATIVAN) 0.5 MG tablet      Last Written Prescription Date:  2/21/18  Last Fill Quantity: 14,   # refills: 0  Last Office Visit: 2/21/18  Future Office visit:  Pt was seen today   Next 5 appointments (look out 90 days)     Mar 13, 2018  2:00 PM CDT   Return Visit with Rosa Chowdary LP   [facility] [place] ([facility] [place])    [address] [place] [address]   [phone]            Apr 13, 2018  2:00 PM CDT   Return Visit with Rosa Chowdary LP   [facility] [place] ([facility] [place])    [address] [place] [address]   [phone]            May 11, 2018  2:00 PM CDT   Return Visit with Rosa Chowdary LP   [facility] [place] ([facility] [place])    [address] [place] [address]   [phone]                   Routing refill request to provider for review/approval because:  Drug not on the [facility], P or [facility] refill protocol or controlled substance

## 2018-02-21 NOTE — PROGRESS NOTES
SUBJECTIVE:   Ijeoma Avalos is a 52 year old female who presents to clinic today for the following health issues:      Diabetes Follow-up  She was recently diagnosed with a diabetes, not taking medications.  Here to follow-up.  Patient is checking blood sugars: once daily.  Results are as follows:         am - before 125-130's    Diabetic concerns: None, no problem taking medication.  Had a little bit of stomach upset earlier although it is not bad now     Symptoms of hypoglycemia (low blood sugar): none     Paresthesias (numbness or burning in feet) or sores: No     Date of last diabetic eye exam: 2018 unsure if it was dm exam     BP Readings from Last 2 Encounters:   12/19/17 (!) 151/106   12/13/17 (!) 136/95     Hemoglobin A1C (%)   Date Value   12/18/2017 12.2 (H)   04/07/2014 5.5     LDL Cholesterol Calculated (mg/dL)   Date Value   12/13/2017 126 (H)   11/07/2016 109 (H)       Amount of exercise or physical activity: 2-3 days/week for an average of 30-45 minutes    Problems taking medications regularly: No    Medication side effects: none    Diet: regular (no restrictions) and diabetic            Hyperlipidemia Follow-Up      Rate your low fat/cholesterol diet?: good    Taking statin?  Yes, no muscle aches from statin    Other lipid medications/supplements?:  none    Hypertension Follow-up      Outpatient blood pressures are being checked at home.  Results are improved.    Low Salt Diet: no added salt    Problem taking medication.  She is trying to exercise more now as well.      Problem list and histories reviewed & adjusted, as indicated.  Additional history: as documented    Patient Active Problem List   Diagnosis     Gastric acidity     Other specified hypothyroidism     Plantar fasciitis     Hyperlipidemia LDL goal <130     Gastroesophageal reflux disease, esophagitis presence not specified     TMJ (temporomandibular joint syndrome)     Neck pain     Other headache syndrome     Uterine leiomyoma,  unspecified location     Adjustment disorder with mixed anxiety and depressed mood     Low hemoglobin     Dysmenorrhea     Right knee pain, unspecified chronicity     Post-operative nausea and vomiting     Bilateral patellofemoral syndrome     New onset type 2 diabetes mellitus (H)     Past Surgical History:   Procedure Laterality Date     APPENDECTOMY        SECTION  2014    Procedure:  SECTION;  Surgeon: Ru Cano MD;  Location:  L+D     CHOLECYSTECTOMY       cyst removed      left  lower leg on bone sheath     DAVINCI HYSTERECTOMY TOTAL, SALPINGECTOMY BILATERAL N/A 2017    Procedure: DAVINCI HYSTERECTOMY TOTAL, SALPINGECTOMY BILATERAL;  DAVINCI SINGLE SITE HYSTERECTOMY, BILATERAL SALPINGECTOMY, LEFT OOPHORECTOMY, LYSIS OF ADHESIONS (LAPAROSCOPIC BAG TO RETREIVE OVARY);  Surgeon: Ru Cano MD;  Location:  OR     DAVINCI LYSIS OF ADHESIONS N/A 2017    Procedure: DAVINCI LYSIS OF ADHESIONS;;  Surgeon: Ru Cano MD;  Location:  OR     DAVINCI OOPHORECTOMY N/A 2017    Procedure: DAVINCI OOPHORECTOMY;;  Surgeon: Ru Cano MD;  Location:  OR     GASTROSCOPY,FL       GI SURGERY       MYOMECTOMY UTERUS  2012       Social History   Substance Use Topics     Smoking status: Never Smoker     Smokeless tobacco: Never Used     Alcohol use No      Comment: social     Family History   Problem Relation Age of Onset     Hypertension Father            Reviewed and updated as needed this visit by clinical staff       Reviewed and updated as needed this visit by Provider         ROS:  Constitutional, HEENT, cardiovascular, pulmonary, GI, , musculoskeletal, neuro, skin, endocrine and psych systems are negative, except as otherwise noted.    OBJECTIVE:     /89  Pulse 79  Temp 98.3  F (36.8  C) (Tympanic)  Ht 5' (1.524 m)  Wt 132 lb (59.9 kg)  LMP 2017  BMI 25.78 kg/m2  Body mass index is 25.78 kg/(m^2).  GENERAL: healthy,  alert and no distress  HENT: normal cephalic/atraumatic, ear canals and TM's normal, nose and mouth without ulcers or lesions, oropharynx clear and oral mucous membranes moist  NECK: no adenopathy, no asymmetry, masses, or scars and thyroid normal to palpation  RESP: lungs clear to auscultation - no rales, rhonchi or wheezes  CV: regular rate and rhythm, normal S1 S2, no S3 or S4, no murmur, click or rub, no peripheral edema and peripheral pulses strong  ABDOMEN: soft, nontender, no hepatosplenomegaly, no masses and bowel sounds normal  MS: no gross musculoskeletal defects noted, no edema  NEURO: Normal strength and tone, mentation intact and speech normal  PSYCH: mentation appears normal, affect normal/bright  Diabetic foot exam: normal DP and PT pulses, no trophic changes or ulcerative lesions and normal sensory exam        ASSESSMENT/PLAN:       (E11.9) New onset type 2 diabetes mellitus (H)  Comment:   Plan: metFORMIN (GLUCOPHAGE-XR) 500 MG 24 hr tablet,         Hemoglobin A1c, Basic metabolic panel  (Ca, Cl,        CO2, Creat, Gluc, K, Na, BUN), lisinopril         (PRINIVIL/ZESTRIL) 5 MG tablet        Doing much better on medication, tolerating it well, except mild static stomach upset, willing to try metformin extended release to see if that works any better.  Cares concerns discussed.  Follow-up check in 3 months sooner if any problem    (I10) Essential hypertension  Comment: Improved  Plan: lisinopril (PRINIVIL/ZESTRIL) 5 MG tablet        BP in adequate control. Discussed cares, low fat low salt  diet etc. Check labs, call pt with results. Refill given. encouraged home BP monitoring. Follow up recheck in 6 months, sooner if problem.         (E78.5) Hyperlipidemia LDL goal <130  (primary encounter diagnosis)  Comment: Taking statin without problem  Plan: Lipid panel reflex to direct LDL Fasting        Discussed recent results      (F43.23) Adjustment disorder with mixed anxiety and depressed mood  Comment:  Needs refill on as needed use, overall tissue think she is doing well.  Plan: LORazepam (ATIVAN) 0.5 MG tablet            (Z23) Need for vaccination  Comment:   Plan: Pneumococcal vaccine 23 valent PPSV23          (Pneumovax) [28902], 1st  Administration          [25951]            Check labs. refill sent.Cares and  treatment discussed follow. up if problem   Patient expressed understanding and agreement with treatment plan. All patient's questions were answered, will let me know if has more later.  Medications: Rx's: Reviewed the potential side effects/complications of medications prescribed.       Dulce Hernandez MD  [facility]

## 2018-02-21 NOTE — MR AVS SNAPSHOT
After Visit Summary   2/21/2018    Ijeoma Avalos    MRN: [id number]           Patient Information     Date Of Birth          1965        Visit Information        Provider Department      2/21/2018 7:40 AM Dulce Hernandez MD [facility] [place]        Today's Diagnoses     Hyperlipidemia LDL goal <130    -  1    New onset type 2 diabetes mellitus (H)        Essential hypertension        Adjustment disorder with mixed anxiety and depressed mood          Care Instructions    Check las refill sent  F/u in 3 months           Follow-ups after your visit        Your next 10 appointments already scheduled     Mar 13, 2018  2:00 PM CDT   [facility]    [address] [place] [id number]   [phone]            Apr 13, 2018  2:00 PM CDT   Return Visit [facility] Rosa [facility] ([facility]    [address] [place] [id number]   [phone]            May 11, 2018  2:00 PM CDT   Return Visit with Rosa Chowdary [facility] [place] ([facility])    [address] [place] [id number]   [phone]              Future tests that were ordered for you today     Open Future Orders        Priority Expected Expires Ordered    Lipid panel reflex to direct LDL Fasting Routine  2/21/2019 2/21/2018            Who to contact     If you have questions or need follow up information about today's clinic visit or your schedule please contact [facility] directly at [phone].  Normal or non-critical lab and imaging results will be communicated to you by MyChart, letter or phone within 4 business days after the clinic has received the results. If you do not hear from us within 7 days, please contact the clinic through MyChart or phone. If you have a critical or  abnormal lab result, we will notify you by phone as soon as possible.  Submit refill requests through [employer] or call your pharmacy and they will forward the refill request to us. Please allow 3 business days for your refill to be completed.          Additional Information About Your Visit        [employer] Information     [employer] gives you secure access to your electronic health record. If you see a primary care provider, you can also send messages to your care team and make appointments. If you have questions, please call your primary care clinic.  If you do not have a primary care provider, please call [phone] and they will assist you.        Care EveryWhere ID     This is your Care EveryWhere ID. This could be used by other organizations to access your [place] medical records  [id number]        Your Vitals Were     Pulse Temperature Height Last Period BMI (Body Mass Index)       79 98.3  F (36.8  C) (Tympanic) 5' (1.524 m) 04/14/2017 25.78 kg/m2        Blood Pressure from Last 3 Encounters:   02/21/18 130/89   12/19/17 (!) 151/106   12/13/17 (!) 136/95    Weight from Last 3 Encounters:   02/21/18 132 lb (59.9 kg)   02/02/18 137 lb (62.1 kg)   12/19/17 (P) 142 lb (64.4 kg)              We Performed the Following     Basic metabolic panel  (Ca, Cl, CO2, Creat, Gluc, K, Na, BUN)     Hemoglobin A1c          Today's Medication Changes          These changes are accurate as of 2/21/18  8:19 AM.  If you have any questions, ask your nurse or doctor.               Start taking these medicines.        Dose/Directions    metFORMIN 500 MG 24 hr tablet   Commonly known as:  GLUCOPHAGE-XR   Used for:  New onset type 2 diabetes mellitus (H)   Replaces:  metFORMIN 500 MG tablet   Started by:  Dulce Hernandez MD        Dose:  1500 mg   Take 3 tablets (1,500 mg) by mouth daily (with dinner)   Quantity:  90 tablet   Refills:  0         These medicines have changed or have updated prescriptions.        Dose/Directions     lisinopril 5 MG tablet   Commonly known as:  PRINIVIL/ZESTRIL   This may have changed:  See the new instructions.   Used for:  Essential hypertension, New onset type 2 diabetes mellitus (H)   Changed by:  Dulce Hernandez MD        Dose:  5 mg   Take 1 tablet (5 mg) by mouth daily   Quantity:  90 tablet   Refills:  1         Stop taking these medicines if you haven't already. Please contact your care team if you have questions.     metFORMIN 500 MG tablet   Commonly known as:  GLUCOPHAGE   Replaced by:  metFORMIN 500 MG 24 hr tablet   Stopped by:  Dulce Hernandez MD                Where to get your medicines      These medications were sent to [facility] [place] [facility] [place], [address]  [address] [facility], [place] [address]     Phone:  [phone]     lisinopril 5 MG tablet    metFORMIN 500 MG 24 hr tablet         Some of these will need a paper prescription and others can be bought over the counter.  Ask your nurse if you have questions.     Bring a paper prescription for each of these medications     LORazepam 0.5 MG tablet                Primary Care Provider Office Phone # Fax #    Dulce Hernandez [phone]       [address] [place] [address] [address]  [place] [address]        Equal Access to Services     ADELINE VIVAS AH: Hadii mario ku hadasho Soomaali, waaxda luqadaha, qaybta kaalmada adeegyada, waxay pavanin hayleonie blackwood. So [facility] [phone].    ATENCIÓN: Si habla español, tiene a irene disposición servicios gratuitos de asistencia lingüística. ame al [phone].    We comply with applicable federal civil rights laws and [place] laws. We do not discriminate on the basis of race, color, national origin, age, disability, sex, sexual orientation, or gender identity.            Thank you!     Thank you for choosing [facility] [place]  for your care. Our goal is always to provide you with excellent care. Hearing back from  our patients is one way we can continue to improve our services. Please take a few minutes to complete the written survey that you may receive in the mail after your visit with us. Thank you!             Your Updated Medication List - Protect others around you: Learn how to safely use, store and throw away your medicines at www.disposemymeds.org.          This list is accurate as of 2/21/18  8:19 AM.  Always use your most recent med list.                   Brand Name Dispense Instructions for use Diagnosis    aspirin 81 MG tablet     90 tablet    Take 1 tablet (81 mg) by mouth daily    New onset type 2 diabetes mellitus (H)       atorvastatin 10 MG tablet    LIPITOR    30 tablet    Take 1 tablet (10 mg) by mouth daily    Hyperlipidemia LDL goal <130       blood glucose monitoring lancets     100 each    Use to test blood sugars  1-2 times daily or as directed.    New onset type 2 diabetes mellitus (H)       blood glucose monitoring meter device kit    no brand specified    1 kit    Use to test blood sugar  2-3  times daily or as directed.    New onset type 2 diabetes mellitus (H)       blood glucose monitoring test strip    no brand specified    1 Box    Use to test blood sugar 1-2  times daily or as directed.    New onset type 2 diabetes mellitus (H)       Glucosamine-Chondroitin--200-150 MG Tabs           levothyroxine 112 MCG tablet    SYNTHROID/LEVOTHROID    90 tablet    Take 1 tablet (112 mcg) by mouth daily    Other specified hypothyroidism       lisinopril 5 MG tablet    PRINIVIL/ZESTRIL    90 tablet    Take 1 tablet (5 mg) by mouth daily    Essential hypertension, New onset type 2 diabetes mellitus (H)       LORazepam 0.5 MG tablet    ATIVAN    14 tablet    Take 1 tablet (0.5 mg) by mouth every 8 hours as needed for anxiety    Adjustment disorder with mixed anxiety and depressed mood       metFORMIN 500 MG 24 hr tablet    GLUCOPHAGE-XR    90 tablet    Take 3 tablets (1,500 mg) by mouth daily (with  dinner)    New onset type 2 diabetes mellitus (H)       MULTIVITAMIN PO      Take by mouth daily        nabumetone 500 MG tablet    RELAFEN    30 tablet    Take 1-2 tablets (500-1,000 mg) by mouth 2 times daily as needed for moderate pain    Neck pain, TMJ (temporomandibular joint syndrome), Adjustment disorder with mixed anxiety and depressed mood       omeprazole 40 MG capsule    priLOSEC    90 capsule    Take 1 capsule (40 mg) by mouth daily    Gastroesophageal reflux disease, esophagitis presence not specified

## 2018-02-21 NOTE — NURSING NOTE
Chief Complaint   Patient presents with     Diabetes       Initial /89  Pulse 79  Temp 98.3  F (36.8  C) (Tympanic)  Ht 5' (1.524 m)  Wt 132 lb (59.9 kg)  LMP 04/14/2017  BMI 25.78 kg/m2 Estimated body mass index is 25.78 kg/(m^2) as calculated from the following:    Height as of this encounter: 5' (1.524 m).    Weight as of this encounter: 132 lb (59.9 kg).  Medication Reconciliation: complete

## 2018-02-22 ASSESSMENT — ANXIETY QUESTIONNAIRES: GAD7 TOTAL SCORE: 4

## 2018-02-22 ASSESSMENT — PATIENT HEALTH QUESTIONNAIRE - PHQ9: SUM OF ALL RESPONSES TO PHQ QUESTIONS 1-9: 1

## 2018-02-28 PROBLEM — I10 ESSENTIAL HYPERTENSION: Status: ACTIVE | Noted: 2018-02-28

## 2018-03-13 ENCOUNTER — OFFICE VISIT (OUTPATIENT)
Dept: PSYCHOLOGY | Facility: CLINIC | Age: 53
End: 2018-03-13
Payer: COMMERCIAL

## 2018-03-13 DIAGNOSIS — F41.9 ANXIETY DISORDER: Primary | ICD-10-CM

## 2018-03-13 PROCEDURE — 90834 PSYTX W PT 45 MINUTES: CPT | Performed by: PSYCHOLOGIST

## 2018-03-13 NOTE — PROGRESS NOTES
Progress Note    Client Name: Ijeoma Avalos  Date: 3/13/18       Service Type: Individual      Session Start Time: 14:00  Session End Time: 14:50      Session Length: 50     Session #: 13     Attendees: Client attended alone    Treatment Plan Last Reviewed: 2/9/17  PHQ-9 / SOSA-7 Last Reviewed: 9/25/17     DATA      Progress Since Last Session (Related to Symptoms / Goals / Homework):   Symptoms: Worry, anxiety    Homework: Good progress      Episode of Care Goals: Satisfactory progress - ACTION (Actively working towards change); Intervened by reinforcing change plan / affirming steps taken     Current / Ongoing Stressors and Concerns:   Physical symptoms   Phase of life issues (full-time work and parent of young child)   Work demands   Family overseas     Treatment Objective(s) Addressed in This Session:   Build and make use of skills for stress management     Intervention:   Client was happy to share the news that she has been promoted at work. This is a welcome recognition of her accomplishments, and she reports feeling increased confidence as a result. She has been thinking about how to find better balance between work/home. Discussed what solutions she might suggest to her supervisor. Client has a mentor whom she will talk with and seek advice from. Discussed Client's continued anxiety about her 's health. He tries to reassure her, but she remains concerned. Pointed out that Client is trying to manage her anxiety by controlling him. A better option will be for her to manage her own feelings, regardless of how he behaves, acknowledging that he is allowed to make choices about his own health (e.g., he doesn't have to have a doctor's visit just because she wants him to). Talked about redirecting her energy--in the moment when she is inclined to tell him what to do, she can instead determine what she needs to do to take care of herself. Client was open to  this input. Revisited Client's goals for her upcoming trip to [facility] for her father's milestone birthday: be present in the moment, make memories with her family.        ASSESSMENT: Current Emotional / Mental Status (status of significant symptoms):   Risk status (Self / Other harm or suicidal ideation)   Client denies current fears or concerns for personal safety.   Client denies current or recent suicidal ideation or behaviors.   Client denies current or recent homicidal ideation or behaviors.   Client denies current or recent self injurious behavior or ideation.   Client denies other safety concerns.   A safety and risk management plan has not been developed at this time, however client was given the after-hours number / 911 should there be a change in any of these risk factors.     Appearance:   Appropriate    Eye Contact:   Good    Psychomotor Behavior: Normal    Attitude:   Cooperative  , open   Orientation:   All   Speech    Rate / Production: Normal     Volume:  Normal    Mood:    Anxious    Affect:    Appropriate    Thought Content:  More adaptive thoughts around work; still tendency toward catastrophizing & negative rumination about personal life   Thought Form:  Coherent  Logical    Insight:    Fair      Medication Review:   No changes to current psychiatric medication(s); Client has prescription for lorazepam, which she uses occasionally.     Medication Compliance:   Yes; infrequent use     Changes in Health Issues:   None reported     Chemical Use Review:   Substance Use: Chemical use reviewed, no active concerns identified      Tobacco Use: No current tobacco use.       Collateral Reports Completed:   Not Applicable    PLAN: (Client Tasks / Therapist Tasks / Other)  Client will discuss with her mentor options for solutions she can suggest to her supervisor to achieve better balance between work and home life. When she feels worried about her 's health and is inclined to tell him what to do as a  way of managing her anxiety, she will instead determine what she needs to do to take care of herself. She will focus on aspects of the situation that are within her control. As Client prepares for an international trip, she will focus on the positive emotions of celebrating an important event with her family. Her goals are to be present in the moment and make memories with her family. She will imagine everything going well at home while she is away. She will continue to use self-regulation strategies to help with anxiety and calming physical symptoms of stress. Return appointments scheduled.      Rosa Chowdary LP                                                   _______________________________________________________________________    Treatment Plan    Client's Name: Ijeoma Avalos  YOB: 1965    Date: 2/9/17    DSM-V Diagnoses: Adjustment Disorders  309.28 (F43.23) With mixed anxiety and depressed mood  Psychosocial / Contextual Factors: physical symptoms; phase of life stressors (full-time work and parent of young child); work demands; family overseas  WHODAS: 16    Referral / Collaboration:  Referral to another professional/service is not indicated at this time.    Anticipated number of session or this episode of care: will re-evaluate every 90 days      MeasurableTreatment Goal(s) related to diagnosis / functional impairment(s)  Goal 1: Client will build skills for stress management.    I will know I've met my goal when my blood pressure is lower and my reflux symptoms have decreased.      Objective #A (Client Action)    Client will use at least 2 coping skills for anxiety management in the next 6 weeks.  Status: Continued - Date(s):10/10/17     Intervention(s)  Therapist will teach self-regulation strategies, CBT skills, mindfulness techniques.    Objective #B  Client will use cognitive strategies identified in therapy to challenge anxious thoughts.  Status: Continued - Date(s):10/10/17      Intervention(s)  Therapist will teach cognitive strategies, provide education.      Goal 2: Client will increase self-confidence.    I will know I've met my goal when I think more positively about myself.      Objective #A (Client Action)    Status: Continued - Date(s):10/10/17     Client will increase assertive communication.    Intervention(s)  Therapist will teach assertiveness skills.    Objective #B  Client will Identify negative self-talk and behaviors: challenge core beliefs, myths, and actions.    Status: Continued - Date(s):10/10/17     Intervention(s)  Therapist will provide education, teach CBT skills.      Goal 3: Client will increase connection in close relationships.    I will know I've met my goal when I feel more close with loved ones.      Objective #A (Client Action)    Status: Continued - Date(s):10/10/17     Client will prioritize time for meaningful relationships.    Intervention(s)  Therapist will provide support and accountability.    Objective #B  Client will make use of effective interpersonal communication skills.    Status: Continued - Date(s):10/10/17     Intervention(s)  Therapist will teach communication skills.      Client has reviewed and agreed to the above plan.      Rosa Chowdary, KATHARINE  February 9, 2017

## 2018-03-13 NOTE — MR AVS SNAPSHOT
MRN:[id number]                      After Visit Summary   3/13/2018    Ijeoma Avalos    MRN: [id number]           Visit Information        Provider Department      3/13/2018 2:00 PM Rosa Chowdary LP [facility] [place] Generic      Your next 10 appointments already scheduled     Apr 13, 2018  2:00 PM CDT   Return Visit with Rosagabriel Chowdary LP   [facility] [place] ([facility] [place])    [address] [place] [id number] [phone]            May 11, 2018  2:00 PM CDT   Return Visit with Rosa MILLER KATHARINE Chowdary   [facility] [place] ([facility] [place])    [address] [place] [address] [id number] [phone]              MyChart Information     "[employer]hart gives you secure access to your electronic health record. If you see a primary care provider, you can also send messages to your care team and make appointments. If you have questions, please call your primary care clinic.  If you do not have a primary care provider, please call [phone] and they will assist you.        Care EveryWhere ID     This is your Care EveryWhere ID. This could be used by other organizations to access your [place] medical records  [id number]        Equal Access to Services     ADELINE VIVAS : Hadii mario mendez hadasho Sowhitleyali, waaxda luqadaha, qaybta kaalmada adeegyada, shannon blackwood. So [facility] [phone].    ATENCIÓN: Si habla español, tiene a irene disposición servicios gratuitos de asistencia lingüística. Llame al [phone].    We comply with applicable federal civil rights laws and [place] laws. We do not discriminate on the basis of race, color, national origin, age, disability, sex, sexual orientation, or gender identity.

## 2018-03-17 ENCOUNTER — MYC REFILL (OUTPATIENT)
Dept: FAMILY MEDICINE | Facility: CLINIC | Age: 53
End: 2018-03-17

## 2018-03-17 DIAGNOSIS — I10 ESSENTIAL HYPERTENSION: ICD-10-CM

## 2018-03-17 DIAGNOSIS — E11.9 NEW ONSET TYPE 2 DIABETES MELLITUS (H): ICD-10-CM

## 2018-03-17 DIAGNOSIS — E03.8 OTHER SPECIFIED HYPOTHYROIDISM: ICD-10-CM

## 2018-03-19 RX ORDER — LEVOTHYROXINE SODIUM 112 UG/1
112 TABLET ORAL DAILY
Qty: 90 TABLET | Refills: 3 | OUTPATIENT
Start: 2018-03-19

## 2018-03-19 RX ORDER — LISINOPRIL 5 MG/1
5 TABLET ORAL DAILY
Qty: 90 TABLET | Refills: 1 | OUTPATIENT
Start: 2018-03-19

## 2018-03-19 RX ORDER — METFORMIN HCL 500 MG
1500 TABLET, EXTENDED RELEASE 24 HR ORAL
Qty: 90 TABLET | Refills: 0 | Status: SHIPPED | OUTPATIENT
Start: 2018-03-19 | End: 2018-04-24

## 2018-03-19 NOTE — TELEPHONE ENCOUNTER
Message from [employer]:  Original authorizing provider: MD Omid Cabreraita Robbie would like a refill of the following medications:  levothyroxine (SYNTHROID/LEVOTHROID) 112 MCG tablet [Dulce Hernandez MD]  metFORMIN (GLUCOPHAGE-XR) 500 MG 24 hr tablet [Dulce Hernandez MD]  lisinopril (PRINIVIL/ZESTRIL) 5 MG tablet [Dulce Hernandez MD]    Preferred pharmacy: [facility] [place] [place] [id number] [facility]    Comment:

## 2018-03-19 NOTE — TELEPHONE ENCOUNTER
"Routing refill request to provider for review/approval because:  Labs out of range:  A1c    Isa MccarthyRN  [facility]  [phone]                Requested Prescriptions   Pending Prescriptions Disp Refills     levothyroxine (SYNTHROID/LEVOTHROID) 112 MCG tablet 90 tablet 3     Sig: Take 1 tablet (112 mcg) by mouth daily    Thyroid Protocol Passed    3/19/2018  9:22 AM       Passed - Patient is 12 years or older       Passed - Recent (12 mo) or future (30 days) visit within the authorizing provider's specialty    Patient had office visit in the last 12 months or has a visit in the next 30 days with authorizing provider or within the authorizing provider's specialty.  See \"Patient Info\" tab in inbasket, or \"Choose Columns\" in Meds & Orders section of the refill encounter.           Passed - Normal TSH on file in past 12 months    Recent Labs   Lab Test  11/22/17   1641   TSH  0.63             Passed - No active pregnancy on record    If patient is pregnant or has had a positive pregnancy test, please check TSH.         Passed - No positive pregnancy test in past 12 months    If patient is pregnant or has had a positive pregnancy test, please check TSH.          metFORMIN (GLUCOPHAGE-XR) 500 MG 24 hr tablet 90 tablet 0     Sig: Take 3 tablets (1,500 mg) by mouth daily (with dinner)    Biguanide Agents Passed    3/19/2018  9:22 AM       Passed - Blood pressure less than 140/90 in past 6 months    BP Readings from Last 3 Encounters:   02/21/18 130/89   12/19/17 (!) 151/106   12/13/17 (!) 136/95                Passed - Patient has documented LDL within the past 12 mos.    Recent Labs   Lab Test  12/13/17   0856   LDL  126*            Passed - Patient has had a Microalbumin in the past 12 mos.    Recent Labs   Lab Test  12/19/17   1356   MICROL  25   UMALCR  86.25*            Passed - Patient is age 10 or older       Passed - Patient has documented A1c within the specified period of time.    Recent " "Labs   Lab Test  02/21/18   0828   A1C  7.7*            Passed - Patient's CR is NOT>1.4 OR Patient's EGFR is NOT<45 within past 12 mos.    Recent Labs   Lab Test  02/21/18   0828   GFRESTIMATED  >90   GFRESTBLACK  >90       Recent Labs   Lab Test  02/21/18   0828   CR  0.63            Passed - Patient does NOT have a diagnosis of CHF.       Passed - Patient is not pregnant       Passed - Patient has not had a positive pregnancy test within the past 12 mos.        Passed - Recent (6 mo) or future (30 days) visit within the authorizing provider's specialty    Patient had office visit in the last 6 months or has a visit in the next 30 days with authorizing provider or within the authorizing provider's specialty.  See \"Patient Info\" tab in inbasket, or \"Choose Columns\" in Meds & Orders section of the refill encounter.            lisinopril (PRINIVIL/ZESTRIL) 5 MG tablet 90 tablet 1     Sig: Take 1 tablet (5 mg) by mouth daily    ACE Inhibitors (Including Combos) Protocol Passed    3/19/2018  9:22 AM       Passed - Blood pressure under 140/90 in past 12 months    BP Readings from Last 3 Encounters:   02/21/18 130/89   12/19/17 (!) 151/106   12/13/17 (!) 136/95                Passed - Recent (12 mo) or future (30 days) visit within the authorizing provider's specialty    Patient had office visit in the last 12 months or has a visit in the next 30 days with authorizing provider or within the authorizing provider's specialty.  See \"Patient Info\" tab in inbasket, or \"Choose Columns\" in Meds & Orders section of the refill encounter.           Passed - Patient is age 18 or older       Passed - No active pregnancy on record       Passed - Normal serum creatinine on file in past 12 months    Recent Labs   Lab Test  02/21/18   0828   CR  0.63            Passed - Normal serum potassium on file in past 12 months    Recent Labs   Lab Test  02/21/18   0828   POTASSIUM  4.2            Passed - No positive pregnancy test in past 12 " months

## 2018-04-24 DIAGNOSIS — E11.9 NEW ONSET TYPE 2 DIABETES MELLITUS (H): ICD-10-CM

## 2018-04-25 ENCOUNTER — OFFICE VISIT (OUTPATIENT)
Dept: PSYCHOLOGY | Facility: CLINIC | Age: 53
End: 2018-04-25
Payer: COMMERCIAL

## 2018-04-25 DIAGNOSIS — F41.9 ANXIETY DISORDER: Primary | ICD-10-CM

## 2018-04-25 PROCEDURE — 90834 PSYTX W PT 45 MINUTES: CPT | Performed by: PSYCHOLOGIST

## 2018-04-25 RX ORDER — METFORMIN HCL 500 MG
TABLET, EXTENDED RELEASE 24 HR ORAL
Qty: 90 TABLET | Refills: 0 | Status: SHIPPED | OUTPATIENT
Start: 2018-04-25 | End: 2018-05-21

## 2018-04-25 NOTE — MR AVS SNAPSHOT
MRN:[id number]                      After Visit Summary   4/25/2018    Ijeoma Avalos    MRN: [id number]           Visit Information        Provider Department      4/25/2018 2:00 PM Rosa Chowdary LP [facility] Generic      Your next 10 appointments already scheduled     May 11, 2018  2:00 PM CDT   Return Visit with Rosa Chowdary LP   [facility] [place] ([facility])    [address] [facility] [id number] [phone]              MyChart Information     [employer] gives you secure access to your electronic health record. If you see a primary care provider, you can also send messages to your care team and make appointments. If you have questions, please call your primary care clinic.  If you do not have a primary care provider, please call [phone] and they will assist you.        Care EveryWhere ID     This is your Care EveryWhere ID. This could be used by other organizations to access your [place] medical records  [id number]        Equal Access to Services     CHRISTINA [facility] Hospital[facility]ENN : Hadii mario wright Sojaclyn, waaxda luqadaha, qaybta kaalmakami adejarred, shannon blackwood. So [facility] [phone].    ATENCIÓN: Si habla español, tiene a irene disposición servicios gratuitos de asistencia lingüística. Llame al [phone].    We comply with applicable federal civil rights laws and [place] laws. We do not discriminate on the basis of race, color, national origin, age, disability, sex, sexual orientation, or gender identity.

## 2018-04-25 NOTE — TELEPHONE ENCOUNTER
Prescription approved per [facility], [facility] or [facility] refill protocol.  Marylou Shields RN - Triage  [facility]

## 2018-04-25 NOTE — TELEPHONE ENCOUNTER
Requested Prescriptions   Pending Prescriptions Disp Refills     metFORMIN (GLUCOPHAGE-XR) 500 MG 24 hr tablet [Pharmacy Med Name: METFORMIN 500MG ER TABLET] 90 tablet 0    Last Written Prescription Date:  3/19/18  Last Fill Quantity: 90,  # refills: 0   Last office visit: 2/21/2018 with prescribing provider:  2/21/18   Future Office Visit:   Next 5 appointments (look out 90 days)     Apr 25, 2018  2:00 PM CDT   Return Visit with Rosa Chowdary LP   [facility] [place] ([facility])    [address] [place] [id number]   [phone]            May 11, 2018  2:00 PM CDT   Return Visit with Rosa Chowdary LP   [facility] [place] ([facility] [place])    [address] [place] [phone]   [phone]                  Sig: TAKE THREE TABLETS BY MOUTH EVERY DAY ( WITH DINNER)    Biguanide Agents Passed    4/24/2018  4:40 PM       Passed - Blood pressure less than 140/90 in past 6 months    BP Readings from Last 3 Encounters:   02/21/18 130/89   12/19/17 (!) 151/106   12/13/17 (!) 136/95                Passed - Patient has documented LDL within the past 12 mos.    Recent Labs   Lab Test  12/13/17   0856   LDL  126*            Passed - Patient has had a Microalbumin in the past 12 mos.    Recent Labs   Lab Test  12/19/17   1356   MICROL  25   UMALCR  86.25*            Passed - Patient is age 10 or older       Passed - Patient has documented A1c within the specified period of time.    Recent Labs   Lab Test  02/21/18   0828   A1C  7.7*            Passed - Patient's CR is NOT>1.4 OR Patient's EGFR is NOT<45 within past 12 mos.    Recent Labs   Lab Test  02/21/18   0828   GFRESTIMATED  >90   GFRESTBLACK  >90       Recent Labs   Lab Test  02/21/18   0828   CR  0.63            Passed - Patient does NOT have a diagnosis of CHF.       Passed - Patient is not pregnant       Passed - Patient has not had a positive pregnancy test within the past 12  "mos.        Passed - Recent (6 mo) or future (30 days) visit within the authorizing provider's specialty    Patient had office visit in the last 6 months or has a visit in the next 30 days with authorizing provider or within the authorizing provider's specialty.  See \"Patient Info\" tab in inbasket, or \"Choose Columns\" in Meds & Orders section of the refill encounter.              "

## 2018-04-25 NOTE — PROGRESS NOTES
Progress Note    Client Name: Ijeoma Avalos  Date: 4/25/18       Service Type: Individual      Session Start Time: 14:10  Session End Time: 15:00      Session Length: 50     Session #: 14     Attendees: Client attended alone    Treatment Plan Last Reviewed: 2/9/17  PHQ-9 / SOSA-7 Last Reviewed: 9/25/17     DATA      Progress Since Last Session (Related to Symptoms / Goals / Homework):   Symptoms: Worry, anxiety    Homework: Good progress      Episode of Care Goals: Satisfactory progress - ACTION (Actively working towards change); Intervened by reinforcing change plan / affirming steps taken     Current / Ongoing Stressors and Concerns:   Physical symptoms   Phase of life issues (full-time work and parent of young child)   Work demands   Family overseas     Treatment Objective(s) Addressed in This Session:   Build and make use of skills for stress management     Intervention:   Client stated that her trip to [facility] for her father's birthday went well. She was able to be present and enjoy the time with family. She continues with improved eating and regular exercise, feeling better physically. Continued work on helping Client to manage anxiety about things she does not have control over. Talked specifically about this issue in the marriage. Client is interested in engaging her  effectively and recognizes that her current patterns often do not accomplish this. Reviewed the options she has for her choices/responses, acknowledging that she does not have control over him/his choices. This was also an opportunity to practice identifying and modifying unhelpful narratives she may tell herself.       ASSESSMENT: Current Emotional / Mental Status (status of significant symptoms):   Risk status (Self / Other harm or suicidal ideation)   Client denies current fears or concerns for personal safety.   Client denies current or recent suicidal ideation or behaviors.   Client  denies current or recent homicidal ideation or behaviors.   Client denies current or recent self injurious behavior or ideation.   Client denies other safety concerns.   A safety and risk management plan has not been developed at this time, however client was given the after-hours number / 911 should there be a change in any of these risk factors.     Appearance:   Appropriate    Eye Contact:   Good    Psychomotor Behavior: Normal    Attitude:   Cooperative  , engaged   Orientation:   All   Speech    Rate / Production: Normal     Volume:  Normal    Mood:    Anxious    Affect:    Appropriate    Thought Content:  More adaptive thoughts around work; still tendency toward catastrophizing & negative rumination about personal life   Thought Form:  Coherent  Logical    Insight:    Fair      Medication Review:   No changes to current psychiatric medication(s); Client has prescription for lorazepam, which she uses occasionally.     Medication Compliance:   Yes; infrequent use     Changes in Health Issues:   None reported     Chemical Use Review:   Substance Use: Chemical use reviewed, no active concerns identified      Tobacco Use: No current tobacco use.       Collateral Reports Completed:   Not Applicable    PLAN: (Client Tasks / Therapist Tasks / Other)  Help Client recognize her options in situations where she wishes the other person were behaving differently. Her goal is to be intentional in responding as she would like, rather than responding reactively in the moment. She will consider the actions that are within her control and direct her actions accordingly. She will pay attention to the stories she is telling herself and how these impact her emotions. When she feels worried about or frustrated with her  and is inclined to tell him what to do as a way of managing her anxiety, she will instead determine what she needs to do to take care of herself. She will continue to use self-regulation strategies to help  with anxiety and calming physical symptoms of stress. Return appointments scheduled.      Rosa Chowdary, LP                                                   _______________________________________________________________________    Treatment Plan    Client's Name: Ijeoam Avalos  YOB: 1965    Date: 2/9/17    DSM-V Diagnoses: Adjustment Disorders  309.28 (F43.23) With mixed anxiety and depressed mood  Psychosocial / Contextual Factors: physical symptoms; phase of life stressors (full-time work and parent of young child); work demands; family overseas  WHODAS: 18    Referral / Collaboration:  Referral to another professional/service is not indicated at this time.    Anticipated number of session or this episode of care: will re-evaluate every 90 days      MeasurableTreatment Goal(s) related to diagnosis / functional impairment(s)  Goal 1: Client will build skills for stress management.    I will know I've met my goal when my blood pressure is lower and my reflux symptoms have decreased.      Objective #A (Client Action)    Client will use at least 2 coping skills for anxiety management in the next 6 weeks.  Status: Continued - Date(s):10/10/17     Intervention(s)  Therapist will teach self-regulation strategies, CBT skills, mindfulness techniques.    Objective #B  Client will use cognitive strategies identified in therapy to challenge anxious thoughts.  Status: Continued - Date(s):10/10/17     Intervention(s)  Therapist will teach cognitive strategies, provide education.      Goal 2: Client will increase self-confidence.    I will know I've met my goal when I think more positively about myself.      Objective #A (Client Action)    Status: Continued - Date(s):10/10/17     Client will increase assertive communication.    Intervention(s)  Therapist will teach assertiveness skills.    Objective #B  Client will Identify negative self-talk and behaviors: challenge core beliefs, myths, and  actions.    Status: Continued - Date(s):10/10/17     Intervention(s)  Therapist will provide education, teach CBT skills.      Goal 3: Client will increase connection in close relationships.    I will know I've met my goal when I feel more close with loved ones.      Objective #A (Client Action)    Status: Continued - Date(s):10/10/17     Client will prioritize time for meaningful relationships.    Intervention(s)  Therapist will provide support and accountability.    Objective #B  Client will make use of effective interpersonal communication skills.    Status: Continued - Date(s):10/10/17     Intervention(s)  Therapist will teach communication skills.      Client has reviewed and agreed to the above plan.      Rosa Chowdary, KATHARINE  February 9, 2017

## 2018-05-18 DIAGNOSIS — E11.9 NEW ONSET TYPE 2 DIABETES MELLITUS (H): ICD-10-CM

## 2018-05-18 RX ORDER — METFORMIN HCL 500 MG
TABLET, EXTENDED RELEASE 24 HR ORAL
Qty: 90 TABLET | Refills: 0 | Status: CANCELLED | OUTPATIENT
Start: 2018-05-18

## 2018-05-29 ENCOUNTER — TELEPHONE (OUTPATIENT)
Dept: LAB | Facility: CLINIC | Age: 53
End: 2018-05-29

## 2018-05-29 DIAGNOSIS — E11.9 NEW ONSET TYPE 2 DIABETES MELLITUS (H): Primary | ICD-10-CM

## 2018-05-29 DIAGNOSIS — E78.5 HYPERLIPIDEMIA WITH TARGET LDL LESS THAN 100: ICD-10-CM

## 2018-05-29 NOTE — TELEPHONE ENCOUNTER
"Patient is scheduled to be seen in our lab June 4, 2018.  Appointment notes indicate \"a1c and fasting labs every 3 months.\".  NO current FUTURE or STANDING ORDERS have been placed.  Please place current FUTURE or STANDING orders as needed.      Note that orders have a maximum duration of one (1) year.    Thank you.  Please call if you have any questions.       >>>>>>>>>>JUST NEED THE A1C ORDERED<<<<<<<<<<  "

## 2018-05-30 ENCOUNTER — OFFICE VISIT (OUTPATIENT)
Dept: FAMILY MEDICINE | Facility: CLINIC | Age: 53
End: 2018-05-30
Payer: COMMERCIAL

## 2018-05-30 VITALS
HEART RATE: 76 BPM | SYSTOLIC BLOOD PRESSURE: 104 MMHG | TEMPERATURE: 98.7 F | DIASTOLIC BLOOD PRESSURE: 70 MMHG | BODY MASS INDEX: 25 KG/M2 | WEIGHT: 128 LBS

## 2018-05-30 DIAGNOSIS — M25.511 ACUTE PAIN OF RIGHT SHOULDER: Primary | ICD-10-CM

## 2018-05-30 PROCEDURE — 99213 OFFICE O/P EST LOW 20 MIN: CPT | Performed by: INTERNAL MEDICINE

## 2018-05-30 RX ORDER — MELOXICAM 7.5 MG/1
7.5-15 TABLET ORAL DAILY PRN
Qty: 60 TABLET | Refills: 1 | Status: SHIPPED | OUTPATIENT
Start: 2018-05-30 | End: 2018-12-24

## 2018-05-30 NOTE — PROGRESS NOTES
SUBJECTIVE:   Ijeoma Avalos is a 52 year old female who presents to clinic today for the following health issues:      Musculoskeletal problem/pain      Duration: 3-4 weeks     Description  Location: right shoulder    Intensity:  moderate    Accompanying signs and symptoms: decreased range of motion, hard to put a shirt on    History  Previous similar problem: no   Previous evaluation:  none    Precipitating or alleviating factors:  Trauma or overuse: YES- felt it pull after stretching for something   Aggravating factors include: none    Therapies tried and outcome: ice and tyl    About 3 or 4 weeks ago patient was reaching out to grab a towel overhead when she suddenly developed pain in her shoulder that gradually has gotten worse. She has a hard time lifting her shoulder to the side and cannot put on a jacket or shirt without pain.         Problem list and histories reviewed & adjusted, as indicated.  Additional history: as documented    Current Outpatient Prescriptions   Medication Sig Dispense Refill     atorvastatin (LIPITOR) 10 MG tablet Take 1 tablet (10 mg) by mouth daily 30 tablet 3     levothyroxine (SYNTHROID/LEVOTHROID) 112 MCG tablet Take 1 tablet (112 mcg) by mouth daily 90 tablet 3     lisinopril (PRINIVIL/ZESTRIL) 5 MG tablet Take 1 tablet (5 mg) by mouth daily 90 tablet 1     meloxicam (MOBIC) 7.5 MG tablet Take 1-2 tablets (7.5-15 mg) by mouth daily as needed 60 tablet 1     metFORMIN (GLUCOPHAGE-XR) 500 MG 24 hr tablet Take 4 tablets (2,000 mg) by mouth daily (with dinner) 120 tablet 0     Multiple Vitamins-Minerals (MULTIVITAMIN PO) Take by mouth daily       omeprazole (PRILOSEC) 40 MG capsule Take 1 capsule (40 mg) by mouth daily 90 capsule 3     aspirin 81 MG tablet Take 1 tablet (81 mg) by mouth daily (Patient not taking: Reported on 2/21/2018) 90 tablet 3     blood glucose monitoring (NO BRAND SPECIFIED) meter device kit Use to test blood sugar  2-3  times daily or as directed. 1 kit 0      blood glucose monitoring (NO BRAND SPECIFIED) test strip Use to test blood sugar 1-2  times daily or as directed. 1 Box 11     blood glucose monitoring (ONE TOUCH DELICA) lancets Use to test blood sugars  1-2 times daily or as directed. 100 each 11     Glucosamine-Chondroitin--200-150 MG TABS        LORazepam (ATIVAN) 0.5 MG tablet Take 1 tablet (0.5 mg) by mouth every 8 hours as needed for anxiety (Patient not taking: Reported on 5/30/2018) 14 tablet 0     nabumetone (RELAFEN) 500 MG tablet Take 1-2 tablets (500-1,000 mg) by mouth 2 times daily as needed for moderate pain (Patient not taking: Reported on 2/21/2018) 30 tablet 0     Recent Labs   Lab Test  02/21/18   0828  12/18/17   1606  12/18/17   1601  12/13/17   0856  11/22/17   1641  11/07/16   0919  10/05/16   1114  12/23/15   0922  08/05/15   0757  04/07/14   A1C  7.7*   --   12.2*   --    --    --    --    --    --    --   5.5   LDL   --    --    --   126*   --   109*   --    --   77   --    --    HDL   --    --    --   48*   --   39*   --    --   37*   --    --    TRIG   --    --    --   192*   --   156*   --    --   202*   --    --    ALT   --   35   --    --    --    --   16  15  21   < >   --    CR  0.63  0.63   --    --    --    --   0.65   --   0.60   < >   --    GFRESTIMATED  >90  >90   --    --    --    --   >90  Non  GFR Calc     --   >90  Non  GFR Calc     < >   --    GFRESTBLACK  >90  >90   --    --    --    --   >90   GFR Calc     --   >90   GFR Calc     < >   --    POTASSIUM  4.2  4.0   --    --    --    --   4.1   --   3.7   < >   --    TSH   --    --    --    --   0.63   --   0.74   --   2.31   < >  1.79    < > = values in this interval not displayed.      BP Readings from Last 3 Encounters:   05/30/18 104/70   02/21/18 130/89   12/19/17 (!) 151/106    Wt Readings from Last 3 Encounters:   05/30/18 128 lb (58.1 kg)   02/21/18 132 lb (59.9 kg)   02/02/18 137 lb (62.1 kg)                     Reviewed and updated as needed this visit by clinical staff  Tobacco  Allergies  Meds       Reviewed and updated as needed this visit by Provider         ROS:  Constitutional, HEENT, cardiovascular, pulmonary, gi and gu systems are negative, except as otherwise noted.    OBJECTIVE:     /70  Pulse 76  Temp 98.7  F (37.1  C) (Tympanic)  Wt 128 lb (58.1 kg)  LMP 04/14/2017  BMI 25 kg/m2  Body mass index is 25 kg/(m^2).  GENERAL: healthy, alert and no distress  RESP: lungs clear to auscultation - no rales, rhonchi or wheezes  CV: regular rate and rhythm, normal S1 S2, no S3 or S4, no murmur, click or rub, no peripheral edema and peripheral pulses strong  MS: Tenderness over the anterior and posterior aspect of the glenohumeral joint, passive abduction limited to 75 degrees, passive forward flexion is limited to 90 degrees, positive empty beer can test, positive Ward test for impingement    Diagnostic Test Results:  none     ASSESSMENT/PLAN:     1. Acute pain of right shoulder  Acute injury with overhead reaching likely a rotator cuff tendonitis and possible impingement syndrome. Recommending NSAIDS and physical therapy. If no improvement in the next 2-4 weeks would recommend MRI.  - [facility] HAND, AND CHIROPRACTIC REFERRAL  - meloxicam (MOBIC) 7.5 MG tablet; Take 1-2 tablets (7.5-15 mg) by mouth daily as needed  Dispense: 60 tablet; Refill: 1    Follow up if no improvement in symptoms in 2-4 weeks.      Jessenia Pryor MD  [facility] [place]

## 2018-05-30 NOTE — MR AVS SNAPSHOT
After Visit Summary   5/30/2018    Ijeoma Avalos    MRN: [id number]           Patient Information     Date Of Birth          1965        Visit Information        Provider Department      5/30/2018 4:20 PM Jessenia Pryor MD [facility] [place]        Today's Diagnoses     Acute pain of right shoulder    -  1       Follow-ups after your visit        Additional Services     [facility] PT, HAND, AND CHIROPRACTIC REFERRAL       **This order will print in the [facility] Scheduling Office**    Physical Therapy, Hand Therapy and Chiropractic Care are available through:    *[facility]  *[facility]  *[facility]    Call one number to schedule at any of the above locations: [phone].    Your provider has referred you to: Physical Therapy at [facility] or [facility]    Indication/Reason for Referral: Shoulder Pain  Onset of Illness: 3 weeks ago  Therapy Orders: Evaluate and Treat  Special Programs: None  Special Request: None    Tayo Cr      Additional Comments for the Therapist or Chiropractor:     Please be aware that coverage of these services is subject to the terms and limitations of your health insurance plan.  Call member services at your health plan with any benefit or coverage questions.      Please bring the following to your appointment:    *Your personal calendar for scheduling future appointments  *Comfortable clothing                  Follow-up notes from your care team     Return in about 4 weeks (around 6/27/2018), or if symptoms worsen or fail to improve.      Your next 10 appointments already scheduled     Jun 04, 2018  7:45 AM CDT   LAB with EC LAB   [facility] [place] ([facility])    [address] [place] [id number] [phone]           OUTSIDE LABS: Please include name of facility and Physician that is requesting outside labs be drawn.  Please indicate if labs are fasting or  non-fasting on appt notes.  Be as specific as you can on which labs are being drawn.              Future tests that were ordered for you today     Open Future Orders        Priority Expected Expires Ordered    **ALT FUTURE anytime Routine 5/29/2018 5/29/2019 5/29/2018    **AST FUTURE 2mo Routine 7/28/2018 9/26/2018 5/29/2018    Hemoglobin A1c Routine  5/29/2019 5/29/2018            Who to contact     If you have questions or need follow up information about today's clinic visit or your schedule please contact [facility] [place] directly at [phone].  Normal or non-critical lab and imaging results will be communicated to you by MyChart, letter or phone within 4 business days after the clinic has received the results. If you do not hear from us within 7 days, please contact the clinic through Mazu Networks[employer] or phone. If you have a critical or abnormal lab result, we will notify you by phone as soon as possible.  Submit refill requests through Ayudarum or call your pharmacy and they will forward the refill request to us. Please allow 3 business days for your refill to be completed.          Additional Information About Your Visit        Mazu [employer] Information     Ayudarum gives you secure access to your electronic health record. If you see a primary care provider, you can also send messages to your care team and make appointments. If you have questions, please call your primary care clinic.  If you do not have a primary care provider, please call [phone] and they will assist you.        Care EveryWhere ID     This is your Care EveryWhere ID. This could be used by other organizations to access your [place] medical records  [id number]        Your Vitals Were     Pulse Temperature Last Period BMI (Body Mass Index)          76 98.7  F (37.1  C) (Tympanic) 04/14/2017 25 kg/m2         Blood Pressure from Last 3 Encounters:   05/30/18 104/70   02/21/18 130/89   12/19/17 (!) 151/106    Weight from Last 3 Encounters:    05/30/18 128 lb (58.1 kg)   02/21/18 132 lb (59.9 kg)   02/02/18 137 lb (62.1 kg)              We Performed the Following     [facility] PT, HAND, AND CHIROPRACTIC REFERRAL          Today's Medication Changes          These changes are accurate as of 5/30/18  4:28 PM.  If you have any questions, ask your nurse or doctor.               Start taking these medicines.        Dose/Directions    meloxicam 7.5 MG tablet   Commonly known as:  MOBIC   Used for:  Acute pain of right shoulder   Started by:  Jessenia Pryor MD        Dose:  7.5-15 mg   Take 1-2 tablets (7.5-15 mg) by mouth daily as needed   Quantity:  60 tablet   Refills:  1            Where to get your medicines      These medications were sent to [facility] [place] [facility]  [address], [place] [address]     Phone:  [phone]     meloxicam 7.5 MG tablet                Primary Care Provider Office Phone # Fax #    Dulce Terrence Hernandez [phone]       [address] [place] [address] [address]  [place] [address]        Equal Access to Services     CHRISTINA VIVAS AH: Hadii mario ku hadasho Soomaali, waaxda luqadaha, qaybta kaalmada adeegyada, waxay idiin haydeborahn nichole blackwood. So [facility] [phone].    ATENCIÓN: Si habla español, tiene a irene disposición servicios gratuitos de asistencia lingüística. Llame al [phone].    We comply with applicable federal civil rights laws and [place] laws. We do not discriminate on the basis of race, color, national origin, age, disability, sex, sexual orientation, or gender identity.            Thank you!     Thank you for choosing [facility] [place]  for your care. Our goal is always to provide you with excellent care. Hearing back from our patients is one way we can continue to improve our services. Please take a few minutes to complete the written survey that you may receive in the mail after your visit with us. Thank you!             Your  Updated Medication List - Protect others around you: Learn how to safely use, store and throw away your medicines at www.disposemymeds.org.          This list is accurate as of 5/30/18  4:28 PM.  Always use your most recent med list.                   Brand Name Dispense Instructions for use Diagnosis    aspirin 81 MG tablet     90 tablet    Take 1 tablet (81 mg) by mouth daily    New onset type 2 diabetes mellitus (H)       atorvastatin 10 MG tablet    LIPITOR    30 tablet    Take 1 tablet (10 mg) by mouth daily    Hyperlipidemia LDL goal <130       blood glucose monitoring lancets     100 each    Use to test blood sugars  1-2 times daily or as directed.    New onset type 2 diabetes mellitus (H)       blood glucose monitoring meter device kit    no brand specified    1 kit    Use to test blood sugar  2-3  times daily or as directed.    New onset type 2 diabetes mellitus (H)       blood glucose monitoring test strip    no brand specified    1 Box    Use to test blood sugar 1-2  times daily or as directed.    New onset type 2 diabetes mellitus (H)       Glucosamine-Chondroitin--200-150 MG Tabs           levothyroxine 112 MCG tablet    SYNTHROID/LEVOTHROID    90 tablet    Take 1 tablet (112 mcg) by mouth daily    Other specified hypothyroidism       lisinopril 5 MG tablet    PRINIVIL/ZESTRIL    90 tablet    Take 1 tablet (5 mg) by mouth daily    Essential hypertension, New onset type 2 diabetes mellitus (H)       LORazepam 0.5 MG tablet    ATIVAN    14 tablet    Take 1 tablet (0.5 mg) by mouth every 8 hours as needed for anxiety    Adjustment disorder with mixed anxiety and depressed mood       meloxicam 7.5 MG tablet    MOBIC    60 tablet    Take 1-2 tablets (7.5-15 mg) by mouth daily as needed    Acute pain of right shoulder       metFORMIN 500 MG 24 hr tablet    GLUCOPHAGE-XR    120 tablet    Take 4 tablets (2,000 mg) by mouth daily (with dinner)    New onset type 2 diabetes mellitus (H)       MULTIVITAMIN  PO      Take by mouth daily        nabumetone 500 MG tablet    RELAFEN    30 tablet    Take 1-2 tablets (500-1,000 mg) by mouth 2 times daily as needed for moderate pain    Neck pain, TMJ (temporomandibular joint syndrome), Adjustment disorder with mixed anxiety and depressed mood       omeprazole 40 MG capsule    priLOSEC    90 capsule    Take 1 capsule (40 mg) by mouth daily    Gastroesophageal reflux disease, esophagitis presence not specified

## 2018-06-04 ENCOUNTER — THERAPY VISIT (OUTPATIENT)
Dept: PHYSICAL THERAPY | Facility: CLINIC | Age: 53
End: 2018-06-04
Attending: INTERNAL MEDICINE
Payer: COMMERCIAL

## 2018-06-04 DIAGNOSIS — M25.511 SHOULDER PAIN, RIGHT: Primary | ICD-10-CM

## 2018-06-04 PROCEDURE — 97112 NEUROMUSCULAR REEDUCATION: CPT | Mod: GP | Performed by: PHYSICAL THERAPIST

## 2018-06-04 PROCEDURE — 97161 PT EVAL LOW COMPLEX 20 MIN: CPT | Mod: GP | Performed by: PHYSICAL THERAPIST

## 2018-06-04 PROCEDURE — 97110 THERAPEUTIC EXERCISES: CPT | Mod: GP | Performed by: PHYSICAL THERAPIST

## 2018-06-04 NOTE — PROGRESS NOTES
[facility] Initial Evaluation  Subjective:  Patient is a 52 year old female presenting with rehab right shoulder hpi. The history is provided by the patient.   Ijeoma Avalos is a 52 year old female with a right shoulder condition.  Occurance: Reaching out.  Condition occurred: at home.  This is a new condition  Patient hurt her shoulder reaching for something on the top shelf at home about a month ago with symptoms persisting up to now. MD referral on 5/30/18..    Patient reports pain:  In the joint and posterior.    Pain is described as aching and is constant and reported as 7/10.  Associated symptoms:  Loss of strength and loss of motion/stiffness. Pain is worse during the night.  Symptoms are exacerbated by carrying, lifting, lying on extremity, using arm behind back, using arm at shoulder level and using arm overhead and relieved by ice, rest and NSAID's.  Since onset symptoms are gradually improving.        General health as reported by patient is good.  Pertinent medical history includes:  Diabetes and thyroid problems.  Medical allergies: no.  Other surgeries include:  None reported.  Current medications:  Thyroid medication, high blood pressure medication and anti-inflammatory.  Current occupation is .  Patient is working in normal job without restrictions.      Barriers include:  None as reported by the patient.    Red flags:  Pain at night/rest.                        Objective:  Standing Alignment:      Shoulder/UE:  Rounded shoulders                  Flexibility/Screens:   Positive screens:  ShoulderNegative screens: Cervical               Ankle/Foot Evaluation            MOBILITY TESTING: Mobility testing ankle: [place]: 7/9.                             Shoulder Evaluation:  ROM:  AROM:    Flexion:  Left:  160    Right:  155    Abduction:  Left: 160   Right:  135      External Rotation:  Left:  WNL    Right:  WNL            Extension/Internal Rotation:  Left:  T6     Right:  T10          Strength:    Flexion: Left:4+/5   Pain:    Right: 4/5      Pain:  +  Extension:  Left: 4+/5    Pain:    Right: 4+/5    Pain:  Abduction:  Left: 4+/5  Pain:    Right: 4/5     Pain:    Internal Rotation:  Left:4+/5     Pain:    Right: 4+/5     Pain:  External Rotation:   Left:4+/5     Pain:   Right:4/5     Pain:        Elbow Flexion:  Left:5-/5     Pain:    Right:5-/5     Pain:  Elbow Extension:  Left:4+/5     Pain:    Right:4+/5     Pain:  Stability Testing:  normal      Special Tests:      Right shoulder positive for the following special tests:Impingement  Right shoulder negative for the following special tests:Labral and Rotator cuff tear  Palpation:      Right shoulder tenderness present at: Supraspinatus and Infraspinatus  Mobility Tests:    Glenohumeral anterior left:  Hypermobile  Glenohumeral anterior right:  Hypermobile  Glenohumeral posterior left:  Hypermobile  Glenohumeral posterior right:  Hypermobile  Glenohumeral inferior left:  Hypermobile  Glenohumeral inferior right:  Hypermobile                                             General     ROS    Assessment/Plan:    Patient is a 52 year old female with right side shoulder complaints.    Patient has the following significant findings with corresponding treatment plan.                Diagnosis 1:  R shoulder pain due to internal imingement  Pain -  hot/cold therapy, US, electric stimulation, manual therapy, splint/taping/bracing/orthotics, self management, education and home program  Decreased ROM/flexibility - manual therapy, therapeutic exercise, therapeutic activity and home program  Decreased strength - therapeutic exercise, therapeutic activities and home program  Decreased proprioception - neuro re-education, therapeutic activities and home program  Inflammation - cold therapy, US, electric stimulation and self management/home program  Impaired muscle performance - electric stimulation, neuro re-education and home  program  Decreased function - therapeutic activities and home program  Impaired posture - neuro re-education, therapeutic activities and home program    Therapy Evaluation Codes:   1) History comprised of:   Personal factors that impact the plan of care:      None.    Comorbidity factors that impact the plan of care are:      Diabetes and Thuroid problems.     Medications impacting care: Anti-inflammatory, High blood pressure and Thyroid.  2) Examination of Body Systems comprised of:   Body structures and functions that impact the plan of care:      Shoulder.   Activity limitations that impact the plan of care are:      Bathing, Cooking, Dressing, Lifting, Sleeping and Reaching.  3) Clinical presentation characteristics are:   Stable/Uncomplicated.  4) Decision-Making    Low complexity using standardized patient assessment instrument and/or measureable assessment of functional outcome.  Cumulative Therapy Evaluation is: Low complexity.    Previous and current functional limitations:  (See Goal Flow Sheet for this information)    Short term and Long term goals: (See Goal Flow Sheet for this information)     Communication ability:  Patient appears to be able to clearly communicate and understand verbal and written communication and follow directions correctly.  Treatment Explanation - The following has been discussed with the patient:   RX ordered/plan of care  Anticipated outcomes  Possible risks and side effects  This patient would benefit from PT intervention to resume normal activities.   Rehab potential is good.    Frequency:  1 X week, once daily  Duration:  for 6 weeks  Discharge Plan:  Achieve all LTG.  Independent in home treatment program.  Reach maximal therapeutic benefit.    Please refer to the daily flowsheet for treatment today, total treatment time and time spent performing 1:1 timed codes.

## 2018-06-04 NOTE — MR AVS SNAPSHOT
After Visit Summary   6/4/2018    Ijeoma Avalos    MRN: [id number]           Patient Information     Date Of Birth          1965        Visit Information        Provider Department      6/4/2018 12:50 PM Luis Amaya PT [facility] [place] [facility]        Today's Diagnoses     Shoulder pain, right    -  1       Follow-ups after your visit        Your next 10 appointments already scheduled     Jun 11, 2018  4:10 PM CDT   FA[facility]IO Extremity with Luis Amaya PT   [facility] [place] [facility] ([facility] [place])    [address]  [address]  [place] [address] [phone]              Who to contact     If you have questions or need follow up information about today's clinic visit or your schedule please contact [place] [facility] directly at [phone].  Normal or non-critical lab and imaging results will be communicated to you by SpotRighthart, letter or phone within 4 business days after the clinic has received the results. If you do not hear from us within 7 days, please contact the clinic through Overblogt or phone. If you have a critical or abnormal lab result, we will notify you by phone as soon as possible.  Submit refill requests through ConnectQuest or call your pharmacy and they will forward the refill request to us. Please allow 3 business days for your refill to be completed.          Additional Information About Your Visit        MyChart Information     ConnectQuest gives you secure access to your electronic health record. If you see a primary care provider, you can also send messages to your care team and make appointments. If you have questions, please call your primary care clinic.  If you do not have a primary care provider, please call [phone] and they will assist you.        Care EveryWhere ID     This is your Care EveryWhere ID. This could be used  by other organizations to access your [place] medical records  [id number]        Your Vitals Were     Last Period                   04/14/2017            Blood Pressure from Last 3 Encounters:   05/30/18 104/70   02/21/18 130/89   12/19/17 (!) 151/106    Weight from Last 3 Encounters:   05/30/18 58.1 kg (128 lb)   02/21/18 59.9 kg (132 lb)   02/02/18 62.1 kg (137 lb)              We Performed the Following     HC PT EVAL, LOW COMPLEXITY     FAVIO INITIAL EVAL REPORT     NEUROMUSCULAR RE-EDUCATION     THERAPEUTIC EXERCISES        Primary Care Provider Office Phone # Fax #    Dulce Hernandez [phone]       [address]  [place] [address]        Equal Access to Services     ADELINE VIVAS : Hadii mario mendez hadasho Soomaali, waaxda luqadaha, qaybta kaalmada adeegyada, shannon thurman . So [facility] [phone].    ATENCIÓN: Si habla español, tiene a irene disposición servicios gratuitos de asistencia lingüística. Llame al [phone].    We comply with applicable federal civil rights laws and [place] laws. We do not discriminate on the basis of race, color, national origin, age, disability, sex, sexual orientation, or gender identity.            Thank you!     Thank you for choosing [facility]  [place] [facility]  for your care. Our goal is always to provide you with excellent care. Hearing back from our patients is one way we can continue to improve our services. Please take a few minutes to complete the written survey that you may receive in the mail after your visit with us. Thank you!             Your Updated Medication List - Protect others around you: Learn how to safely use, store and throw away your medicines at www.disposemymeds.org.          This list is accurate as of 6/4/18  1:49 PM.  Always use your most recent med list.                   Brand Name Dispense Instructions for use Diagnosis    aspirin 81 MG tablet     90  tablet    Take 1 tablet (81 mg) by mouth daily    New onset type 2 diabetes mellitus (H)       atorvastatin 10 MG tablet    LIPITOR    30 tablet    Take 1 tablet (10 mg) by mouth daily    Hyperlipidemia LDL goal <130       blood glucose monitoring lancets     100 each    Use to test blood sugars  1-2 times daily or as directed.    New onset type 2 diabetes mellitus (H)       blood glucose monitoring meter device kit    no brand specified    1 kit    Use to test blood sugar  2-3  times daily or as directed.    New onset type 2 diabetes mellitus (H)       blood glucose monitoring test strip    no brand specified    1 Box    Use to test blood sugar 1-2  times daily or as directed.    New onset type 2 diabetes mellitus (H)       Glucosamine-Chondroitin--200-150 MG Tabs           levothyroxine 112 MCG tablet    SYNTHROID/LEVOTHROID    90 tablet    Take 1 tablet (112 mcg) by mouth daily    Other specified hypothyroidism       lisinopril 5 MG tablet    PRINIVIL/ZESTRIL    90 tablet    Take 1 tablet (5 mg) by mouth daily    Essential hypertension, New onset type 2 diabetes mellitus (H)       LORazepam 0.5 MG tablet    ATIVAN    14 tablet    Take 1 tablet (0.5 mg) by mouth every 8 hours as needed for anxiety    Adjustment disorder with mixed anxiety and depressed mood       meloxicam 7.5 MG tablet    MOBIC    60 tablet    Take 1-2 tablets (7.5-15 mg) by mouth daily as needed    Acute pain of right shoulder       metFORMIN 500 MG 24 hr tablet    GLUCOPHAGE-XR    120 tablet    Take 4 tablets (2,000 mg) by mouth daily (with dinner)    New onset type 2 diabetes mellitus (H)       MULTIVITAMIN PO      Take by mouth daily        nabumetone 500 MG tablet    RELAFEN    30 tablet    Take 1-2 tablets (500-1,000 mg) by mouth 2 times daily as needed for moderate pain    Neck pain, TMJ (temporomandibular joint syndrome), Adjustment disorder with mixed anxiety and depressed mood       omeprazole 40 MG capsule    priLOSEC    90  capsule    Take 1 capsule (40 mg) by mouth daily    Gastroesophageal reflux disease, esophagitis presence not specified

## 2018-06-19 DIAGNOSIS — E11.9 NEW ONSET TYPE 2 DIABETES MELLITUS (H): ICD-10-CM

## 2018-06-20 RX ORDER — METFORMIN HCL 500 MG
TABLET, EXTENDED RELEASE 24 HR ORAL
Qty: 120 TABLET | Refills: 0 | Status: SHIPPED | OUTPATIENT
Start: 2018-06-20 | End: 2018-07-24

## 2018-06-20 NOTE — TELEPHONE ENCOUNTER
"Requested Prescriptions   Pending Prescriptions Disp Refills     metFORMIN (GLUCOPHAGE-XR) 500 MG 24 hr tablet [Pharmacy Med Name: METFORMIN HCL ER 500MG TB24]  Last Written Prescription Date:  5/21/18  Last Fill Quantity: 120,  # refills: 0   Last office visit: 5/30/2018 with prescribing provider:  Roya   Future Office Visit:     120 tablet 0     Sig: TAKE FOUR TABLETS BY MOUTH EVERY DAY ( WITH DINNER)    Biguanide Agents Passed    6/19/2018  6:30 PM       Passed - Blood pressure less than 140/90 in past 6 months    BP Readings from Last 3 Encounters:   05/30/18 104/70   02/21/18 130/89   12/19/17 (!) 151/106                Passed - Patient has documented LDL within the past 12 mos.    Recent Labs   Lab Test  12/13/17   0856   LDL  126*            Passed - Patient has had a Microalbumin in the past 12 mos.    Recent Labs   Lab Test  12/19/17   1356   MICROL  25   UMALCR  86.25*            Passed - Patient is age 10 or older       Passed - Patient has documented A1c within the specified period of time.    If HgbA1C is 8 or greater, it needs to be on file within the past 3 months.  If less than 8, must be on file within the past 6 months.     Recent Labs   Lab Test  02/21/18   0828   A1C  7.7*            Passed - Patient's CR is NOT>1.4 OR Patient's EGFR is NOT<45 within past 12 mos.    Recent Labs   Lab Test  02/21/18   0828   GFRESTIMATED  >90   GFRESTBLACK  >90       Recent Labs   Lab Test  02/21/18   0828   CR  0.63            Passed - Patient does NOT have a diagnosis of CHF.       Passed - Patient is not pregnant       Passed - Patient has not had a positive pregnancy test within the past 12 mos.        Passed - Recent (6 mo) or future (30 days) visit within the authorizing provider's specialty    Patient had office visit in the last 6 months or has a visit in the next 30 days with authorizing provider or within the authorizing provider's specialty.  See \"Patient Info\" tab in inbasket, or \"Choose Columns\" in " Meds & Orders section of the refill encounter.

## 2018-06-20 NOTE — TELEPHONE ENCOUNTER
Medication is being filled for 1 time refill only due to:  Patient needs labs Dr. Hernandez has ordered labs. Please schedule the patient for a fasting lab only appointment.   Ruth Yi RN

## 2018-06-28 DIAGNOSIS — E78.5 HYPERLIPIDEMIA WITH TARGET LDL LESS THAN 100: ICD-10-CM

## 2018-06-28 DIAGNOSIS — E11.9 NEW ONSET TYPE 2 DIABETES MELLITUS (H): ICD-10-CM

## 2018-06-28 LAB
ALT SERPL W P-5'-P-CCNC: 19 U/L (ref 0–50)
AST SERPL W P-5'-P-CCNC: 12 U/L (ref 0–45)
HBA1C MFR BLD: 5.9 % (ref 0–5.6)

## 2018-06-28 PROCEDURE — 84450 TRANSFERASE (AST) (SGOT): CPT | Performed by: FAMILY MEDICINE

## 2018-06-28 PROCEDURE — 36415 COLL VENOUS BLD VENIPUNCTURE: CPT | Performed by: FAMILY MEDICINE

## 2018-06-28 PROCEDURE — 84460 ALANINE AMINO (ALT) (SGPT): CPT | Performed by: FAMILY MEDICINE

## 2018-06-28 PROCEDURE — 83036 HEMOGLOBIN GLYCOSYLATED A1C: CPT | Performed by: FAMILY MEDICINE

## 2018-07-01 ENCOUNTER — TRANSFERRED RECORDS (OUTPATIENT)
Dept: MULTI SPECIALTY CLINIC | Facility: CLINIC | Age: 53
End: 2018-07-01

## 2018-07-01 LAB — PAP SMEAR - HIM PATIENT REPORTED: NEGATIVE

## 2018-07-24 DIAGNOSIS — E78.5 HYPERLIPIDEMIA LDL GOAL <130: ICD-10-CM

## 2018-07-24 DIAGNOSIS — E11.9 NEW ONSET TYPE 2 DIABETES MELLITUS (H): ICD-10-CM

## 2018-07-24 RX ORDER — METFORMIN HCL 500 MG
TABLET, EXTENDED RELEASE 24 HR ORAL
Qty: 120 TABLET | Refills: 0 | Status: CANCELLED | OUTPATIENT
Start: 2018-07-24

## 2018-07-24 RX ORDER — ATORVASTATIN CALCIUM 10 MG/1
10 TABLET, FILM COATED ORAL DAILY
Qty: 90 TABLET | Refills: 3 | Status: SHIPPED | OUTPATIENT
Start: 2018-07-24 | End: 2018-12-24

## 2018-07-24 NOTE — TELEPHONE ENCOUNTER
"Requested Prescriptions   Pending Prescriptions Disp Refills     atorvastatin (LIPITOR) 10 MG tablet  Last Written Prescription Date:  12/19/17  Last Fill Quantity: 30,  # refills: 3   Last office visit: 5/30/2018 with prescribing provider:  Roya   Future Office Visit:     30 tablet 3     Sig: Take 1 tablet (10 mg) by mouth daily    Statins Protocol Passed    7/24/2018  9:06 AM       Passed - LDL on file in past 12 months    Recent Labs   Lab Test  12/13/17   0856   LDL  126*            Passed - No abnormal creatine kinase in past 12 months    No lab results found.            Passed - Recent (12 mo) or future (30 days) visit within the authorizing provider's specialty    Patient had office visit in the last 12 months or has a visit in the next 30 days with authorizing provider or within the authorizing provider's specialty.  See \"Patient Info\" tab in inbasket, or \"Choose Columns\" in Meds & Orders section of the refill encounter.           Passed - Patient is age 18 or older       Passed - No active pregnancy on record       Passed - No positive pregnancy test in past 12 months        metFORMIN (GLUCOPHAGE-XR) 500 MG 24 hr tablet  Last Written Prescription Date:  6/20/18  Last Fill Quantity: 120,  # refills: 0   Last office visit: 5/30/2018 with prescribing provider:  Roya   Future Office Visit:     120 tablet 0    Biguanide Agents Passed    7/24/2018  9:06 AM       Passed - Blood pressure less than 140/90 in past 6 months    BP Readings from Last 3 Encounters:   05/30/18 104/70   02/21/18 130/89   12/19/17 (!) 151/106                Passed - Patient has documented LDL within the past 12 mos.    Recent Labs   Lab Test  12/13/17   0856   LDL  126*            Passed - Patient has had a Microalbumin in the past 12 mos.    Recent Labs   Lab Test  12/19/17   1356   MICROL  25   UMALCR  86.25*            Passed - Patient is age 10 or older       Passed - Patient has documented A1c within the specified period of time.    If " "HgbA1C is 8 or greater, it needs to be on file within the past 3 months.  If less than 8, must be on file within the past 6 months.     Recent Labs   Lab Test  06/28/18   0732   A1C  5.9*            Passed - Patient's CR is NOT>1.4 OR Patient's EGFR is NOT<45 within past 12 mos.    Recent Labs   Lab Test  02/21/18   0828   GFRESTIMATED  >90   GFRESTBLACK  >90       Recent Labs   Lab Test  02/21/18   0828   CR  0.63            Passed - Patient does NOT have a diagnosis of CHF.       Passed - Patient is not pregnant       Passed - Patient has not had a positive pregnancy test within the past 12 mos.        Passed - Recent (6 mo) or future (30 days) visit within the authorizing provider's specialty    Patient had office visit in the last 6 months or has a visit in the next 30 days with authorizing provider or within the authorizing provider's specialty.  See \"Patient Info\" tab in inbasket, or \"Choose Columns\" in Meds & Orders section of the refill encounter.              "

## 2018-07-24 NOTE — TELEPHONE ENCOUNTER
Atorvastatin: Routing refill request to provider for review/approval because:  A break in medication    Metformin: Filled per [facility] protocol in my chart message    Marylou Shields RN - Triage  [facility]

## 2018-07-24 NOTE — TELEPHONE ENCOUNTER
Reason for Call:  Medication or medication refill:    Do you use a [facility]?  Name of the pharmacy and phone number for the current request:  [facility] [address] - [phone]    Name of the medication requested: metformin    Other request: na    Can we leave a detailed message on this number? YES    Phone number patient can be reached at: Cell number on file:    Telephone Information:   Mobile [phone]       Best Time: anytime,  PT IS OUT,  CHANGE OF PHARMACY,  Pt wants [facility]    Call taken on 7/24/2018 at 11:17 AM by Abeba Magdaleno

## 2018-09-14 DIAGNOSIS — I10 ESSENTIAL HYPERTENSION: ICD-10-CM

## 2018-09-14 DIAGNOSIS — E11.9 NEW ONSET TYPE 2 DIABETES MELLITUS (H): ICD-10-CM

## 2018-09-14 DIAGNOSIS — K21.9 GASTROESOPHAGEAL REFLUX DISEASE, ESOPHAGITIS PRESENCE NOT SPECIFIED: ICD-10-CM

## 2018-09-14 NOTE — TELEPHONE ENCOUNTER
"Requested Prescriptions   Pending Prescriptions Disp Refills     lisinopril (PRINIVIL/ZESTRIL) 5 MG tablet 90 tablet 1    Last Written Prescription Date:  02/21/2018  Last Fill Quantity: 90 tablet,  # refills: 1   Last office visit: 5/30/2018 with prescribing provider:  Jessenia Pryor   Future Office Visit:     Sig: Take 1 tablet (5 mg) by mouth daily    ACE Inhibitors (Including Combos) Protocol Passed    9/14/2018  2:43 PM       Passed - Blood pressure under 140/90 in past 12 months    BP Readings from Last 3 Encounters:   05/30/18 104/70   02/21/18 130/89   12/19/17 (!) 151/106                Passed - Recent (12 mo) or future (30 days) visit within the authorizing provider's specialty    Patient had office visit in the last 12 months or has a visit in the next 30 days with authorizing provider or within the authorizing provider's specialty.  See \"Patient Info\" tab in inbasket, or \"Choose Columns\" in Meds & Orders section of the refill encounter.           Passed - Patient is age 18 or older       Passed - No active pregnancy on record       Passed - Normal serum creatinine on file in past 12 months    Recent Labs   Lab Test  02/21/18   0828   CR  0.63            Passed - Normal serum potassium on file in past 12 months    Recent Labs   Lab Test  02/21/18   0828   POTASSIUM  4.2            Passed - No positive pregnancy test in past 12 months        omeprazole (PRILOSEC) 40 MG capsule 90 capsule 3    Last Written Prescription Date:  09/26/2017  Last Fill Quantity: 90 capsule,  # refills: 3   Last office visit: 5/30/2018 with prescribing provider:  Jessenia Pryor   Future Office Visit:     Sig: Take 1 capsule (40 mg) by mouth daily    PPI Protocol Passed    9/14/2018  2:43 PM       Passed - Not on Clopidogrel (unless Pantoprazole ordered)       Passed - No diagnosis of osteoporosis on record       Passed - Recent (12 mo) or future (30 days) visit within the authorizing provider's specialty    Patient had office visit " "in the last 12 months or has a visit in the next 30 days with authorizing provider or within the authorizing provider's specialty.  See \"Patient Info\" tab in inbasket, or \"Choose Columns\" in Meds & Orders section of the refill encounter.           Passed - Patient is age 18 or older       Passed - No active pregnacy on record       Passed - No positive pregnancy test in past 12 months          "

## 2018-09-17 RX ORDER — LISINOPRIL 5 MG/1
5 TABLET ORAL DAILY
Qty: 90 TABLET | Refills: 1 | Status: SHIPPED | OUTPATIENT
Start: 2018-09-17 | End: 2018-12-24

## 2018-09-17 RX ORDER — OMEPRAZOLE 40 MG/1
40 CAPSULE, DELAYED RELEASE ORAL DAILY
Qty: 90 CAPSULE | Refills: 2 | Status: SHIPPED | OUTPATIENT
Start: 2018-09-17 | End: 2019-06-09

## 2018-09-17 NOTE — TELEPHONE ENCOUNTER
[place] [facility] [place].  Isa Mccarthy RN  [facility]  [phone]

## 2018-12-14 ENCOUNTER — OFFICE VISIT (OUTPATIENT)
Dept: PSYCHOLOGY | Facility: CLINIC | Age: 53
End: 2018-12-14
Payer: COMMERCIAL

## 2018-12-14 DIAGNOSIS — F43.23 ADJUSTMENT DISORDER WITH MIXED ANXIETY AND DEPRESSED MOOD: ICD-10-CM

## 2018-12-14 DIAGNOSIS — F41.9 ANXIETY DISORDER: Primary | ICD-10-CM

## 2018-12-14 PROCEDURE — 90834 PSYTX W PT 45 MINUTES: CPT | Performed by: PSYCHOLOGIST

## 2018-12-16 DIAGNOSIS — E11.9 NEW ONSET TYPE 2 DIABETES MELLITUS (H): ICD-10-CM

## 2018-12-16 DIAGNOSIS — E03.8 OTHER SPECIFIED HYPOTHYROIDISM: ICD-10-CM

## 2018-12-17 RX ORDER — METFORMIN HCL 500 MG
TABLET, EXTENDED RELEASE 24 HR ORAL
Qty: 120 TABLET | Refills: 0 | Status: SHIPPED | OUTPATIENT
Start: 2018-12-17 | End: 2018-12-24

## 2018-12-17 RX ORDER — LEVOTHYROXINE SODIUM 112 UG/1
TABLET ORAL
Qty: 30 TABLET | Refills: 0 | Status: SHIPPED | OUTPATIENT
Start: 2018-12-17 | End: 2018-12-24

## 2018-12-17 NOTE — PROGRESS NOTES
Progress Note    Client Name: Ijeoma Avalos  Date: 12/14/18       Service Type: Individual      Session Start Time: 09:30  Session End Time: 10:25      Session Length: 55     Session #: 1 (this episode of care)     Attendees: Client attended alone    Treatment Plan Last Reviewed: 2/9/17  PHQ-9 / SOSA-7 Last Reviewed: 9/25/17     DATA      Progress Since Last Session (Related to Symptoms / Goals / Homework):   Symptoms: Anxiety, confusion    Homework: N/A      Episode of Care Goals: Assessing     Current / Ongoing Stressors and Concerns:   Alcohol abuse in    Phase of life issues (full-time work and parent of young child)   Work demands   Family overseas     Treatment Objective(s) Addressed in This Session:   Assess current needs and symptoms   [Build and make use of skills for stress management]     Intervention:   This was my first meeting with Client in nearly 8 months. She stated that she has had an acute stressor recently--she learned that her  has been abusing alcohol for the past ~2 years. She reported feeling shocked, betrayed, hurt, angry, and anxious. She has not wanted to compromise his privacy or that of their family, so she has not been able to reach out to anyone for support. She stated that she is unsure how to proceed, is seeking support and resources. She denied any concerns about physical safety or violence. Provided contact information for [facility] to complete a CD assessment. Also suggested that Client consider [facility]. Offered some basic principles: Client's job is to manage her own emotions, not try to regulate her  or control his drinking (only he can do that); her time will be better spent on self-care than on monitoring or tracking her 's habits. Talked about a shift in thinking--letting go of any specific outcome (e.g., he has to stop drinking in order for me to be OK) and instead committing to  facing whatever comes up, understanding that she will be OK and she will make decisions in the best interest of her daughter, even if the situation does not lead to the outcome she prefers.     ASSESSMENT: Current Emotional / Mental Status (status of significant symptoms):   Risk status (Self / Other harm or suicidal ideation)   Client denies current fears or concerns for personal safety.   Client denies current or recent suicidal ideation or behaviors.   Client denies current or recent homicidal ideation or behaviors.   Client denies current or recent self injurious behavior or ideation.   Client denies other safety concerns.   A safety and risk management plan has not been developed at this time, however client was given the after-hours number / 911 should there be a change in any of these risk factors.     Appearance:   Appropriate    Eye Contact:   Good    Psychomotor Behavior: Normal    Attitude:   Cooperative     Orientation:   All   Speech    Rate / Production: Normal     Volume:  Normal    Mood:    Anxious    Affect:    Appropriate    Thought Content:  Frequent negative ruminations , inclination to focus on trying to manage her    Thought Form:  Coherent  Logical    Insight:    Fair      Medication Review:   No changes to current psychiatric medication(s) reported; Client has prescription for lorazepam, which she uses occasionally.     Medication Compliance:   Yes; infrequent use     Changes in Health Issues:   None reported     Chemical Use Review:   Substance Use: Chemical use reviewed, no active concerns identified      Tobacco Use: No current tobacco use.       Collateral Reports Completed:   Not Applicable    PLAN: (Client Tasks / Therapist Tasks / Other)  Provided contact information for CD assessment for  through [facility]. Encouraged Client to consider attendance at [facility] or concerned persons group. Client will focus on a few basic principles in the meantime: her job is to manage her own  emotions, not try to regulate her  or control his drinking; her time will be better spent on self-care than on monitoring or tracking her 's habits. Will help Client work toward a shift in thinking--letting go of any specific outcome (e.g., he has to stop drinking in order for me to be OK) and instead committing to facing whatever comes up, understanding that she will be OK and she will make decisions in the best interest of her daughter, even if the situation does not lead to the outcome she prefers. Return appointments scheduled. Client will contact me sooner if needed. Plan to review treatment plan next session.      Rosa Chowdary, KATHARINE                                                   _______________________________________________________________________    Treatment Plan    Client's Name: Ijeoma Avalos  YOB: 1965    Date: 2/9/17    DSM-V Diagnoses: Adjustment Disorders  309.28 (F43.23) With mixed anxiety and depressed mood  Psychosocial / Contextual Factors: physical symptoms; phase of life stressors (full-time work and parent of young child); work demands; family overseas  WHODAS: 16    Referral / Collaboration:  Referral to another professional/service is not indicated at this time.    Anticipated number of session or this episode of care: will re-evaluate every 90 days      MeasurableTreatment Goal(s) related to diagnosis / functional impairment(s)  Goal 1: Client will build skills for stress management.    I will know I've met my goal when my blood pressure is lower and my reflux symptoms have decreased.      Objective #A (Client Action)    Client will use at least 2 coping skills for anxiety management in the next 6 weeks.  Status: Continued - Date(s):10/10/17     Intervention(s)  Therapist will teach self-regulation strategies, CBT skills, mindfulness techniques.    Objective #B  Client will use cognitive strategies identified in therapy to challenge anxious  thoughts.  Status: Continued - Date(s):10/10/17     Intervention(s)  Therapist will teach cognitive strategies, provide education.      Goal 2: Client will increase self-confidence.    I will know I've met my goal when I think more positively about myself.      Objective #A (Client Action)    Status: Continued - Date(s):10/10/17     Client will increase assertive communication.    Intervention(s)  Therapist will teach assertiveness skills.    Objective #B  Client will Identify negative self-talk and behaviors: challenge core beliefs, myths, and actions.    Status: Continued - Date(s):10/10/17     Intervention(s)  Therapist will provide education, teach CBT skills.      Goal 3: Client will increase connection in close relationships.    I will know I've met my goal when I feel more close with loved ones.      Objective #A (Client Action)    Status: Continued - Date(s):10/10/17     Client will prioritize time for meaningful relationships.    Intervention(s)  Therapist will provide support and accountability.    Objective #B  Client will make use of effective interpersonal communication skills.    Status: Continued - Date(s):10/10/17     Intervention(s)  Therapist will teach communication skills.      Client has reviewed and agreed to the above plan.      Rosa Chowdary, KATHARINE  February 9, 2017

## 2018-12-17 NOTE — TELEPHONE ENCOUNTER
"Requested Prescriptions   Pending Prescriptions Disp Refills     levothyroxine (SYNTHROID/LEVOTHROID) 112 MCG tablet [Pharmacy Med Name: Levothyroxine Sodium Oral Tablet 112 MCG]  Last Written Prescription Date:  11/22/17  Last Fill Quantity: 90,  # refills: 3   Last office visit: 5/30/2018 with prescribing provider:  Roya   Future Office Visit:   Next 5 appointments (look out 90 days)    Dec 24, 2018  7:20 AM CST  [place] Dulce Hernandez [place]  [facility] [place] [address]  [phone]   Jan 02, 2019 11:30 AM CST  Return Visit with Rosa Chowdary LP  [facility] ([facility]) [address] [place] [id number]  [phone]   Feb 05, 2019  2:00 PM CST  Return Visit with Rosa Chowdary LP  [facility] ([facility]) [address] [place] [id number]  [phone]          30 tablet 2     Sig: TAKE ONE TABLET BY MOUTH ONE TIME DAILY    Thyroid Protocol Failed - 12/16/2018  7:00 AM       Failed - Normal TSH on file in past 12 months    Recent Labs   Lab Test 11/22/17  1641   TSH 0.63             Passed - Patient is 12 years or older       Passed - Recent (12 mo) or future (30 days) visit within the authorizing provider's specialty    Patient had office visit in the last 12 months or has a visit in the next 30 days with authorizing provider or within the authorizing provider's specialty.  See \"Patient Info\" tab in inbasket, or \"Choose Columns\" in Meds & Orders section of the refill encounter.             Passed - No active pregnancy on record    If patient is pregnant or has had a positive pregnancy test, please check TSH.         Passed - No positive pregnancy test in past 12 months    If patient is pregnant or has had a positive pregnancy test, please check TSH.            "

## 2018-12-24 ENCOUNTER — OFFICE VISIT (OUTPATIENT)
Dept: FAMILY MEDICINE | Facility: CLINIC | Age: 53
End: 2018-12-24
Payer: COMMERCIAL

## 2018-12-24 VITALS
TEMPERATURE: 98.5 F | HEIGHT: 60 IN | SYSTOLIC BLOOD PRESSURE: 132 MMHG | WEIGHT: 130 LBS | DIASTOLIC BLOOD PRESSURE: 88 MMHG | BODY MASS INDEX: 25.52 KG/M2 | OXYGEN SATURATION: 99 % | HEART RATE: 76 BPM

## 2018-12-24 DIAGNOSIS — E11.9 TYPE 2 DIABETES MELLITUS WITHOUT COMPLICATION, WITHOUT LONG-TERM CURRENT USE OF INSULIN (H): ICD-10-CM

## 2018-12-24 DIAGNOSIS — M25.561 RIGHT KNEE PAIN, UNSPECIFIED CHRONICITY: ICD-10-CM

## 2018-12-24 DIAGNOSIS — I10 ESSENTIAL HYPERTENSION: ICD-10-CM

## 2018-12-24 DIAGNOSIS — E03.8 OTHER SPECIFIED HYPOTHYROIDISM: ICD-10-CM

## 2018-12-24 DIAGNOSIS — E78.5 HYPERLIPIDEMIA LDL GOAL <130: ICD-10-CM

## 2018-12-24 DIAGNOSIS — Z00.00 ROUTINE HISTORY AND PHYSICAL EXAMINATION OF ADULT: Primary | ICD-10-CM

## 2018-12-24 LAB
CREAT UR-MCNC: 270 MG/DL
HBA1C MFR BLD: 6.2 % (ref 0–5.6)
MICROALBUMIN UR-MCNC: 55 MG/L
MICROALBUMIN/CREAT UR: 20.33 MG/G CR (ref 0–25)

## 2018-12-24 PROCEDURE — 99396 PREV VISIT EST AGE 40-64: CPT | Performed by: FAMILY MEDICINE

## 2018-12-24 PROCEDURE — 36415 COLL VENOUS BLD VENIPUNCTURE: CPT | Performed by: FAMILY MEDICINE

## 2018-12-24 PROCEDURE — 84443 ASSAY THYROID STIM HORMONE: CPT | Performed by: FAMILY MEDICINE

## 2018-12-24 PROCEDURE — 82043 UR ALBUMIN QUANTITATIVE: CPT | Performed by: FAMILY MEDICINE

## 2018-12-24 PROCEDURE — 80053 COMPREHEN METABOLIC PANEL: CPT | Performed by: FAMILY MEDICINE

## 2018-12-24 PROCEDURE — 84439 ASSAY OF FREE THYROXINE: CPT | Performed by: FAMILY MEDICINE

## 2018-12-24 PROCEDURE — 83036 HEMOGLOBIN GLYCOSYLATED A1C: CPT | Performed by: FAMILY MEDICINE

## 2018-12-24 PROCEDURE — 99213 OFFICE O/P EST LOW 20 MIN: CPT | Mod: 25 | Performed by: FAMILY MEDICINE

## 2018-12-24 PROCEDURE — 80061 LIPID PANEL: CPT | Performed by: FAMILY MEDICINE

## 2018-12-24 RX ORDER — METFORMIN HCL 500 MG
2000 TABLET, EXTENDED RELEASE 24 HR ORAL
Qty: 360 TABLET | Refills: 1 | Status: SHIPPED | OUTPATIENT
Start: 2018-12-24 | End: 2019-07-20

## 2018-12-24 RX ORDER — ATORVASTATIN CALCIUM 10 MG/1
10 TABLET, FILM COATED ORAL DAILY
Qty: 90 TABLET | Refills: 3 | Status: SHIPPED | OUTPATIENT
Start: 2018-12-24 | End: 2021-03-10

## 2018-12-24 RX ORDER — LISINOPRIL 5 MG/1
5 TABLET ORAL DAILY
Qty: 90 TABLET | Refills: 1 | Status: SHIPPED | OUTPATIENT
Start: 2018-12-24 | End: 2019-09-19

## 2018-12-24 RX ORDER — LEVOTHYROXINE SODIUM 112 UG/1
112 TABLET ORAL DAILY
Qty: 90 TABLET | Refills: 3 | Status: SHIPPED | OUTPATIENT
Start: 2018-12-24 | End: 2019-04-22

## 2018-12-24 ASSESSMENT — MIFFLIN-ST. JEOR: SCORE: 1126.18

## 2018-12-24 NOTE — PROGRESS NOTES
SUBJECTIVE:   CC: Ijeoma Avalos is an 53 year old woman who presents for preventive health visit.     Healthy Habits:    Do you get at least three servings of calcium containing foods daily (dairy, green leafy vegetables, etc.)? yes    Amount of exercise or daily activities, outside of work: 2 day(s) per week    Problems taking medications regularly No    Medication side effects: No    Have you had an eye exam in the past two years? yes    Do you see a dentist twice per year? yes    Do you have sleep apnea, excessive snoring or daytime drowsiness?no      PROBLEMS TO ADD ON...  Diabetes Follow-up      Patient is checking blood sugars: rarely.  Results range from 120 - 130 or less      Diabetic concerns: other - due for labs and need refill, on meds       Symptoms of hypoglycemia (low blood sugar): none     Paresthesias (numbness or burning in feet) or sores: No     Date of last diabetic eye exam: early this  year     Diabetes Management Resources    Hyperlipidemia Follow-Up      Rate your low fat/cholesterol diet?: good    Taking statin?  Yes, no muscle aches from statin    Other lipid medications/supplements?:  none    Hypertension Follow-up      Outpatient blood pressures are not being checked at home.  Results are good when she does     Low Salt Diet: no added salt    BP Readings from Last 2 Encounters:   12/24/18 132/88   05/30/18 104/70     Hemoglobin A1C (%)   Date Value   06/28/2018 5.9 (H)   02/21/2018 7.7 (H)     LDL Cholesterol Calculated (mg/dL)   Date Value   12/13/2017 126 (H)   11/07/2016 109 (H)       Today's PHQ-2 Score:   PHQ-2 ( 1999 [employer]) 12/24/2018 12/13/2017   Q1: Little interest or pleasure in doing things 0 0   Q2: Feeling down, depressed or hopeless 0 1   PHQ-2 Score 0 1   Q1: Little interest or pleasure in doing things - Not at all   Q2: Feeling down, depressed or hopeless - Several days   PHQ-2 Score - 1       Abuse: Current or Past(Physical, Sexual or Emotional)- No  Do you feel  safe in your environment? Yes    Social History     Tobacco Use     Smoking status: Never Smoker     Smokeless tobacco: Never Used   Substance Use Topics     Alcohol use: No     Alcohol/week: 0.0 oz     Comment: social     If you drink alcohol do you typically have >3 drinks per day or >7 drinks per week? No                     Reviewed orders with patient.  Reviewed health maintenance and updated orders accordingly - Yes  Patient Active Problem List   Diagnosis     Gastric acidity     Other specified hypothyroidism     Plantar fasciitis     Gastroesophageal reflux disease, esophagitis presence not specified     TMJ (temporomandibular joint syndrome)     Neck pain     Other headache syndrome     Uterine leiomyoma, unspecified location     Adjustment disorder with mixed anxiety and depressed mood     Low hemoglobin     Dysmenorrhea     Right knee pain, unspecified chronicity     Post-operative nausea and vomiting     Bilateral patellofemoral syndrome     New onset type 2 diabetes mellitus (H)     Essential hypertension     Hyperlipidemia with target LDL less than 100     Shoulder pain, right     Past Surgical History:   Procedure Laterality Date     APPENDECTOMY        SECTION  2014    Procedure:  SECTION;  Surgeon: Ru Cano MD;  Location:  [facility]     CHOLECYSTECTOMY       cyst removed      left  lower leg on bone sheath     DAVINCI HYSTERECTOMY TOTAL, SALPINGECTOMY BILATERAL N/A 2017    Procedure: DAVINCI HYSTERECTOMY TOTAL, SALPINGECTOMY BILATERAL;  DAVINCI SINGLE SITE HYSTERECTOMY, BILATERAL SALPINGECTOMY, LEFT OOPHORECTOMY, LYSIS OF ADHESIONS (LAPAROSCOPIC BAG TO RETREIVE OVARY);  Surgeon: Ru Cano MD;  Location:  OR     DAVINCI LYSIS OF ADHESIONS N/A 2017    Procedure: DAVINCI LYSIS OF ADHESIONS;;  Surgeon: Ru Cano MD;  Location:  OR     DAVINCI OOPHORECTOMY N/A 2017    Procedure: DAVINCI OOPHORECTOMY;;  Surgeon: Ru Cano MD;   Location: [facility]     GI SURGERY       MYOMECTOMY UTERUS  sept 2012     [facility] GASTROSCOPY,[place]         Social History     Tobacco Use     Smoking status: Never Smoker     Smokeless tobacco: Never Used   Substance Use Topics     Alcohol use: No     Alcohol/week: 0.0 oz     Comment: social     Family History   Problem Relation Age of Onset     Hypertension Father            Mammogram Screening: Patient over age 50, mutual decision to screen reflected in health maintenance.    Pertinent mammograms are reviewed under the imaging tab.  History of abnormal Pap smear: NO - age 30- 65 PAP every 3 years recommended  PAP / HPV Latest Ref Rng & Units 11/7/2016   PAP - OTHER-NIL, See Result   HPV 16 DNA NEG Negative   HPV 18 DNA NEG Negative   OTHER HR HPV NEG Negative     Reviewed and updated as needed this visit by clinical staff         Reviewed and updated as needed this visit by Provider        Past Medical History:   Diagnosis Date     Gastric acidity      Gestational diabetes mellitus     DIET CONTROL     Hx of previous reproductive problem     IVF     Hypothyroid      Motion sickness      Ovarian cyst      Post-operative nausea and vomiting 4/21/2017        ROS:  CONSTITUTIONAL: NEGATIVE for fever, chills, change in weight  INTEGUMENTARU/SKIN: NEGATIVE for worrisome rashes, moles or lesions  EYES: NEGATIVE for vision changes or irritation  ENT: NEGATIVE for ear, mouth and throat problems  RESP: NEGATIVE for significant cough or SOB  BREAST: NEGATIVE for masses, tenderness or discharge  CV: NEGATIVE for chest pain, palpitations or peripheral edema  GI: NEGATIVE for nausea, abdominal pain, heartburn, or change in bowel habits  : NEGATIVE for unusual urinary or vaginal symptoms. Periods are regular.  MUSCULOSKELETAL:NEGATIVE for significant arthralgias or myalgia and POSITIVE  for knee left   NEURO: NEGATIVE for weakness, dizziness or paresthesias  ENDOCRINE: as per HPI   HEME/ALLERGY/IMMUNE: NEGATIVE for bleeding  problems  PSYCHIATRIC: NEGATIVE for changes in mood or affect    OBJECTIVE:   LMP 04/14/2017   EXAM:  GENERAL: healthy, alert and no distress  EYES: Eyes grossly normal to inspection, PERRL and conjunctivae and sclerae normal  HENT: ear canals and TM's normal, nose and mouth without ulcers or lesions  NECK: no adenopathy, no asymmetry, masses, or scars and thyroid normal to palpation  RESP: lungs clear to auscultation - no rales, rhonchi or wheezes  BREAST: normal without masses, tenderness or nipple discharge and no palpable axillary masses or adenopathy  CV: regular rate and rhythm, normal S1 S2, no S3 or S4, no murmur, click or rub, no peripheral edema and peripheral pulses strong  ABDOMEN: soft, nontender, no hepatosplenomegaly, no masses and bowel sounds normal   (female): deferred  RECTAL: deferred  MS: no gross musculoskeletal defects noted, no edema  SKIN: no suspicious lesions or rashes  NEURO: Normal strength and tone, mentation intact and speech normal  PSYCH: mentation appears normal, affect normal/bright  Diabetic foot exam: normal DP and PT pulses, no trophic changes or ulcerative lesions and normal sensory exam        ASSESSMENT/PLAN:   (Z00.00) Routine history and physical examination of adult  (primary encounter diagnosis)  Comment:   Plan: GASTROENTEROLOGY ADULT REF PROCEDURE ONLY, *[facility]         Screening Digital Bilateral            (E78.5) Hyperlipidemia LDL goal <130  Comment:   Plan: Lipid panel reflex to direct LDL Fasting,         atorvastatin (LIPITOR) 10 MG tablet            (E03.8) Other specified hypothyroidism  Comment:   Plan:TSH         WITH FREE T4 REFLEX,   levothyroxine (SYNTHROID/LEVOTHROID) 112 MCG         tablet        Check labs. adjust med's if needed     (I10) Essential hypertension  Comment:   Plan: COMPREHENSIVE METABOLIC PANEL, lisinopril         (PRINIVIL/ZESTRIL) 5 MG tablet            (E11.9) Type 2 diabetes mellitus without complication, without long-term current use of  insulin (H)  Comment:   Plan: HEMOGLOBIN A1C, COMPREHENSIVE METABOLIC PANEL,         Lipid panel reflex to direct LDL Fasting, TSH         WITH FREE T4 REFLEX, lisinopril         (PRINIVIL/ZESTRIL) 5 MG tablet, metFORMIN         (GLUCOPHAGE-XR) 500 MG 24 hr tablet, Albumin         Random Urine Quantitative with Creat Ratio         Discussed cares, diet/ exercise . Talked about DM management , health risk etc. gave refill on meds. Check lab, call pt with results. . Follow up as needed          (M25.561) Right knee pain, unspecified chronicity  Comment:   Plan: ORTHOPEDICS ADULT REFERRAL           discussed knee cares and symptomatic treatment including  adequate pain control, heat,  stretches etc  . Referral given to ortho. she will do follow up if  problem.         COUNSELING:   Reviewed preventive health counseling, as reflected in patient instructions       Regular exercise       Healthy diet/nutrition       Vision screening       Immunizations    Declined: Influenza due to Other does not want it                Aspirin Prophylaxsis       Osteoporosis Prevention/Bone Health       Colon cancer screening       HIV screeninx in teen years, 1x in adult years, and at intervals if high risk    BP Readings from Last 1 Encounters:   18 104/70     Estimated body mass index is 25 kg/m  as calculated from the following:    Height as of 18: 1.524 m (5').    Weight as of 18: 58.1 kg (128 lb).      Weight management plan: Discussed healthy diet and exercise guidelines     reports that  has never smoked. she has never used smokeless tobacco.      Counseling Resources:  ATP IV Guidelines  Pooled Cohorts Equation Calculator  Breast Cancer Risk Calculator  FRAX Risk Assessment  ICSI Preventive Guidelines  Dietary Guidelines for Americans,   USDA's MyPlate  ASA Prophylaxis  Lung CA Screening    Dulce Hernandez MD  [facility] [place]

## 2018-12-25 LAB
ALBUMIN SERPL-MCNC: 4.1 G/DL (ref 3.4–5)
ALP SERPL-CCNC: 56 U/L (ref 40–150)
ALT SERPL W P-5'-P-CCNC: 21 U/L (ref 0–50)
ANION GAP SERPL CALCULATED.3IONS-SCNC: 8 MMOL/L (ref 3–14)
AST SERPL W P-5'-P-CCNC: 14 U/L (ref 0–45)
BILIRUB SERPL-MCNC: 0.7 MG/DL (ref 0.2–1.3)
BUN SERPL-MCNC: 14 MG/DL (ref 7–30)
CALCIUM SERPL-MCNC: 9.1 MG/DL (ref 8.5–10.1)
CHLORIDE SERPL-SCNC: 106 MMOL/L (ref 94–109)
CHOLEST SERPL-MCNC: 124 MG/DL
CO2 SERPL-SCNC: 25 MMOL/L (ref 20–32)
CREAT SERPL-MCNC: 0.67 MG/DL (ref 0.52–1.04)
GFR SERPL CREATININE-BSD FRML MDRD: >90 ML/MIN/{1.73_M2}
GLUCOSE SERPL-MCNC: 104 MG/DL (ref 70–99)
HDLC SERPL-MCNC: 60 MG/DL
LDLC SERPL CALC-MCNC: 49 MG/DL
NONHDLC SERPL-MCNC: 64 MG/DL
POTASSIUM SERPL-SCNC: 4.1 MMOL/L (ref 3.4–5.3)
PROT SERPL-MCNC: 7.7 G/DL (ref 6.8–8.8)
SODIUM SERPL-SCNC: 139 MMOL/L (ref 133–144)
T4 FREE SERPL-MCNC: 1.78 NG/DL (ref 0.76–1.46)
TRIGL SERPL-MCNC: 76 MG/DL
TSH SERPL DL<=0.005 MIU/L-ACNC: 0.06 MU/L (ref 0.4–4)

## 2018-12-26 DIAGNOSIS — E03.8 OTHER SPECIFIED HYPOTHYROIDISM: ICD-10-CM

## 2018-12-26 RX ORDER — LEVOTHYROXINE SODIUM 100 UG/1
100 TABLET ORAL DAILY
Qty: 90 TABLET | Refills: 3 | Status: CANCELLED | OUTPATIENT
Start: 2018-12-26 | End: 2019-12-26

## 2019-01-02 ENCOUNTER — OFFICE VISIT (OUTPATIENT)
Dept: PSYCHOLOGY | Facility: CLINIC | Age: 54
End: 2019-01-02
Payer: COMMERCIAL

## 2019-01-02 DIAGNOSIS — F41.9 ANXIETY DISORDER: Primary | ICD-10-CM

## 2019-01-02 DIAGNOSIS — E03.8 OTHER SPECIFIED HYPOTHYROIDISM: Primary | ICD-10-CM

## 2019-01-02 DIAGNOSIS — F43.23 ADJUSTMENT DISORDER WITH MIXED ANXIETY AND DEPRESSED MOOD: ICD-10-CM

## 2019-01-02 PROCEDURE — 90834 PSYTX W PT 45 MINUTES: CPT | Performed by: PSYCHOLOGIST

## 2019-01-02 RX ORDER — LEVOTHYROXINE SODIUM 100 UG/1
100 TABLET ORAL DAILY
Qty: 90 TABLET | Refills: 0 | Status: CANCELLED | OUTPATIENT
Start: 2019-01-02 | End: 2020-01-02

## 2019-01-02 RX ORDER — LEVOTHYROXINE SODIUM 100 UG/1
100 TABLET ORAL DAILY
Qty: 90 TABLET | Refills: 3 | Status: CANCELLED | OUTPATIENT
Start: 2019-01-02 | End: 2020-01-02

## 2019-01-02 NOTE — PROGRESS NOTES
"                                             Progress Note    Client Name: Ijeoma Avalos  Date: 1/2/19       Service Type: Individual      Session Start Time: 11:30  Session End Time: 12:25      Session Length: 55     Session #: 2 (this episode of care)     Attendees: Client attended alone    Treatment Plan Last Reviewed: 2/9/17  PHQ-9 / SOSA-7 Last Reviewed: 9/25/17     DATA      Progress Since Last Session (Related to Symptoms / Goals / Homework):   Symptoms: Anxiety, frustration - improving    Homework: Good progress      Episode of Care Goals: Assessing     Current / Ongoing Stressors and Concerns:   Alcohol abuse in    Phase of life issues (full-time work and parent of young child)   Work demands   Family overseas     Treatment Objective(s) Addressed in This Session:   Assess current needs and symptoms   [Build and make use of skills for stress management]     Intervention:   Continued work on acute family stressor--recent revelation that her  has been abusing alcohol. Client stated that she was more successful than she initially anticipated in staying focused on managing herself rather than trying to monitor or control her . She believes that he has indeed stopped drinking, and this has reduced her anxiety somewhat, although her belief is that the underlying issues/causes have not yet been identified or addressed. Discussed what she has been doing to take care of herself and manage her anxiety. She has allowed herself to confide in 2 close friends, and this has been helpful. Talked more generally about first steps Client might take if her goal is to feel more connected to her . Used the \"love language\" framework as one possibility. Also discussed a particular recurrent stressful interaction at work. Helped Client articulate a phrase she can use if she feels she is being treated rudely (e.g., you may not be aware of this, but you are yelling at me/speaking rudely to me). " Revisited the difference between assertive and aggressive communication.     ASSESSMENT: Current Emotional / Mental Status (status of significant symptoms):   Risk status (Self / Other harm or suicidal ideation)   Client denies current fears or concerns for personal safety.   Client denies current or recent suicidal ideation or behaviors.   Client denies current or recent homicidal ideation or behaviors.   Client denies current or recent self injurious behavior or ideation.   Client denies other safety concerns.   A safety and risk management plan has not been developed at this time, however client was given the after-hours number / 911 should there be a change in any of these risk factors.     Appearance:   Appropriate    Eye Contact:   Good    Psychomotor Behavior: Normal    Attitude:   Cooperative     Orientation:   All   Speech    Rate / Production: Normal     Volume:  Normal    Mood:    Anxious --slightly reduced   Affect:    Appropriate    Thought Content:  Frequent negative ruminations , better ability to refocus on what is within her control   Thought Form:  Coherent  Logical    Insight:    Fair      Medication Review:   No changes to current psychiatric medication(s) reported; Client has prescription for lorazepam, which she uses occasionally.     Medication Compliance:   Yes; infrequent use     Changes in Health Issues:   None reported     Chemical Use Review:   Substance Use: Chemical use reviewed, no active concerns identified      Tobacco Use: No current tobacco use.       Collateral Reports Completed:   Not Applicable    PLAN: (Client Tasks / Therapist Tasks / Other)  Support Client in good self-care and reaching out to her support network during a stressful time. She will maintain focus on managing her own emotions, rather than trying to regulate her  or control his drinking. Continue to encourage Client to consider attendance at [facility] or concerned persons group. Will help Client work toward  a shift in thinking--letting go of any specific outcome (e.g., he has to stop drinking in order for me to be OK) and instead committing to facing whatever comes up, understanding that she will be OK and she will make decisions in the best interest of her daughter, even if the situation does not lead to the outcome she prefers. Identified a specific phrase she can use in a predictable challenging situation at work (e.g., you may not be aware of this, but you are yelling at me/speaking rudely to me), reminding herself that assertive communication is effective. Return appointments scheduled. Client will contact me sooner if needed. Plan to review treatment plan next session.      Rosa Chowdary, KATHARINE                                                   _______________________________________________________________________    Treatment Plan    Client's Name: Ijeoma Avalos  YOB: 1965    Date: 2/9/17    DSM-V Diagnoses: Adjustment Disorders  309.28 (F43.23) With mixed anxiety and depressed mood  Psychosocial / Contextual Factors: physical symptoms; phase of life stressors (full-time work and parent of young child); work demands; family overseas  WHODAS: 16    Referral / Collaboration:  Referral to another professional/service is not indicated at this time.    Anticipated number of session or this episode of care: will re-evaluate every 90 days      MeasurableTreatment Goal(s) related to diagnosis / functional impairment(s)  Goal 1: Client will build skills for stress management.    I will know I've met my goal when my blood pressure is lower and my reflux symptoms have decreased.      Objective #A (Client Action)    Client will use at least 2 coping skills for anxiety management in the next 6 weeks.  Status: Continued - Date(s):10/10/17     Intervention(s)  Therapist will teach self-regulation strategies, CBT skills, mindfulness techniques.    Objective #B  Client will use cognitive strategies identified in  therapy to challenge anxious thoughts.  Status: Continued - Date(s):10/10/17     Intervention(s)  Therapist will teach cognitive strategies, provide education.      Goal 2: Client will increase self-confidence.    I will know I've met my goal when I think more positively about myself.      Objective #A (Client Action)    Status: Continued - Date(s):10/10/17     Client will increase assertive communication.    Intervention(s)  Therapist will teach assertiveness skills.    Objective #B  Client will Identify negative self-talk and behaviors: challenge core beliefs, myths, and actions.    Status: Continued - Date(s):10/10/17     Intervention(s)  Therapist will provide education, teach CBT skills.      Goal 3: Client will increase connection in close relationships.    I will know I've met my goal when I feel more close with loved ones.      Objective #A (Client Action)    Status: Continued - Date(s):10/10/17     Client will prioritize time for meaningful relationships.    Intervention(s)  Therapist will provide support and accountability.    Objective #B  Client will make use of effective interpersonal communication skills.    Status: Continued - Date(s):10/10/17     Intervention(s)  Therapist will teach communication skills.      Client has reviewed and agreed to the above plan.      Rosa Chowdary, KATHARINE  February 9, 2017

## 2019-01-07 ENCOUNTER — NURSE TRIAGE (OUTPATIENT)
Dept: FAMILY MEDICINE | Facility: CLINIC | Age: 54
End: 2019-01-07

## 2019-01-07 DIAGNOSIS — E03.8 OTHER SPECIFIED HYPOTHYROIDISM: Primary | ICD-10-CM

## 2019-01-07 RX ORDER — LEVOTHYROXINE SODIUM 100 UG/1
100 TABLET ORAL DAILY
Qty: 90 TABLET | Refills: 0 | Status: SHIPPED | OUTPATIENT
Start: 2019-01-07 | End: 2019-04-12

## 2019-01-07 NOTE — TELEPHONE ENCOUNTER
Detailed message left with info from provider and return number to call with any questions or concerns.    Janna LITTLE RN  EP Triage

## 2019-01-07 NOTE — TELEPHONE ENCOUNTER
New Script  faxed. Remind pt to do follow up for thyroid follow up and  recheck in 2-3 months  after being on new dose

## 2019-01-08 ENCOUNTER — TRANSFERRED RECORDS (OUTPATIENT)
Dept: HEALTH INFORMATION MANAGEMENT | Facility: CLINIC | Age: 54
End: 2019-01-08

## 2019-02-05 ENCOUNTER — OFFICE VISIT (OUTPATIENT)
Dept: PSYCHOLOGY | Facility: CLINIC | Age: 54
End: 2019-02-05
Payer: COMMERCIAL

## 2019-02-05 DIAGNOSIS — F41.9 ANXIETY DISORDER: Primary | ICD-10-CM

## 2019-02-05 DIAGNOSIS — F43.23 ADJUSTMENT DISORDER WITH MIXED ANXIETY AND DEPRESSED MOOD: ICD-10-CM

## 2019-02-05 PROCEDURE — 90834 PSYTX W PT 45 MINUTES: CPT | Performed by: PSYCHOLOGIST

## 2019-02-05 NOTE — PROGRESS NOTES
Progress Note    Client Name: Ijeoma Avalos  Date: 2/5/19       Service Type: Individual      Session Start Time: 14:00  Session End Time: 14:50      Session Length: 50     Session #: 3 (this episode of care)     Attendees: Client attended alone    Treatment Plan Last Reviewed: 2/9/17  PHQ-9 / SOSA-7 Last Reviewed: 9/25/17     DATA      Progress Since Last Session (Related to Symptoms / Goals / Homework):   Symptoms: Anxiety, frustration - improving    Homework: Good progress      Episode of Care Goals: Assessing     Current / Ongoing Stressors and Concerns:   Alcohol abuse in    Phase of life issues (full-time work and parent of young child)   Work demands   Family overseas     Treatment Objective(s) Addressed in This Session:   Assess current needs and symptoms   [Build and make use of skills for stress management]     Intervention:   Session focused on Client's coping with concerns about 's alcohol use. She is doing well with staying focused on managing her own feelings, and she has kept up well with self-care. I offered reinforcement for her ability to maintain her own healthy behaviors, not use the increased stress as a reason to slide on her own goals. Client observed that she is improving in her ability to tolerate issues beyond her control. She acknowledges some continued disconnection with her , although she believes they are functioning well in everyday life. She would like to take steps to bridge this gap. Challenged her to consider what she might ask for from him or what actions she can take herself in order to rebuild that feeling of closeness. Client was happy to share that a short story she submitted has been selected for publication. Writing continues to be an important outlet.     ASSESSMENT: Current Emotional / Mental Status (status of significant symptoms):   Risk status (Self / Other harm or suicidal ideation)   Client denies  current fears or concerns for personal safety.   Client denies current or recent suicidal ideation or behaviors.   Client denies current or recent homicidal ideation or behaviors.   Client denies current or recent self injurious behavior or ideation.   Client denies other safety concerns.   A safety and risk management plan has not been developed at this time, however client was given the after-hours number / 911 should there be a change in any of these risk factors.     Appearance:   Appropriate    Eye Contact:   Good    Psychomotor Behavior: Normal    Attitude:   Engaged, open     Orientation:   All   Speech    Rate / Production: Normal     Volume:  Normal    Mood:    Improved--reduced anxiety    Affect:    Appropriate    Thought Content:  Improved balance , better ability to refocus on what is within her control   Thought Form:  Coherent  Logical    Insight:    Fair      Medication Review:   No changes to current psychiatric medication(s) reported; Client has prescription for lorazepam, which she uses occasionally.     Medication Compliance:   Yes; infrequent use     Changes in Health Issues:   None reported     Chemical Use Review:   Substance Use: Chemical use reviewed, no active concerns identified      Tobacco Use: No current tobacco use.       Collateral Reports Completed:   Not Applicable    PLAN: (Client Tasks / Therapist Tasks / Other)  Client will consider what she might ask for from her  or what actions she can take on her own that would allow her to feel more connected in the marriage. Encouraged continued focus on managing her own emotions, rather than trying to regulate her  or control his drinking. Will help Client work toward a shift in thinking--letting go of any specific outcome (e.g., he has to stop drinking in order for me to be OK) and instead committing to facing whatever comes up, understanding that she will be OK and she will make decisions in the best interest of her  daughter, even if the situation does not lead to the outcome she prefers. Return appointments scheduled. Client will contact me sooner if needed. Plan to review treatment plan next session.      Rosa MILLERMikie Chowdary, LP                                                   _______________________________________________________________________    Treatment Plan    Client's Name: Ijeoma Avalos  YOB: 1965    Date: 2/9/17    DSM-V Diagnoses: Adjustment Disorders  309.28 (F43.23) With mixed anxiety and depressed mood  Psychosocial / Contextual Factors: physical symptoms; phase of life stressors (full-time work and parent of young child); work demands; family overseas  WHODAS: 16    Referral / Collaboration:  Referral to another professional/service is not indicated at this time.    Anticipated number of session or this episode of care: will re-evaluate every 90 days      MeasurableTreatment Goal(s) related to diagnosis / functional impairment(s)  Goal 1: Client will build skills for stress management.    I will know I've met my goal when my blood pressure is lower and my reflux symptoms have decreased.      Objective #A (Client Action)    Client will use at least 2 coping skills for anxiety management in the next 6 weeks.  Status: Continued - Date(s):10/10/17     Intervention(s)  Therapist will teach self-regulation strategies, CBT skills, mindfulness techniques.    Objective #B  Client will use cognitive strategies identified in therapy to challenge anxious thoughts.  Status: Continued - Date(s):10/10/17     Intervention(s)  Therapist will teach cognitive strategies, provide education.      Goal 2: Client will increase self-confidence.    I will know I've met my goal when I think more positively about myself.      Objective #A (Client Action)    Status: Continued - Date(s):10/10/17     Client will increase assertive communication.    Intervention(s)  Therapist will teach assertiveness skills.    Objective  #B  Client will Identify negative self-talk and behaviors: challenge core beliefs, myths, and actions.    Status: Continued - Date(s):10/10/17     Intervention(s)  Therapist will provide education, teach CBT skills.      Goal 3: Client will increase connection in close relationships.    I will know I've met my goal when I feel more close with loved ones.      Objective #A (Client Action)    Status: Continued - Date(s):10/10/17     Client will prioritize time for meaningful relationships.    Intervention(s)  Therapist will provide support and accountability.    Objective #B  Client will make use of effective interpersonal communication skills.    Status: Continued - Date(s):10/10/17     Intervention(s)  Therapist will teach communication skills.      Client has reviewed and agreed to the above plan.      Rosa Chowdary LP  February 9, 2017

## 2019-04-12 DIAGNOSIS — E03.8 OTHER SPECIFIED HYPOTHYROIDISM: ICD-10-CM

## 2019-04-12 DIAGNOSIS — Z12.11 COLON CANCER SCREENING: Primary | ICD-10-CM

## 2019-04-12 RX ORDER — LEVOTHYROXINE SODIUM 100 UG/1
TABLET ORAL
Qty: 30 TABLET | Refills: 0 | Status: SHIPPED | OUTPATIENT
Start: 2019-04-12 | End: 2019-09-13

## 2019-04-12 NOTE — LETTER
April 16, 2019      Ijeoma Avalos  [address]  [place] [address]        Dear Ijeoma,    I care about your health and have reviewed your health plan. I have reviewed your medical conditions, medication list, and lab results and am making recommendations based on this review, to better manage your health.    You are in particular need of attention regarding:  -Thyroid    I am recommending that you:  -schedule a LAB ONLY APPOINTMENT to recheck your: TSH (thyroid test) test within the next 1-4 weeks.    Here is a list of Health Maintenance topics that are due now or due soon:  Health Maintenance Due   Topic Date Due     Colon Cancer Screening - FIT Test - yearly  12/02/1977     Zoster (Shingles) Vaccine (1 of 2) 12/02/2017     Depression Assessment - every 6 months  08/21/2018     Flu Vaccine (1) 09/01/2018     Mammogram - every 2 years  11/22/2018     Diptheria Tetanus Pertussis (DTAP/TDAP/TD) Vaccine (2 - Td) 01/01/2019     Eye Exam - yearly  01/04/2019       Please call us at [phone] (or use i[employer]) to address the above recommendations.     Thank you for trusting [facility] and we appreciate the opportunity to serve you.  We look forward to supporting your healthcare needs in the future.    Healthy Regards,    Dulce Hernandez MD

## 2019-04-12 NOTE — TELEPHONE ENCOUNTER
Patient needs to have her labs rechecked. I have filled 30 days. Routing to Dr. Hernandez to order TSH/T4.     Routing to team to schedule patient for a lab appointment.

## 2019-04-12 NOTE — TELEPHONE ENCOUNTER
Gino message sent.      .Clementine MILLER    [facility] [place]

## 2019-04-12 NOTE — TELEPHONE ENCOUNTER
"Requested Prescriptions   Pending Prescriptions Disp Refills     levothyroxine (SYNTHROID/LEVOTHROID) 100 MCG tablet [Pharmacy Med Name: Levothyroxine Sodium Oral Tablet 100 MCG] 30 tablet 0     Sig: TAKE 1 TABLET BY MOUTH ONCE DAILY  Last Written Prescription Date:  1/7/19  Last Fill Quantity: 90,  # refills: 0   Last office visit: 12/24/2018 with prescribing provider:  David   Future Office Visit:   Next 5 appointments (look out 90 days)    May 10, 2019  9:00 AM CDT  Return Visit with Rosa Chowdary LP  [facility] [place] ([facility]) [address] [address]  [phone]                Thyroid Protocol Failed - 4/12/2019  7:00 AM        Failed - Normal TSH on file in past 12 months     Recent Labs   Lab Test 12/24/18  0806   TSH 0.06*              Passed - Patient is 12 years or older        Passed - Recent (12 mo) or future (30 days) visit within the authorizing provider's specialty     Patient had office visit in the last 12 months or has a visit in the next 30 days with authorizing provider or within the authorizing provider's specialty.  See \"Patient Info\" tab in inbasket, or \"Choose Columns\" in Meds & Orders section of the refill encounter.              Passed - Medication is active on med list        Passed - No active pregnancy on record       If patient is pregnant or has had a positive pregnancy test, please check TSH.          Passed - No positive pregnancy test in past 12 months     If patient is pregnant or has had a positive pregnancy test, please check TSH.            "

## 2019-04-12 NOTE — TELEPHONE ENCOUNTER
Script refill faxed. Remind pt to do follow up for lab only  Appointment, as she needs to do f/u thyroid check, since last one was out of range.  also due of ca -colon screen, so order placed for FIT , so she should complete that as well .   Thanks

## 2019-04-12 NOTE — TELEPHONE ENCOUNTER
Routing refill request to provider for review/approval because:  Labs out of range:  TSH     Margie KHANN, RN   [facility]

## 2019-04-15 NOTE — TELEPHONE ENCOUNTER
Routing to team to inform and assist in scheduling.   Aura Wright RN   [facility] - Triage

## 2019-04-16 NOTE — TELEPHONE ENCOUNTER
Clementine Moise contacted [facility] on 04/16/19 and left a message. If patient calls back please schedule appointment in 1 month for a lab visit.        .Clementine MILLER    [facility] [place]

## 2019-04-22 ENCOUNTER — OFFICE VISIT (OUTPATIENT)
Dept: FAMILY MEDICINE | Facility: CLINIC | Age: 54
End: 2019-04-22
Payer: COMMERCIAL

## 2019-04-22 VITALS
HEART RATE: 76 BPM | RESPIRATION RATE: 14 BRPM | DIASTOLIC BLOOD PRESSURE: 88 MMHG | OXYGEN SATURATION: 98 % | HEIGHT: 60 IN | TEMPERATURE: 97.3 F | WEIGHT: 136 LBS | SYSTOLIC BLOOD PRESSURE: 138 MMHG | BODY MASS INDEX: 26.7 KG/M2

## 2019-04-22 DIAGNOSIS — E11.9 NEW ONSET TYPE 2 DIABETES MELLITUS (H): ICD-10-CM

## 2019-04-22 DIAGNOSIS — H81.12 BENIGN PAROXYSMAL POSITIONAL VERTIGO OF LEFT EAR: Primary | ICD-10-CM

## 2019-04-22 PROCEDURE — 99214 OFFICE O/P EST MOD 30 MIN: CPT | Performed by: PHYSICIAN ASSISTANT

## 2019-04-22 RX ORDER — ONDANSETRON 4 MG/1
4 TABLET, FILM COATED ORAL EVERY 8 HOURS PRN
Qty: 20 TABLET | Refills: 0 | Status: SHIPPED | OUTPATIENT
Start: 2019-04-22 | End: 2019-09-13

## 2019-04-22 RX ORDER — MECLIZINE HYDROCHLORIDE 25 MG/1
25 TABLET ORAL 3 TIMES DAILY PRN
Qty: 30 TABLET | Refills: 0 | Status: SHIPPED | OUTPATIENT
Start: 2019-04-22 | End: 2019-09-13

## 2019-04-22 ASSESSMENT — MIFFLIN-ST. JEOR: SCORE: 1153.39

## 2019-04-22 NOTE — PROGRESS NOTES
"  SUBJECTIVE:   Ijeoma Avalos is a 53 year old female who presents to clinic today for the following   health issues:  =    Dizziness      Duration: today    Description   Feeling faint:  no   Feeling like the surroundings are moving: no   Loss of consciousness or falls: no     Intensity:  severe    Accompanying signs and symptoms:   Nausea/vomitting: no   Palpitations: no   Weakness in arms or legs: no   Vision or speech changes: no   Ringing in ears (Tinnitus): no   Hearing loss related to dizziness: no   Other (fevers/chills/sweating/dyspnea): no     History (similar episodes/head trauma/previous evaluation/recent bleeding): YES;was an ear infection the    Precipitating or alleviating factors (new meds/chemicals): None  Worse with activity/head movement: YES    Therapies tried and outcome: None      Ijeoma presents to the clinic for evaluation of dizziness that she awoke with this morning.  Over the past several days she has been experiencing a pressure sensation in her ears.  Yesterday she noticed a sore throat and some swollen glands in her neck.  Today when she awoke she began to feel dizziness when she got up from bed and with moving her head to the left.  She describes her dizziness as feeling like she is \" on a boat\" .  The sensation is constant, but worse with head movement and walking.  She has no headache, vomiting, changes in vision, CP SOB or weakness of her extremities. Of note, last week she traveled in an airplane.  She does not recall any specific ear pain with travel.        Additional history: as documented    Reviewed  and updated as needed this visit by clinical staff         Reviewed and updated as needed this visit by Provider         Patient Active Problem List   Diagnosis     Gastric acidity     Other specified hypothyroidism     Plantar fasciitis     Gastroesophageal reflux disease, esophagitis presence not specified     TMJ (temporomandibular joint syndrome)     Neck pain     Other " headache syndrome     Uterine leiomyoma, unspecified location     Adjustment disorder with mixed anxiety and depressed mood     Low hemoglobin     Dysmenorrhea     Right knee pain, unspecified chronicity     Post-operative nausea and vomiting     Bilateral patellofemoral syndrome     New onset type 2 diabetes mellitus (H)     Essential hypertension     Hyperlipidemia with target LDL less than 100     Shoulder pain, right     Past Surgical History:   Procedure Laterality Date     APPENDECTOMY        SECTION  2014    Procedure:  SECTION;  Surgeon: Ru Cano MD;  Location:  [facility]     CHOLECYSTECTOMY       cyst removed      left  lower leg on bone sheath     DAVINCI HYSTERECTOMY TOTAL, SALPINGECTOMY BILATERAL N/A 2017    Procedure: DAVINCI HYSTERECTOMY TOTAL, SALPINGECTOMY BILATERAL;  DAVINCI SINGLE SITE HYSTERECTOMY, BILATERAL SALPINGECTOMY, LEFT OOPHORECTOMY, LYSIS OF ADHESIONS (LAPAROSCOPIC BAG TO RETREIVE OVARY);  Surgeon: Ru Cano MD;  Location: [facility]     DAVINCI LYSIS OF ADHESIONS N/A 2017    Procedure: DAVINCI LYSIS OF ADHESIONS;;  Surgeon: Ru Cano MD;  Location:  OR     DAVINCI OOPHORECTOMY N/A 2017    Procedure: DAVINCI OOPHORECTOMY;;  Surgeon: Ru Cano MD;  Location:  OR     GI SURGERY       MYOMECTOMY UTERUS  2012     ZZ GASTROSCOPY,FL         Social History     Tobacco Use     Smoking status: Never Smoker     Smokeless tobacco: Never Used   Substance Use Topics     Alcohol use: No     Alcohol/week: 0.0 oz     Comment: social     Family History   Problem Relation Age of Onset     Hypertension Father          Current Outpatient Medications   Medication Sig Dispense Refill     atorvastatin (LIPITOR) 10 MG tablet Take 1 tablet (10 mg) by mouth daily 90 tablet 3     blood glucose monitoring (NO BRAND SPECIFIED) meter device kit Use to test blood sugar  2-3  times daily or as directed. 1 kit 0     blood glucose monitoring  (NO BRAND SPECIFIED) test strip Use to test blood sugar 1-2  times daily or as directed. 1 Box 11     blood glucose monitoring (ONE TOUCH DELICA) lancets Use to test blood sugars  1-2 times daily or as directed. 100 each 11     Glucosamine-Chondroitin--200-150 MG TABS        levothyroxine (SYNTHROID/LEVOTHROID) 100 MCG tablet TAKE 1 TABLET BY MOUTH ONCE DAILY 30 tablet 0     lisinopril (PRINIVIL/ZESTRIL) 5 MG tablet Take 1 tablet (5 mg) by mouth daily 90 tablet 1     meclizine (ANTIVERT) 25 MG tablet Take 1 tablet (25 mg) by mouth 3 times daily as needed for dizziness 30 tablet 0     metFORMIN (GLUCOPHAGE-XR) 500 MG 24 hr tablet Take 4 tablets (2,000 mg) by mouth daily (with dinner) 360 tablet 1     Multiple Vitamins-Minerals (MULTIVITAMIN PO) Take by mouth daily       omeprazole (PRILOSEC) 40 MG capsule Take 1 capsule (40 mg) by mouth daily 90 capsule 2     ondansetron (ZOFRAN) 4 MG tablet Take 1 tablet (4 mg) by mouth every 8 hours as needed for nausea 20 tablet 0     Allergies   Allergen Reactions     Cephalexin Hives     Hives on hands, face and stomach.        ROS:  Constitutional, HEENT, cardiovascular, pulmonary, GI, , musculoskeletal, neuro, skin, endocrine and psych systems are negative, except as otherwise noted.    OBJECTIVE:     /88   Pulse 76   Temp 97.3  F (36.3  C) (Tympanic)   Resp 14   Ht 1.524 m (5')   Wt 61.7 kg (136 lb)   LMP 04/14/2017   SpO2 98%   BMI 26.56 kg/m    Body mass index is 26.56 kg/m .  GENERAL: healthy, alert and no distress  EYES: Eyes grossly normal to inspection, PERRL and conjunctivae and sclerae normal, EOMI  HENT: ear canals and TM's normal, nose and mouth without ulcers or lesions  NECK: no adenopathy  RESP: lungs clear to auscultation - no rales, rhonchi or wheezes  CV: regular rate and rhythm, normal S1 S2, no S3 or S4, no murmur  MS: no gross musculoskeletal defects noted, no edema  NEURO: Normal strength and tone, mentation intact and speech normal,  + reproducible dizziness with bianca padilla-pike, no nystagmus  PSYCH: mentation appears normal, affect normal/bright    Diagnostic Test Results:  none     ASSESSMENT/PLAN:       1. Benign paroxysmal positional vertigo of left ear  Symptoms today are consistent with BPPV vs. labyrinthitis secondary to recent air travel vs. Viral syndrome. Provided instruction on how to perform the Epley Manuevar and encouraged her to do this several times per day until symptom improvement.  Also provided Meclizine/zofran for symptom control. Today she is neuro intact without any acute/worrisome findings. She should follow up at the end if the week if symptoms persist, sooner f worsening.   - meclizine (ANTIVERT) 25 MG tablet; Take 1 tablet (25 mg) by mouth 3 times daily as needed for dizziness  Dispense: 30 tablet; Refill: 0  - ondansetron (ZOFRAN) 4 MG tablet; Take 1 tablet (4 mg) by mouth every 8 hours as needed for nausea  Dispense: 20 tablet; Refill: 0    2. New onset type 2 diabetes mellitus (H)  - **A1C FUTURE anytime; Future    Follow-Up: As above      David Waldrop PA-C  [facility]

## 2019-04-27 DIAGNOSIS — E11.9 NEW ONSET TYPE 2 DIABETES MELLITUS (H): ICD-10-CM

## 2019-04-29 RX ORDER — LANCETS 33 GAUGE
EACH MISCELLANEOUS
Qty: 100 EACH | Refills: 7 | Status: SHIPPED | OUTPATIENT
Start: 2019-04-29 | End: 2022-07-05

## 2019-04-29 NOTE — TELEPHONE ENCOUNTER
"Requested Prescriptions   Pending Prescriptions Disp Refills     ONETOUCH DELICA LANCETS 33G MISC [Pharmacy Med Name: OneTouch Delica Lancets 33G Miscellaneous] 100 each 7     Sig: USE TO TEST BLOOD GLUCOSE 1 TO 2 TIMES DAILY OR AS DIRECTED  Last Written Prescription Date:  12/19/17  Last Fill Quantity: 100,  # refills: 11   Last office visit: 4/22/2019 with prescribing provider:  Benjie Lomeli Office Visit:   Next 5 appointments (look out 90 days)    May 01, 2019  8:00 AM CDT  Lab visit with [facility] LAB  [facility] ([address] [address]  [phone]   May 10, 2019  9:00 AM CDT  Return Visit with Rosa Chowdary LP  [facility] ([address] [address]  [phone]                Diabetic Supplies Protocol Passed - 4/27/2019  9:51 AM        Passed - Medication is active on med list        Passed - Patient is 18 years of age or older        Passed - Recent (6 mo) or future (30 days) visit within the authorizing provider's specialty     Patient had office visit in the last 6 months or has a visit in the next 30 days with authorizing provider.  See \"Patient Info\" tab in inbasket, or \"Choose Columns\" in Meds & Orders section of the refill encounter.            blood glucose (ONETOUCH ULTRA) test strip [Pharmacy Med Name: OneTouch Ultra Blue In Vitro Strip] 50 each 7     Sig: USE TO TEST BLOOD GLUCOSE 1 TO 2 TIMES DAILY OR AS DIRECTED  Last Written Prescription Date:  12/19/17  Last Fill Quantity: 1,  # refills: 11   Last office visit: 4/22/2019 with prescribing provider:  Benjie Lomeli Office Visit:   Next 5 appointments (look out 90 days)    May 01, 2019  8:00 AM CDT  Lab visit with EC LAB  [facility] ([address] [address]  [phone]   May 10, 2019  9:00 AM CDT  Return Visit with " "Rosa Chowdary LP  [facility] [place] ([facility] [place]) [address] [facility] [facility] [address]  [place] [address] [phone]                Diabetic Supplies Protocol Passed - 4/27/2019  9:51 AM        Passed - Medication is active on med list        Passed - Patient is 18 years of age or older        Passed - Recent (6 mo) or future (30 days) visit within the authorizing provider's specialty     Patient had office visit in the last 6 months or has a visit in the next 30 days with authorizing provider.  See \"Patient Info\" tab in inbasket, or \"Choose Columns\" in Meds & Orders section of the refill encounter.              "

## 2019-05-01 DIAGNOSIS — E03.8 OTHER SPECIFIED HYPOTHYROIDISM: ICD-10-CM

## 2019-05-01 DIAGNOSIS — E11.9 NEW ONSET TYPE 2 DIABETES MELLITUS (H): ICD-10-CM

## 2019-05-01 LAB
HBA1C MFR BLD: 5.6 % (ref 0–5.6)
T4 FREE SERPL-MCNC: 1.72 NG/DL (ref 0.76–1.46)
TSH SERPL DL<=0.005 MIU/L-ACNC: 0.16 MU/L (ref 0.4–4)

## 2019-05-01 PROCEDURE — 36415 COLL VENOUS BLD VENIPUNCTURE: CPT | Performed by: PHYSICIAN ASSISTANT

## 2019-05-01 PROCEDURE — 83036 HEMOGLOBIN GLYCOSYLATED A1C: CPT | Performed by: PHYSICIAN ASSISTANT

## 2019-05-01 PROCEDURE — 84439 ASSAY OF FREE THYROXINE: CPT | Performed by: PHYSICIAN ASSISTANT

## 2019-05-01 PROCEDURE — 84443 ASSAY THYROID STIM HORMONE: CPT | Performed by: PHYSICIAN ASSISTANT

## 2019-05-03 DIAGNOSIS — E03.9 HYPOTHYROIDISM, UNSPECIFIED TYPE: Primary | ICD-10-CM

## 2019-05-03 RX ORDER — LEVOTHYROXINE SODIUM 88 UG/1
88 TABLET ORAL DAILY
Qty: 90 TABLET | Refills: 1 | Status: SHIPPED | OUTPATIENT
Start: 2019-05-03 | End: 2019-10-18

## 2019-05-03 NOTE — RESULT ENCOUNTER NOTE
Ijeoma-  Here are your recent results.     Your TSH ( thyroid stimulating hormone) is low and your thyroid hormone is elevated indicating that you are overreplaced on your medication.  Please begin 88 mcg daily of synthroid ( this has been sent to the pharmacy for you).  Follow up in 4-6 weeks for a recheck    If you have any questions please do not hesitate to contact our office via phone ([phone]) or you may send me a message via [employer] by clicking the contact my Care Team link.      It was a pleasure participating in your care!    Thank you,    David Waldrop MPH, PA-C  [address] [place] [address] [phone]

## 2019-05-10 ENCOUNTER — OFFICE VISIT (OUTPATIENT)
Dept: PSYCHOLOGY | Facility: CLINIC | Age: 54
End: 2019-05-10
Payer: COMMERCIAL

## 2019-05-10 DIAGNOSIS — F43.23 ADJUSTMENT DISORDER WITH MIXED ANXIETY AND DEPRESSED MOOD: ICD-10-CM

## 2019-05-10 DIAGNOSIS — F41.9 ANXIETY DISORDER: Primary | ICD-10-CM

## 2019-05-10 PROCEDURE — 90834 PSYTX W PT 45 MINUTES: CPT | Performed by: PSYCHOLOGIST

## 2019-05-10 NOTE — PROGRESS NOTES
Progress Note    Client Name: Ijeoma Avalos  Date: 5/10/19       Service Type: Individual      Session Start Time: 09:00  Session End Time: 09:50      Session Length: 50     Session #: 4 (this episode of care)     Attendees: Client attended alone    Treatment Plan Last Reviewed: 5/10/19  PHQ-9 / SOSA-7 Last Reviewed: 2/21/18     DATA      Progress Since Last Session (Related to Symptoms / Goals / Homework):   Symptoms: Worry    Homework: In progress      Episode of Care Goals: Satisfactory progress - ACTION (Actively working towards change); Intervened by reinforcing change plan / affirming steps taken     Current / Ongoing Stressors and Concerns:   Alcohol abuse in    Phase of life issues (full-time work and parent of young child)   Work demands   Family overseas     Treatment Objective(s) Addressed in This Session:   Build and make use of skills for stress management     Intervention:   Client endorses some continued worries related to her 's patterns of alcohol use and impact on family relationships. She is working to manage her impulse to try to monitor/control him, with varied success. She recalled there was one day in which she was upset about this issue to the extent that she lost her appetite and felt unable to eat throughout the day. Helped her identify self-talk that is calming rather than agitating for times such as that. Revisited the recommendation to consider attendance at [facility]. Client acknowledged some concern that doing so would be insulting to her . Reframed this as something she would be doing for herself, to find support for her own feelings and experience, not something she would be doing to him or to elicit any particular reaction from him. Validated her right to receive the support that she needs. Reviewed her progress with her own self-care. She said that she has scheduled sessions with a  to increase  accountability with exercise (after a period of reduced activity). She noted that she has not been writing recently and is missing this outlet. Discussed if journaling would be a helpful way for her to process her thoughts around marital issues. She will consider this.      ASSESSMENT: Current Emotional / Mental Status (status of significant symptoms):   Risk status (Self / Other harm or suicidal ideation)   Client denies current fears or concerns for personal safety.   Client denies current or recent suicidal ideation or behaviors.   Client denies current or recent homicidal ideation or behaviors.   Client denies current or recent self injurious behavior or ideation.   Client denies other safety concerns.   A safety and risk management plan has not been developed at this time, however client was given the after-hours number / 911 should there be a change in any of these risk factors.     Appearance:   Appropriate    Eye Contact:   Good    Psychomotor Behavior: Normal    Attitude:   Cooperative , forthcoming    Orientation:   All   Speech    Rate / Production: Normal     Volume:  Normal    Mood:    Worried    Affect:    Appropriate    Thought Content:  Some rumination, worries about worst-case scenarios    Thought Form:  Coherent  Logical    Insight:    Fair      Medication Review:   No changes to current psychiatric medication(s) reported; Client has prescription for lorazepam, which she uses occasionally.     Medication Compliance:   Yes; infrequent use     Changes in Health Issues:   None reported     Chemical Use Review:   Substance Use: Chemical use reviewed, no active concerns identified      Tobacco Use: No current tobacco use.       Collateral Reports Completed:   CGI    PLAN: (Client Tasks / Therapist Tasks / Other)  Client will consider attendance at [facility] for added support around her 's drinking. She may try journaling as a way to process and sort through her own thoughts and experiences.  Encouraged continued focus on managing her own emotions, rather than trying to regulate her  or control his drinking. She will continue with regular exercise and personal training sessions. Return appointments scheduled. Client will contact me sooner if needed.    Rosa Chowdary, LP                                                   _______________________________________________________________________    Treatment Plan    Client's Name: Ijeoma Avalos  YOB: 1965    Date: 2/9/17    DSM-V Diagnoses: Adjustment Disorders  309.28 (F43.23) With mixed anxiety and depressed mood  Psychosocial / Contextual Factors: physical symptoms; phase of life stressors (full-time work and parent of young child); work demands; family overseas  WHODAS: 16    Referral / Collaboration:  Referral to another professional/service is not indicated at this time.    Anticipated number of session or this episode of care: will re-evaluate every 90 days      MeasurableTreatment Goal(s) related to diagnosis / functional impairment(s)  Goal 1: Client will build skills for stress management.    I will know I've met my goal when my blood pressure is lower and my reflux symptoms have decreased.      Objective #A (Client Action)    Client will use at least 2 coping skills for anxiety management in the next 6 weeks.  Status: Continued - Date(s):5/10/19     Intervention(s)  Therapist will teach self-regulation strategies, CBT skills, mindfulness techniques.    Objective #B  Client will use cognitive strategies identified in therapy to challenge anxious thoughts.  Status: Continued - Date(s):5/10/19     Intervention(s)  Therapist will teach cognitive strategies, provide education.      Goal 2: Client will increase self-confidence.    I will know I've met my goal when I think more positively about myself.      Objective #A (Client Action)    Status: Continued - Date(s):5/10/19     Client will increase assertive  communication.    Intervention(s)  Therapist will teach assertiveness skills.    Objective #B  Client will Identify negative self-talk and behaviors: challenge core beliefs, myths, and actions.    Status: Continued - Date(s):5/10/19     Intervention(s)  Therapist will provide education, teach CBT skills.      Goal 3: Client will increase connection in close relationships.    I will know I've met my goal when I feel more close with loved ones.      Objective #A (Client Action)    Status: Continued - Date(s):5/10/19     Client will prioritize time for meaningful relationships.    Intervention(s)  Therapist will provide support and accountability.    Objective #B  Client will make use of effective interpersonal communication skills.    Status: Continued - Date(s): 5/10/19     Intervention(s)  Therapist will teach communication skills.      Client has reviewed and agreed to the above plan.      Rosa Chowdary, KATHARINE  February 9, 2017

## 2019-06-04 DIAGNOSIS — Z00.00 ROUTINE HISTORY AND PHYSICAL EXAMINATION OF ADULT: ICD-10-CM

## 2019-06-04 PROCEDURE — 77067 SCR MAMMO BI INCL CAD: CPT | Mod: TC

## 2019-06-09 DIAGNOSIS — K21.9 GASTROESOPHAGEAL REFLUX DISEASE, ESOPHAGITIS PRESENCE NOT SPECIFIED: ICD-10-CM

## 2019-06-10 RX ORDER — OMEPRAZOLE 40 MG/1
40 CAPSULE, DELAYED RELEASE ORAL DAILY
Qty: 90 CAPSULE | Refills: 2 | Status: SHIPPED | OUTPATIENT
Start: 2019-06-10 | End: 2020-03-23

## 2019-06-10 NOTE — TELEPHONE ENCOUNTER
Prescription approved per [facility] Refill Protocol.  Aura Wright RN   [facility] - Triage

## 2019-06-10 NOTE — TELEPHONE ENCOUNTER
"Requested Prescriptions   Pending Prescriptions Disp Refills     omeprazole (PRILOSEC) 40 MG DR capsule [Pharmacy Med Name: Omeprazole  Last Written Prescription Date:  9-  Last Fill Quantity: 90 capsule,  # refills: 2   Last office visit: 4/22/2019 with prescribing provider:     Future Office Visit:   Next 5 appointments (look out 90 days)    Jul 26, 2019  8:00 AM CDT  Return Visit with Rosa Chowdary LP  [facility] ([address] [address]  [phone]   Aug 20, 2019  9:00 AM CDT  Return Visit with Rosa Chowdary LP  [facility] ([address] [address]  [phone]          Oral Capsule Delayed Release 40 MG] 30 capsule 1     Sig: Take 1 capsule (40 mg) by mouth daily       PPI Protocol Passed - 6/9/2019  5:27 PM        Passed - Not on Clopidogrel (unless Pantoprazole ordered)        Passed - No diagnosis of osteoporosis on record        Passed - Recent (12 mo) or future (30 days) visit within the authorizing provider's specialty     Patient had office visit in the last 12 months or has a visit in the next 30 days with authorizing provider or within the authorizing provider's specialty.  See \"Patient Info\" tab in inbasket, or \"Choose Columns\" in Meds & Orders section of the refill encounter.              Passed - Medication is active on med list        Passed - Patient is age 18 or older        Passed - No active pregnacy on record        Passed - No positive pregnancy test in past 12 months          "

## 2019-07-20 DIAGNOSIS — E11.9 TYPE 2 DIABETES MELLITUS WITHOUT COMPLICATION, WITHOUT LONG-TERM CURRENT USE OF INSULIN (H): ICD-10-CM

## 2019-07-22 RX ORDER — METFORMIN HCL 500 MG
2000 TABLET, EXTENDED RELEASE 24 HR ORAL
Qty: 360 TABLET | Refills: 0 | Status: SHIPPED | OUTPATIENT
Start: 2019-07-22 | End: 2019-10-18

## 2019-07-22 NOTE — TELEPHONE ENCOUNTER
Requested Prescriptions   Pending Prescriptions Disp Refills     metFORMIN (GLUCOPHAGE-XR) 500 MG 24 hr tablet [Pharmacy Med Name: metFORMIN HCl ER Oral Tablet Extended Release 24 Hour 500 MG] 120 tablet 0     Sig: Take 4 tablets (2,000 mg) by mouth daily (with dinner)  Last Written Prescription Date:  12/24/18  Last Fill Quantity: 360,  # refills: 1   Last office visit: 4/22/2019 with prescribing provider:  Benjie Lomeli Office Visit:   Next 5 appointments (look out 90 days)    Jul 26, 2019  8:00 AM CDT  Return Visit with Rosa Chowdary LP  [facility] ([facility]) [address] [place] [id number]  [phone]   Jul 29, 2019  7:30 AM CDT  Lab visit with [facility]  [facility] ([facility]) [address] [place] [address]  [phone]   Aug 20, 2019  9:00 AM CDT  Return Visit with Rosa Chowdary LP  [facility] ([facility]) [address] [place] [id number]  [phone]   Sep 17, 2019  9:00 AM CDT  Return Visit with Rosa Chowdary LP  [facility] ([facility]) [address] [place] [address]  [phone]                Biguanide Agents Passed - 7/20/2019  7:01 AM        Passed - Blood pressure less than 140/90 in past 6 months     BP Readings from Last 3 Encounters:   04/22/19 138/88   12/24/18 132/88   05/30/18 104/70                 Passed - Patient has documented LDL within the past 12 mos.     Recent Labs   Lab Test 12/24/18  0806   LDL 49             Passed - Patient has had a Microalbumin in the past 15 mos.     Recent Labs   Lab Test 12/24/18  0815   MICROL 55   UMALCR 20.33             Passed - Patient is age 10 or older        Passed - Patient has documented A1c within the specified period of time.     If HgbA1C is 8 or greater, it needs to be on file within the past 3 months.  If less  "than 8, must be on file within the past 6 months.     Recent Labs   Lab Test 05/01/19  0756   A1C 5.6             Passed - Patient's CR is NOT>1.4 OR Patient's EGFR is NOT<45 within past 12 mos.     Recent Labs   Lab Test 12/24/18  0806   GFRESTIMATED >90   GFRESTBLACK >90       Recent Labs   Lab Test 12/24/18  0806   CR 0.67             Passed - Patient does NOT have a diagnosis of CHF.        Passed - Medication is active on med list        Passed - Patient is not pregnant        Passed - Patient has not had a positive pregnancy test within the past 12 mos.         Passed - Recent (6 mo) or future (30 days) visit within the authorizing provider's specialty     Patient had office visit in the last 6 months or has a visit in the next 30 days with authorizing provider or within the authorizing provider's specialty.  See \"Patient Info\" tab in inbasket, or \"Choose Columns\" in Meds & Orders section of the refill encounter.              "

## 2019-07-26 ENCOUNTER — OFFICE VISIT (OUTPATIENT)
Dept: PSYCHOLOGY | Facility: CLINIC | Age: 54
End: 2019-07-26
Payer: COMMERCIAL

## 2019-07-26 DIAGNOSIS — F41.9 ANXIETY DISORDER: Primary | ICD-10-CM

## 2019-07-26 PROCEDURE — 90834 PSYTX W PT 45 MINUTES: CPT | Performed by: PSYCHOLOGIST

## 2019-07-26 ASSESSMENT — COLUMBIA-SUICIDE SEVERITY RATING SCALE - C-SSRS
TOTAL  NUMBER OF INTERRUPTED ATTEMPTS PAST 3 MONTHS: NO
1. IN THE PAST MONTH, HAVE YOU WISHED YOU WERE DEAD OR WISHED YOU COULD GO TO SLEEP AND NOT WAKE UP?: NO
2. HAVE YOU ACTUALLY HAD ANY THOUGHTS OF KILLING YOURSELF?: NO
TOTAL  NUMBER OF ABORTED OR SELF INTERRUPTED ATTEMPTS PAST LIFETIME: NO
1. IN THE PAST MONTH, HAVE YOU WISHED YOU WERE DEAD OR WISHED YOU COULD GO TO SLEEP AND NOT WAKE UP?: NO
2. HAVE YOU ACTUALLY HAD ANY THOUGHTS OF KILLING YOURSELF LIFETIME?: NO
ATTEMPT LIFETIME: NO
6. HAVE YOU EVER DONE ANYTHING, STARTED TO DO ANYTHING, OR PREPARED TO DO ANYTHING TO END YOUR LIFE?: NO
TOTAL  NUMBER OF INTERRUPTED ATTEMPTS LIFETIME: NO
ATTEMPT PAST THREE MONTHS: NO
TOTAL  NUMBER OF ABORTED OR SELF INTERRUPTED ATTEMPTS PAST 3 MONTHS: NO
6. HAVE YOU EVER DONE ANYTHING, STARTED TO DO ANYTHING, OR PREPARED TO DO ANYTHING TO END YOUR LIFE?: NO

## 2019-07-26 NOTE — PROGRESS NOTES
"                                             Progress Note    Client Name: Ijeoma Avalos  Date: 7/26/19       Service Type: Individual      Session Start Time: 08:00  Session End Time: 08:50      Session Length: 50     Session #: 5 (this episode of care)     Attendees: Client attended alone    Treatment Plan Last Reviewed: 5/10/19  PHQ-9 / SOSA-7 Last Reviewed: 2/21/18  CSSRS: 7/26/19     DATA  Interactive Complexity: No  Crisis: No       Progress Since Last Session (Related to Symptoms / Goals / Homework):   Symptoms: Worry    Homework: In progress      Episode of Care Goals: Satisfactory progress - ACTION (Actively working towards change); Intervened by reinforcing change plan / affirming steps taken     Current / Ongoing Stressors and Concerns:   Alcohol abuse in    Phase of life issues (full-time work and parent of young child)   Work demands   Family overseas     Treatment Objective(s) Addressed in This Session:   Build and make use of skills for stress management     Intervention:   Session focused on interpersonal issues. Client is looking for input about how to best support her  in sobriety. She reported that she does not believe Al-Anon will be a good fit for her and is seeking other options. Noted there are likely to be individual differences--different people will appreciate different forms of support. Suggested that Client talk with her  about what he would find to be supportive. She will also work to focus more on words of affirmation (as opposed to inquiries such as \"are you OK?\"  \"is anything bothering you?\" etc. that she has tended toward in the past). Talked about taking good care of herself as another way to support her . If she is doing what she needs to do for herself, feeling healthy and balanced, that is good for the relationship (the same is true for him). She can control the choices she makes in her own life, and this is her primary responsibility. Client " also was interested in processing her reaction to recent news that her former  has received a terminal diagnosis.      ASSESSMENT: Current Emotional / Mental Status (status of significant symptoms):   Risk status (Self / Other harm or suicidal ideation)   Client denies current fears or concerns for personal safety.   Client denies current or recent suicidal ideation or behaviors.   Client denies current or recent homicidal ideation or behaviors.   Client denies current or recent self injurious behavior or ideation.   Client denies other safety concerns.    A safety and risk management plan has not been developed at this time.Recommended that patient call 911 or go to the local ED should there be a change in any of these risk factors.     Appearance:   Appropriate    Eye Contact:   Good    Psychomotor Behavior: Normal    Attitude:   Thoughtful, open     Orientation:   All   Speech    Rate / Production: Normal     Volume:  Normal    Mood:    Worried    Affect:    Appropriate    Thought Content:  Frequent worries    Thought Form:  Coherent  Logical    Insight:    Fair      Medication Review:   No changes to current psychiatric medication(s) reported; Client has prescription for lorazepam, which she uses occasionally.     Medication Compliance:   Yes; infrequent use     Changes in Health Issues:   None reported     Chemical Use Review:   Substance Use: Chemical use reviewed, no active concerns identified      Tobacco Use: No current tobacco use.       Diagnosis:     1.  Anxiety Disorder     Collateral Reports Completed:   CSSRS    PLAN: (Client Tasks / Therapist Tasks / Other)  Client will talk with her  about how she can best support his sobriety. She will focus on words of affirmation in her interactions with him. Encouraged continued focus on managing her own emotions and taking good care of herself and her needs. As she reacts (from a distance) to news of her former 's serious diagnosis, she  will consider making a donation or doing a good deed in his honor, perhaps imagining light and love around him. Client will continue creative writing as an outlet for self-care and enjoyment. Return appointments scheduled. Client will contact me sooner if needed.    Rosa Chowdary, LP                                                   _______________________________________________________________________    Treatment Plan    Client's Name: Ijeoma Avalos  YOB: 1965    Date: 2/9/17    DSM-V Diagnoses: Adjustment Disorders  309.28 (F43.23) With mixed anxiety and depressed mood  Psychosocial / Contextual Factors: physical symptoms; phase of life stressors (full-time work and parent of young child); work demands; family overseas  WHODAS: 16    Referral / Collaboration:  Referral to another professional/service is not indicated at this time.    Anticipated number of session or this episode of care: will re-evaluate every 90 days      MeasurableTreatment Goal(s) related to diagnosis / functional impairment(s)  Goal 1: Client will build skills for stress management.    I will know I've met my goal when my blood pressure is lower and my reflux symptoms have decreased.      Objective #A (Client Action)    Client will use at least 2 coping skills for anxiety management in the next 6 weeks.  Status: Continued - Date(s):5/10/19     Intervention(s)  Therapist will teach self-regulation strategies, CBT skills, mindfulness techniques.    Objective #B  Client will use cognitive strategies identified in therapy to challenge anxious thoughts.  Status: Continued - Date(s):5/10/19     Intervention(s)  Therapist will teach cognitive strategies, provide education.      Goal 2: Client will increase self-confidence.    I will know I've met my goal when I think more positively about myself.      Objective #A (Client Action)    Status: Continued - Date(s):5/10/19     Client will increase assertive  communication.    Intervention(s)  Therapist will teach assertiveness skills.    Objective #B  Client will Identify negative self-talk and behaviors: challenge core beliefs, myths, and actions.    Status: Continued - Date(s):5/10/19     Intervention(s)  Therapist will provide education, teach CBT skills.      Goal 3: Client will increase connection in close relationships.    I will know I've met my goal when I feel more close with loved ones.      Objective #A (Client Action)    Status: Continued - Date(s):5/10/19     Client will prioritize time for meaningful relationships.    Intervention(s)  Therapist will provide support and accountability.    Objective #B  Client will make use of effective interpersonal communication skills.    Status: Continued - Date(s): 5/10/19     Intervention(s)  Therapist will teach communication skills.      Client has reviewed and agreed to the above plan.      Rosa Chowdary, KATHARINE  February 9, 2017

## 2019-07-29 ENCOUNTER — TELEPHONE (OUTPATIENT)
Dept: LAB | Facility: CLINIC | Age: 54
End: 2019-07-29

## 2019-07-29 DIAGNOSIS — E03.9 HYPOTHYROIDISM, UNSPECIFIED TYPE: ICD-10-CM

## 2019-07-29 DIAGNOSIS — E11.9 NEW ONSET TYPE 2 DIABETES MELLITUS (H): Primary | ICD-10-CM

## 2019-07-29 DIAGNOSIS — E11.9 NEW ONSET TYPE 2 DIABETES MELLITUS (H): ICD-10-CM

## 2019-07-29 LAB
HBA1C MFR BLD: 6 % (ref 0–5.6)
TSH SERPL DL<=0.005 MIU/L-ACNC: 0.48 MU/L (ref 0.4–4)

## 2019-07-29 PROCEDURE — 36415 COLL VENOUS BLD VENIPUNCTURE: CPT | Performed by: PHYSICIAN ASSISTANT

## 2019-07-29 PROCEDURE — 83036 HEMOGLOBIN GLYCOSYLATED A1C: CPT | Performed by: PHYSICIAN ASSISTANT

## 2019-07-29 PROCEDURE — 84443 ASSAY THYROID STIM HORMONE: CPT | Performed by: PHYSICIAN ASSISTANT

## 2019-07-29 NOTE — TELEPHONE ENCOUNTER
"Good Morning,    Patient came for thyroid testing today 7/29/2019 and requested an A1C.  \"I need to get my thyroid function tested, the doctor has recommended every 3 months. I'd also like to schedule my A1C test\"    Please put Future orders in for the A1C as needed.     Lab erika extra for this test only.    Thank you  B  "

## 2019-07-30 NOTE — RESULT ENCOUNTER NOTE
Ijeoma-  Here are your recent results.     Your TSH is in normal range we can recheck this in 12 months.    Your A1C continues to be slightly elevated and shows prediabetes.  Please continue a healthy diet and exercise daily. We can recheck this in 12 months    If you have any questions please do not hesitate to contact our office via phone ([phone]) or you may send me a message via [employer] by clicking the contact my Care Team link.      It was a pleasure participating in your care!    Thank you,    David Waldrop MPH, PA-C  [address] [place] [address] [phone]

## 2019-08-20 ENCOUNTER — OFFICE VISIT (OUTPATIENT)
Dept: PSYCHOLOGY | Facility: CLINIC | Age: 54
End: 2019-08-20
Payer: COMMERCIAL

## 2019-08-20 DIAGNOSIS — F41.9 ANXIETY DISORDER: Primary | ICD-10-CM

## 2019-08-20 PROCEDURE — 90834 PSYTX W PT 45 MINUTES: CPT | Performed by: PSYCHOLOGIST

## 2019-08-20 NOTE — PROGRESS NOTES
"                                             Progress Note    Client Name: Ijeoma Avalos  Date: 8/20/19       Service Type: Individual      Session Start Time: 08:00  Session End Time: 08:50      Session Length: 50     Session #: 6 (this episode of care)     Attendees: Client attended alone    Treatment Plan Last Reviewed: 5/10/19  PHQ-9 / SOSA-7 Last Reviewed: 2/21/18  CSSRS: 7/26/19     DATA  Interactive Complexity: No  Crisis: No       Progress Since Last Session (Related to Symptoms / Goals / Homework):   Symptoms: Anxiety, low self-confidence    Homework: In progress      Episode of Care Goals: Satisfactory progress - ACTION (Actively working towards change); Intervened by reinforcing change plan / affirming steps taken     Current / Ongoing Stressors and Concerns:   Alcohol abuse in    Phase of life issues (full-time work and parent of young child)   Work demands   Family overseas     Treatment Objective(s) Addressed in This Session:   Build and make use of skills for stress management     Intervention:   Continued work focused on helping Client to separate from impulses to take responsibility for or \"fix\" her 's issues. She identified some ways that her self-esteem issues are being triggered by the current situation. Did some work on the narrative that Client is telling herself about this situation, and clarified that her work is to manage her own emotions. She can evaluate her own choices and behavior against her values (not against how anyone reacts). Client reported that her parents are arriving today from [facility] for an extended visit. Engaged in practice about how she would like to respond to comments or indications of their disapproval or unhappiness that may arise.      ASSESSMENT: Current Emotional / Mental Status (status of significant symptoms):   Risk status (Self / Other harm or suicidal ideation)   Client denies current fears or concerns for personal safety.   Client denies " current or recent suicidal ideation or behaviors.   Client denies current or recent homicidal ideation or behaviors.   Client denies current or recent self injurious behavior or ideation.   Client denies other safety concerns.    A safety and risk management plan has not been developed at this time.Recommended that patient call 911 or go to the local ED should there be a change in any of these risk factors.     Appearance:   Appropriate    Eye Contact:   Good    Psychomotor Behavior: Normal    Attitude:   Cooperative , non-defensive    Orientation:   All   Speech    Rate / Production: Normal     Volume:  Normal    Mood:    Worried    Affect:    Appropriate    Thought Content:  Frequent worries , tendency toward personalization   Thought Form:  Coherent  Logical    Insight:    Fair      Medication Review:   No changes to current psychiatric medication(s) reported; Client has prescription for lorazepam, which she uses occasionally.     Medication Compliance:   Yes; infrequent use     Changes in Health Issues:   None reported     Chemical Use Review:   Substance Use: Chemical use reviewed, no active concerns identified      Tobacco Use: No current tobacco use.       Diagnosis:     1.  Anxiety Disorder     Collateral Reports Completed:   Not Applicable    PLAN: (Client Tasks / Therapist Tasks / Other)  Client will intentionally choose a narrative about her current family situation that is accurate and neutral and avoids placing responsibility on her for issues that are not hers to solve. She will practie evaluating her own choices and behavior based on her standards and values. During an extended visit with her parents, she will take opportunities as they arise to practice neutral,  non-defensive responding. She will keep in mind what values and behaviors she would like to model for her daughter. Client will continue creative writing as an outlet for self-care and enjoyment. Return appointments scheduled. Client will  contact me sooner if needed.    Rosa Chowdary, LP                                                   _______________________________________________________________________    Treatment Plan    Client's Name: Ijeoma Avalos  YOB: 1965    Date: 2/9/17    DSM-V Diagnoses: Adjustment Disorders  309.28 (F43.23) With mixed anxiety and depressed mood  Psychosocial / Contextual Factors: physical symptoms; phase of life stressors (full-time work and parent of young child); work demands; family overseas  WHODAS: 16    Referral / Collaboration:  Referral to another professional/service is not indicated at this time.    Anticipated number of session or this episode of care: will re-evaluate every 90 days      MeasurableTreatment Goal(s) related to diagnosis / functional impairment(s)  Goal 1: Client will build skills for stress management.    I will know I've met my goal when my blood pressure is lower and my reflux symptoms have decreased.      Objective #A (Client Action)    Client will use at least 2 coping skills for anxiety management in the next 6 weeks.  Status: Continued - Date(s):5/10/19     Intervention(s)  Therapist will teach self-regulation strategies, CBT skills, mindfulness techniques.    Objective #B  Client will use cognitive strategies identified in therapy to challenge anxious thoughts.  Status: Continued - Date(s):5/10/19     Intervention(s)  Therapist will teach cognitive strategies, provide education.      Goal 2: Client will increase self-confidence.    I will know I've met my goal when I think more positively about myself.      Objective #A (Client Action)    Status: Continued - Date(s):5/10/19     Client will increase assertive communication.    Intervention(s)  Therapist will teach assertiveness skills.    Objective #B  Client will Identify negative self-talk and behaviors: challenge core beliefs, myths, and actions.    Status: Continued - Date(s):5/10/19      Intervention(s)  Therapist will provide education, teach CBT skills.      Goal 3: Client will increase connection in close relationships.    I will know I've met my goal when I feel more close with loved ones.      Objective #A (Client Action)    Status: Continued - Date(s):5/10/19     Client will prioritize time for meaningful relationships.    Intervention(s)  Therapist will provide support and accountability.    Objective #B  Client will make use of effective interpersonal communication skills.    Status: Continued - Date(s): 5/10/19     Intervention(s)  Therapist will teach communication skills.      Client has reviewed and agreed to the above plan.      Rosa Chowdary, KATHARINE  February 9, 2017

## 2019-09-13 ENCOUNTER — OFFICE VISIT (OUTPATIENT)
Dept: FAMILY MEDICINE | Facility: CLINIC | Age: 54
End: 2019-09-13
Payer: COMMERCIAL

## 2019-09-13 VITALS
WEIGHT: 134 LBS | HEART RATE: 84 BPM | BODY MASS INDEX: 26.31 KG/M2 | HEIGHT: 60 IN | TEMPERATURE: 98.2 F | SYSTOLIC BLOOD PRESSURE: 124 MMHG | OXYGEN SATURATION: 98 % | RESPIRATION RATE: 16 BRPM | DIASTOLIC BLOOD PRESSURE: 82 MMHG

## 2019-09-13 DIAGNOSIS — J06.9 VIRAL UPPER RESPIRATORY TRACT INFECTION: Primary | ICD-10-CM

## 2019-09-13 PROCEDURE — 99213 OFFICE O/P EST LOW 20 MIN: CPT | Performed by: PHYSICIAN ASSISTANT

## 2019-09-13 ASSESSMENT — ANXIETY QUESTIONNAIRES
3. WORRYING TOO MUCH ABOUT DIFFERENT THINGS: SEVERAL DAYS
5. BEING SO RESTLESS THAT IT IS HARD TO SIT STILL: NOT AT ALL
IF YOU CHECKED OFF ANY PROBLEMS ON THIS QUESTIONNAIRE, HOW DIFFICULT HAVE THESE PROBLEMS MADE IT FOR YOU TO DO YOUR WORK, TAKE CARE OF THINGS AT HOME, OR GET ALONG WITH OTHER PEOPLE: NOT DIFFICULT AT ALL
2. NOT BEING ABLE TO STOP OR CONTROL WORRYING: SEVERAL DAYS
GAD7 TOTAL SCORE: 4
1. FEELING NERVOUS, ANXIOUS, OR ON EDGE: NOT AT ALL
7. FEELING AFRAID AS IF SOMETHING AWFUL MIGHT HAPPEN: SEVERAL DAYS
6. BECOMING EASILY ANNOYED OR IRRITABLE: SEVERAL DAYS

## 2019-09-13 ASSESSMENT — PATIENT HEALTH QUESTIONNAIRE - PHQ9
SUM OF ALL RESPONSES TO PHQ QUESTIONS 1-9: 1
5. POOR APPETITE OR OVEREATING: NOT AT ALL

## 2019-09-13 ASSESSMENT — MIFFLIN-ST. JEOR: SCORE: 1144.32

## 2019-09-13 NOTE — PROGRESS NOTES
Subjective     Ijeoma Avalos is a 53 year old female who presents to clinic today for the following health issues:    HPI   RESPIRATORY SYMPTOMS      Duration: yesterday    Description  nasal congestion, facial pain/pressure and cough    Severity: moderate    Accompanying signs and symptoms: None    History (predisposing factors):  none    Precipitating or alleviating factors: None    Therapies tried and outcome:  claritin and tylenol          Patient Active Problem List   Diagnosis     Gastric acidity     Other specified hypothyroidism     Plantar fasciitis     Gastroesophageal reflux disease, esophagitis presence not specified     TMJ (temporomandibular joint syndrome)     Neck pain     Other headache syndrome     Uterine leiomyoma, unspecified location     Adjustment disorder with mixed anxiety and depressed mood     Low hemoglobin     Dysmenorrhea     Right knee pain, unspecified chronicity     Post-operative nausea and vomiting     Bilateral patellofemoral syndrome     New onset type 2 diabetes mellitus (H)     Essential hypertension     Hyperlipidemia with target LDL less than 100     Shoulder pain, right     Past Surgical History:   Procedure Laterality Date     APPENDECTOMY        SECTION  2014    Procedure:  SECTION;  Surgeon: Ru Cano MD;  Location:  [facility]     CHOLECYSTECTOMY       cyst removed      left  lower leg on bone sheath     DAVINCI HYSTERECTOMY TOTAL, SALPINGECTOMY BILATERAL N/A 2017    Procedure: DAVINCI HYSTERECTOMY TOTAL, SALPINGECTOMY BILATERAL;  DAVINCI SINGLE SITE HYSTERECTOMY, BILATERAL SALPINGECTOMY, LEFT OOPHORECTOMY, LYSIS OF ADHESIONS (LAPAROSCOPIC BAG TO RETREIVE OVARY);  Surgeon: Ru Cano MD;  Location:  OR     DAVINCI LYSIS OF ADHESIONS N/A 2017    Procedure: DAVINCI LYSIS OF ADHESIONS;;  Surgeon: Ru Cano MD;  Location:  OR     DAVINCI OOPHORECTOMY N/A 2017    Procedure: DAVINCI OOPHORECTOMY;;   Surgeon: Ru Cano MD;  Location:  OR     GI SURGERY       MYOMECTOMY UTERUS  sept 2012     ZZ GASTROSCOPY,FL         Social History     Tobacco Use     Smoking status: Never Smoker     Smokeless tobacco: Never Used   Substance Use Topics     Alcohol use: No     Alcohol/week: 0.0 oz     Comment: social     Family History   Problem Relation Age of Onset     Hypertension Father          Current Outpatient Medications   Medication Sig Dispense Refill     atorvastatin (LIPITOR) 10 MG tablet Take 1 tablet (10 mg) by mouth daily 90 tablet 3     blood glucose (NO BRAND SPECIFIED) lancets standard Use to test blood sugar 1-2 times daily or as directed. 100 each 3     blood glucose (NO BRAND SPECIFIED) test strip Use to test blood sugar 1-2 times daily or as directed. 100 strip 3     blood glucose (ONETOUCH ULTRA) test strip USE TO TEST BLOOD GLUCOSE 1 TO 2 TIMES DAILY OR AS DIRECTED 50 each 7     blood glucose monitoring (NO BRAND SPECIFIED) meter device kit Use to test blood sugar 1-2 times daily or as directed. 1 kit 0     blood glucose monitoring (NO BRAND SPECIFIED) meter device kit Use to test blood sugar  2-3  times daily or as directed. 1 kit 0     levothyroxine (SYNTHROID/LEVOTHROID) 88 MCG tablet Take 1 tablet (88 mcg) by mouth daily 90 tablet 1     lisinopril (PRINIVIL/ZESTRIL) 5 MG tablet Take 1 tablet (5 mg) by mouth daily 90 tablet 1     metFORMIN (GLUCOPHAGE-XR) 500 MG 24 hr tablet Take 4 tablets (2,000 mg) by mouth daily (with dinner) 360 tablet 0     omeprazole (PRILOSEC) 40 MG DR capsule Take 1 capsule (40 mg) by mouth daily 90 capsule 2     ONETOUCH DELICA LANCETS 33G MISC USE TO TEST BLOOD GLUCOSE 1 TO 2 TIMES DAILY OR AS DIRECTED 100 each 7     Allergies   Allergen Reactions     Cephalexin Hives     Hives on hands, face and stomach.      Ibuprofen      sneezing         Reviewed and updated as needed this visit by Provider         Review of Systems   ROS COMP: Constitutional, HEENT,  cardiovascular, pulmonary, gi and gu systems are negative, except as otherwise noted.      Objective    /82   Pulse 84   Temp 98.2  F (36.8  C) (Tympanic)   Resp 16   Ht 1.524 m (5')   Wt 60.8 kg (134 lb)   LMP 04/14/2017   SpO2 98%   BMI 26.17 kg/m    Body mass index is 26.17 kg/m .  Physical Exam   GENERAL: healthy, alert and no distress  EYES: Eyes grossly normal to inspection, PERRL and conjunctivae and sclerae normal  HENT: ear canals and TM's normal, nose and mouth without ulcers or lesions, TTP of maxillary sinuses  NECK: no adenopathy  RESP: lungs clear to auscultation - no rales, rhonchi or wheezes  CV: regular rate and rhythm, normal S1 S2, no S3 or S4, no murmur, click or rub    Diagnostic Test Results:  Labs reviewed in Epic        Assessment & Plan     1. Viral upper respiratory tract infection  Symptoms ongoing now x 1 day.  Advise that she begin supportive care with tylenol or motrin, Flonase and sudafed for congestion.  If no improvement in 7-10 days she will RTC     BMI:   Estimated body mass index is 26.17 kg/m  as calculated from the following:    Height as of this encounter: 1.524 m (5').    Weight as of this encounter: 60.8 kg (134 lb).       No follow-ups on file.    David Waldrop PA-C  [facility]

## 2019-09-14 ASSESSMENT — ANXIETY QUESTIONNAIRES: GAD7 TOTAL SCORE: 4

## 2019-09-17 ENCOUNTER — OFFICE VISIT (OUTPATIENT)
Dept: PSYCHOLOGY | Facility: CLINIC | Age: 54
End: 2019-09-17
Payer: COMMERCIAL

## 2019-09-17 DIAGNOSIS — F41.9 ANXIETY DISORDER: Primary | ICD-10-CM

## 2019-09-17 PROCEDURE — 90834 PSYTX W PT 45 MINUTES: CPT | Performed by: PSYCHOLOGIST

## 2019-09-17 NOTE — PROGRESS NOTES
Progress Note    Client Name: Ijeoma Avalos  Date: 9/17/19       Service Type: Individual      Session Start Time: 08:00  Session End Time: 08:50      Session Length: 50     Session #: 7 (this episode of care)     Attendees: Client attended alone    Treatment Plan Last Reviewed: 5/10/19  PHQ-9 / SOSA-7 Last Reviewed: 2/21/18  CSSRS: 7/26/19     DATA  Interactive Complexity: No  Crisis: No       Progress Since Last Session (Related to Symptoms / Goals / Homework):   Symptoms: Anxiety, low self-confidence    Homework: In progress      Episode of Care Goals: Satisfactory progress - ACTION (Actively working towards change); Intervened by reinforcing change plan / affirming steps taken     Current / Ongoing Stressors and Concerns:   Alcohol abuse in    Phase of life issues (full-time work and parent of young child)   Work demands   Family overseas     Treatment Objective(s) Addressed in This Session:   Build and make use of skills for stress management     Intervention:   Discussed Client's experience of her daughter's transition to  and her parents' extended visit. Identified a primary area of opportunity for growth for Client in letting go of her impulse to try to regulate others (simply a futile pursuit). Talked about how she can move past any conditions she puts on her own inner peace and equilibrum (e.g., I'll feel less anxious if he stops drinking; I have to do something about how much time she spends on the phone or I'll never relax). Emphasized the importance of finding a way for her to be OK, even when others are behaving in ways that she doesn't prefer. Noted that she can redirect her impulse to take action directed at creating behavior change in someone else to meet her own needs in that moment. The scope of her control is over her own choices and behavior; she can decide how she wishes to behave during challenging times and give herself  credit for those decisions. Client was open to this input.       ASSESSMENT: Current Emotional / Mental Status (status of significant symptoms):   Risk status (Self / Other harm or suicidal ideation)   Client denies current fears or concerns for personal safety.   Client denies current or recent suicidal ideation or behaviors.   Client denies current or recent homicidal ideation or behaviors.   Client denies current or recent self injurious behavior or ideation.   Client denies other safety concerns.    A safety and risk management plan has not been developed at this time.Recommended that patient call 911 or go to the local ED should there be a change in any of these risk factors.     Appearance:   Appropriate    Eye Contact:   Good    Psychomotor Behavior: Normal    Attitude:   Engaged , thoughtful   Orientation:   All   Speech    Rate / Production: Normal     Volume:  Normal    Mood:    Mild depression, worry    Affect:    Appropriate    Thought Content:  Frequent worries , tendency toward personalization, self-critical   Thought Form:  Coherent  Logical    Insight:    Fair      Medication Review:   No changes to current psychiatric medication(s) reported; Client has prescription for lorazepam, which she uses occasionally.     Medication Compliance:   Yes; infrequent use     Changes in Health Issues:   None reported     Chemical Use Review:   Substance Use: Chemical use reviewed, no active concerns identified      Tobacco Use: No current tobacco use.       Diagnosis:     1.  Anxiety Disorder     Collateral Reports Completed:   Not Applicable    PLAN: (Client Tasks / Therapist Tasks / Other)  Client will practice letting go of her impulse to try to regulate others and will instead find a way to be OK, even when others are behaving in ways that she doesn't prefer. She will try redirecting her impulse to take action intended to change someone else and instead focus on meeting her own needs in that moment. She can  decide how she wishes to behave during challenging times and give herself credit for those decisions. Encourage intentional self-care during extended visit from her parents. Return appointments scheduled. Client will contact me sooner if needed.    Rosa Chowdary, LP                                                   _______________________________________________________________________    Treatment Plan    Client's Name: Ijeoma Avalos  YOB: 1965    Date: 2/9/17    DSM-V Diagnoses: Adjustment Disorders  309.28 (F43.23) With mixed anxiety and depressed mood  Psychosocial / Contextual Factors: physical symptoms; phase of life stressors (full-time work and parent of young child); work demands; family overseas  WHODAS: 18    Referral / Collaboration:  Referral to another professional/service is not indicated at this time.    Anticipated number of session or this episode of care: will re-evaluate every 90 days      MeasurableTreatment Goal(s) related to diagnosis / functional impairment(s)  Goal 1: Client will build skills for stress management.    I will know I've met my goal when my blood pressure is lower and my reflux symptoms have decreased.      Objective #A (Client Action)    Client will use at least 2 coping skills for anxiety management in the next 6 weeks.  Status: Continued - Date(s):5/10/19     Intervention(s)  Therapist will teach self-regulation strategies, CBT skills, mindfulness techniques.    Objective #B  Client will use cognitive strategies identified in therapy to challenge anxious thoughts.  Status: Continued - Date(s):5/10/19     Intervention(s)  Therapist will teach cognitive strategies, provide education.      Goal 2: Client will increase self-confidence.    I will know I've met my goal when I think more positively about myself.      Objective #A (Client Action)    Status: Continued - Date(s):5/10/19     Client will increase assertive communication.    Intervention(s)  Therapist  will teach assertiveness skills.    Objective #B  Client will Identify negative self-talk and behaviors: challenge core beliefs, myths, and actions.    Status: Continued - Date(s):5/10/19     Intervention(s)  Therapist will provide education, teach CBT skills.      Goal 3: Client will increase connection in close relationships.    I will know I've met my goal when I feel more close with loved ones.      Objective #A (Client Action)    Status: Continued - Date(s):5/10/19     Client will prioritize time for meaningful relationships.    Intervention(s)  Therapist will provide support and accountability.    Objective #B  Client will make use of effective interpersonal communication skills.    Status: Continued - Date(s): 5/10/19     Intervention(s)  Therapist will teach communication skills.      Client has reviewed and agreed to the above plan.      Rosa Chowdary LP  February 9, 2017

## 2019-09-19 DIAGNOSIS — E11.9 TYPE 2 DIABETES MELLITUS WITHOUT COMPLICATION, WITHOUT LONG-TERM CURRENT USE OF INSULIN (H): ICD-10-CM

## 2019-09-19 DIAGNOSIS — I10 ESSENTIAL HYPERTENSION: ICD-10-CM

## 2019-09-19 RX ORDER — LISINOPRIL 5 MG/1
TABLET ORAL
Qty: 30 TABLET | Refills: 5 | Status: SHIPPED | OUTPATIENT
Start: 2019-09-19 | End: 2020-03-23

## 2019-09-19 NOTE — TELEPHONE ENCOUNTER
"Requested Prescriptions   Pending Prescriptions Disp Refills     lisinopril (PRINIVIL/ZESTRIL) 5 MG tablet [Pharmacy Med Name: Lisinopril Oral Tablet 5 MG] 30 tablet 0     Sig: TAKE ONE TABLET BY MOUTH ONE TIME DAILY       ACE Inhibitors (Including Combos) Protocol Passed - 9/19/2019  7:00 AM        Passed - Blood pressure under 140/90 in past 12 months     BP Readings from Last 3 Encounters:   09/13/19 124/82   04/22/19 138/88   12/24/18 132/88                 Passed - Recent (12 mo) or future (30 days) visit within the authorizing provider's specialty     Patient had office visit in the last 12 months or has a visit in the next 30 days with authorizing provider or within the authorizing provider's specialty.  See \"Patient Info\" tab in inbasket, or \"Choose Columns\" in Meds & Orders section of the refill encounter.              Passed - Medication is active on med list        Passed - Patient is age 18 or older        Passed - No active pregnancy on record        Passed - Normal serum creatinine on file in past 12 months     Recent Labs   Lab Test 12/24/18  0806   CR 0.67             Passed - Normal serum potassium on file in past 12 months     Recent Labs   Lab Test 12/24/18  0806   POTASSIUM 4.1             Passed - No positive pregnancy test within past 12 months        Last Written Prescription Date:  12/24/2018  Last Fill Quantity: 90,  # refills: 1   Last office visit: 9/13/2019 with prescribing provider:  yes   Future Office Visit:   Next 5 appointments (look out 90 days)    Oct 15, 2019  8:00 AM CDT  Return Visit with Rosa Chowdary LP  [facility] [place] ([address] [address]  [phone]   Nov 12, 2019  8:00 AM CST  Return Visit with Rosa Chowdary LP  [facility] [place] ([address] [address]  [phone]   Dec 10, 2019  8:00 AM CST  Return Visit with Rosa MILLER" KATHARINE Chowdary  [facility] [place] ([facility]) [address] [facility] [address] [phone]

## 2019-09-30 ENCOUNTER — OFFICE VISIT (OUTPATIENT)
Dept: FAMILY MEDICINE | Facility: CLINIC | Age: 54
End: 2019-09-30
Payer: COMMERCIAL

## 2019-09-30 VITALS
HEART RATE: 78 BPM | SYSTOLIC BLOOD PRESSURE: 132 MMHG | BODY MASS INDEX: 26.11 KG/M2 | TEMPERATURE: 98.7 F | WEIGHT: 133 LBS | DIASTOLIC BLOOD PRESSURE: 86 MMHG | OXYGEN SATURATION: 96 % | HEIGHT: 60 IN

## 2019-09-30 DIAGNOSIS — J30.9 ALLERGIC RHINITIS, UNSPECIFIED SEASONALITY, UNSPECIFIED TRIGGER: ICD-10-CM

## 2019-09-30 DIAGNOSIS — R09.81 NASAL CONGESTION: ICD-10-CM

## 2019-09-30 DIAGNOSIS — J01.80 OTHER ACUTE SINUSITIS, RECURRENCE NOT SPECIFIED: Primary | ICD-10-CM

## 2019-09-30 PROCEDURE — 99214 OFFICE O/P EST MOD 30 MIN: CPT | Performed by: FAMILY MEDICINE

## 2019-09-30 RX ORDER — AZITHROMYCIN 250 MG/1
TABLET, FILM COATED ORAL
Qty: 6 TABLET | Refills: 0 | Status: SHIPPED | OUTPATIENT
Start: 2019-09-30 | End: 2019-11-12

## 2019-09-30 RX ORDER — FLUTICASONE PROPIONATE 50 MCG
1-2 SPRAY, SUSPENSION (ML) NASAL DAILY
Qty: 16 G | Refills: 3 | Status: SHIPPED | OUTPATIENT
Start: 2019-09-30 | End: 2023-02-16

## 2019-09-30 ASSESSMENT — MIFFLIN-ST. JEOR: SCORE: 1139.78

## 2019-09-30 NOTE — PROGRESS NOTES
Subjective     Ijeoma Avalos is a 53 year old female who presents to clinic today for the following health issues:    HPI   Concern - Ear Pain/ uri sx   Onset: x had similar  in July , was better but now has same sx again x 1 week.   Also had  cold few weeks ago and sx felt somewhat better but feels like has   ongoing uri like sx. Now also has  ear ache , mostly left. Also has ongoing   nasal congestion, sinus pressure, throat irritation and mucous looks thicker , no fever or chills. Has mid HA. No vertigo today but had mild  Vertigo like sx couple of days ago  but not bad.     Also Has known hx of allergies and has frequent sneezing and stuffy nose etc.  She also  took a Claritin and it helped . Has watery eyes and itchy at time , no mattering. Although this last week sx have stephanie worse and now has facial pressure and ear ache so concerned     Description:   Has hx of  vertigo, saw David in April and was told to do Epley Maneuver   Helped a little but than came back. Sx are not bad this time.  has been given  Dramamine, it  helps     PROBLEMS TO ADD ON...  Has hx of vertigo on and off last few months  and it has helped and vertigo is not bad     Reviewed and updated as needed this visit by Provider         Review of Systems   ROS COMP: Constitutional, HEENT, cardiovascular, pulmonary, GI, , musculoskeletal, neuro, skin, endocrine and psych systems are negative, except as otherwise noted.      Objective    LMP 04/14/2017   There is no height or weight on file to calculate BMI.  Physical Exam   GENERAL: healthy, alert and no distress  EYES: Eyes grossly normal to inspection, PERRL and conjunctivae and sclerae normal  HENT: ear canals and TM's normal, nasal mucosa edematous , oropharynx clear, oral mucous membranes moist and sinuses: tenderness present  bilaterally  NECK: no adenopathy  RESP: lungs clear to auscultation - no rales, rhonchi or wheezes  CV: regular rate and rhythm, normal S1 S2, no S3 or S4, no  murmur,  ABDOMEN: soft, nontender, no hepatosplenomegaly, no masses and bowel sounds normal  NEURO: Normal strength and tone, sensory exam grossly normal and mentation intact  PSYCH: mentation appears normal, affect normal/bright            Assessment & Plan     (J01.80) Other acute sinusitis, recurrence not specified  (primary encounter diagnosis)  Comment:   Plan: fluticasone (FLONASE) 50 MCG/ACT nasal spray,         azithromycin (ZITHROMAX) 250 MG tablet                 URI lingering onto sinusitis. Treat with z pack. Cares and symptomatic treatment discussed. Follow up if problem.     (R09.81) Nasal congestion  Comment:   Plan: fluticasone (FLONASE) 50 MCG/ACT nasal spray            (J30.9) Allergic rhinitis, unspecified seasonality, unspecified trigger  Comment:   Plan:      Discussed allergy and treatment. Willing to try Flonase. Also continue with  OTC  allergy med's as needed.    Cares and symptomatic treatment discussed follow up  In 4 weeks sooner, if problem    Dulce Hernandez MD  [facility] [place]

## 2019-09-30 NOTE — PATIENT INSTRUCTIONS
Patient Education     Nasal Allergies: Related Problems  Allergies can cause nasal passages to swell. This narrows the air passages. Allergies also cause increased mucus production in the nose. These changes result in nasal allergy symptoms. Common symptoms include itching, sneezing, stuffy nose, and runny nose. Nasal allergies can also cause problems in other parts of the respiratory system. Some of the more common problems are discussed below. If you think you have any of these problems, talk to your healthcare provider about treatment choices.    Sinus infections  Fluid may be trapped in the sinuses. Bacteria may grow in trapped fluid. This causes sinus infection (sinusitis).  Conjunctivitis  Allergens irritate your eyes, including the lining of the conjunctiva. This causes eyes to become red, itchy, puffy, and watery.  Ear problems  The eustachian tube connects the middle ear to nasal passages.  Allergies can block this tube, and make the ears feel plugged. Fluid may also build up, leading to an ear infection (otitis media).  Nasal polyps  Allergies cause nasal passages to swell. Constant swelling can lead to formation of a sac called a polyp. Polyps can grow large enough to block nasal passages.  Asthma  Asthma is inflammation and swelling of the air passages in the lungs. The symptoms are wheezing, shortness of breath, coughing, and chest tightness. Allergies, including nasal allergies, are common in people with asthma.  Date Last Reviewed: 9/1/2016 2000-2018 [employer]. [address] [place] [id number]. All rights reserved. This information is not intended as a substitute for professional medical care. Always follow your healthcare professional's instructions.

## 2019-10-18 DIAGNOSIS — E11.9 TYPE 2 DIABETES MELLITUS WITHOUT COMPLICATION, WITHOUT LONG-TERM CURRENT USE OF INSULIN (H): ICD-10-CM

## 2019-10-18 DIAGNOSIS — E03.9 HYPOTHYROIDISM, UNSPECIFIED TYPE: ICD-10-CM

## 2019-10-18 RX ORDER — LEVOTHYROXINE SODIUM 88 UG/1
TABLET ORAL
Qty: 30 TABLET | Refills: 6 | Status: SHIPPED | OUTPATIENT
Start: 2019-10-18 | End: 2020-04-29

## 2019-10-18 NOTE — TELEPHONE ENCOUNTER
Requested Prescriptions   Pending Prescriptions Disp Refills     metFORMIN (GLUCOPHAGE-XR) 500 MG 24 hr tablet [Pharmacy Med Name:  Last Written Prescription Date:  7-225-2019  Last Fill Quantity: 360 tablet,  # refills: 0   Last office visit: 9/30/2019 with prescribing provider:     Future Office Visit:   Next 5 appointments (look out 90 days)    Nov 12, 2019  8:00 AM CST  Return Visit with Rosa Chowdary LP  [facility] [place] ([facility]) [address] [place] [address]  [phone]   Dec 10, 2019  8:00 AM CST  Return Visit with Rosa Chowdary LP  [facility] [place] ([facility]) [address] [place] [address]  [phone]          metFORMIN HCl ER Oral Tablet Extended Release 24 Hour 500 MG] 120 tablet 0     Sig: Take 4 tablets (2,000 mg) by mouth daily (with dinner)       Biguanide Agents Passed - 10/18/2019  7:00 AM        Passed - Blood pressure less than 140/90 in past 6 months     BP Readings from Last 3 Encounters:   09/30/19 132/86   09/13/19 124/82   04/22/19 138/88                 Passed - Patient has documented LDL within the past 12 mos.     Recent Labs   Lab Test 12/24/18  0806   LDL 49             Passed - Patient has had a Microalbumin in the past 15 mos.     Recent Labs   Lab Test 12/24/18  0815   MICROL 55   UMALCR 20.33             Passed - Patient is age 10 or older        Passed - Patient has documented A1c within the specified period of time.     If HgbA1C is 8 or greater, it needs to be on file within the past 3 months.  If less than 8, must be on file within the past 6 months.     Recent Labs   Lab Test 07/29/19  0734   A1C 6.0*             Passed - Patient's CR is NOT>1.4 OR Patient's EGFR is NOT<45 within past 12 mos.     Recent Labs   Lab Test 12/24/18  0806   GFRESTIMATED >90   GFRESTBLACK >90       Recent Labs   Lab Test 12/24/18  0806   CR 0.67             Passed - Patient does NOT have  "a diagnosis of CHF.        Passed - Medication is active on med list        Passed - Patient is not pregnant        Passed - Patient has not had a positive pregnancy test within the past 12 mos.         Passed - Recent (6 mo) or future (30 days) visit within the authorizing provider's specialty     Patient had office visit in the last 6 months or has a visit in the next 30 days with authorizing provider or within the authorizing provider's specialty.  See \"Patient Info\" tab in inbasket, or \"Choose Columns\" in Meds & Orders section of the refill encounter.            '  "

## 2019-10-18 NOTE — TELEPHONE ENCOUNTER
"Requested Prescriptions   Pending Prescriptions Disp Refills     levothyroxine (SYNTHROID/LEVOTHROID) 88 MCG tablet [Pharmacy Med Name:  Last Written Prescription Date:  5-3-2019  Last Fill Quantity: 90 tablet,  # refills: 1   Last office visit: 9/30/2019 with prescribing provider:     Future Office Visit:   Next 5 appointments (look out 90 days)    Nov 12, 2019  8:00 AM CST  Return Visit with Rosa Chowdary LP  [facility] ([address] [address]  [phone]   Dec 10, 2019  8:00 AM CST  Return Visit with Rosa Chowdary LP  [facility] ([address] [address]  [phone]          Levothyroxine Sodium Oral Tablet 88 MCG] 30 tablet 0     Sig: Take 1 tablet by mouth once daily       Thyroid Protocol Passed - 10/18/2019  7:00 AM        Passed - Patient is 12 years or older        Passed - Recent (12 mo) or future (30 days) visit within the authorizing provider's specialty     Patient has had an office visit with the authorizing provider or a provider within the authorizing providers department within the previous 12 mos or has a future within next 30 days. See \"Patient Info\" tab in inbasket, or \"Choose Columns\" in Meds & Orders section of the refill encounter.              Passed - Medication is active on med list        Passed - Normal TSH on file in past 12 months     Recent Labs   Lab Test 07/29/19  0734   TSH 0.48              Passed - No active pregnancy on record     If patient is pregnant or has had a positive pregnancy test, please check TSH.          Passed - No positive pregnancy test in past 12 months     If patient is pregnant or has had a positive pregnancy test, please check TSH.            "

## 2019-10-21 RX ORDER — METFORMIN HCL 500 MG
2000 TABLET, EXTENDED RELEASE 24 HR ORAL
Qty: 120 TABLET | Refills: 0 | Status: SHIPPED | OUTPATIENT
Start: 2019-10-21 | End: 2019-11-29

## 2019-11-12 ENCOUNTER — OFFICE VISIT (OUTPATIENT)
Dept: PSYCHOLOGY | Facility: CLINIC | Age: 54
End: 2019-11-12
Payer: COMMERCIAL

## 2019-11-12 ENCOUNTER — OFFICE VISIT (OUTPATIENT)
Dept: FAMILY MEDICINE | Facility: CLINIC | Age: 54
End: 2019-11-12
Payer: COMMERCIAL

## 2019-11-12 VITALS
RESPIRATION RATE: 14 BRPM | BODY MASS INDEX: 25.39 KG/M2 | HEART RATE: 88 BPM | OXYGEN SATURATION: 99 % | SYSTOLIC BLOOD PRESSURE: 124 MMHG | DIASTOLIC BLOOD PRESSURE: 88 MMHG | TEMPERATURE: 99.3 F | WEIGHT: 130 LBS

## 2019-11-12 DIAGNOSIS — F41.9 ANXIETY DISORDER: Primary | ICD-10-CM

## 2019-11-12 DIAGNOSIS — R07.0 THROAT PAIN: Primary | ICD-10-CM

## 2019-11-12 DIAGNOSIS — J02.0 STREPTOCOCCAL PHARYNGITIS: ICD-10-CM

## 2019-11-12 LAB
DEPRECATED S PYO AG THROAT QL EIA: ABNORMAL
SPECIMEN SOURCE: ABNORMAL

## 2019-11-12 PROCEDURE — 87880 STREP A ASSAY W/OPTIC: CPT | Performed by: PHYSICIAN ASSISTANT

## 2019-11-12 PROCEDURE — 90834 PSYTX W PT 45 MINUTES: CPT | Performed by: PSYCHOLOGIST

## 2019-11-12 PROCEDURE — 99213 OFFICE O/P EST LOW 20 MIN: CPT | Performed by: PHYSICIAN ASSISTANT

## 2019-11-12 RX ORDER — AMOXICILLIN 500 MG/1
500 CAPSULE ORAL 2 TIMES DAILY
Qty: 20 CAPSULE | Refills: 0 | Status: SHIPPED | OUTPATIENT
Start: 2019-11-12 | End: 2019-12-26

## 2019-11-12 ASSESSMENT — PATIENT HEALTH QUESTIONNAIRE - PHQ9
SUM OF ALL RESPONSES TO PHQ QUESTIONS 1-9: 1
5. POOR APPETITE OR OVEREATING: SEVERAL DAYS

## 2019-11-12 ASSESSMENT — ANXIETY QUESTIONNAIRES
2. NOT BEING ABLE TO STOP OR CONTROL WORRYING: SEVERAL DAYS
7. FEELING AFRAID AS IF SOMETHING AWFUL MIGHT HAPPEN: NOT AT ALL
6. BECOMING EASILY ANNOYED OR IRRITABLE: NOT AT ALL
3. WORRYING TOO MUCH ABOUT DIFFERENT THINGS: SEVERAL DAYS
5. BEING SO RESTLESS THAT IT IS HARD TO SIT STILL: NOT AT ALL
1. FEELING NERVOUS, ANXIOUS, OR ON EDGE: NOT AT ALL
IF YOU CHECKED OFF ANY PROBLEMS ON THIS QUESTIONNAIRE, HOW DIFFICULT HAVE THESE PROBLEMS MADE IT FOR YOU TO DO YOUR WORK, TAKE CARE OF THINGS AT HOME, OR GET ALONG WITH OTHER PEOPLE: NOT DIFFICULT AT ALL
GAD7 TOTAL SCORE: 3

## 2019-11-12 NOTE — PROGRESS NOTES
Progress Note    Client Name: Ijeoma Avalos  Date: 11/12/19       Service Type: Individual      Session Start Time: 08:00  Session End Time: 08:50      Session Length: 50     Session #: 8 (this episode of care)     Attendees: Client attended alone    Treatment Plan Last Reviewed: 5/10/19  PHQ-9 / SOSA-7 Last Reviewed: 9/13/19  CSSRS: 7/26/19  CGI: 11/12/19     DATA  Interactive Complexity: No  Crisis: No       Progress Since Last Session (Related to Symptoms / Goals / Homework):   Symptoms: Anxiety, low self-confidence    Homework: In progress      Episode of Care Goals: Satisfactory progress - ACTION (Actively working towards change); Intervened by reinforcing change plan / affirming steps taken     Current / Ongoing Stressors and Concerns:   Alcohol abuse in    Phase of life issues (full-time work and parent of young child)   Work demands   Family overseas     Treatment Objective(s) Addressed in This Session:   Build and make use of skills for stress management     Intervention:   Client reported that her  had another episode of excessive alcohol use and has now agreed to start some marital counseling. They are looking forward to starting the process soon. Helped Client articulate the primary messages she wishes to convey as part of that work. Client described multiple opportunities she has had during her parents' extended visit to practice regulating herself when others are not behaving as she'd like. She reports mixed success with this. She noted that she often still defaults to trying to defend herself or her actions, which typically leads to frustration. Practiced other options, including stepping away from the interaction while assertively setting limits (e.g., I know you don't agree, but we've decided how we're going to parent in these situations and we're going to stick with that) or hearing the other person out with no attempt to then  counter-argue or explain herself. Discussed the grief related to recognizing that her parents are not the source for validation, encouragement, and support that she would like them to be. Noted, however, that realistic expectations will help to prevent disappointment, and also allow Client to consider where she can reliably receive this type of support.       ASSESSMENT: Current Emotional / Mental Status (status of significant symptoms):   Risk status (Self / Other harm or suicidal ideation)   Client denies current fears or concerns for personal safety.   Client denies current or recent suicidal ideation or behaviors.   Client denies current or recent homicidal ideation or behaviors.   Client denies current or recent self injurious behavior or ideation.   Client denies other safety concerns.    A safety and risk management plan has not been developed at this time.Recommended that patient call 911 or go to the local ED should there be a change in any of these risk factors.     Appearance:   Appropriate    Eye Contact:   Good    Psychomotor Behavior: Normal    Attitude:   Cooperative    Orientation:   All   Speech    Rate / Production: Normal     Volume:  Normal    Mood:    Mild depression, worry    Affect:    Appropriate    Thought Content:  Frequent worries , tendency toward personalization, self-critical; improving ability to access more adaptive thought patterns   Thought Form:  Coherent  Logical    Insight:    Fair      Medication Review:   No changes to current psychiatric medication(s) reported; Client has prescription for lorazepam, which she uses occasionally.     Medication Compliance:   Yes; infrequent use     Changes in Health Issues:   None reported     Chemical Use Review:   Substance Use: Chemical use reviewed, no active concerns identified      Tobacco Use: No current tobacco use.       Diagnosis:     1.  Anxiety Disorder     Collateral Reports Completed:   CGI    PLAN: (Client Tasks / Therapist Tasks  / Other)  During times of predictable conflict with family members, instead of responding defensively, Client will practice either stepping away from the interaction and assertively setting limits (I know you don't agree, but we've decided how we're going to parent in these situations and we're going to stick with that) or hearing the other person out with no attempt to then counter-argue or explain herself. She will consider who in her life is a reliable source of support and encouragement and seek to have those needs met accordingly. Client will practice letting go of her impulse to try to regulate others and will instead find a way to be OK, even when others are behaving in ways that she doesn't prefer. She will try redirecting her impulse to take action intended to change someone else and instead focus on meeting her own needs in that moment. She can decide how she wishes to behave during challenging times and give herself credit for those decisions. Return appointments scheduled. Client will contact me sooner if needed.    Rosa Chowdary, LP                                                   _______________________________________________________________________    Treatment Plan    Client's Name: Ijeoma Avalos  YOB: 1965    Date: 2/9/17    DSM-V Diagnoses: Adjustment Disorders  309.28 (F43.23) With mixed anxiety and depressed mood  Psychosocial / Contextual Factors: physical symptoms; phase of life stressors (full-time work and parent of young child); work demands; family overseas  WHODAS: 16    Referral / Collaboration:  Referral to another professional/service is not indicated at this time.    Anticipated number of session or this episode of care: will re-evaluate every 90 days      MeasurableTreatment Goal(s) related to diagnosis / functional impairment(s)  Goal 1: Client will build skills for stress management.    I will know I've met my goal when my blood pressure is lower and my reflux  symptoms have decreased.      Objective #A (Client Action)    Client will use at least 2 coping skills for anxiety management in the next 6 weeks.  Status: Continued - Date(s):5/10/19     Intervention(s)  Therapist will teach self-regulation strategies, CBT skills, mindfulness techniques.    Objective #B  Client will use cognitive strategies identified in therapy to challenge anxious thoughts.  Status: Continued - Date(s):5/10/19     Intervention(s)  Therapist will teach cognitive strategies, provide education.      Goal 2: Client will increase self-confidence.    I will know I've met my goal when I think more positively about myself.      Objective #A (Client Action)    Status: Continued - Date(s):5/10/19     Client will increase assertive communication.    Intervention(s)  Therapist will teach assertiveness skills.    Objective #B  Client will Identify negative self-talk and behaviors: challenge core beliefs, myths, and actions.    Status: Continued - Date(s):5/10/19     Intervention(s)  Therapist will provide education, teach CBT skills.      Goal 3: Client will increase connection in close relationships.    I will know I've met my goal when I feel more close with loved ones.      Objective #A (Client Action)    Status: Continued - Date(s):5/10/19     Client will prioritize time for meaningful relationships.    Intervention(s)  Therapist will provide support and accountability.    Objective #B  Client will make use of effective interpersonal communication skills.    Status: Continued - Date(s): 5/10/19     Intervention(s)  Therapist will teach communication skills.      Client has reviewed and agreed to the above plan.      Rosa Chowdary, KATHARINE  February 9, 2017

## 2019-11-12 NOTE — PROGRESS NOTES
Subjective     Ijeoma Avalos is a 53 year old female who presents to clinic today for the following health issues:    HPI   RESPIRATORY SYMPTOMS      Duration: today    Description  Nausea, headaches, throat pain    Severity: moderate    Accompanying signs and symptoms: None    History (predisposing factors):  strep exposure    Precipitating or alleviating factors: None    Therapies tried and outcome:  none       diagnosed with strep 1 week ago. Today, she developed sore throat, chills and nausea. No fever measured yet.  Symptoms began suddenly this morning.            Patient Active Problem List   Diagnosis     Gastric acidity     Other specified hypothyroidism     Plantar fasciitis     Gastroesophageal reflux disease, esophagitis presence not specified     TMJ (temporomandibular joint syndrome)     Neck pain     Other headache syndrome     Uterine leiomyoma, unspecified location     Adjustment disorder with mixed anxiety and depressed mood     Low hemoglobin     Dysmenorrhea     Right knee pain, unspecified chronicity     Post-operative nausea and vomiting     Bilateral patellofemoral syndrome     New onset type 2 diabetes mellitus (H)     Essential hypertension     Hyperlipidemia with target LDL less than 100     Shoulder pain, right     Atopic rhinitis     Nasal congestion     Past Surgical History:   Procedure Laterality Date     APPENDECTOMY        SECTION  2014    Procedure:  SECTION;  Surgeon: Ru Cano MD;  Location:  L+D     CHOLECYSTECTOMY       cyst removed      left  lower leg on bone sheath     DAVINCI HYSTERECTOMY TOTAL, SALPINGECTOMY BILATERAL N/A 2017    Procedure: DAVINCI HYSTERECTOMY TOTAL, SALPINGECTOMY BILATERAL;  DAVINCI SINGLE SITE HYSTERECTOMY, BILATERAL SALPINGECTOMY, LEFT OOPHORECTOMY, LYSIS OF ADHESIONS (LAPAROSCOPIC BAG TO RETREIVE OVARY);  Surgeon: Ru Cano MD;  Location:  OR     DAVINCI LYSIS OF ADHESIONS N/A 2017     Procedure: DAVINCI LYSIS OF ADHESIONS;;  Surgeon: Ru Cano MD;  Location:  OR     DAVINCI OOPHORECTOMY N/A 4/21/2017    Procedure: DAVINCI OOPHORECTOMY;;  Surgeon: Ru Cano MD;  Location:  OR     GI SURGERY       MYOMECTOMY UTERUS  sept 2012     ZZ GASTROSCOPY,[place]         Social History     Tobacco Use     Smoking status: Never Smoker     Smokeless tobacco: Never Used   Substance Use Topics     Alcohol use: No     Alcohol/week: 0.0 standard drinks     Comment: social     Family History   Problem Relation Age of Onset     Hypertension Father          Current Outpatient Medications   Medication Sig Dispense Refill     amoxicillin (AMOXIL) 500 MG capsule Take 1 capsule (500 mg) by mouth 2 times daily for 10 days 20 capsule 0     atorvastatin (LIPITOR) 10 MG tablet Take 1 tablet (10 mg) by mouth daily 90 tablet 3     blood glucose (NO BRAND SPECIFIED) lancets standard Use to test blood sugar 1-2 times daily or as directed. 100 each 3     blood glucose (NO BRAND SPECIFIED) test strip Use to test blood sugar 1-2 times daily or as directed. 100 strip 3     blood glucose (ONETOUCH ULTRA) test strip USE TO TEST BLOOD GLUCOSE 1 TO 2 TIMES DAILY OR AS DIRECTED 50 each 7     blood glucose monitoring (NO BRAND SPECIFIED) meter device kit Use to test blood sugar 1-2 times daily or as directed. 1 kit 0     blood glucose monitoring (NO BRAND SPECIFIED) meter device kit Use to test blood sugar  2-3  times daily or as directed. 1 kit 0     fluticasone (FLONASE) 50 MCG/ACT nasal spray Spray 1-2 sprays into both nostrils daily 16 g 3     levothyroxine (SYNTHROID/LEVOTHROID) 88 MCG tablet Take 1 tablet by mouth once daily 30 tablet 6     lisinopril (PRINIVIL/ZESTRIL) 5 MG tablet TAKE ONE TABLET BY MOUTH ONE TIME DAILY  30 tablet 5     metFORMIN (GLUCOPHAGE-XR) 500 MG 24 hr tablet Take 4 tablets (2,000 mg) by mouth daily (with dinner) 120 tablet 0     omeprazole (PRILOSEC) 40 MG DR capsule Take 1 capsule (40  mg) by mouth daily 90 capsule 2     ONETOUCH DELICA LANCETS 33G MISC USE TO TEST BLOOD GLUCOSE 1 TO 2 TIMES DAILY OR AS DIRECTED 100 each 7     Allergies   Allergen Reactions     Cephalexin Hives     Hives on hands, face and stomach.      Ibuprofen      sneezing         Reviewed and updated as needed this visit by Provider         Review of Systems   ROS COMP: Constitutional, HEENT, cardiovascular, pulmonary, gi and gu systems are negative, except as otherwise noted.      Objective    /88   Pulse 88   Temp 99.3  F (37.4  C) (Oral)   Resp 14   Wt 59 kg (130 lb)   LMP 04/14/2017   SpO2 99%   BMI 25.39 kg/m    Body mass index is 25.39 kg/m .  Physical Exam   GENERAL: healthy, alert and no distress  EYES: Eyes grossly normal to inspection, PERRL and conjunctivae and sclerae normal  HENT: ear canals and TM's normal, nose and mouth without ulcers or lesions  NECK: no adenopathy, no asymmetry, masses, or scars and thyroid normal to palpation  RESP: lungs clear to auscultation - no rales, rhonchi or wheezes  CV: regular rate and rhythm, normal S1 S2, no S3 or S4, no murmur, click or rub, no peripheral edema and peripheral pulses strong    Diagnostic Test Results:  Results for orders placed or performed in visit on 11/12/19 (from the past 24 hour(s))   Strep, Rapid Screen   Result Value Ref Range    Specimen Description Throat     Rapid Strep A Screen (A)      POSITIVE: Group A Streptococcal antigen detected by immunoassay.           Assessment & Plan     1. Throat pain  - Strep, Rapid Screen    2. Streptococcal pharyngitis  Strep positive today.  Advised that she begin Amox x 10 days.  Home care instructions given.   - amoxicillin (AMOXIL) 500 MG capsule; Take 1 capsule (500 mg) by mouth 2 times daily for 10 days  Dispense: 20 capsule; Refill: 0     BMI:   Estimated body mass index is 25.39 kg/m  as calculated from the following:    Height as of 9/30/19: 1.524 m (5').    Weight as of this encounter: 59 kg (130  lb).       Follow up if new or worsening symptoms    No follow-ups on file.    David Waldrop PA-C  [facility]

## 2019-11-13 ASSESSMENT — ANXIETY QUESTIONNAIRES: GAD7 TOTAL SCORE: 3

## 2019-11-22 ENCOUNTER — OFFICE VISIT (OUTPATIENT)
Dept: FAMILY MEDICINE | Facility: CLINIC | Age: 54
End: 2019-11-22
Payer: COMMERCIAL

## 2019-11-22 VITALS
WEIGHT: 132 LBS | TEMPERATURE: 98.7 F | DIASTOLIC BLOOD PRESSURE: 70 MMHG | HEART RATE: 86 BPM | BODY MASS INDEX: 25.78 KG/M2 | OXYGEN SATURATION: 98 % | SYSTOLIC BLOOD PRESSURE: 100 MMHG

## 2019-11-22 DIAGNOSIS — M79.662 PAIN AND SWELLING OF LEFT LOWER LEG: ICD-10-CM

## 2019-11-22 DIAGNOSIS — M62.838 MUSCLE SPASM: ICD-10-CM

## 2019-11-22 DIAGNOSIS — M79.89 PAIN AND SWELLING OF LEFT LOWER LEG: ICD-10-CM

## 2019-11-22 DIAGNOSIS — M54.16 LUMBAR RADICULOPATHY: Primary | ICD-10-CM

## 2019-11-22 DIAGNOSIS — S39.012A LUMBOSACRAL STRAIN, INITIAL ENCOUNTER: ICD-10-CM

## 2019-11-22 PROCEDURE — 99214 OFFICE O/P EST MOD 30 MIN: CPT | Performed by: NURSE PRACTITIONER

## 2019-11-22 RX ORDER — CYCLOBENZAPRINE HCL 5 MG
5 TABLET ORAL DAILY PRN
Qty: 15 TABLET | Refills: 0 | Status: SHIPPED | OUTPATIENT
Start: 2019-11-22 | End: 2019-12-26

## 2019-11-22 RX ORDER — METHYLPREDNISOLONE 4 MG
TABLET, DOSE PACK ORAL
Qty: 21 TABLET | Refills: 0 | Status: SHIPPED | OUTPATIENT
Start: 2019-11-22 | End: 2019-12-26

## 2019-11-22 NOTE — PROGRESS NOTES
"Subjective     Ijeoma Avalos is a 53 year old female who presents to clinic today for the following health issues:      Joint Pain    Onset: a month     Description:   Location: left leg   Character: Sharp and achy     Intensity: moderate, severe    Progression of Symptoms: intermittent    Accompanying Signs & Symptoms:  Other symptoms: \"fatigued\" - pain is also at the sciatica but unsure of where the pain originates from     History:   Previous similar pain: no       Precipitating factors:   Trauma or overuse: no     Alleviating factors:  Improved by: acetaminophen and aspercream, massage     Therapies Tried and outcome: none     HPI: Ijeoma presents today with the complaint of left leg ache, which she has had for the past month. Denies injury or overuse pattern. She does say she sits a lot for work and worries about blood clot. She states that the pain, which she describes as a \"deep achy pain,\" stretches from her calf superiorly into her buttocks. Denies swelling, redness, tenderness, and heat. She states that it hurts throughout the day and is unchanged by activity. She does have some low back issues historically, but her back, at present, is without pain. Denies numbness, tingling, or limb weakness. Denies history of blood clots, PE, malignancy, blood clotting disorder. She is not a smoker and does not use OCP. She finds that she is starting to limp. No chest pain, shortness of breath, rapid heart rate.     Patient Active Problem List   Diagnosis     Gastric acidity     Other specified hypothyroidism     Plantar fasciitis     Gastroesophageal reflux disease, esophagitis presence not specified     TMJ (temporomandibular joint syndrome)     Neck pain     Other headache syndrome     Uterine leiomyoma, unspecified location     Adjustment disorder with mixed anxiety and depressed mood     Low hemoglobin     Dysmenorrhea     Right knee pain, unspecified chronicity     Post-operative nausea and vomiting     " Bilateral patellofemoral syndrome     New onset type 2 diabetes mellitus (H)     Essential hypertension     Hyperlipidemia with target LDL less than 100     Shoulder pain, right     Atopic rhinitis     Nasal congestion     Past Surgical History:   Procedure Laterality Date     APPENDECTOMY        SECTION  2014    Procedure:  SECTION;  Surgeon: Ru Cano MD;  Location:  L+D     CHOLECYSTECTOMY       cyst removed      left  lower leg on bone sheath     DAVINCI HYSTERECTOMY TOTAL, SALPINGECTOMY BILATERAL N/A 2017    Procedure: DAVINCI HYSTERECTOMY TOTAL, SALPINGECTOMY BILATERAL;  DAVINCI SINGLE SITE HYSTERECTOMY, BILATERAL SALPINGECTOMY, LEFT OOPHORECTOMY, LYSIS OF ADHESIONS (LAPAROSCOPIC BAG TO RETREIVE OVARY);  Surgeon: Ru Cano MD;  Location:  OR     DAVINCI LYSIS OF ADHESIONS N/A 2017    Procedure: DAVINCI LYSIS OF ADHESIONS;;  Surgeon: Ru Cano MD;  Location:  OR     DAVINCI OOPHORECTOMY N/A 2017    Procedure: DAVINCI OOPHORECTOMY;;  Surgeon: Ru Cano MD;  Location:  OR     GI SURGERY       MYOMECTOMY UTERUS  2012     ZZ GASTROSCOPY,[place]         Social History     Tobacco Use     Smoking status: Never Smoker     Smokeless tobacco: Never Used   Substance Use Topics     Alcohol use: No     Alcohol/week: 0.0 standard drinks     Comment: social     Family History   Problem Relation Age of Onset     Hypertension Father            Reviewed and updated as needed this visit by Provider  Tobacco  Allergies  Meds  Problems  Med Hx  Surg Hx  Fam Hx         Review of Systems   ROS COMP: Constitutional, cardiovascular, pulmonary, musculoskeletal, neuro, skin systems are negative, except as otherwise noted.      Objective    /70 (BP Location: Left arm, Patient Position: Chair, Cuff Size: Adult Regular)   Pulse 86   Temp 98.7  F (37.1  C) (Tympanic)   Wt 59.9 kg (132 lb)   LMP 2017   SpO2 98%   BMI 25.78 kg/m     Body mass index is 25.78 kg/m .  Physical Exam   GENERAL: healthy, alert and no distress  NECK: no adenopathy, no asymmetry, masses, or scars and thyroid normal to palpation  RESP: lungs clear to auscultation - no rales, rhonchi or wheezes  CV: regular rate and rhythm, normal S1 S2, no S3 or S4, no murmur, click or rub, no peripheral edema and peripheral pulses strong  MS: Left lower extremity - no gross deformity noted; no edema, redness, heat; Some tenderness in popliteal space; ROM intact; Bears weight; Strength intact  NEURO: Normal strength and tone, mentation intact and speech normal  BACK: Markedly tender myofascial knots in lumbosacral region paraspinally on left; No overlying skin changes; No bony tenderness    Diagnostic Test Results:  Labs reviewed in Epic        Assessment & Plan     Ijeoma was seen today for musculoskeletal problem. History and exam seem to suggest lumbosacral radiculopathy. Tender knots in this region and pain seemingly follows L5 - S1 dermatomes. Will treat conservatively with alternation of ice and heat, Medrol Dose Madan, Flexeril, Tylenol (cannot take ibuprofen due to reaction), gentle stretch, and PT / chiropractic referral. She will follow up in a few weeks if no better. If that is the case, I will consider referral to sports medicine. She agrees with this approach.     She does seem concerned about DVT, and because I cannot confidently rule this out (even though it appears as though she is dealing with musculoskeletal issue), I think it is reasonable to order a lower extremity venous duplex. I will follow up with her after I have this result.     Discussed reasons to call or return to clinic. Ijeoma acknowledges and demonstrates understanding of circumstances under which care should be sought urgently or emergently. Follow up as discussed. Discussed risks, benefits, alternatives, potential side effects, and proper administration of new medication / treatment. Agrees with plan  of care. All questions answered.       Diagnoses and all orders for this visit:    Lumbar radiculopathy  -     [facility] PT, HAND, AND CHIROPRACTIC REFERRAL; Future  -     methylPREDNISolone (MEDROL DOSEPAK) 4 MG tablet therapy pack; Follow Package Directions    Muscle spasm  -     cyclobenzaprine (FLEXERIL) 5 MG tablet; Take 1 tablet (5 mg) by mouth daily as needed for muscle spasms    Lumbosacral strain, initial encounter  -     methylPREDNISolone (MEDROL DOSEPAK) 4 MG tablet therapy pack; Follow Package Directions    Pain and swelling of left lower leg  -     US Lower Extremity Venous Duplex Left; Future         BMI:   Estimated body mass index is 25.78 kg/m  as calculated from the following:    Height as of 9/30/19: 1.524 m (5').    Weight as of this encounter: 59.9 kg (132 lb).       See Patient Instructions    Return in about 4 weeks (around 12/20/2019) for persistent or worsening symptoms, imaging study. Call [phone] to schedule this.    Jarod Storey NP  [facility] [place]

## 2019-11-22 NOTE — PATIENT INSTRUCTIONS
1. Tylenol   2. Medrol Dose Madan  3. Flexeril (muscle spasm)  4. Massage  5. Stretch / ROM (research these online)  6. Alternate ice and heat 3 times a day  7. Chiropractor     8. Schedule ultrasound to rule out US

## 2019-11-29 DIAGNOSIS — E11.9 TYPE 2 DIABETES MELLITUS WITHOUT COMPLICATION, WITHOUT LONG-TERM CURRENT USE OF INSULIN (H): ICD-10-CM

## 2019-11-29 RX ORDER — METFORMIN HCL 500 MG
2000 TABLET, EXTENDED RELEASE 24 HR ORAL
Qty: 120 TABLET | Refills: 1 | Status: SHIPPED | OUTPATIENT
Start: 2019-11-29 | End: 2020-01-29

## 2019-11-29 NOTE — TELEPHONE ENCOUNTER
Requested Prescriptions   Pending Prescriptions Disp Refills     metFORMIN (GLUCOPHAGE-XR) 500 MG 24 hr tablet [Pharmacy Med Name: metFORMIN HCl ER Oral Tablet Extended Release 24 Hour 500 MG] 120 tablet 0     Sig: Take 4 tablets (2,000 mg) by mouth daily (with dinner)   Last Written Prescription Date:  10/21/19  Last Fill Quantity: 120,  # refills: 0   Last office visit: 11/22/2019 with prescribing provider:  [place]   Future Office Visit:   Next 5 appointments (look out 90 days)    Dec 10, 2019  8:00 AM CST  Return Visit with Rosa Chowdary LP  [facility] ([facility]) [address] [place] [id number]  [phone]   Dec 30, 2019  7:40 AM CST  PHYSICAL with Dulce Hernandez MD  [facility] [address] [place] [id number]  [phone]   Jan 21, 2020  8:00 AM CST  Return Visit with Rosa Chowdary LP  [facility] [address] [place] [id number]  [phone]             Biguanide Agents Passed - 11/29/2019  1:15 PM        Passed - Blood pressure less than 140/90 in past 6 months     BP Readings from Last 3 Encounters:   11/22/19 100/70   11/12/19 124/88   09/30/19 132/86                 Passed - Patient has documented LDL within the past 12 mos.     Recent Labs   Lab Test 12/24/18  0806   LDL 49             Passed - Patient has had a Microalbumin in the past 15 mos.     Recent Labs   Lab Test 12/24/18  0815   MICROL 55   UMALCR 20.33             Passed - Patient is age 10 or older        Passed - Patient has documented A1c within the specified period of time.     If HgbA1C is 8 or greater, it needs to be on file within the past 3 months.  If less than 8, must be on file within the past 6 months.     Recent Labs   Lab Test 07/29/19  0734   A1C 6.0*             Passed - Patient's CR is NOT>1.4 OR Patient's EGFR is NOT<45  "within past 12 mos.     Recent Labs   Lab Test 12/24/18  0806   GFRESTIMATED >90   GFRESTBLACK >90       Recent Labs   Lab Test 12/24/18  0806   CR 0.67             Passed - Patient does NOT have a diagnosis of CHF.        Passed - Medication is active on med list        Passed - Patient is not pregnant        Passed - Patient has not had a positive pregnancy test within the past 12 mos.         Passed - Recent (6 mo) or future (30 days) visit within the authorizing provider's specialty     Patient had office visit in the last 6 months or has a visit in the next 30 days with authorizing provider or within the authorizing provider's specialty.  See \"Patient Info\" tab in inbasket, or \"Choose Columns\" in Meds & Orders section of the refill encounter.              "

## 2019-11-30 ENCOUNTER — MYC REFILL (OUTPATIENT)
Dept: FAMILY MEDICINE | Facility: CLINIC | Age: 54
End: 2019-11-30

## 2019-11-30 DIAGNOSIS — E11.9 TYPE 2 DIABETES MELLITUS WITHOUT COMPLICATION, WITHOUT LONG-TERM CURRENT USE OF INSULIN (H): ICD-10-CM

## 2019-12-02 RX ORDER — METFORMIN HCL 500 MG
2000 TABLET, EXTENDED RELEASE 24 HR ORAL
Qty: 120 TABLET | Refills: 1 | OUTPATIENT
Start: 2019-12-02

## 2019-12-10 ENCOUNTER — OFFICE VISIT (OUTPATIENT)
Dept: PSYCHOLOGY | Facility: CLINIC | Age: 54
End: 2019-12-10
Payer: COMMERCIAL

## 2019-12-10 DIAGNOSIS — F41.9 ANXIETY DISORDER: Primary | ICD-10-CM

## 2019-12-10 PROCEDURE — 90834 PSYTX W PT 45 MINUTES: CPT | Performed by: PSYCHOLOGIST

## 2019-12-10 NOTE — PROGRESS NOTES
"                                             Progress Note    Client Name: Ijeoma Avalos  Date: 12/10/19       Service Type: Individual      Session Start Time: 08:00  Session End Time: 08:50      Session Length: 50     Session #: 9 (this episode of care)     Attendees: Client attended alone    Treatment Plan Last Reviewed: 5/10/19  PHQ-9 / SOSA-7 Last Reviewed: 9/13/19  CSSRS: 7/26/19  CGI: 11/12/19     DATA  Interactive Complexity: No  Crisis: No       Progress Since Last Session (Related to Symptoms / Goals / Homework):   Symptoms: Worry, reactivity    Homework: In progress      Episode of Care Goals: Satisfactory progress - ACTION (Actively working towards change); Intervened by reinforcing change plan / affirming steps taken     Current / Ongoing Stressors and Concerns:   Alcohol abuse in    Phase of life issues (full-time work and parent of young child)   Work demands   Family overseas     Treatment Objective(s) Addressed in This Session:   Build and make use of skills for stress management     Intervention:   Client stated that her parents have returned home to [facility] after an extended visit. With some distance, she is now reviewing the visit and evaluating her choices and behaviors. In general, she believes that she still has a long way to go in avoiding reactivity in response to frustrating interactions. She was able to give herself credit for some instances of choosing intentional responses, not taking the emotional \"bait.\" Discussed the common theme that we keep returning to--Client managing herself and her own reactions rather than trying to change others in order to manage her feelings. We discussed recent frustrations with family members in which Client finds herself annoyed with their choices. Used this example to practice shifting focus, Client checking with herself to determine what she needs in that moment, how she can regulate herself. Personalization remains a common distortion that " Client is working to identify and modify.     ASSESSMENT: Current Emotional / Mental Status (status of significant symptoms):   Risk status (Self / Other harm or suicidal ideation)   Client denies current fears or concerns for personal safety.   Client denies current or recent suicidal ideation or behaviors.   Client denies current or recent homicidal ideation or behaviors.   Client denies current or recent self injurious behavior or ideation.   Client denies other safety concerns.    A safety and risk management plan has not been developed at this time.Recommended that patient call 911 or go to the local ED should there be a change in any of these risk factors.     Appearance:   Appropriate    Eye Contact:   Good    Psychomotor Behavior: Normal    Attitude:   Thoughtful, engaged    Orientation:   All   Speech    Rate / Production: Normal     Volume:  Normal    Mood:    Anxious    Affect:    Appropriate    Thought Content:  Frequent worries , tendency toward personalization, self-critical   Thought Form:  Coherent  Logical    Insight:    Fair      Medication Review:   No changes to current psychiatric medication(s) reported; Client has prescription for lorazepam, which she uses occasionally.     Medication Compliance:   Yes; infrequent use     Changes in Health Issues:   None reported     Chemical Use Review:   Substance Use: Chemical use reviewed, no active concerns identified      Tobacco Use: No current tobacco use.       Diagnosis:     1.  Anxiety Disorder     Collateral Reports Completed:   Not Applicable    PLAN: (Client Tasks / Therapist Tasks / Other)  Upon arriving home from work, Client will focus on what she needs (connection) rather than trying to micromanage family members and their habits. She will practice abeling personalization distortions as she becomes aware of them, then stepping back and reminding herself that other people's behavior is about them, not about her. She can decide how she wishes to  behave during challenging times and give herself credit for those decisions. Return appointments scheduled. Client will contact me sooner if needed.    Rosa Chowdary, LP                                                   _______________________________________________________________________    Treatment Plan    Client's Name: Ijeoma Avalos  YOB: 1965    Date: 2/9/17    DSM-V Diagnoses: Adjustment Disorders  309.28 (F43.23) With mixed anxiety and depressed mood  Psychosocial / Contextual Factors: physical symptoms; phase of life stressors (full-time work and parent of young child); work demands; family overseas  WHODAS: 18    Referral / Collaboration:  Referral to another professional/service is not indicated at this time.    Anticipated number of session or this episode of care: will re-evaluate every 90 days      MeasurableTreatment Goal(s) related to diagnosis / functional impairment(s)  Goal 1: Client will build skills for stress management.    I will know I've met my goal when my blood pressure is lower and my reflux symptoms have decreased.      Objective #A (Client Action)    Client will use at least 2 coping skills for anxiety management in the next 6 weeks.  Status: Continued - Date(s):5/10/19     Intervention(s)  Therapist will teach self-regulation strategies, CBT skills, mindfulness techniques.    Objective #B  Client will use cognitive strategies identified in therapy to challenge anxious thoughts.  Status: Continued - Date(s):5/10/19     Intervention(s)  Therapist will teach cognitive strategies, provide education.      Goal 2: Client will increase self-confidence.    I will know I've met my goal when I think more positively about myself.      Objective #A (Client Action)    Status: Continued - Date(s):5/10/19     Client will increase assertive communication.    Intervention(s)  Therapist will teach assertiveness skills.    Objective #B  Client will Identify negative self-talk and  behaviors: challenge core beliefs, myths, and actions.    Status: Continued - Date(s):5/10/19     Intervention(s)  Therapist will provide education, teach CBT skills.      Goal 3: Client will increase connection in close relationships.    I will know I've met my goal when I feel more close with loved ones.      Objective #A (Client Action)    Status: Continued - Date(s):5/10/19     Client will prioritize time for meaningful relationships.    Intervention(s)  Therapist will provide support and accountability.    Objective #B  Client will make use of effective interpersonal communication skills.    Status: Continued - Date(s): 5/10/19     Intervention(s)  Therapist will teach communication skills.      Client has reviewed and agreed to the above plan.      Rosa Chowdary, KATHARINE  February 9, 2017

## 2019-12-20 ENCOUNTER — OFFICE VISIT (OUTPATIENT)
Dept: FAMILY MEDICINE | Facility: CLINIC | Age: 54
End: 2019-12-20
Payer: COMMERCIAL

## 2019-12-20 ENCOUNTER — ANCILLARY PROCEDURE (OUTPATIENT)
Dept: GENERAL RADIOLOGY | Facility: CLINIC | Age: 54
End: 2019-12-20
Attending: PHYSICIAN ASSISTANT
Payer: COMMERCIAL

## 2019-12-20 VITALS
HEART RATE: 74 BPM | WEIGHT: 132 LBS | TEMPERATURE: 97.4 F | SYSTOLIC BLOOD PRESSURE: 124 MMHG | BODY MASS INDEX: 25.78 KG/M2 | OXYGEN SATURATION: 100 % | DIASTOLIC BLOOD PRESSURE: 74 MMHG

## 2019-12-20 DIAGNOSIS — S80.00XA CONTUSION OF KNEE, UNSPECIFIED LATERALITY, INITIAL ENCOUNTER: Primary | ICD-10-CM

## 2019-12-20 DIAGNOSIS — S80.00XA CONTUSION OF KNEE, UNSPECIFIED LATERALITY, INITIAL ENCOUNTER: ICD-10-CM

## 2019-12-20 PROCEDURE — 73560 X-RAY EXAM OF KNEE 1 OR 2: CPT | Mod: 59

## 2019-12-20 PROCEDURE — 99213 OFFICE O/P EST LOW 20 MIN: CPT | Performed by: PHYSICIAN ASSISTANT

## 2019-12-20 NOTE — PROGRESS NOTES
Subjective     Ijeoma Avalos is a 54 year old female who presents to clinic today for the following health issues:    HPI   Joint Pain    Onset: since 19    Description:   Location: both knees  Character: Sharp and Dull ache    Intensity: mild    Progression of Symptoms: better    Accompanying Signs & Symptoms:  Other symptoms: swelling and bruising    History:   Previous similar pain: no       Precipitating factors:   Trauma or overuse: YES    Alleviating factors:  Improved by: nothing    Therapies Tried and outcome:     Ijeoma presents to the clinic for pain and abrasions to bilateral knees after a trip and fall at home 2 days ago. She noted that she fell directed onto both knees.  She was ambulatory after she fell.  She has noticed more pain in the days following her accident.  Pain is worse with palpation of the patella and with walking. She has been keeping her abrasions clean and covered.       Patient Active Problem List   Diagnosis     Gastric acidity     Other specified hypothyroidism     Plantar fasciitis     Gastroesophageal reflux disease, esophagitis presence not specified     TMJ (temporomandibular joint syndrome)     Neck pain     Other headache syndrome     Uterine leiomyoma, unspecified location     Adjustment disorder with mixed anxiety and depressed mood     Low hemoglobin     Dysmenorrhea     Right knee pain, unspecified chronicity     Post-operative nausea and vomiting     Bilateral patellofemoral syndrome     New onset type 2 diabetes mellitus (H)     Essential hypertension     Hyperlipidemia with target LDL less than 100     Shoulder pain, right     Atopic rhinitis     Nasal congestion     Past Surgical History:   Procedure Laterality Date     APPENDECTOMY        SECTION  2014    Procedure:  SECTION;  Surgeon: Ru Cano MD;  Location:  L[facility]D     CHOLECYSTECTOMY       cyst removed      left  lower leg on bone sheath     DAVINCI HYSTERECTOMY TOTAL,  SALPINGECTOMY BILATERAL N/A 4/21/2017    Procedure: DAVINCI HYSTERECTOMY TOTAL, SALPINGECTOMY BILATERAL;  DAVINCI SINGLE SITE HYSTERECTOMY, BILATERAL SALPINGECTOMY, LEFT OOPHORECTOMY, LYSIS OF ADHESIONS (LAPAROSCOPIC BAG TO RETREIVE OVARY);  Surgeon: Ru Cano MD;  Location: [facility]     DAVINCI LYSIS OF ADHESIONS N/A 4/21/2017    Procedure: DAVINCI LYSIS OF ADHESIONS;;  Surgeon: Ru Cano MD;  Location: [facility]     DAVINCI OOPHORECTOMY N/A 4/21/2017    Procedure: DAVINCI OOPHORECTOMY;;  Surgeon: Ru Cano MD;  Location: [facility]     GI SURGERY       MYOMECTOMY UTERUS  sept 2012     ZZ GASTROSCOPY,FL         Social History     Tobacco Use     Smoking status: Never Smoker     Smokeless tobacco: Never Used   Substance Use Topics     Alcohol use: No     Alcohol/week: 0.0 standard drinks     Comment: social     Family History   Problem Relation Age of Onset     Hypertension Father          Current Outpatient Medications   Medication Sig Dispense Refill     atorvastatin (LIPITOR) 10 MG tablet Take 1 tablet (10 mg) by mouth daily 90 tablet 3     blood glucose (NO BRAND SPECIFIED) lancets standard Use to test blood sugar 1-2 times daily or as directed. 100 each 3     blood glucose (NO BRAND SPECIFIED) test strip Use to test blood sugar 1-2 times daily or as directed. 100 strip 3     blood glucose (ONETOUCH ULTRA) test strip USE TO TEST BLOOD GLUCOSE 1 TO 2 TIMES DAILY OR AS DIRECTED 50 each 7     blood glucose monitoring (NO BRAND SPECIFIED) meter device kit Use to test blood sugar 1-2 times daily or as directed. 1 kit 0     blood glucose monitoring (NO BRAND SPECIFIED) meter device kit Use to test blood sugar  2-3  times daily or as directed. 1 kit 0     diclofenac (VOLTAREN) 1 % topical gel Apply 2 g topically 4 times daily 100 g 0     fluticasone (FLONASE) 50 MCG/ACT nasal spray Spray 1-2 sprays into both nostrils daily 16 g 3     levothyroxine (SYNTHROID/LEVOTHROID) 88 MCG tablet Take 1 tablet by  mouth once daily 30 tablet 6     lisinopril (PRINIVIL/ZESTRIL) 5 MG tablet TAKE ONE TABLET BY MOUTH ONE TIME DAILY  30 tablet 5     metFORMIN (GLUCOPHAGE-XR) 500 MG 24 hr tablet Take 4 tablets (2,000 mg) by mouth daily (with dinner) 120 tablet 1     omeprazole (PRILOSEC) 40 MG DR capsule Take 1 capsule (40 mg) by mouth daily 90 capsule 2     ONETOUCH DELICA LANCETS 33G MISC USE TO TEST BLOOD GLUCOSE 1 TO 2 TIMES DAILY OR AS DIRECTED 100 each 7     cyclobenzaprine (FLEXERIL) 5 MG tablet Take 1 tablet (5 mg) by mouth daily as needed for muscle spasms (Patient not taking: Reported on 12/20/2019) 15 tablet 0     methylPREDNISolone (MEDROL DOSEPAK) 4 MG tablet therapy pack Follow Package Directions (Patient not taking: Reported on 12/20/2019) 21 tablet 0     Allergies   Allergen Reactions     Cephalexin Hives     Hives on hands, face and stomach.      Ibuprofen      sneezing         Reviewed and updated as needed this visit by Provider         Review of Systems   ROS COMP: Constitutional, HEENT, cardiovascular, pulmonary, gi and gu systems are negative, except as otherwise noted.      Objective    /74   Pulse 74   Temp 97.4  F (36.3  C)   Wt 59.9 kg (132 lb)   LMP 04/14/2017   SpO2 100%   BMI 25.78 kg/m    Body mass index is 25.78 kg/m .  Physical Exam   GENERAL: healthy, alert and no distress  MS: no joint effusions noted to knees, TTP along bilateral patella, FROM of LE bilaterally, 5/5 strength of LE bilaterally.   SKIN:  Superficial abrasions noted on bilateral knees, no surrounding erythema, no drainage  PSYCH: mentation appears normal, affect normal/bright    Diagnostic Test Results:  Labs reviewed in Epic        Assessment & Plan     1. Contusion of knee, unspecified laterality, initial encounter  Knee x rays obtained today due to trauma.  X rays are negative for acute fracture per my read.  Etiology of pain is likely contusion and abrasion.  Home care instructions given for abrasions.  Advised that  she apply ice and elevate her knees for the contusions.  She gets some stomach upset with ibuprofen and would like to try voltaren. Follow up in 2-3 weeks if symptoms persist  - XR Knee Bilateral 1/2 Views; Future  - diclofenac (VOLTAREN) 1 % topical gel; Apply 2 g topically 4 times daily  Dispense: 100 g; Refill: 0     Follow up as above    No follow-ups on file.    David Waldrop PA-C  [facility] [place]

## 2019-12-26 ENCOUNTER — OFFICE VISIT (OUTPATIENT)
Dept: FAMILY MEDICINE | Facility: CLINIC | Age: 54
End: 2019-12-26
Payer: COMMERCIAL

## 2019-12-26 VITALS
OXYGEN SATURATION: 98 % | DIASTOLIC BLOOD PRESSURE: 78 MMHG | BODY MASS INDEX: 25.32 KG/M2 | WEIGHT: 129 LBS | SYSTOLIC BLOOD PRESSURE: 124 MMHG | HEART RATE: 73 BPM | TEMPERATURE: 97.1 F | HEIGHT: 60 IN

## 2019-12-26 DIAGNOSIS — S80.02XS CONTUSION OF LEFT KNEE, SEQUELA: ICD-10-CM

## 2019-12-26 DIAGNOSIS — Z12.11 COLON CANCER SCREENING: ICD-10-CM

## 2019-12-26 DIAGNOSIS — Z00.00 ROUTINE HISTORY AND PHYSICAL EXAMINATION OF ADULT: Primary | ICD-10-CM

## 2019-12-26 DIAGNOSIS — E11.9 TYPE 2 DIABETES MELLITUS WITHOUT COMPLICATION, WITHOUT LONG-TERM CURRENT USE OF INSULIN (H): ICD-10-CM

## 2019-12-26 LAB
ALBUMIN SERPL-MCNC: 4 G/DL (ref 3.4–5)
ALP SERPL-CCNC: 65 U/L (ref 40–150)
ALT SERPL W P-5'-P-CCNC: 19 U/L (ref 0–50)
ANION GAP SERPL CALCULATED.3IONS-SCNC: 4 MMOL/L (ref 3–14)
AST SERPL W P-5'-P-CCNC: 9 U/L (ref 0–45)
BILIRUB SERPL-MCNC: 0.6 MG/DL (ref 0.2–1.3)
BUN SERPL-MCNC: 15 MG/DL (ref 7–30)
CALCIUM SERPL-MCNC: 9.3 MG/DL (ref 8.5–10.1)
CHLORIDE SERPL-SCNC: 105 MMOL/L (ref 94–109)
CHOLEST SERPL-MCNC: 166 MG/DL
CO2 SERPL-SCNC: 28 MMOL/L (ref 20–32)
CREAT SERPL-MCNC: 0.7 MG/DL (ref 0.52–1.04)
CREAT UR-MCNC: 202 MG/DL
GFR SERPL CREATININE-BSD FRML MDRD: >90 ML/MIN/{1.73_M2}
GLUCOSE SERPL-MCNC: 109 MG/DL (ref 70–99)
HBA1C MFR BLD: 6 % (ref 0–5.6)
HDLC SERPL-MCNC: 52 MG/DL
LDLC SERPL CALC-MCNC: 86 MG/DL
MICROALBUMIN UR-MCNC: 70 MG/L
MICROALBUMIN/CREAT UR: 34.65 MG/G CR (ref 0–25)
NONHDLC SERPL-MCNC: 114 MG/DL
POTASSIUM SERPL-SCNC: 4 MMOL/L (ref 3.4–5.3)
PROT SERPL-MCNC: 7.7 G/DL (ref 6.8–8.8)
SODIUM SERPL-SCNC: 137 MMOL/L (ref 133–144)
TRIGL SERPL-MCNC: 139 MG/DL

## 2019-12-26 PROCEDURE — 99207 C FOOT EXAM  NO CHARGE: CPT | Mod: 25 | Performed by: FAMILY MEDICINE

## 2019-12-26 PROCEDURE — 99213 OFFICE O/P EST LOW 20 MIN: CPT | Mod: 25 | Performed by: FAMILY MEDICINE

## 2019-12-26 PROCEDURE — 99396 PREV VISIT EST AGE 40-64: CPT | Performed by: FAMILY MEDICINE

## 2019-12-26 PROCEDURE — 80053 COMPREHEN METABOLIC PANEL: CPT | Performed by: FAMILY MEDICINE

## 2019-12-26 PROCEDURE — 80061 LIPID PANEL: CPT | Performed by: FAMILY MEDICINE

## 2019-12-26 PROCEDURE — 36415 COLL VENOUS BLD VENIPUNCTURE: CPT | Performed by: FAMILY MEDICINE

## 2019-12-26 PROCEDURE — 82043 UR ALBUMIN QUANTITATIVE: CPT | Performed by: FAMILY MEDICINE

## 2019-12-26 PROCEDURE — 83036 HEMOGLOBIN GLYCOSYLATED A1C: CPT | Performed by: FAMILY MEDICINE

## 2019-12-26 ASSESSMENT — MIFFLIN-ST. JEOR: SCORE: 1116.64

## 2019-12-26 NOTE — PROGRESS NOTES
SUBJECTIVE:   CC: Ijeoma Avalos is an 54 year old woman who presents for preventive health visit.     Healthy Habits:    Getting at least 3 servings of Calcium per day:  Yes    Bi-annual eye exam:  Yes    Dental care twice a year:  Yes    Sleep apnea or symptoms of sleep apnea:  None    Diet:  Diabetic    Frequency of exercise:  None    Duration of exercise:  N/A    Taking medications regularly:  Yes    Barriers to taking medications:  None    Medication side effects:  None    PHQ-2 Total Score:    Additional concerns today:  No          PROBLEMS TO ADD ON...  Due for dm labs. She thinsk she is doing good. She fell and bruised her kness and was seen recently by another provider. So she was on some pain med's for her knee , so it may have caused the sugars to be high for a short time, so she was worried.    her knee is feeing a bit better , still some soreness but she is walking much better    She is due for DM follow up ,so wish to proceed with DM LABS today   Diabetes Follow-up    How often are you checking your blood sugar? One time daily  What time of day are you checking your blood sugars (select all that apply)?  usualy good,   Have you had any blood sugars above 200?  No  Have you had any blood sugars below 70?  No    What symptoms do you notice when your blood sugar is low?  None    What concerns do you have today about your diabetes? None and Other: due for labs      Do you have any of these symptoms? (Select all that apply)  No numbness or tingling in feet.  No redness, sores or blisters on feet.  No complaints of excessive thirst.  No reports of blurry vision.  No significant changes to weight.     Have you had a diabetic eye exam in the last 12 months? No    BP Readings from Last 2 Encounters:   12/26/19 124/78   12/20/19 124/74     Hemoglobin A1C (%)   Date Value   07/29/2019 6.0 (H)   05/01/2019 5.6     LDL Cholesterol Calculated (mg/dL)   Date Value   12/24/2018 49   12/13/2017 126 (H)        Diabetes Management Resources      Today's PHQ-2 Score:   PHQ-2 (  [employer]) 2019   Q1: Little interest or pleasure in doing things 0   Q2: Feeling down, depressed or hopeless 0   PHQ-2 Score 0   Q1: Little interest or pleasure in doing things -   Q2: Feeling down, depressed or hopeless -   PHQ-2 Score -       Abuse: Current or Past(Physical, Sexual or Emotional)- No  Do you feel safe in your environment? Yes        Social History     Tobacco Use     Smoking status: Never Smoker     Smokeless tobacco: Never Used   Substance Use Topics     Alcohol use: No     Alcohol/week: 0.0 standard drinks     Comment: social     If you drink alcohol do you typically have >3 drinks per day or >7 drinks per week? No    Alcohol Use 2017   Prescreen: >3 drinks/day or >7 drinks/week? The patient does not drink >3 drinks per day nor >7 drinks per week.   No flowsheet data found.    Reviewed orders with patient.  Reviewed health maintenance and updated orders accordingly - Yes  Patient Active Problem List   Diagnosis     Gastric acidity     Other specified hypothyroidism     Plantar fasciitis     Gastroesophageal reflux disease, esophagitis presence not specified     TMJ (temporomandibular joint syndrome)     Neck pain     Other headache syndrome     Uterine leiomyoma, unspecified location     Adjustment disorder with mixed anxiety and depressed mood     Low hemoglobin     Dysmenorrhea     Right knee pain, unspecified chronicity     Post-operative nausea and vomiting     Bilateral patellofemoral syndrome     New onset type 2 diabetes mellitus (H)     Essential hypertension     Hyperlipidemia with target LDL less than 100     Shoulder pain, right     Atopic rhinitis     Nasal congestion     Past Surgical History:   Procedure Laterality Date     APPENDECTOMY        SECTION  2014    Procedure:  SECTION;  Surgeon: Ru Cano MD;  [facility]: [facility]     CHOLECYSTECTOMY       cyst removed       left  lower leg on bone sheath     DAVINCI HYSTERECTOMY TOTAL, SALPINGECTOMY BILATERAL N/A 4/21/2017    Procedure: DAVINCI HYSTERECTOMY TOTAL, SALPINGECTOMY BILATERAL;  DAVINCI SINGLE SITE HYSTERECTOMY, BILATERAL SALPINGECTOMY, LEFT OOPHORECTOMY, LYSIS OF ADHESIONS (LAPAROSCOPIC BAG TO RETREIVE OVARY);  Surgeon: Ru Cano MD;  [facility]     DAVINCI LYSIS OF ADHESIONS N/A 4/21/2017    Procedure: DAVINCI LYSIS OF ADHESIONS;;  Surgeon: Ru Cano MD;  [facility]     DAVINCI OOPHORECTOMY N/A 4/21/2017    Procedure: DAVINCI OOPHORECTOMY;;  Surgeon: Ru Cano MD;  Location: [facility]     GI SURGERY       MYOMECTOMY UTERUS  sept 2012     ZZ GASTROSCOPY,FL         Social History     Tobacco Use     Smoking status: Never Smoker     Smokeless tobacco: Never Used   Substance Use Topics     Alcohol use: No     Alcohol/week: 0.0 standard drinks     Comment: social     Family History   Problem Relation Age of Onset     Hypertension Father            Mammogram Screening: Patient over age 50, mutual decision to screen reflected in health maintenance.    Pertinent mammograms are reviewed under the imaging tab.  History of abnormal Pap smear: NO - age 30- 65 PAP every 3 years recommended  PAP / HPV Latest Ref Rng & Units 11/7/2016   PAP - OTHER-NIL, See Result   HPV 16 DNA NEG Negative   HPV 18 DNA NEG Negative   OTHER HR HPV NEG Negative     Reviewed and updated as needed this visit by clinical staff         Reviewed and updated as needed this visit by Provider        Past Medical History:   Diagnosis Date     Gastric acidity      Gestational diabetes mellitus     DIET CONTROL     Hx of previous reproductive problem     IVF     Hypothyroid      Motion sickness      Ovarian cyst      Post-operative nausea and vomiting 4/21/2017        Review of Systems  CONSTITUTIONAL: NEGATIVE for fever, chills, change in weight  INTEGUMENTARU/SKIN: NEGATIVE for worrisome rashes, moles or lesions  EYES:  NEGATIVE for vision changes or irritation  ENT: NEGATIVE for ear, mouth and throat problems  RESP: NEGATIVE for significant cough or SOB  BREAST: NEGATIVE for masses, tenderness or discharge  CV: NEGATIVE for chest pain, palpitations or peripheral edema  GI: NEGATIVE for nausea, abdominal pain, heartburn, or change in bowel habits  : NEGATIVE for unusual urinary or vaginal symptoms. Postmenopausal   MUSCULOSKELETAL: NEGATIVE for significant arthralgias or myalgia  NEURO: NEGATIVE for weakness, dizziness or paresthesias  ENDOCRINE: NEGATIVE for temperature intolerance, skin/hair changes  PSYCHIATRIC: NEGATIVE for changes in mood or affect  Foot exam : No lesion , normal sensation by monofilament. Normal peripheral pulses  .        OBJECTIVE:   LMP 04/14/2017   Physical Exam  GENERAL: healthy, alert and no distress  EYES: Eyes grossly normal to inspection, PERRL and conjunctivae and sclerae normal  HENT: ear canals and TM's normal, nose and mouth without ulcers or lesions  NECK: no adenopathy, no asymmetry, masses, or scars and thyroid normal to palpation  RESP: lungs clear to auscultation - no rales, rhonchi or wheezes  BREAST: normal without masses, tenderness or nipple discharge and no palpable axillary masses or adenopathy  CV: regular rate and rhythm, normal S1 S2, no S3 or S4, no murmur, c no peripheral edema   ABDOMEN: soft, nontender, no hepatosplenomegaly, no masses and bowel sounds normal   (female): deferred  MS: no leg edema, bot knees - with well healed abrasion related to recent injury well healed, scabbed and no signs of infection but has mild diffuse discomfort and  knee tenderness around that area , ROM of knee is ok   SKIN: no suspicious lesions or rashes  NEURO: Normal strength and tone, mentation intact and speech normal  PSYCH: mentation appears normal, affect normal/bright  Foot exam : No lesion , normal sensation by monofilament. Normal peripheral pulses  .         ASSESSMENT/PLAN:    (Z00.00) Routine history and physical examination of adult  (primary encounter diagnosis)  Comment:   Plan:     (E11.9) Type 2 diabetes mellitus without complication, without long-term current use of insulin (H)  Comment:   Plan: COMPREHENSIVE METABOLIC PANEL, Lipid panel         reflex to direct LDL Fasting, Albumin Random         Urine Quantitative with Creat Ratio, Hemoglobin        A1c, FOOT EXAM           Discussed cares, diet/ exercise . Talked about DM management , health risk etc. gave refill on meds. Check lab, call pt with results. Follow up as needed    (S80.02XS) Contusion of left knee, sequela  Comment: contusion - improving  Plan: Cares and symptomatic treatment discussed follow up if problem     (Z12.11) Colon cancer screening  Comment:   Plan: Fecal colorectal cancer screen (FIT),         GASTROENTEROLOGY ADULT REF PROCEDURE ONLY         Carrol Carmen [phone]            Check labs. refill sent.Cares and  treatment discussed. Follow. up if problem   Patient expressed understanding and agreement with treatment plan. All patient's questions were answered, will let me know if has more later.  Medications: Rx's: Reviewed the potential side effects/complications of medications prescribed.     COUNSELING:  Reviewed preventive health counseling, as reflected in patient instructions       Regular exercise       Healthy diet/nutrition       Vision screening       Hearing screening       Immunizations    consider Vaccinated for: Zoster  Later            Aspirin Prophylaxsis       Osteoporosis Prevention/Bone Health       Colon cancer screening    Estimated body mass index is 25.78 kg/m  as calculated from the following:    Height as of 9/30/19: 1.524 m (5').    Weight as of 12/20/19: 59.9 kg (132 lb).    Weight management plan: Discussed healthy diet and exercise guidelines     reports that she has never smoked. She has never used smokeless tobacco.      Counseling Resources:  ATP IV  Guidelines  Pooled Cohorts Equation Calculator  Breast Cancer Risk Calculator  FRAX Risk Assessment  ICSI Preventive Guidelines  Dietary Guidelines for Americans, 2010  [employer]'s MyPlate  ASA Prophylaxis  Lung CA Screening    Dulce Hernandez MD  [facility] [place] [place]

## 2019-12-26 NOTE — PATIENT INSTRUCTIONS
Patient Education     Prevention Guidelines, Women Ages 50 to 64  Screening tests and vaccines are an important part of managing your health. A screening test is done to find possible disorders or diseases in people who don't have any symptoms. The goal is to find a disease early so lifestyle changes can be made and you can be watched more closely to reduce the risk of disease, or to detect it early enough to treat it most effectively. Screening tests are not considered diagnostic, but are used to determine if more testing is needed. Health counseling is essential, too. Below are guidelines for these, for women ages 50 to 64. Talk with your healthcare provider to make sure you re up to date on what you need.  Screening Who needs it How often   Type 2 diabetes or prediabetes All women beginning at age 45 and women without symptoms at any age who are overweight or obese and have 1 or more additional risk factors for diabetes. At  least every 3 years   Type 2 diabetes or prediabetes All women diagnosed with gestational diabetes Lifelong testing every 3 years   Type 2 diabetes All women with prediabetes Every year   Alcohol misuse All women in this age group At routine exams   Blood pressure All women in this age group Yearly checkup if your blood pressure is normal  Normal blood pressure is less than 120/80 mm Hg  If your blood pressure reading is higher than normal, follow the advice of your healthcare provider   Breast cancer All women at average risk in this age group Yearly mammogram should be done until age 54. At age 55, switch to mammograms every other year or choose to continue yearly mammograms.  All women should be familiar with the potential benefits and risks of breast cancer screening with mammograms.      Cervical cancer All women in this age group, except women who have had a complete hysterectomy Pap test every 3 years or Pap test with human papillomavirus (HPV) test every 5 years   Chlamydia Women at  increased risk for infection At routine exams   Colorectal cancer All women in this age group Flexible sigmoidoscopy every 5 years, or colonoscopy every 10 years, or double-contrast barium enema every 5 years; yearly fecal occult blood test or fecal immunochemical test; or a stool DNA test as often as your health care provider advises; talk with your health care provider about which tests are best for you   Depression All women in this age group At routine exams   Gonorrhea Sexually active women at increased risk for infection At routine exams   Hepatitis C Anyone at increased risk; 1 time for those born between 1945 and 1965 At routine exams   High cholesterol or triglycerides All women in this age group who are at risk for coronary artery disease At least every 5 years   HIV All women At routine exams   Lung cancer Adults age 55 to 80 who have smoked Yearly screening in smokers with 30 pack-year history of smoking or who quit within 15 years   Obesity All women in this age group At routine exams   Osteoporosis Women who are postmenopausal Ask your healthcare provider   Syphilis Women at increased risk for infection - talk with your healthcare provider At routine exams   Tuberculosis Women at increased risk for infection - talk with your healthcare provider Ask your healthcare provider   Vision All women in this age group Ask your healthcare provider   Vaccine Who needs it How often   Chickenpox (varicella) All women in this age group who have no record of this infection or vaccine 2 doses; the second dose should be given at least 4 weeks after the first dose   Hepatitis A Women at increased risk for infection - talk with your healthcare provider 2 doses given at least 6 months apart   Hepatitis B Women at increased risk for infection - talk with your healthcare provider 3 doses over 6 months; second dose should be given 1 month after the first dose; the third dose should be given at least 2 months after the second  dose and at least 4 months after the first dose   Haemophilus influenzaeType B (HIB) Women at increased risk for infection - talk with your healthcare provider 1 to 3 doses   Influenza (flu) All women in this age group Once a year   Measles, mumps, rubella (MMR) Women in this age group through their late 50s who have no record of these infections or vaccines 1 dose   Meningococcal Women at increased risk for infection - talk with your healthcare provider 1 or more doses   Pneumococcal conjugate vaccine (PCV13) and pneumococcal polysaccharide vaccine (PPSV23) Women at increased risk for infection - talk with your healthcare provider PCV13: 1 dose ages 19 to 65 (protects against 13 types of pneumococcal bacteria)  PPSV23: 1 to 2 doses through age 64, or 1 dose at 65 or older (protects against 23 types of pneumococcal bacteria)   Tetanus/diphtheria/pertussis (Td/Tdap) booster All women in this age group Td every 10 years, or a one-time dose of Tdap instead of a Td booster after age 18, then Td every 10 years   Zoster All women ages 60 and older 1 dose   Counseling Who needs it How often   BRCA gene mutation testing for breast and ovarian cancer susceptibility Women with increased risk for having gene mutation When your risk is known   Breast cancer and chemoprevention Women at high risk for breast cancer When your risk is known   Diet and exercise Women who are overweight or obese When diagnosed, and then at routine exams   Sexually transmitted infection prevention Women at increased risk for infection - talk with your healthcare provider At routine exams   Use of daily aspirin Women ages 55 and up in this age group who are at risk for cardiovascular health problems such as stroke When your risk is known   Use of tobacco and the health effects it can cause All women in this age group Every exam   1[employer]  Date Last Reviewed: 1/26/2016 2000-2018 [employer]. [address] [facility],  [place] [address]. All rights reserved. This information is not intended as a substitute for professional medical care. Always follow your healthcare professional's instructions.

## 2019-12-27 ENCOUNTER — MYC MEDICAL ADVICE (OUTPATIENT)
Dept: FAMILY MEDICINE | Facility: CLINIC | Age: 54
End: 2019-12-27

## 2019-12-27 NOTE — TELEPHONE ENCOUNTER
Please see [facility] message and advise.      Thank you,  Isa MABRYRN BSN  [facility]  [phone]

## 2020-01-08 ENCOUNTER — THERAPY VISIT (OUTPATIENT)
Dept: CHIROPRACTIC MEDICINE | Facility: CLINIC | Age: 55
End: 2020-01-08
Attending: NURSE PRACTITIONER
Payer: COMMERCIAL

## 2020-01-08 DIAGNOSIS — M25.561 RIGHT KNEE PAIN, UNSPECIFIED CHRONICITY: ICD-10-CM

## 2020-01-08 DIAGNOSIS — G89.29 CHRONIC RIGHT-SIDED LOW BACK PAIN WITHOUT SCIATICA: ICD-10-CM

## 2020-01-08 DIAGNOSIS — M77.9 TENDONITIS: ICD-10-CM

## 2020-01-08 DIAGNOSIS — M99.03 SOMATIC DYSFUNCTION OF LUMBAR REGION: Primary | ICD-10-CM

## 2020-01-08 DIAGNOSIS — M53.3 SACRAL PAIN: ICD-10-CM

## 2020-01-08 DIAGNOSIS — M99.04 SOMATIC DYSFUNCTION OF SACRAL REGION: ICD-10-CM

## 2020-01-08 DIAGNOSIS — M54.50 CHRONIC RIGHT-SIDED LOW BACK PAIN WITHOUT SCIATICA: ICD-10-CM

## 2020-01-08 PROCEDURE — 99203 OFFICE O/P NEW LOW 30 MIN: CPT | Mod: 25 | Performed by: CHIROPRACTOR

## 2020-01-08 PROCEDURE — 98941 CHIROPRACT MANJ 3-4 REGIONS: CPT | Mod: AT | Performed by: CHIROPRACTOR

## 2020-01-08 PROCEDURE — 97112 NEUROMUSCULAR REEDUCATION: CPT | Mod: 59 | Performed by: CHIROPRACTOR

## 2020-01-09 PROBLEM — M25.511 SHOULDER PAIN, RIGHT: Status: RESOLVED | Noted: 2018-06-04 | Resolved: 2020-01-09

## 2020-01-09 PROBLEM — M77.9 TENDONITIS: Status: ACTIVE | Noted: 2020-01-09

## 2020-01-09 PROBLEM — M99.03 SOMATIC DYSFUNCTION OF LUMBAR REGION: Status: ACTIVE | Noted: 2020-01-09

## 2020-01-09 PROBLEM — M99.04 SOMATIC DYSFUNCTION OF SACRAL REGION: Status: ACTIVE | Noted: 2020-01-09

## 2020-01-09 PROBLEM — M53.3 SACRAL PAIN: Status: ACTIVE | Noted: 2020-01-09

## 2020-01-09 PROBLEM — M54.50 CHRONIC RIGHT-SIDED LOW BACK PAIN WITHOUT SCIATICA: Status: ACTIVE | Noted: 2020-01-09

## 2020-01-09 PROBLEM — M22.2X1 BILATERAL PATELLOFEMORAL SYNDROME: Status: RESOLVED | Noted: 2017-07-18 | Resolved: 2020-01-09

## 2020-01-09 PROBLEM — M22.2X2 BILATERAL PATELLOFEMORAL SYNDROME: Status: RESOLVED | Noted: 2017-07-18 | Resolved: 2020-01-09

## 2020-01-09 PROBLEM — G89.29 CHRONIC RIGHT-SIDED LOW BACK PAIN WITHOUT SCIATICA: Status: ACTIVE | Noted: 2020-01-09

## 2020-01-09 NOTE — PROGRESS NOTES
Initial Chiropractic Clinic Visit    PCP: Dulce Hernandez    Ijeoma Avalos is a 54 year old female who is seen  in consultation at the request of  Jarod Storey M.D. presenting with chronic low back pain and R knee pain . Patient reports that the onset was 2 years ago.  When asked, patient denies:, falling, slipping, bending and reaching or sleeping awkwardly. The pt reports chronic R SI joint pain and R knee pain that started over 5 years ago intermittently. She grades the pain a 1-7/10 on VAS. She cannot relate a specific incident that could have caused the pain, it seemed to be gradual. There is no radiation of pain in the R leg.  Prior to onset, the patient was able to walk one mile. Patient notes that due to symptoms, they can only sit for a short period due to pain. Ijeoma Avalos notes   standing rated at a 7/10 painful and prior to this incident it was 2/10.        Injury: There was no acute injury related to this episode     Location of Pain: R SI joint  at the following level(s) T4 , T8 , L5 , Sacrum  and PSIS Right   Duration of Pain: 2-5 years    Rating of Pain at worst: 7/10  Rating of Pain Currently: 1/10  Symptoms are better with: Nothing  Symptoms are worse with: walking   Additional Features: none      Health History  as reported by the patient:    How does the patient rate their own health:   Poor    Current or past medical history:   Diabetes, Migraines/headaches and Thyroid problems    Medical allergies  Other: penicillin     Past Traumas/Surgeries  Other:  Hysterectomy     Family History  Family History   Problem Relation Age of Onset     Hypertension Father        Medications:  High blood pressure and Thyroid    Occupation:       Primary job tasks:   Computer work and Prolonged sitting    Barriers as home/work:   none    Additional health Issues:     None             Ijeoma was asked to complete the Oswestry Low Back Disability Index and Tayo Start Back screening  tool, today in the office.  Disability score: 18%. Keel Start Total Score:2 Sub Score: 1     Review of Systems  Musculoskeletal: as above  Remainder of review of systems is negative including constitutional, CV, pulmonary, GI, Skin and Neurologic except as noted in HPI or medical history.    Past Medical History:   Diagnosis Date     Gastric acidity      Gestational diabetes mellitus     DIET CONTROL     Hx of previous reproductive problem     IVF     Hypothyroid      Motion sickness      Ovarian cyst      Post-operative nausea and vomiting 2017     Past Surgical History:   Procedure Laterality Date     APPENDECTOMY        SECTION  2014    Procedure:  SECTION;  Surgeon: Ru Cano MD;  Location:  [facility]     CHOLECYSTECTOMY       cyst removed      left  lower leg on bone sheath     DAVINCI HYSTERECTOMY TOTAL, SALPINGECTOMY BILATERAL N/A 2017    Procedure: DAVINCI HYSTERECTOMY TOTAL, SALPINGECTOMY BILATERAL;  DAVINCI SINGLE SITE HYSTERECTOMY, BILATERAL SALPINGECTOMY, LEFT OOPHORECTOMY, LYSIS OF ADHESIONS (LAPAROSCOPIC BAG TO RETREIVE OVARY);  Surgeon: Ru Cano MD;  Location:  OR     DAVINCI LYSIS OF ADHESIONS N/A 2017    Procedure: DAVINCI LYSIS OF ADHESIONS;;  Surgeon: Ru Cano MD;  Location:  OR     DAVINCI OOPHORECTOMY N/A 2017    Procedure: DAVINCI OOPHORECTOMY;;  Surgeon: Ru Cano MD;  Location:  OR     GI SURGERY       MYOMECTOMY UTERUS  2012     ZZ GASTROSCOPY,FL       Objective  LMP 2017       GENERAL APPEARANCE: healthy, alert and no distress   GAIT: NORMAL  SKIN: no suspicious lesions or rashes  NEURO: Normal strength and tone, mentation intact and speech normal  PSYCH:  mentation appears normal and affect normal/bright    soft tissue tenderness over patellar tendon, limited range of motion  Decreased with pain, positive drawer sign, negative pivot-shift  , negative Sara sign , negative patellar grind  "test  and negative anterior patellar compression positive for pain      Low back exam:    Inspection:  \"     no visible deformity in the low back       normal skin\"  Slumped seated posture, anterior pelvic flexion with sacral/coccyx seated posture, Increased thoracic kyphosis with subsequent anterior cervical carriage. Poor seated posture      ROM:       limited flexion due to pain       limited extension due to pain    Tender:       paraspinal muscles       B QL     Non Tender:       remainder of lumbar spine    Strength:       hip flexion 5/5 Normal       knee extension 5/5 Normal       ankle dorsiflexion 5/5 Normal       ankle plantarflexion 5/5 Normal       dorsiflexion of the great toe 5/5 Normal    Reflexes:       patellar (L3, L4) 2 bilaterally       achilles tendons (S1) 2 bilaterally    Sensation:      grossly intact throughout lower extremities    Special tests:  Kemps - Right positive, low back pain and Left positive, low back pain, SLR - Right negative and Left negative, Gaenslen's - Right negative and Left negative and Fabere - Right positive, hip and low back pain and Left negative    Segmental spinal dysfunction/restrictions found at:  T4 , T8 , L5 , Sacrum  and PSIS Right     The following soft tissue hypotonicities were observed:Quad lumb: bilateral, referred pain: no  T paraspinals: ache and dull pain, no    Trigger points were found in: none     Muscle spasm found in:Lumbar erector spine and Quad lumb      Radiology:  None     Assessment:    1. Somatic dysfunction of lumbar region    2. Chronic right-sided low back pain without sciatica    3. Somatic dysfunction of sacral region    4. Sacral pain    5. Right knee pain, unspecified chronicity    6. Tendonitis        RX ordered/plan of care  Anticipated outcomes  Possible risks and side effects    After discussing the risk and benefits of care, patient consented to treatment    Prognosis: Excellent      Patient's condition:  Patient had restrictions " pre-manipulation    Treatment effectiveness:  Post manipulation there is better intersegmental movement and Patient claims to feel looser post manipulation      Plan:    Procedures:  Evaluation and Management:  66541 Moderate level exam 30 min    CMT:  66111 Chiropractic manipulative treatment 3-4 regions performed   Thoracic: Diversified, T4, T8, Prone  Lumbar: Diversified, L5, Side posture  Pelvis: Drop Table, Sacrum , PSIS Right , Prone   Anterior R pubis: R drop table     Modalities:  None performed this visit    Therapeutic procedures:  94373: Neurological re-education/proprioception training and proper long term sitting posture: Corrected patient's seated posture when sitting for longer than 20 minutes or seated at the computer related to work duties for over 8 hours per day. Fit patient with Jojo lumbar support for postural re-training with demonstration. Showed patient how to place the support correctly when seated and to increase usage by 2-3 hour increments per day until they are able to sit full time without spinal irritation. Related improper vs. proper sitting to optimal spinal biomechanics using the spine model with demonstration with the purpose of PREVENTING premature spinal degeneration from cumulative static motion. Demonstrated the increase in load and shearing forces on the spine in addition to the cumulative degenerative effects of axial compression on a spine that is chronically slumped and in an 'unlocked' position vs a 'locked' position. Demonstrated the use of a lap top table for lap top computers to prevent excessive cervical flexion of the neck. Demonstrated 'hip hinging' to access the computer when seated rather than thoracic flexion. Gave cervical retraction for proper cervical alignment, anterior deep cervical flexor strengthening and cervical proprioception training with demonstration. Per 10-12 minutes total        Response to Treatment  Reduction in symptoms as reported by  patient      Treatment plan and goals:  Goals:  Walking one mile     Frequency of care  Duration of care is estimated to be 4-6 weeks, from the initial treatment.  It is estimated that the patient will need a total of 4-6 visits to resolve this episode.  For the initial therapeutic trial of care, the frequency is recommended at 1 X week, once daily.  A reevaluation would be clinically appropriate in 4-6 visits, to determine progress and further course of care.    In-Office Treatment  Evaluation  Spinal Chiropractic Manipulative Therapy  Postural correction        Recommendations:    Instructions:  use lumbar support as instructed     Follow-up:    Return to care in 1 week with ACP.       Discussed the assessment with the patient.      Disclaimer: This note consists of symbols derived from keyboarding, dictation and/or voice recognition software. As a result, there may be errors in the script that have gone undetected. Please consider this when interpreting information found in this chart.

## 2020-01-21 ENCOUNTER — OFFICE VISIT (OUTPATIENT)
Dept: PSYCHOLOGY | Facility: CLINIC | Age: 55
End: 2020-01-21
Payer: COMMERCIAL

## 2020-01-21 DIAGNOSIS — F41.9 ANXIETY DISORDER: Primary | ICD-10-CM

## 2020-01-21 PROCEDURE — 90834 PSYTX W PT 45 MINUTES: CPT | Performed by: PSYCHOLOGIST

## 2020-01-21 NOTE — PROGRESS NOTES
"                                             Progress Note    Client Name: Ijeoma Avalos  Date: 1/21/20       Service Type: Individual      Session Start Time: 08:00  Session End Time: 08:50      Session Length: 50     Session #: 10 (this episode of care)     Attendees: Client attended alone    Treatment Plan Last Reviewed: 5/10/19  PHQ-9 / SOSA-7 Last Reviewed: 11/12/19  CSSRS: 7/26/19  CGI: 11/12/19     DATA  Interactive Complexity: No  Crisis: No       Progress Since Last Session (Related to Symptoms / Goals / Homework):   Symptoms: Lower mood    Homework: In progress      Episode of Care Goals: Satisfactory progress - ACTION (Actively working towards change); Intervened by reinforcing change plan / affirming steps taken     Current / Ongoing Stressors and Concerns:   Alcohol abuse in    Phase of life issues (full-time work and parent of young child)   Work demands   Family overseas     Treatment Objective(s) Addressed in This Session:   Build and make use of skills for stress management     Intervention:   Client reported variable progress with her homework to focus on her own needs rather than trying to control others, with some forward motion noted overall. She described some lower mood recently--general malaise and feeling \"empty.\" She recognizes that dissatisfaction at work is a contributing factor. She also is disappointed with herself that even when she is at home, or spending time in leisure activities, she is not really present. Noted how this may be contributing to feelings of burnout. Discussed some ways to make a shift toward being more present (\"be where you are\"), even for short periods of time. Talked about using her senses to ground herself in the present. Also identified a routine she could establish with her daughter after work in order to feel more connected even though they are away from each other all day. She has some ideas for changes to pursue at work that might lead to " "increased satisfaction.      ASSESSMENT: Current Emotional / Mental Status (status of significant symptoms):   Risk status (Self / Other harm or suicidal ideation)   Client denies current fears or concerns for personal safety.   Client denies current or recent suicidal ideation or behaviors.   Client denies current or recent homicidal ideation or behaviors.   Client denies current or recent self injurious behavior or ideation.   Client denies other safety concerns.    A safety and risk management plan has not been developed at this time.Recommended that patient call 911 or go to the local ED should there be a change in any of these risk factors.     Appearance:   Appropriate    Eye Contact:   Good    Psychomotor Behavior: Normal    Attitude:   Cooperative    Orientation:   All   Speech    Rate / Production: Normal     Volume:  Normal    Mood:    Depressed--mild; anxious    Affect:    Appropriate    Thought Content:  Frequent worries , tendency toward personalization, self-critical   Thought Form:  Coherent  Logical    Insight:    Fair      Medication Review:   No changes to current psychiatric medication(s) reported; Client has prescription for lorazepam, which she uses occasionally.     Medication Compliance:   Yes; infrequent use     Changes in Health Issues:   None reported     Chemical Use Review:   Substance Use: Chemical use reviewed, no active concerns identified      Tobacco Use: No current tobacco use.       Diagnosis:     1.  Anxiety Disorder     Collateral Reports Completed:   Not Applicable    PLAN: (Client Tasks / Therapist Tasks / Other)  Client will practice engaging her 5 senses to be more present in the moment, even for short periods of time. She will remind herself to \"be where you are.\" She will take 10-15 minutes with her daughter before dinner to engage in a preferred joint activity in order to feel more connected after a day apart. She will pursue changes in her work life that she believes may " bring increased satisfaction. She will practice labeling personalization distortions as she becomes aware of them, then stepping back and reminding herself that other people's behavior is about them, not about her. Return appointments scheduled. Client will contact me sooner if needed.    Rosa MILLERMikie Chowdary, LP                                                   _______________________________________________________________________    Treatment Plan    Client's Name: Ijeoma Avalos  YOB: 1965    Date: 2/9/17    DSM-V Diagnoses: Adjustment Disorders  309.28 (F43.23) With mixed anxiety and depressed mood  Psychosocial / Contextual Factors: physical symptoms; phase of life stressors (full-time work and parent of young child); work demands; family overseas  WHODAS: 18    Referral / Collaboration:  Referral to another professional/service is not indicated at this time.    Anticipated number of session or this episode of care: will re-evaluate every 90 days      MeasurableTreatment Goal(s) related to diagnosis / functional impairment(s)  Goal 1: Client will build skills for stress management.    I will know I've met my goal when my blood pressure is lower and my reflux symptoms have decreased.      Objective #A (Client Action)    Client will use at least 2 coping skills for anxiety management in the next 6 weeks.  Status: Continued - Date(s):5/10/19     Intervention(s)  Therapist will teach self-regulation strategies, CBT skills, mindfulness techniques.    Objective #B  Client will use cognitive strategies identified in therapy to challenge anxious thoughts.  Status: Continued - Date(s):5/10/19     Intervention(s)  Therapist will teach cognitive strategies, provide education.      Goal 2: Client will increase self-confidence.    I will know I've met my goal when I think more positively about myself.      Objective #A (Client Action)    Status: Continued - Date(s):5/10/19     Client will increase assertive  communication.    Intervention(s)  Therapist will teach assertiveness skills.    Objective #B  Client will Identify negative self-talk and behaviors: challenge core beliefs, myths, and actions.    Status: Continued - Date(s):5/10/19     Intervention(s)  Therapist will provide education, teach CBT skills.      Goal 3: Client will increase connection in close relationships.    I will know I've met my goal when I feel more close with loved ones.      Objective #A (Client Action)    Status: Continued - Date(s):5/10/19     Client will prioritize time for meaningful relationships.    Intervention(s)  Therapist will provide support and accountability.    Objective #B  Client will make use of effective interpersonal communication skills.    Status: Continued - Date(s): 5/10/19     Intervention(s)  Therapist will teach communication skills.      Client has reviewed and agreed to the above plan.      Rosa Chowdary, KATHARINE  February 9, 2017

## 2020-01-22 ENCOUNTER — THERAPY VISIT (OUTPATIENT)
Dept: CHIROPRACTIC MEDICINE | Facility: CLINIC | Age: 55
End: 2020-01-22
Payer: COMMERCIAL

## 2020-01-22 DIAGNOSIS — M54.50 CHRONIC RIGHT-SIDED LOW BACK PAIN WITHOUT SCIATICA: ICD-10-CM

## 2020-01-22 DIAGNOSIS — M99.03 SOMATIC DYSFUNCTION OF LUMBAR REGION: Primary | ICD-10-CM

## 2020-01-22 DIAGNOSIS — G89.29 CHRONIC RIGHT-SIDED LOW BACK PAIN WITHOUT SCIATICA: ICD-10-CM

## 2020-01-22 DIAGNOSIS — M25.561 RIGHT KNEE PAIN, UNSPECIFIED CHRONICITY: ICD-10-CM

## 2020-01-22 DIAGNOSIS — M99.04 SOMATIC DYSFUNCTION OF SACRAL REGION: ICD-10-CM

## 2020-01-22 DIAGNOSIS — M53.3 SACRAL PAIN: ICD-10-CM

## 2020-01-22 PROCEDURE — 97810 ACUP 1/> WO ESTIM 1ST 15 MIN: CPT | Mod: GA | Performed by: CHIROPRACTOR

## 2020-01-22 PROCEDURE — 98941 CHIROPRACT MANJ 3-4 REGIONS: CPT | Mod: AT | Performed by: CHIROPRACTOR

## 2020-01-23 NOTE — PROGRESS NOTES
Visit #:  2    Subjective:  Ijeoma Avalos is a 54 year old female who is seen in f/u up for:     Data Unavailable.     Since last visit on 1/8/2020,  Ijeoma Avalos reports:    Area of chief complaint:  Lumbar and Extra-spinal :  Symptoms are graded at 4/10. The quality is described as stiff, achey, dull.  Motion has increased, but is still not normal. The pt reports at least 10% improvement since initial presentation. She is pleased with her progress. She notes more ROM in the mid to low back area. The pt reports R knee pain today however states the px in the R knee is less as the low back area has improved. Patient feels that they are improved due to a reduction in symptoms.     Since last visit the patient feels that they are 10 percent  improved from last visit.       Objective:  The following was observed:    P: palpatory tenderness Lumbar erector spine and Quad lumb Bilaterally, R knee  A: static palpation demonstrates intersegmental asymmetry   R: motion palpation notes restricted motion  T: hypertonicity at: Lumbar erector spine and Quad lumb R>>L    Segmental spinal dysfunction/restrictions found at:   T5 , T9 , L5 , Sacrum  and PSIS Right         Assessment:    Diagnoses:    No diagnosis found.    Patient's condition:  Patient had restrictions pre-manipulation    Treatment effectiveness:  Post manipulation there is better intersegmental movement and Patient claims to feel looser post manipulation      Procedures:  CMT:  27730 Chiropractic manipulative treatment 3-4 regions performed   Thoracic: Diversified, T5, T9, Prone  Lumbar: Diversified, L5, Side posture  Pelvis: Drop Table, Sacrum , PSIS Right , Prone    Modalities:  51661: Acupuncture, for 15 minutes:  Points: Mikeatuostefanoaji Points in lumbar spine  Ahsi point in hips and legs  For 15 minutes    Therapeutic procedures:  None    Response to Treatment  Reduction of pain    Prognosis: Good    Progress towards Goals: Patient is making progress  towards the goal.     Recommendations:    Instructions:  expect soreness    Follow-up:    Return to care in 1 week with ACP   Knee adjustment .

## 2020-01-29 DIAGNOSIS — E11.9 TYPE 2 DIABETES MELLITUS WITHOUT COMPLICATION, WITHOUT LONG-TERM CURRENT USE OF INSULIN (H): ICD-10-CM

## 2020-01-29 RX ORDER — METFORMIN HCL 500 MG
TABLET, EXTENDED RELEASE 24 HR ORAL
Qty: 360 TABLET | Refills: 1 | Status: SHIPPED | OUTPATIENT
Start: 2020-01-29 | End: 2020-07-08

## 2020-01-30 NOTE — TELEPHONE ENCOUNTER
Last Written Prescription Date:  11/29/19  Last Fill Quantity: 120,  # refills: 1   Last office visit: 12/26/2019 with prescribing provider: sehlia  Future Office Visit:   Next 5 appointments (look out 90 days)    Feb 18, 2020 10:00 AM CST  Return Visit with Rosa Chowdary LP  [facility] [place] [facility] ([facility] [place]) [address] [place] [address]  [place] [address]  [phone]   Mar 20, 2020  8:00 AM CDT  Return Visit with Rosa Chowdary LP  [facility] ([facility]) [address] [place], [place] [address] [address]  [phone]           Requested Prescriptions   Pending Prescriptions Disp Refills     metFORMIN (GLUCOPHAGE-XR) 500 MG 24 hr tablet [Pharmacy Med Name: metFORMIN HCl ER Oral Tablet Extended Release 24 Hour 500 MG] 120 tablet 0     Sig: TAKE FOUR TABLETS BY MOUTH DAILY       Biguanide Agents Passed - 1/29/2020  5:57 PM        Passed - Blood pressure less than 140/90 in past 6 months     BP Readings from Last 3 Encounters:   12/26/19 124/78   12/20/19 124/74   11/22/19 100/70                 Passed - Patient has documented LDL within the past 12 mos.     Recent Labs   Lab Test 12/26/19  0803   LDL 86             Passed - Patient has had a Microalbumin in the past 15 mos.     Recent Labs   Lab Test 12/26/19  0803   MICROL 70   UMALCR 34.65*             Passed - Patient is age 10 or older        Passed - Patient has documented A1c within the specified period of time.     If HgbA1C is 8 or greater, it needs to be on file within the past 3 months.  If less than 8, must be on file within the past 6 months.     Recent Labs   Lab Test 12/26/19  0803   A1C 6.0*             Passed - Patient's CR is NOT>1.4 OR Patient's EGFR is NOT<45 within past 12 mos.     Recent Labs   Lab Test 12/26/19  0803   GFRESTIMATED >90   GFRESTBLACK >90       Recent Labs   Lab Test 12/26/19  0803   CR 0.70             Passed - Patient does NOT have a diagnosis of CHF.         Patient's IV access infiltrated. On-call hospitalist Dr. Tho Pelayo notified and suggested I call the nursing supervisor to help with gaining a new access. Nursing supervisor Jimmy Cortez notified and suggested I call ER Charge Nurse [address] ER Charge Nurse [address] notified and stated they were really busy at this time, but could try to send someone up later. "Passed - Medication is active on med list        Passed - Patient is not pregnant        Passed - Patient has not had a positive pregnancy test within the past 12 mos.         Passed - Recent (6 mo) or future (30 days) visit within the authorizing provider's specialty     Patient had office visit in the last 6 months or has a visit in the next 30 days with authorizing provider or within the authorizing provider's specialty.  See \"Patient Info\" tab in inbasket, or \"Choose Columns\" in Meds & Orders section of the refill encounter.              "

## 2020-01-30 NOTE — TELEPHONE ENCOUNTER
Prescription approved per [facility] Refill Protocol.    Gem Rodriguez RN, BSN  [facility]

## 2020-03-02 ENCOUNTER — HEALTH MAINTENANCE LETTER (OUTPATIENT)
Age: 55
End: 2020-03-02

## 2020-03-03 ENCOUNTER — OFFICE VISIT (OUTPATIENT)
Dept: FAMILY MEDICINE | Facility: CLINIC | Age: 55
End: 2020-03-03
Payer: COMMERCIAL

## 2020-03-03 VITALS
HEIGHT: 60 IN | DIASTOLIC BLOOD PRESSURE: 72 MMHG | HEART RATE: 89 BPM | TEMPERATURE: 99.3 F | BODY MASS INDEX: 25.72 KG/M2 | WEIGHT: 131 LBS | RESPIRATION RATE: 12 BRPM | SYSTOLIC BLOOD PRESSURE: 124 MMHG | OXYGEN SATURATION: 97 %

## 2020-03-03 DIAGNOSIS — R30.0 DYSURIA: Primary | ICD-10-CM

## 2020-03-03 DIAGNOSIS — N39.0 URINARY TRACT INFECTION WITH HEMATURIA, SITE UNSPECIFIED: ICD-10-CM

## 2020-03-03 DIAGNOSIS — E11.9 NEW ONSET TYPE 2 DIABETES MELLITUS (H): ICD-10-CM

## 2020-03-03 DIAGNOSIS — R31.9 URINARY TRACT INFECTION WITH HEMATURIA, SITE UNSPECIFIED: ICD-10-CM

## 2020-03-03 LAB
ALBUMIN UR-MCNC: NEGATIVE MG/DL
APPEARANCE UR: CLEAR
BACTERIA #/AREA URNS HPF: ABNORMAL /HPF
BILIRUB UR QL STRIP: NEGATIVE
COLOR UR AUTO: YELLOW
GLUCOSE UR STRIP-MCNC: NEGATIVE MG/DL
HGB UR QL STRIP: ABNORMAL
KETONES UR STRIP-MCNC: NEGATIVE MG/DL
LEUKOCYTE ESTERASE UR QL STRIP: NEGATIVE
NITRATE UR QL: NEGATIVE
NON-SQ EPI CELLS #/AREA URNS LPF: ABNORMAL /LPF
PH UR STRIP: 5.5 PH (ref 5–7)
RBC #/AREA URNS AUTO: ABNORMAL /HPF
SOURCE: ABNORMAL
SP GR UR STRIP: 1.01 (ref 1–1.03)
UROBILINOGEN UR STRIP-ACNC: 0.2 EU/DL (ref 0.2–1)
WBC #/AREA URNS AUTO: ABNORMAL /HPF

## 2020-03-03 PROCEDURE — 87086 URINE CULTURE/COLONY COUNT: CPT | Performed by: PHYSICIAN ASSISTANT

## 2020-03-03 PROCEDURE — 99214 OFFICE O/P EST MOD 30 MIN: CPT | Performed by: PHYSICIAN ASSISTANT

## 2020-03-03 PROCEDURE — 81001 URINALYSIS AUTO W/SCOPE: CPT | Performed by: PHYSICIAN ASSISTANT

## 2020-03-03 RX ORDER — SULFAMETHOXAZOLE/TRIMETHOPRIM 800-160 MG
1 TABLET ORAL 2 TIMES DAILY
Qty: 6 TABLET | Refills: 0 | Status: SHIPPED | OUTPATIENT
Start: 2020-03-03 | End: 2020-05-11

## 2020-03-03 ASSESSMENT — MIFFLIN-ST. JEOR: SCORE: 1125.71

## 2020-03-03 NOTE — PROGRESS NOTES
Subjective     Ijeoma Avalos is a 54 year old female who presents to clinic today for the following health issues:    HPI   URINARY TRACT SYMPTOMS      Duration: 10 days     Description  dysuria    Intensity:  mild    Accompanying signs and symptoms:  Fever/chills: no   Flank pain no   Nausea and vomiting: no   Vaginal symptoms: none  Abdominal/Pelvic Pain: no     History  History of frequent UTI's: no   History of kidney stones: no   Sexually Active: YES  Possibility of pregnancy: No    Precipitating or alleviating factors: None    Therapies tried and outcome: none   Outcome: none     Ijeoma is a 54 year old female with a past medical history of DM II, hypothyroidism, hypertension and hyperlipidemia who presents to clinic with a 10 day history of dysuria. She does not have a history of urinary tract infections. She has had associated urinary urgency.     The patient has also noted that for the past 5+ days her morning glucose is measuring around 120,  normally is in the 90's. Would like to discuss the reason for this increase and the possibility of managing her diabetes in the future with lifestyle modifications primarily and decreasing her medications.    Reviewed and updated as needed this visit by Provider         Review of Systems   ROS COMP: Constitutional, HEENT, cardiovascular, pulmonary, gi and gu systems are negative, except as otherwise noted.      Objective    /72 (Cuff Size: Adult Regular)   Pulse 89   Temp 99.3  F (37.4  C) (Tympanic)   Resp 12   Ht 1.524 m (5')   Wt 59.4 kg (131 lb)   LMP 04/14/2017   SpO2 97%   BMI 25.58 kg/m    Body mass index is 25.58 kg/m .  Physical Exam   GENERAL: healthy, alert and no distress  RESP: lungs clear to auscultation - no rales, rhonchi or wheezes  CV: regular rate and rhythm, normal S1 S2, no S3 or S4, no murmur,  ABDOMEN: soft, nontender, no hepatosplenomegaly, no masses and bowel sounds normal  MS: no gross musculoskeletal defects noted, no  edema  PSYCH: mentation appears normal, affect normal/bright    Diagnostic Test Results:  Labs reviewed in Epic  No results found for this or any previous visit (from the past 24 hour(s)).        Assessment & Plan          1. Dysuria  - UA reflex to Microscopic and Culture today shows few RBC's and bacteria, suspicious for urinary tract infection.  Bactrim started empirically until culture is available., Advised that she RTC in 2 weeks for repeat UA to ensure that hematuria has resolved.     2. Urinary tract infection with hematuria, site unspecified  - Urine Culture Aerobic Bacterial  - Sulfamethoxazole-trimethoprim (BACTRIM DS) 800-160 MG tablet; Take 1 tablet by mouth 2 times daily  Dispense: 6 tablet; Refill: 0  - Urinary cultures pending, will alter antibiotics as needed per sensitivities.  - Patient had moderate amount of blood in urine, presumably d/t UTI, will follow up with repeat UA in 2 weeks to confirm hematuria has resolved.    3. Type 2 diabetes mellitus (H)  - AMBULATORY ADULT DIABETES EDUCATOR REFERRAL      David Waldrop PA-C  [facility] [place]

## 2020-03-04 ENCOUNTER — TELEPHONE (OUTPATIENT)
Dept: FAMILY MEDICINE | Facility: CLINIC | Age: 55
End: 2020-03-04

## 2020-03-04 LAB
BACTERIA SPEC CULT: NORMAL
SPECIMEN SOURCE: NORMAL

## 2020-03-04 NOTE — TELEPHONE ENCOUNTER
Diabetes Education Scheduling Outreach #1:    Call to patient to schedule. Left message with phone number to call to schedule.    Plan for 2nd outreach attempt within 1 week.    Shruthi Garcia  [place] [facility]  Diabetes and Nutrition Scheduling

## 2020-03-05 NOTE — RESULT ENCOUNTER NOTE
Ijeoma-  Here are your recent results.      Your Urine culture did now grow out a bacterial infection.  You may finish the antibiotic if it is helping you feel better.  Please also drink plenty of water.  Return in 2 weeks to recheck your urine.    If you have any questions please do not hesitate to contact our office via phone ([phone]) or you may send me a message via [employer] by clicking the contact my Care Team link.      It was a pleasure participating in your care!    Thank you,    David Waldrop MPH, PA-C  [address] [place] [address] [phone]

## 2020-03-11 ENCOUNTER — THERAPY VISIT (OUTPATIENT)
Dept: CHIROPRACTIC MEDICINE | Facility: CLINIC | Age: 55
End: 2020-03-11
Payer: COMMERCIAL

## 2020-03-11 DIAGNOSIS — M54.50 CHRONIC RIGHT-SIDED LOW BACK PAIN WITHOUT SCIATICA: ICD-10-CM

## 2020-03-11 DIAGNOSIS — M25.561 RIGHT KNEE PAIN, UNSPECIFIED CHRONICITY: ICD-10-CM

## 2020-03-11 DIAGNOSIS — M99.03 SOMATIC DYSFUNCTION OF LUMBAR REGION: Primary | ICD-10-CM

## 2020-03-11 DIAGNOSIS — G89.29 CHRONIC RIGHT-SIDED LOW BACK PAIN WITHOUT SCIATICA: ICD-10-CM

## 2020-03-11 DIAGNOSIS — M99.04 SOMATIC DYSFUNCTION OF SACRAL REGION: ICD-10-CM

## 2020-03-11 DIAGNOSIS — M53.3 SACRAL PAIN: ICD-10-CM

## 2020-03-11 PROCEDURE — 98941 CHIROPRACT MANJ 3-4 REGIONS: CPT | Mod: AT | Performed by: CHIROPRACTOR

## 2020-03-11 PROCEDURE — 97810 ACUP 1/> WO ESTIM 1ST 15 MIN: CPT | Mod: GA | Performed by: CHIROPRACTOR

## 2020-03-11 NOTE — PROGRESS NOTES
Visit #:  3    Subjective:  Ijeoma Avalos is a 54 year old female who is seen in f/u up for:        Somatic dysfunction of lumbar region  Chronic right-sided low back pain without sciatica  Somatic dysfunction of sacral region  Sacral pain  Right knee pain, unspecified chronicity.     Since last visit,  Ijeoma Avalos reports:    Area of chief complaint:  Lumbar and Extra-spinal :  Symptoms are graded at 3/10. The quality is described as stiff, achey, dull.  Motion has increased, but is still not normal. The pt reports soreness in the R knee today. She is now able to walk up stairs with less pain.  Overall she has reached 50% improvement since initial presentation. She notes tension in the low back area. She denies radiation of pain in the extremities.     Patient feels that they are improved due to a reduction in symptoms.     Since last visit the patient feels that they are 10 percent  improved from last visit.       Objective:  The following was observed:    P: palpatory tenderness Lumbar erector spine and Quad lumb Bilaterally, R knee  A: static palpation demonstrates intersegmental asymmetry   R: motion palpation notes restricted motion  T: hypertonicity at: Lumbar erector spine and Quad lumb R>>L    Segmental spinal dysfunction/restrictions found at:   T5 , T9 , L5 , Sacrum  and PSIS Right         Assessment:    Diagnoses:      1. Somatic dysfunction of lumbar region    2. Chronic right-sided low back pain without sciatica    3. Somatic dysfunction of sacral region    4. Sacral pain    5. Right knee pain, unspecified chronicity        Patient's condition:  Slight exacerbation    Treatment effectiveness:  Post manipulation there is better intersegmental movement and Patient claims to feel looser post manipulation      Procedures:  CMT:  98841 Chiropractic manipulative treatment 3-4 regions performed   Thoracic: Diversified, T5, T9, Prone  Lumbar: Diversified, L5, Side posture  Pelvis: Drop Table,  Sacrum , PSIS Right , Prone    Modalities:  52809: Acupuncture, for 15 minutes:  Points: Mikeatdacia Points in lumbar spine  Ahsi point in hips and legs  For 15 minutes    Therapeutic procedures:  None    Response to Treatment  Reduction of pain    Prognosis: Good    Progress towards Goals: Patient is making progress towards the goal.     Recommendations:    Instructions:  expect soreness    Follow-up:    Return to care in 1 week with ACP   Knee adjustment .

## 2020-03-18 ENCOUNTER — TELEPHONE (OUTPATIENT)
Dept: FAMILY MEDICINE | Facility: CLINIC | Age: 55
End: 2020-03-18

## 2020-03-18 DIAGNOSIS — R31.9 URINARY TRACT INFECTION WITH HEMATURIA, SITE UNSPECIFIED: ICD-10-CM

## 2020-03-18 DIAGNOSIS — N39.0 URINARY TRACT INFECTION WITH HEMATURIA, SITE UNSPECIFIED: ICD-10-CM

## 2020-03-18 LAB
ALBUMIN UR-MCNC: NEGATIVE MG/DL
APPEARANCE UR: CLEAR
BACTERIA #/AREA URNS HPF: ABNORMAL /HPF
BILIRUB UR QL STRIP: NEGATIVE
COLOR UR AUTO: YELLOW
GLUCOSE UR STRIP-MCNC: NEGATIVE MG/DL
HGB UR QL STRIP: ABNORMAL
KETONES UR STRIP-MCNC: NEGATIVE MG/DL
LEUKOCYTE ESTERASE UR QL STRIP: NEGATIVE
MUCOUS THREADS #/AREA URNS LPF: PRESENT /LPF
NITRATE UR QL: NEGATIVE
NON-SQ EPI CELLS #/AREA URNS LPF: ABNORMAL /LPF
PH UR STRIP: 5.5 PH (ref 5–7)
RBC #/AREA URNS AUTO: ABNORMAL /HPF
SOURCE: ABNORMAL
SP GR UR STRIP: 1.01 (ref 1–1.03)
UROBILINOGEN UR STRIP-ACNC: 0.2 EU/DL (ref 0.2–1)
WBC #/AREA URNS AUTO: ABNORMAL /HPF

## 2020-03-18 PROCEDURE — 81001 URINALYSIS AUTO W/SCOPE: CPT | Performed by: PHYSICIAN ASSISTANT

## 2020-03-18 NOTE — TELEPHONE ENCOUNTER
Patient is calling requesting her recent lab results that were finalized today. Provider has not read the results.     Routing to NATAN to advise. Thank you.     Gem Rodriguez RN, BSN  [facility]

## 2020-03-18 NOTE — TELEPHONE ENCOUNTER
Patient called for results of lab work she had done this morning.     Please call patient back.      Isa Perdomo

## 2020-03-18 NOTE — TELEPHONE ENCOUNTER
Please see other telephone encounter from today. Provider has not read the results. Message already sent to provider, awaiting provider response. If patient calls back please inform her of this.   Thank you.     Gem Rodriguez RN, BSN  [facility]

## 2020-03-19 ENCOUNTER — TELEPHONE (OUTPATIENT)
Dept: FAMILY MEDICINE | Facility: CLINIC | Age: 55
End: 2020-03-19

## 2020-03-19 DIAGNOSIS — R31.9 HEMATURIA, UNSPECIFIED TYPE: Primary | ICD-10-CM

## 2020-03-19 NOTE — TELEPHONE ENCOUNTER
Reason for Call:  Other call back    Detailed comments: Pt wants to go over lab results from yesterday.  Pt is concerned    Phone Number Patient can be reached at: Cell number on file:    Telephone Information:   Mobile [phone]       Best Time: anytime    Can we leave a detailed message on this number? YES    Call taken on 3/19/2020 at 11:07 AM by Abeba Magdaleno

## 2020-03-19 NOTE — RESULT ENCOUNTER NOTE
Ijeoma-  Here are your recent results.     There is still a very small amount of blood in your urine, this is likely residual from your recent infection.  You should have this rechecked  In 2-4 weeks.     If you have any questions please do not hesitate to contact our office via phone ([phone]) or you may send me a message via [employer] by clicking the contact my Care Team link.      It was a pleasure participating in your care!    Thank you,    David Waldrop MPH, PA-C  [address] [place] [address] [phone]

## 2020-03-19 NOTE — TELEPHONE ENCOUNTER
Patient read [employer] message sent today with lab results. Closing this encounter.     Gem Rodriguez RN, BSN  [facility]

## 2020-03-19 NOTE — TELEPHONE ENCOUNTER
Went through lab results with patient. Advised if patient is having any symptoms to call the clinic sooner. Otherwise to follow up in 2-4 weeks to repeat labs. Patient verbalized understanding and agrees with plan.     Gem Rodriguez RN, BSN  [facility]

## 2020-03-20 ENCOUNTER — VIRTUAL VISIT (OUTPATIENT)
Dept: PSYCHOLOGY | Facility: CLINIC | Age: 55
End: 2020-03-20
Payer: COMMERCIAL

## 2020-03-20 DIAGNOSIS — F41.9 ANXIETY DISORDER: Primary | ICD-10-CM

## 2020-03-20 PROCEDURE — 90832 PSYTX W PT 30 MINUTES: CPT | Mod: TEL | Performed by: PSYCHOLOGIST

## 2020-03-20 NOTE — PROGRESS NOTES
"  Telephone Visit Note    Patient Name: Ijeoma Avalos  Date: 3/20/2020         Service Type: Telephone Visit     The patient has been notified of following:     \"This telephone visit will be conducted via a call between you and your provider. We have found that certain health care needs can be provided without the need for an office visit.  This service lets us provide the care you need with a short phone conversation.    If during the course of the call the provider feels a telephone visit is not appropriate, you will not be charged for this service.\"      Session Start Time: 08:00  Session End Time: 08:30     Session Length: 30 min    Session #: 11 (this episode of care)    Attendees: Client attended alone     DATA      Progress Since Last Session (Related to Symptoms / Goals / Homework):   Symptoms: Variable      Episode of Care Goals: Satisfactory progress - ACTION (Actively working towards change); Intervened by reinforcing change plan / affirming steps taken     Current / Ongoing Stressors and Concerns:   Global COVID-19 outbreak   Family stressors   Work stressors     Intervention:   Client reported good progress with homework we discussed last session--being present in the moment, being intentional in creating routines for connection with her daughter, focusing on what aspects of relationships are within her control and letting go of trying to manage others people's actions. She noted that these changes have brought her increased peace. Offered reinforcement for her good efforts. Reviewed coping with stress associated with COVID-19 virus and social distancing, change in routines, etc. She acknowledged that she is struggling with feeling isolated and unsettled. Normalized this experience in the current circumstances and discussed the benefits of active coping. Client decided that she will send invitations to [facility] with 2 friends for increased connection. She will suggest to family members that they " take turns choosing favorite activities for the family to do together each week. Emphasized the importance of continuing with self-care routines during times of increased stress.         ASSESSMENT: Current Emotional / Mental Status (status of significant symptoms):   Risk status (Self / Other harm or suicidal ideation)   Patient denies current fears or concerns for personal safety.   Patient denies current or recent suicidal ideation or behaviors.   Patient denies current or recent homicidal ideation or behaviors.   Patient denies current or recent self injurious behavior or ideation.   Patient denies other safety concerns.   Patient reports there has been no change in risk factors since her last session.     Patient reports there has been no change in protective factors since her last session.     Recommended that patient call 911 or go to the local ED should there be a change in any of these risk factors.     Attitude:   Cooperative    Orientation:   All   Speech    Rate / Production: Normal     Volume:  Normal    Mood:    Variable--anxious, mild depression    Thought Content:  Worries; working toward decreased personalization    Thought Form:  Coherent  Logical    Insight:    Fair      Medication Compliance:   Yes; infrequent use of lorazepan     Changes in Health Issues:   Yes: being treated for possible UTI; blood pressure has been higher     Chemical Use Review:   Substance Use: Chemical use reviewed, no active concerns identified      Tobacco Use: No current tobacco use.      Diagnosis:  1. Anxiety disorder          PLAN: (Patient Tasks / Therapist Tasks / Other)  Client will schedule time to connect via [employer] with friends. Family members will take turns choosing a favorite activity to do once/week to give them something to look forward to. Encouraged continued self-care with physical activity, time for writing, creative pursuits. Return telephone/virtual visit scheduled. Client will contact me sooner if  needed. She inquired about the possibility of refilling lorazepam prescription. I encouraged her to send a [employer]t message to her medical team.      I have reviewed the note as documented above.  This accurately captures the substance of my conversation with the patient.    Rosa Chowdary, LP

## 2020-03-26 ENCOUNTER — TELEPHONE (OUTPATIENT)
Dept: EDUCATION SERVICES | Facility: CLINIC | Age: 55
End: 2020-03-26

## 2020-03-26 ENCOUNTER — ALLIED HEALTH/NURSE VISIT (OUTPATIENT)
Dept: EDUCATION SERVICES | Facility: CLINIC | Age: 55
End: 2020-03-26
Payer: COMMERCIAL

## 2020-03-26 DIAGNOSIS — E11.9 TYPE 2 DIABETES MELLITUS WITHOUT COMPLICATION, WITHOUT LONG-TERM CURRENT USE OF INSULIN (H): Primary | ICD-10-CM

## 2020-03-26 DIAGNOSIS — E03.8 OTHER SPECIFIED HYPOTHYROIDISM: ICD-10-CM

## 2020-03-26 DIAGNOSIS — E11.9 TYPE 2 DIABETES MELLITUS (H): Primary | ICD-10-CM

## 2020-03-26 PROCEDURE — 98968 PH1 ASSMT&MGMT NQHP 21-30: CPT

## 2020-03-26 NOTE — PATIENT INSTRUCTIONS
Continue current plan of care.    Get updated A1C level. Discuss after labs are back.    Consider a decrease in metformin dose if indicated.    Aim for increased activity.

## 2020-03-26 NOTE — PROGRESS NOTES
Diabetes Self-Management Education & Support    Presents for: Individual review(Telephone visit)    SUBJECTIVE/OBJECTIVE:  Presents for: Individual review(Telephone visit)  Diabetes education in the past 24mo: Yes  Focus of Visit: Taking Medication, Monitoring, Reducing Risks  Diabetes type: Type 2  Disease course: Stable  How confident are you filling out medical forms by yourself:: Not Assessed  Difficulty affording diabetes medication?: No  Difficulty affording diabetes testing supplies?: No  Cultural Influences/Ethnic Background:        Diabetes Symptoms & Complications:  Fatigue: No  Neuropathy: No  Polydipsia: No  Polyphagia: No  Polyuria: No  Visual change: No  Slow healing wounds: No  Other: No  Symptom course: Stable  Weight trend: Stable  Complications assessed today?: Yes  Autonomic neuropathy: No  CVA: No  Heart disease: No  Nephropathy: No  Peripheral neuropathy: No  Peripheral Vascular Disease: No  Retinopathy: No    Patient Problem List and Family Medical History reviewed for relevant medical history, current medical status, and diabetes risk factors.    Vitals:  [facility] 04/14/2017   Estimated body mass index is 25.58 kg/m  as calculated from the following:    Height as of 3/3/20: 1.524 m (5').    Weight as of 3/3/20: 59.4 kg (131 lb).   Last 3 BP:   BP Readings from Last 3 Encounters:   03/03/20 124/72   12/26/19 124/78   12/20/19 124/74       History   Smoking Status     Never Smoker   Smokeless Tobacco     Never Used       Labs:  Lab Results   Component Value Date    A1C 6.0 12/26/2019     Lab Results   Component Value Date     12/26/2019     Lab Results   Component Value Date    LDL 86 12/26/2019     HDL Cholesterol   Date Value Ref Range Status   12/26/2019 52 >49 mg/dL Final   ]  GFR Estimate   Date Value Ref Range Status   12/26/2019 >90 >60 mL/min/[1.73_m2] Final     Comment:     Non  GFR Calc  Starting 12/18/2018, serum creatinine based estimated GFR (eGFR) will be    calculated using the Chronic Kidney Disease Epidemiology Collaboration   (CKD-EPI) equation.       GFR Estimate If Black   Date Value Ref Range Status   12/26/2019 >90 >60 mL/min/[1.73_m2] Final     Comment:      GFR Calc  Starting 12/18/2018, serum creatinine based estimated GFR (eGFR) will be   calculated using the Chronic Kidney Disease Epidemiology Collaboration   (CKD-EPI) equation.       Lab Results   Component Value Date    CR 0.70 12/26/2019     No results found for: MICROALBUMIN    Healthy Eating:  Healthy Eating Assessed Today: Yes  Meal planning/habits: Calorie counting, Low carb  How many times a week on average do you eat food made away from home (restaurant/take-out)?: 1  Meals include: Breakfast, Lunch, Dinner, Morning Snack, Evening Snack  Beverages: Water, Tea, Milk, Coffee, Diet soda, Alcohol  Has patient met with a dietitian in the past?: No    Being Active:  Being Active Assessed Today: Yes  Exercise:: Currently not exercising  Barrier to exercise: Access    Monitoring:  Monitoring Assessed Today: Yes  Blood Glucose Meter: Accu-chek  Times checking blood sugar at home (number): 1  Times checking blood sugar at home (per): Day  Blood glucose trend: Increasing      Taking Medications:  Diabetes Medication(s)     Biguanides       metFORMIN (GLUCOPHAGE-XR) 500 MG 24 hr tablet    TAKE FOUR TABLETS BY MOUTH DAILY           Taking Medication Assessed Today: Yes  Current Treatments: Diet, Oral Medication (taken by mouth)  Problems taking diabetes medications regularly?: No  Diabetes medication side effects?: No    Problem Solving:  Problem Solving Assessed Today: Yes  Is the patient at risk for hypoglycemia?: No  Is the patient at risk for DKA?: No              Reducing Risks:  Reducing Risks Assessed Today: Yes  CAD Risks: Diabetes Mellitus  Has dilated eye exam at least once a year?: Yes  Sees dentist every 6 months?: Yes  Feet checked by healthcare provider in the last year?:  Yes    Healthy Coping:  Healthy Coping Assessed Today: Yes  Emotional response to diabetes: Ready to learn, Confidence diabetes can be controlled, Concern for health and well-being  Informal Support system:: Family  Stage of change: MAINTENANCE (Working to maintain change, with risk of relapse)  Patient Activation Measure Survey Score:  CYNTHIA Score (Last Two) 12/4/2012   CYNTHIA Raw Score 50   Activation Score 86.3   CYNTHIA Level 4       Diabetes knowledge and skills assessment:   Patient is knowledgeable in diabetes management concepts related to: Healthy Eating, Being Active, Monitoring, Taking Medication, Problem Solving, Reducing Risks and Healthy Coping    Patient needs further education on the following diabetes management concepts: Being Active    Based on learning assessment above, most appropriate setting for further diabetes education would be: Group class or Individual setting.      INTERVENTIONS:    Education provided today on:  AADE Self-Care Behaviors:  Being Active: relationship to blood glucose and describe appropriate activity program  Taking Medication: action of prescribed medication  Reducing Risks: major complications of diabetes, prevention, early diagnostic measures and treatment of complications, foot care, appropriate dental care, annual eye exam and A1C - goals, relating to blood glucose levels, how often to check    Opportunities for ongoing education and support in diabetes-self management were discussed.    Pt verbalized understanding of concepts discussed and recommendations provided today.       Education Materials Provided:  No new materials provided today      ASSESSMENT:   Ijeoma is wondering about decreasing her metformin to prevent any long-term side effects. We discussed the pros and cons and how it is a balancing act. We will get a more recent A1C. She usually checks a fasting BG everyday and they have been increasing to 110-120, but remain in goal. She tested her BG on the phone with  me today about 1.5 hours after lunch and it was 87. She is doing a wonderful job with her diabetes management and has maintained her weight loss over the past 4 months.      Goals Addressed as of 3/26/2020 at 4:01 PM       Healthy Eating (pt-stated)     Added 3/26/20 by Katherine Kelly, RN     My Goal: I will maintain a healthy weight.    What I need to meet my goal: Continue to calorie and carb count.    I plan to meet my goal by this date: 2 months.             Patient's most recent   Lab Results   Component Value Date    A1C 6.0 12/26/2019    is meeting goal of <7.0    PLAN  See Patient Instructions for co-developed, patient-stated behavior change goals.  AVS printed and provided to patient today. See Follow-Up section for recommended follow-up.    Katherine Kelly RN, CDE    Time Spent: 30 minutes  Encounter Type: Individual    Any diabetes medication dose changes were made via the CDE Protocol and Collaborative Practice Agreement with the patient's primary care provider. A copy of this encounter was shared with the provider.

## 2020-03-26 NOTE — TELEPHONE ENCOUNTER
Hi Ijeoma Ventura has a lab visit scheduled for April 1st to follow-up on urinary issues. Since she is already coming into lab that day we are wondering if you would be able to put in lab orders to check her Hemoglobin A1C and TSH?    Thank you in advance!    Katherine Kelly RN, CDE

## 2020-04-14 ENCOUNTER — VIRTUAL VISIT (OUTPATIENT)
Dept: PSYCHOLOGY | Facility: CLINIC | Age: 55
End: 2020-04-14
Payer: COMMERCIAL

## 2020-04-14 DIAGNOSIS — F41.9 ANXIETY DISORDER: Primary | ICD-10-CM

## 2020-04-14 PROCEDURE — 90834 PSYTX W PT 45 MINUTES: CPT | Mod: TEL | Performed by: PSYCHOLOGIST

## 2020-04-14 ASSESSMENT — ANXIETY QUESTIONNAIRES
7. FEELING AFRAID AS IF SOMETHING AWFUL MIGHT HAPPEN: SEVERAL DAYS
3. WORRYING TOO MUCH ABOUT DIFFERENT THINGS: NOT AT ALL
6. BECOMING EASILY ANNOYED OR IRRITABLE: SEVERAL DAYS
5. BEING SO RESTLESS THAT IT IS HARD TO SIT STILL: NOT AT ALL
2. NOT BEING ABLE TO STOP OR CONTROL WORRYING: SEVERAL DAYS
IF YOU CHECKED OFF ANY PROBLEMS ON THIS QUESTIONNAIRE, HOW DIFFICULT HAVE THESE PROBLEMS MADE IT FOR YOU TO DO YOUR WORK, TAKE CARE OF THINGS AT HOME, OR GET ALONG WITH OTHER PEOPLE: SOMEWHAT DIFFICULT
1. FEELING NERVOUS, ANXIOUS, OR ON EDGE: SEVERAL DAYS
GAD7 TOTAL SCORE: 5

## 2020-04-14 ASSESSMENT — PATIENT HEALTH QUESTIONNAIRE - PHQ9
SUM OF ALL RESPONSES TO PHQ QUESTIONS 1-9: 4
5. POOR APPETITE OR OVEREATING: SEVERAL DAYS

## 2020-04-14 NOTE — PROGRESS NOTES
"  Telephone Visit Note    Patient Name: Ijeoma Avalos  Date: 4/14/2020         Service Type: Telephone Visit     The patient has been notified of the following:      \"We have found that certain health care needs can be provided without the need for a face to face visit.  This service lets us provide the care you need with a phone conversation.       I will have full access to your [place] medical record during this entire phone call.   I will be taking notes for your medical record.      Since this is like an office visit, we will bill your insurance company for this service.       There are potential benefits and risks of telephone visits (e.g. limits to patient confidentiality) that differ from in-person visits.?  Confidentiality still applies for telephone services, and nobody will record the visit.  It is important to be in a quiet, private space that is free of distractions (including cell phone or other devices) during the visit.??      If during the course of the call I believe a telephone visit is not appropriate, you will not be charged for this service\"     Consent has been obtained for this service by care team member: Yes      Session Start Time: 11:00  Session End Time: 11:50     Session Length: 50 min    Session #: 12 (this episode of care)    Attendees: Client attended alone     DATA      Progress Since Last Session (Related to Symptoms / Goals / Homework):   Symptoms: Variable      Episode of Care Goals: Satisfactory progress - ACTION (Actively working towards change); Intervened by reinforcing change plan / affirming steps taken     Current / Ongoing Stressors and Concerns:   Global COVID-19 outbreak   Family stressors   Work stressors     Intervention:   Client stated that she was not prepared to do a video visit today, but she is open to doing so in the future. Reviewed the process involved and agreed that I will send an email invitation for our next session. Client acknowledged frequent " worry and stress these days. 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 Normalized her experience and identified this as a time calling for extra bobby and patience with herself and others. Worked on changing her self-talk from critical (clearly I must be doing something wrong, not everyone is struggling as much as I am) to compassionate (I've never done this before, there's no reason to believe I should be good at it right away, it will probably get easier with time). We talked about the benefits of adding some basic structure and predictability to the daily schedule, which will likely ease anxiety. Encouraged Client to evaluate which activities are draining and which are restoring and to see if some of the draining activities can be deferred (or happen less frequently) for the time being. She will work with her  to designate at least one period of time each week when she can focus on writing.       ASSESSMENT: Current Emotional / Mental Status (status of significant symptoms):   Risk status (Self / Other harm or suicidal ideation)   Patient denies current fears or concerns for personal safety.   Patient denies current or recent suicidal ideation or behaviors.   Patient denies current or recent homicidal ideation or behaviors.   Patient denies current or recent self injurious behavior or ideation.   Patient denies other safety concerns.   Patient reports there has been no change in risk factors since her last session.     Patient reports there has been no change in protective factors since her last session.     Recommended that patient call 911 or go to the local ED should there be a change in any of these risk factors.     Attitude:   Open, forthcoming    Orientation:   All   Speech    Rate / Production: Normal     Volume:  Normal    Mood:    Anxious    Thought Content:  Tendency toward self-critical  interpretations   Thought Form:  Coherent  Logical    Insight:    Fair      Medication Compliance:   Yes; infrequent use of lorazepan     Changes in Health Issues:   None reported     Chemical Use Review:   Substance Use: Chemical use reviewed, no active concerns identified      Tobacco Use: No current tobacco use.      Diagnosis:  1. Anxiety disorder        PLAN: (Patient Tasks / Therapist Tasks / Other)  Client will use the opportunities presented by the current environment to practice self-compassion and conveying important values to her daughter. She will work toward compassionate self-talk (I've never done this before, there's no reason to believe I should be good at it right away, it will probably get easier with time). She will add some basic structure  to the daily schedule, to increase predictability and ease anxiety. She will determine if any of the draining activities on her plate can be deferred (or happen less frequently) for the time being. She will work with her  to designate at least one period of time each week when she can focus on writing. Return video visits scheduled--Client prefers email invitation. She will contact me sooner if needed.     I have reviewed the note as documented above.  This accurately captures the substance of my conversation with the patient.    Rosa Chowdary, KATHARINE     _______________________________________________________________________     Treatment Plan     Client's Name: Ijeoma Avalos                 YOB: 1965     Date: 2/9/17     DSM-V Diagnoses: Adjustment Disorders  309.28 (F43.23) With mixed anxiety and depressed mood  Psychosocial / Contextual Factors: physical symptoms; phase of life stressors (full-time work and parent of young child); work demands; family [place]  WHODAS: 18     Referral / Collaboration:  Referral to another professional/service is not indicated at this time.     Anticipated number of session or this episode of care:  will re-evaluate every 90 days        MeasurableTreatment Goal(s) related to diagnosis / functional impairment(s)  Goal 1: Client will build skills for stress management.    I will know I've met my goal when my blood pressure is lower and my reflux symptoms have decreased.       Objective #A (Client Action)                Client will use at least 2 coping skills for anxiety management in the next 6 weeks.  Status: Continued - Date(s):4/14/20      Intervention(s)  Therapist will teach self-regulation strategies, CBT skills, mindfulness techniques.     Objective #B  Client will use cognitive strategies identified in therapy to challenge anxious thoughts.  Status: Continued - Date(s):4/14/20      Intervention(s)  Therapist will teach cognitive strategies, provide education.        Goal 2: Client will increase self-confidence.    I will know I've met my goal when I think more positively about myself.       Objective #A (Client Action)                Status: Continued - Date(s):4/14/20      Client will increase assertive communication.     Intervention(s)  Therapist will teach assertiveness skills.     Objective #B  Client will Identify negative self-talk and behaviors: challenge core beliefs, myths, and actions.                  Status: Continued - Date(s):4/14/20      Intervention(s)  Therapist will provide education, teach CBT skills.        Goal 3: Client will increase connection in close relationships.    I will know I've met my goal when I feel more close with loved ones.       Objective #A (Client Action)                Status: Continued - Date(s):4/14/20      Client will prioritize time for meaningful relationships.     Intervention(s)  Therapist will provide support and accountability.     Objective #B  Client will make use of effective interpersonal communication skills.                         Status: Continued - Date(s): 4/14/20      Intervention(s)  Therapist will teach communication skills.        Client  has reviewed and agreed to the above plan.

## 2020-04-15 ASSESSMENT — ANXIETY QUESTIONNAIRES: GAD7 TOTAL SCORE: 5

## 2020-04-29 DIAGNOSIS — E03.9 HYPOTHYROIDISM, UNSPECIFIED TYPE: ICD-10-CM

## 2020-04-29 RX ORDER — LEVOTHYROXINE SODIUM 88 UG/1
TABLET ORAL
Qty: 90 TABLET | Refills: 0 | Status: SHIPPED | OUTPATIENT
Start: 2020-04-29 | End: 2020-05-11

## 2020-04-29 NOTE — TELEPHONE ENCOUNTER
Prescription approved per [facility] Refill Protocol.  Aura Wright RN   [facility] - Triage

## 2020-05-07 ENCOUNTER — MYC MEDICAL ADVICE (OUTPATIENT)
Dept: FAMILY MEDICINE | Facility: CLINIC | Age: 55
End: 2020-05-07

## 2020-05-07 DIAGNOSIS — E11.9 TYPE 2 DIABETES MELLITUS WITHOUT COMPLICATION, WITHOUT LONG-TERM CURRENT USE OF INSULIN (H): ICD-10-CM

## 2020-05-07 DIAGNOSIS — Z53.9 ERRONEOUS ENCOUNTER--DISREGARD: Primary | ICD-10-CM

## 2020-05-07 DIAGNOSIS — R31.9 HEMATURIA, UNSPECIFIED TYPE: ICD-10-CM

## 2020-05-07 DIAGNOSIS — E03.8 OTHER SPECIFIED HYPOTHYROIDISM: ICD-10-CM

## 2020-05-07 LAB
ALBUMIN UR-MCNC: NEGATIVE MG/DL
APPEARANCE UR: CLEAR
BILIRUB UR QL STRIP: NEGATIVE
COLOR UR AUTO: YELLOW
GLUCOSE UR STRIP-MCNC: NEGATIVE MG/DL
HBA1C MFR BLD: 6 % (ref 0–5.6)
HGB UR QL STRIP: ABNORMAL
KETONES UR STRIP-MCNC: NEGATIVE MG/DL
LEUKOCYTE ESTERASE UR QL STRIP: ABNORMAL
NITRATE UR QL: NEGATIVE
NON-SQ EPI CELLS #/AREA URNS LPF: ABNORMAL /LPF
PH UR STRIP: 7 PH (ref 5–7)
RBC #/AREA URNS AUTO: ABNORMAL /HPF
SOURCE: ABNORMAL
SP GR UR STRIP: 1.02 (ref 1–1.03)
T4 FREE SERPL-MCNC: 1.71 NG/DL (ref 0.76–1.46)
TSH SERPL DL<=0.005 MIU/L-ACNC: 0.34 MU/L (ref 0.4–4)
UROBILINOGEN UR STRIP-ACNC: 0.2 EU/DL (ref 0.2–1)
WBC #/AREA URNS AUTO: ABNORMAL /HPF

## 2020-05-07 PROCEDURE — 81001 URINALYSIS AUTO W/SCOPE: CPT | Performed by: PHYSICIAN ASSISTANT

## 2020-05-07 PROCEDURE — 84443 ASSAY THYROID STIM HORMONE: CPT | Performed by: FAMILY MEDICINE

## 2020-05-07 PROCEDURE — 36415 COLL VENOUS BLD VENIPUNCTURE: CPT | Performed by: FAMILY MEDICINE

## 2020-05-07 PROCEDURE — 83036 HEMOGLOBIN GLYCOSYLATED A1C: CPT | Performed by: FAMILY MEDICINE

## 2020-05-07 PROCEDURE — 84439 ASSAY OF FREE THYROXINE: CPT | Performed by: FAMILY MEDICINE

## 2020-05-07 NOTE — TELEPHONE ENCOUNTER
[facility] and [facility].   Aura Wright RN   [facility] - Triage

## 2020-05-11 ENCOUNTER — VIRTUAL VISIT (OUTPATIENT)
Dept: FAMILY MEDICINE | Facility: CLINIC | Age: 55
End: 2020-05-11
Payer: COMMERCIAL

## 2020-05-11 DIAGNOSIS — N30.90 BLADDER INFECTION: ICD-10-CM

## 2020-05-11 DIAGNOSIS — E03.9 HYPOTHYROIDISM, UNSPECIFIED TYPE: Primary | ICD-10-CM

## 2020-05-11 DIAGNOSIS — E11.9 TYPE 2 DIABETES MELLITUS WITHOUT COMPLICATION, WITHOUT LONG-TERM CURRENT USE OF INSULIN (H): ICD-10-CM

## 2020-05-11 DIAGNOSIS — F43.23 ADJUSTMENT DISORDER WITH MIXED ANXIETY AND DEPRESSED MOOD: ICD-10-CM

## 2020-05-11 DIAGNOSIS — N95.2 VAGINAL ATROPHY: ICD-10-CM

## 2020-05-11 DIAGNOSIS — N94.10 DYSPAREUNIA IN FEMALE: ICD-10-CM

## 2020-05-11 PROCEDURE — 99214 OFFICE O/P EST MOD 30 MIN: CPT | Mod: 95 | Performed by: INTERNAL MEDICINE

## 2020-05-11 RX ORDER — LORAZEPAM 0.5 MG/1
0.5 TABLET ORAL EVERY 8 HOURS PRN
Qty: 14 TABLET | Refills: 0 | Status: SHIPPED | OUTPATIENT
Start: 2020-05-11 | End: 2022-07-05

## 2020-05-11 RX ORDER — NITROFURANTOIN 25; 75 MG/1; MG/1
100 CAPSULE ORAL 2 TIMES DAILY
Qty: 14 CAPSULE | Refills: 0 | Status: SHIPPED | OUTPATIENT
Start: 2020-05-11 | End: 2021-03-10

## 2020-05-11 RX ORDER — LEVOTHYROXINE SODIUM 75 UG/1
75 TABLET ORAL DAILY
Qty: 90 TABLET | Refills: 0 | Status: SHIPPED | OUTPATIENT
Start: 2020-05-11 | End: 2020-08-04

## 2020-05-11 RX ORDER — ESTRADIOL 0.1 MG/G
2 CREAM VAGINAL
Qty: 42.5 G | Refills: 3 | Status: SHIPPED | OUTPATIENT
Start: 2020-05-11 | End: 2021-03-10

## 2020-05-11 NOTE — PROGRESS NOTES
Ijeoma Avalos is a 54 year old female who is being evaluated via a billable telephone visit.        What phone number would you like to be contacted at? [phone]    How would you like to obtain your AVS? Jose Chiang     Ijeoma Avalos is a 54 year old female who presents to clinic today for the following health issues:    HPI   Results:   Go over UA lab results from 5/7/2020. Pt was give bactrim for sx of dysuria and blood in the urine. Pt states that medication did not help relieve sx. Burring, discomfort and blood in the urine still persists.     Ijeoma was diagnosed with hypothyroidism many years ago during a very stressful time (she was studying for her PHd). She was initially put on 25 mcg which then was increased gradually to 100 mcg. Over time this was lowered to 88 mcg. She has been on this dose for almost a year and her recent TSH came back low at 0.34 and her free T4 was high at 1.74    Anxiety Follow-Up    How are you doing with your anxiety since your last visit? Worsened, pt following counselor - hard time sleeping at night.     Are you having other symptoms that might be associated with anxiety? Yes:  migraines     Have you had a significant life event? No     Are you feeling depressed? No    Do you have any concerns with your use of alcohol or other drugs? No    Social History     Tobacco Use     Smoking status: Never Smoker     Smokeless tobacco: Never Used   Substance Use Topics     Alcohol use: No     Alcohol/week: 0.0 standard drinks     Comment: social     Drug use: No     SOSA-7 SCORE 9/13/2019 11/12/2019 4/14/2020   Total Score 4 3 5     PHQ 9/13/2019 11/12/2019 4/14/2020   PHQ-9 Total Score 1 1 4   Q9: Thoughts of better off dead/self-harm past 2 weeks Not at all Not at all Not at all     Last PHQ-9 4/14/2020   1.  Little interest or pleasure in doing things 1   2.  Feeling down, depressed, or hopeless 0   3.  Trouble falling or staying asleep, or sleeping too much 2    4.  Feeling tired or having little energy 1   5.  Poor appetite or overeating 0   6.  Feeling bad about yourself 0   7.  Trouble concentrating 0   8.  Moving slowly or restless 0   Q9: Thoughts of better off dead/self-harm past 2 weeks 0   PHQ-9 Total Score 4   Difficulty at work, home, or with people Somewhat difficult     SOSA-7  4/14/2020   1. Feeling nervous, anxious, or on edge 1   2. Not being able to stop or control worrying 1   3. Worrying too much about different things 0   4. Trouble relaxing 1   5. Being so restless that it is hard to sit still 0   6. Becoming easily annoyed or irritable 1   7. Feeling afraid, as if something awful might happen 1   SOSA-7 Total Score 5   If you checked any problems, how difficult have they made it for you to do your work, take care of things at home, or get along with other people? Somewhat difficult       Hypothyroidism Follow-up      Since last visit, patient describes the following symptoms: Hair loss, and weight changes. Pt would like to go over thyroid results, Thyroid levels have been low. RN suggested breaking tablet half and pt has been taking medication this way x the past 2 days.           Patient Active Problem List   Diagnosis     Gastric acidity     Other specified hypothyroidism     Plantar fasciitis     Gastroesophageal reflux disease, esophagitis presence not specified     TMJ (temporomandibular joint syndrome)     Neck pain     Other headache syndrome     Uterine leiomyoma, unspecified location     Adjustment disorder with mixed anxiety and depressed mood     Low hemoglobin     Dysmenorrhea     Right knee pain, unspecified chronicity     Post-operative nausea and vomiting     New onset type 2 diabetes mellitus (H)     Essential hypertension     Hyperlipidemia with target LDL less than 100     Atopic rhinitis     Nasal congestion     Somatic dysfunction of lumbar region     Chronic right-sided low back pain without sciatica     Somatic dysfunction of  sacral region     Sacral pain     Tendonitis     Past Surgical History:   Procedure Laterality Date     APPENDECTOMY        SECTION  2014    Procedure:  SECTION;  Surgeon: Ru Cano MD;  Location:  [facility]     CHOLECYSTECTOMY       cyst removed      left  lower leg on bone sheath     DAVINCI HYSTERECTOMY TOTAL, SALPINGECTOMY BILATERAL N/A 2017    Procedure: DAVINCI HYSTERECTOMY TOTAL, SALPINGECTOMY BILATERAL;  DAVINCI SINGLE SITE HYSTERECTOMY, BILATERAL SALPINGECTOMY, LEFT OOPHORECTOMY, LYSIS OF ADHESIONS (LAPAROSCOPIC BAG TO RETREIVE OVARY);  Surgeon: Ru Cano MD;  Location: [facility]     DAVINCI LYSIS OF ADHESIONS N/A 2017    Procedure: DAVINCI LYSIS OF ADHESIONS;;  Surgeon: Ru Cano MD;  Location:  OR     DAVINCI OOPHORECTOMY N/A 2017    Procedure: DAVINCI OOPHORECTOMY;;  Surgeon: Ru Cano MD;  Location:  OR     GI SURGERY       MYOMECTOMY UTERUS  2012     ZZ GASTROSCOPY,[place]         Social History     Tobacco Use     Smoking status: Never Smoker     Smokeless tobacco: Never Used   Substance Use Topics     Alcohol use: No     Alcohol/week: 0.0 standard drinks     Comment: social     Family History   Problem Relation Age of Onset     Hypertension Father            Reviewed and updated as needed this visit by Provider         Review of Systems   Constitutional, HEENT, cardiovascular, pulmonary, gi and gu systems are negative, except as otherwise noted.       Objective   Reported vitals:  LMP 2017    healthy, alert and no distress  PSYCH: Alert and oriented times 3; coherent speech, normal   rate and volume, able to articulate logical thoughts, able   to abstract reason, no tangential thoughts, no hallucinations   or delusions  Her affect is normal  RESP: No cough, no audible wheezing, able to talk in full sentences  Remainder of exam unable to be completed due to telephone visits    Diagnostic Test Results:  Labs reviewed in  "Epic        Assessment/Plan:  1. Hypothyroidism, unspecified type  Reviewing Ijeoma's labs she has had a suppressed TSH for quite some time and an elevated T4 since 2018. Perhaps she is regaining some functionality of her thyroid and not needing so much replacement. I am reducing her levothyroxine from 88 mcg to 75 mcg and recommending she recheck labs in 12 weeks.   - levothyroxine (SYNTHROID/LEVOTHROID) 75 MCG tablet; Take 1 tablet (75 mcg) by mouth daily  Dispense: 90 tablet; Refill: 0  - TSH with free T4 reflex; Future    2. Bladder infection  Did not respond to Bactrim so recommending Macrobid. She reports very painful intercourse so likely has vaginal atrophy contributing to her bladder infections therefore I am prescribing her topical estradiol as well.   - nitroFURantoin macrocrystal-monohydrate (MACROBID) 100 MG capsule; Take 1 capsule (100 mg) by mouth 2 times daily  Dispense: 14 capsule; Refill: 0    3. Dyspareunia in female  Recommending physical therapy and topical estrogens.   - [facility] PT[facility] HAND, AND CHIROPRACTIC REFERRAL; Future    4. Vaginal atrophy  - estradiol (ESTRACE) 0.1 MG/GM vaginal cream; Place 2 g vaginally twice a week  Dispense: 42.5 g; Refill: 3    5. Adjustment disorder with mixed anxiety and depressed mood  Seeing a therapist; possible that her hyperthyroidism could also be contributing.   - LORazepam (ATIVAN) 0.5 MG tablet; Take 1 tablet (0.5 mg) by mouth every 8 hours as needed for anxiety  Dispense: 14 tablet; Refill: 0    6. Type 2 diabetes mellitus without complication, without long-term current use of insulin (H)  Seeing a dietician and taking Metformin. A1c was 6% last week. 2      Return in about 12 weeks (around 8/3/2020) for Medication Follow up, Lab Work - Not fasting.      Phone call duration:  22 minutes    Jessenia Pryor MD    The patient has been notified of following:     \"This telephone visit will be conducted via a call between you and your physician/provider. We have " "found that certain health care needs can be provided without the need for a physical exam.  This service lets us provide the care you need with a short phone conversation.  If a prescription is necessary we can send it directly to your pharmacy.  If lab work is needed we can place an order for that and you can then stop by our lab to have the test done at a later time.    Telephone visits are billed at different rates depending on your insurance coverage. During this emergency period, for some insurers they may be billed the same as an in-person visit.  Please reach out to your insurance provider with any questions.    If during the course of the call the physician/provider feels a telephone visit is not appropriate, you will not be charged for this service.\"    Patient has given verbal consent for Telephone visit?  Yes      "

## 2020-05-13 ENCOUNTER — VIRTUAL VISIT (OUTPATIENT)
Dept: PHYSICAL THERAPY | Facility: CLINIC | Age: 55
End: 2020-05-13
Attending: INTERNAL MEDICINE
Payer: COMMERCIAL

## 2020-05-13 DIAGNOSIS — N94.10 DYSPAREUNIA IN FEMALE: ICD-10-CM

## 2020-05-13 PROCEDURE — 97110 THERAPEUTIC EXERCISES: CPT | Mod: 95 | Performed by: PHYSICAL THERAPIST

## 2020-05-13 PROCEDURE — 97161 PT EVAL LOW COMPLEX 20 MIN: CPT | Mod: 95 | Performed by: PHYSICAL THERAPIST

## 2020-05-13 PROCEDURE — 97112 NEUROMUSCULAR REEDUCATION: CPT | Mod: 95 | Performed by: PHYSICAL THERAPIST

## 2020-05-14 NOTE — PROGRESS NOTES
"Physical Therapy Virtual Initial Visit      The patient has been notified of following:     \"This virtual visit will be conducted between you and your provider. We have found that certain health care needs can be provided without the need for physical presence.  This service lets us provide the care you need with a virtual visit.\"    Due to external, as well as internal [facility] management of the COVID-19 Virus, Ijeoma Avalos was not seen in our clinic.  As a substitution, we implemented a virtual visit to manage this patient's condition utilizing the PTRx virtual visit platform via the patient s existing code.  The provider, Nelida Pantoja, reviewed the patient's chart, PTRx prescription, and spoke with the patient to determine the following telemedicine visit is appropriate and effective for the patient's care.    The following type of visit was completed:   Video Visit:  The [employer] platform uses a synchronous HIPAA compliant video stream for this patient encounter.         Subjective:    Therapist Generated HPI Evaluation  Problem details: MD order 5/11/20. Ijeoma has been having pain with intercourse for the past 2-3 years. She mentions it started with initial penetration and gradually got so worse that she could hardly have intercourse at all. She is currently going through menopause and not sure if it is hormone related. She underwent hysterectomy couple of years back for endometriosis. She also had two laparoscopic surgeries in the past for cyst removal. Currently she noticed blood in urine and met with her provider and was told it was UTI and took medication. She mentioned about dyspareunia to her provider and was sent to PT for further management. .         Type of problem:  Pelvic dysfunction.    This is a chronic condition.  Condition occurred with:  Insidious onset.  Where condition occurred: for unknown reasons.  Patient reports pain:  N/a.  Pain is described as sharp and aching and is " intermittent.  Pain is worse during the night.  Since onset symptoms are gradually worsening.  Symptoms are exacerbated by intercourse  Relieved by: no intercourse.      Restrictions due to condition include:  Working in normal job without restrictions.  Barriers include:  None as reported by patient.    Patient Health History  Ijeoma Avalos being seen for pain with intercourse.     Date of Onset: past few years.      Pain is reported as 9/10 on pain scale.  General health as reported by patient is good.  Pertinent medical history includes: thyroid problems.   Red flags:  None as reported by patient.  Medical allergies: none.   Surgeries include:  Other. Other surgery history details: hysterectomy, lapsroscopic, c- section.    Current medications:  Thyroid medication. Other medications details: diabetes, BP.       Primary job tasks include:  Computer work and prolonged sitting.                              Objective:    System                               Pelvic Dysfunction Evaluation:          Abdominal Wall:  normal        Pelvic Clock Exam:  Pelvic clock exam: mentions pain present everywhere during medical examination with provider.                    Additional History:  Delivery History:    Number of Pregnancies: 1  Number of Live Births: 1                     General   ROS    PTRx Content from today's visit:  Exercise Name: Warmth, Notes: warm bottle in the pelvic area for 10-15 min  Exercise Name: Pelvic Floor Quieting Techniques  Exercise Name: Diaphragmatic Breathing, Sets: 1 set - Reps: 3 reps - Sessions:  3 x day, Notes: before performing the PFM exercise to relax the body and get the rhythm of breathing  Exercise Name: Double Knee to Chest, Sets: 1 set - Reps: 5 reps each with 10 sec hold - Sessions: 2 x day  Exercise Name: Supine Butterfly, Sets: 1 set - Reps: 5 reps each with 10 sec - Sessions: 2 x day, Notes: FROG LEG STRETCH- 5 reps  each with 10 sec hold  Exercise Name: Repeated Hip  External Rotation with Overpressure in Sitting, Sets: 1 set - Reps:  5 reps each with 10 sec hold  - Sessions:  2 x day, Notes: perform in lying  Exercise Name: Pretzel Stretch, Sets: 1 set  - Reps:  5 reps each with 10 sec hold  - Sessions:  2 x day  Exercise Name: All 4s Stretch, Sets: 1 set - Reps:  5 reps each with 10-30 sec hold - Sessions: 1-2 x day, Notes: knees as wide as it can go    Assessment/Plan:     Patient is a 54 year old female with pelvic complaints.    Patient has the following significant findings with corresponding treatment plan.                Diagnosis 1:  Dyspareunia  Pain -  hot/cold therapy, manual therapy, STS, self management, education and home program  Decreased ROM/flexibility - manual therapy, therapeutic exercise, therapeutic activity and home program  Decreased function - therapeutic activities and home program    Therapy Evaluation Codes:   1) History comprised of:   Personal factors that impact the plan of care:      Past/current experiences and Time since onset of symptoms.    Comorbidity factors that impact the plan of care are:      check hPI.     Medications impacting care: Check HPI.  2) Examination of Body Systems comprised of:   Body structures and functions that impact the plan of care:      Pelvis.   Activity limitations that impact the plan of care are:      [place].  3) Clinical presentation characteristics are:   Stable/Uncomplicated.  4) Decision-Making    Low complexity using standardized patient assessment instrument and/or measureable assessment of functional outcome.  Cumulative Therapy Evaluation is: Low complexity.    Previous and current functional limitations:  (See Goal Flow Sheet for this information)    Short term and Long term goals: (See Goal Flow Sheet for this information)     Communication ability:  Patient appears to be able to clearly communicate and understand verbal and written communication and follow directions correctly.  Treatment  Explanation - The following has been discussed with the patient:   RX ordered/plan of care  Anticipated outcomes  Possible risks and side effects  This patient would benefit from PT intervention to resume normal activities.   Rehab potential is good.    Frequency:  1 X every other week, once daily  Duration:  for 4 weeks  Discharge Plan:  Achieve all LTG.  Independent in home treatment program.  Reach maximal therapeutic benefit.    Please refer to the daily flowsheet for treatment today, total treatment time and time spent performing 1:1 timed codes.       Virtual visit contact time    Time of service began: 3.40 PM  Time of service ended: 4.25 PM  Total Time for set up, visit, and documentation: 45 minutes    Payor: [employer] / Plan: [employer] / Product Type: PPO /     Procedure Code/s   Therapeutic Exercise (72553): 30 minutes  Evaluation: 15 minutes    I have reviewed the note as documented above.  This accurately captures the substance of my conversation with the patient.  Provider location: [place] ([facility]/State)  Patient location: Home    ___________________________________________________

## 2020-05-15 ENCOUNTER — VIRTUAL VISIT (OUTPATIENT)
Dept: PSYCHOLOGY | Facility: CLINIC | Age: 55
End: 2020-05-15
Payer: COMMERCIAL

## 2020-05-15 DIAGNOSIS — F41.9 ANXIETY DISORDER: Primary | ICD-10-CM

## 2020-05-15 PROCEDURE — 90834 PSYTX W PT 45 MINUTES: CPT | Mod: TEL | Performed by: PSYCHOLOGIST

## 2020-05-15 NOTE — PROGRESS NOTES
"  Telephone Visit Note    Patient Name: Ijeoma Avalos  Date: 5/15/2020         Service Type: Telephone Visit     The patient has been notified of the following:      \"We have found that certain health care needs can be provided without the need for a face to face visit.  This service lets us provide the care you need with a phone conversation.       I will have full access to your [facility] medical record during this entire phone call.   I will be taking notes for your medical record.      Since this is like an office visit, we will bill your insurance company for this service.       There are potential benefits and risks of telephone visits (e.g. limits to patient confidentiality) that differ from in-person visits.?  Confidentiality still applies for telephone services, and nobody will record the visit.  It is important to be in a quiet, private space that is free of distractions (including cell phone or other devices) during the visit.??      If during the course of the call I believe a telephone visit is not appropriate, you will not be charged for this service\"     Consent has been obtained for this service by care team member: Yes      Session Start Time: 08:04  Session End Time: 08:52     Session Length: 45-50 min    Session #: 13 (this episode of care)    Attendees: Client attended alone     DATA      Progress Since Last Session (Related to Symptoms / Goals / Homework):   Symptoms: Variable      Episode of Care Goals: Satisfactory progress - ACTION (Actively working towards change); Intervened by reinforcing change plan / affirming steps taken     Current / Ongoing Stressors and Concerns:   Global COVID-19 outbreak   Family stressors   Work stressors     Intervention:   Attempted video visit using Collections platform. Video would not load (error message: \"Two patients with matching MRNs were found\"). Offered video visit via doxy, but Client elected to continue with a telephone visit. Client reported that " "anxiety currently centers more around day-to-day balancing of responsibilities than anxiety about illness. Client observed that she is experiencing perhaps increased reactivity with coworkers during phone calls. Further discussion indicates that she tends to personalize their comments at times. Worked through an assertive response to a colleague who interrupts her on calls. Practiced language that is professional, tactful, and assertive. choosing language. Also discussed the challenge of \"shutting off\" from work when working from home. Client will experiment with using yoga, jose maria chi, or gardening (being in her body instead of her mind) to make the transition from work to family time/downtime. Encouraged continued practice with self-compassion, patience, and bobby when unexpected challenges arise or the day doesn't unfold as planned. Client acknowledged this is a place for continued work.     ASSESSMENT: Current Emotional / Mental Status (status of significant symptoms):   Risk status (Self / Other harm or suicidal ideation)   Patient denies current fears or concerns for personal safety.   Patient denies current or recent suicidal ideation or behaviors.   Patient denies current or recent homicidal ideation or behaviors.   Patient denies current or recent self injurious behavior or ideation.   Patient denies other safety concerns.   Patient reports there has been no change in risk factors since her last session.     Patient reports there has been no change in protective factors since her last session.     Recommended that patient call 911 or go to the local ED should there be a change in any of these risk factors.     Attitude:   Cooperative    Orientation:   All   Speech    Rate / Production: Normal     Volume:  Normal    Mood:    Anxious    Thought Content:  Some personalization distortions, frequent worries   Thought Form:  Coherent  Logical    Insight:    Fair      Medication Compliance:   Yes; infrequent use of " lorazepan     Changes in Health Issues:   Yes: pursuing PT for pelvic floor issues     Chemical Use Review:   Substance Use: Chemical use reviewed, no active concerns identified      Tobacco Use: No current tobacco use.      Diagnosis:  1. Anxiety disorder        PLAN: (Patient Tasks / Therapist Tasks / Other)  Client will practice assertive language as needed for managing interruptions during professional meetings. She will work to catch personalization distortions as they occur. She will experiment with using yoga, jose maria chi, or gardening (being in her body instead of her mind) to make the transition from work to family time/downtime. Encouraged continued practice with self-compassion, patience, and bobby when unexpected challenges arise or the day doesn't unfold as planned. Return video visits scheduled--Client prefers email invitation. She will contact me sooner if needed.     I have reviewed the note as documented above.  This accurately captures the substance of my conversation with the patient.    Rosa Chowdary, LP     _______________________________________________________________________     Treatment Plan     Client's Name: Ijeoma Avalos                 YOB: 1965     Date: 2/9/17     DSM-V Diagnoses: Adjustment Disorders  309.28 (F43.23) With mixed anxiety and depressed mood  Psychosocial / Contextual Factors: physical symptoms; phase of life stressors (full-time work and parent of young child); work demands; family [place]  WHODAS: 18     Referral / Collaboration:  Referral to another professional/service is not indicated at this time.     Anticipated number of session or this episode of care: will re-evaluate every 90 days        MeasurableTreatment Goal(s) related to diagnosis / functional impairment(s)  Goal 1: Client will build skills for stress management.    I will know I've met my goal when my blood pressure is lower and my reflux symptoms have decreased.       Objective #A  (Client Action)                Client will use at least 2 coping skills for anxiety management in the next 6 weeks.  Status: Continued - Date(s):4/14/20      Intervention(s)  Therapist will teach self-regulation strategies, CBT skills, mindfulness techniques.     Objective #B  Client will use cognitive strategies identified in therapy to challenge anxious thoughts.  Status: Continued - Date(s):4/14/20      Intervention(s)  Therapist will teach cognitive strategies, provide education.        Goal 2: Client will increase self-confidence.    I will know I've met my goal when I think more positively about myself.       Objective #A (Client Action)                Status: Continued - Date(s):4/14/20      Client will increase assertive communication.     Intervention(s)  Therapist will teach assertiveness skills.     Objective #B  Client will Identify negative self-talk and behaviors: challenge core beliefs, myths, and actions.                  Status: Continued - Date(s):4/14/20      Intervention(s)  Therapist will provide education, teach CBT skills.        Goal 3: Client will increase connection in close relationships.    I will know I've met my goal when I feel more close with loved ones.       Objective #A (Client Action)                Status: Continued - Date(s):4/14/20      Client will prioritize time for meaningful relationships.     Intervention(s)  Therapist will provide support and accountability.     Objective #B  Client will make use of effective interpersonal communication skills.                         Status: Continued - Date(s): 4/14/20      Intervention(s)  Therapist will teach communication skills.        Client has reviewed and agreed to the above plan.

## 2020-05-19 ENCOUNTER — ANCILLARY PROCEDURE (OUTPATIENT)
Dept: GENERAL RADIOLOGY | Facility: CLINIC | Age: 55
End: 2020-05-19
Attending: PHYSICIAN ASSISTANT
Payer: COMMERCIAL

## 2020-05-19 ENCOUNTER — OFFICE VISIT (OUTPATIENT)
Dept: FAMILY MEDICINE | Facility: CLINIC | Age: 55
End: 2020-05-19
Payer: COMMERCIAL

## 2020-05-19 VITALS
DIASTOLIC BLOOD PRESSURE: 70 MMHG | SYSTOLIC BLOOD PRESSURE: 121 MMHG | WEIGHT: 132 LBS | BODY MASS INDEX: 25.78 KG/M2 | TEMPERATURE: 98.7 F | HEART RATE: 78 BPM

## 2020-05-19 DIAGNOSIS — Z23 NEED FOR VACCINATION: Primary | ICD-10-CM

## 2020-05-19 DIAGNOSIS — S61.213A LACERATION OF LEFT MIDDLE FINGER WITHOUT DAMAGE TO NAIL, FOREIGN BODY PRESENCE UNSPECIFIED, INITIAL ENCOUNTER: ICD-10-CM

## 2020-05-19 PROCEDURE — 90471 IMMUNIZATION ADMIN: CPT | Performed by: PHYSICIAN ASSISTANT

## 2020-05-19 PROCEDURE — 73140 X-RAY EXAM OF FINGER(S): CPT | Mod: LT

## 2020-05-19 PROCEDURE — 99213 OFFICE O/P EST LOW 20 MIN: CPT | Mod: 25 | Performed by: PHYSICIAN ASSISTANT

## 2020-05-19 PROCEDURE — 90714 TD VACC NO PRESV 7 YRS+ IM: CPT | Performed by: PHYSICIAN ASSISTANT

## 2020-05-19 NOTE — PROGRESS NOTES
Subjective     Ijeoma Avalos is a 54 year old female who presents to clinic today for the following health issues:    HPI   Concern - laceration   Onset:  Last night 10:00 pm     Description:   Pt cut herself last night on sewing machine, small 2 cm irregular laceration to left 3rd digit.  She reports that the sewing needle broke and she is unsure if this is still embedded in the skin.  She cleaned and covered the laceration at the time of injury last night at 10 pm. Bleeding well controlled. Pain manageable,  Last TDAp > 10 years    Intensity: moderate    Progression of Symptoms:  same    Accompanying Signs & Symptoms:  Pain in left  middle finger     Previous history of similar problem:       Precipitating factors:   Worsened by:     Alleviating factors:  Improved by:     Therapies Tried and outcome:       Patient Active Problem List   Diagnosis     Gastric acidity     Other specified hypothyroidism     Plantar fasciitis     Gastroesophageal reflux disease, esophagitis presence not specified     TMJ (temporomandibular joint syndrome)     Neck pain     Other headache syndrome     Uterine leiomyoma, unspecified location     Adjustment disorder with mixed anxiety and depressed mood     Low hemoglobin     Dysmenorrhea     Right knee pain, unspecified chronicity     Post-operative nausea and vomiting     New onset type 2 diabetes mellitus (H)     Essential hypertension     Hyperlipidemia with target LDL less than 100     Atopic rhinitis     Nasal congestion     Somatic dysfunction of lumbar region     Chronic right-sided low back pain without sciatica     Somatic dysfunction of sacral region     Sacral pain     Tendonitis     Past Surgical History:   Procedure Laterality Date     APPENDECTOMY        SECTION  2014    Procedure:  SECTION;  Surgeon: Ru Cano MD;  Location:  [facility]     CHOLECYSTECTOMY       cyst removed      left  lower leg on bone sheath     DAVINCI HYSTERECTOMY  TOTAL, SALPINGECTOMY BILATERAL N/A 4/21/2017    Procedure: DAVINCI HYSTERECTOMY TOTAL, SALPINGECTOMY BILATERAL;  DAVINCI SINGLE SITE HYSTERECTOMY, BILATERAL SALPINGECTOMY, LEFT OOPHORECTOMY, LYSIS OF ADHESIONS (LAPAROSCOPIC BAG TO RETREIVE OVARY);  Surgeon: Ru Cano MD;  Location[facility]     DAVINCI LYSIS OF ADHESIONS N/A 4/21/2017    Procedure: DAVINCI LYSIS OF ADHESIONS;;  Surgeon: Ru Cano MD;  Location[facility]     DAVINCI OOPHORECTOMY N/A 4/21/2017    Procedure: DAVINCI OOPHORECTOMY;;  Surgeon: Ru Cano MD;  Location: [facility]     GI SURGERY       MYOMECTOMY UTERUS  sept 2012     ZZ GASTROSCOPY,FL         Social History     Tobacco Use     Smoking status: Never Smoker     Smokeless tobacco: Never Used   Substance Use Topics     Alcohol use: No     Alcohol/week: 0.0 standard drinks     Comment: social     Family History   Problem Relation Age of Onset     Hypertension Father          Current Outpatient Medications   Medication Sig Dispense Refill     atorvastatin (LIPITOR) 10 MG tablet Take 1 tablet (10 mg) by mouth daily 90 tablet 3     blood glucose (ACCU-CHEK GINA PLUS) test strip USE TO TEST BLOOD SUGAR 1 TO 2 TIMES DAILY OR AS DIRECTED 100 each 0     blood glucose (NO BRAND SPECIFIED) lancets standard Use to test blood sugar 1-2 times daily or as directed. 100 each 3     blood glucose monitoring (NO BRAND SPECIFIED) meter device kit Use to test blood sugar  2-3  times daily or as directed. 1 kit 0     diclofenac (VOLTAREN) 1 % topical gel Apply 2 g topically 4 times daily 100 g 0     estradiol (ESTRACE) 0.1 MG/GM vaginal cream Place 2 g vaginally twice a week 42.5 g 3     fluticasone (FLONASE) 50 MCG/ACT nasal spray Spray 1-2 sprays into both nostrils daily 16 g 3     levothyroxine (SYNTHROID/LEVOTHROID) 75 MCG tablet Take 1 tablet (75 mcg) by mouth daily 90 tablet 0     lisinopril (ZESTRIL) 5 MG tablet TAKE 1 TABLET BY MOUTH ONCE DAILY 30 tablet 4     LORazepam (ATIVAN) 0.5 MG  tablet Take 1 tablet (0.5 mg) by mouth every 8 hours as needed for anxiety 14 tablet 0     metFORMIN (GLUCOPHAGE-XR) 500 MG 24 hr tablet TAKE FOUR TABLETS BY MOUTH DAILY  360 tablet 1     nitroFURantoin macrocrystal-monohydrate (MACROBID) 100 MG capsule Take 1 capsule (100 mg) by mouth 2 times daily 14 capsule 0     omeprazole (PRILOSEC) 40 MG DR capsule Take 1 capsule by mouth once daily 30 capsule 3     ONETOUCH DELICA LANCETS 33G MISC USE TO TEST BLOOD GLUCOSE 1 TO 2 TIMES DAILY OR AS DIRECTED 100 each 7     Allergies   Allergen Reactions     Cephalexin Hives     Hives on hands, face and stomach.      Ibuprofen      sneezing       Reviewed and updated as needed this visit by Provider         Review of Systems   Constitutional, HEENT, cardiovascular, pulmonary, gi and gu systems are negative, except as otherwise noted.      Objective    Temp 98.7  F (37.1  C) (Oral)   Wt 59.9 kg (132 lb)   LMP 04/14/2017   BMI 25.78 kg/m    Body mass index is 25.78 kg/m .  Physical Exam   GENERAL: healthy, alert and no distress  MS: no gross musculoskeletal defects noted, no edema  SKIN: small 2 cm superficial laceration to distal tip of left third digit, from of left third digit, 5/5 strength, sensation intact surrounding laceration. No FB visualized  PSYCH: mentation appears normal, affect normal/bright    Diagnostic Test Results:  none         Assessment & Plan     1. Need for vaccination  - 1st  Administration  [78910]  - TD PRSERV FREE >=7 YRS ADS IM [73847]    2. Laceration of left middle finger without damage to nail, foreign body presence unspecified, initial encounter  No FB noted on x ray examination of left third digit, no bony abnormality per my read. Laceration is superficial and bleeding is well controlled, no sutures required.  Today the laceration was cleaned and dressed, home care instructions given. TD updated.  She will return for routine HM in July  - XR Finger Left G/E 2 Views; Future        2 months for  annual exam    No follow-ups on file.    David Waldrop PA-C  [facility] [place]

## 2020-06-01 ENCOUNTER — VIRTUAL VISIT (OUTPATIENT)
Dept: FAMILY MEDICINE | Facility: CLINIC | Age: 55
End: 2020-06-01
Payer: COMMERCIAL

## 2020-06-01 DIAGNOSIS — R10.2 PELVIC PAIN IN FEMALE: Primary | ICD-10-CM

## 2020-06-01 PROCEDURE — 99214 OFFICE O/P EST MOD 30 MIN: CPT | Mod: 95 | Performed by: INTERNAL MEDICINE

## 2020-06-01 NOTE — PROGRESS NOTES
"Ijeoma Avalos is a 54 year old female who is being evaluated via a billable telephone visit.      The patient has been notified of following:     \"This telephone visit will be conducted via a call between you and your physician/provider. We have found that certain health care needs can be provided without the need for a physical exam.  This service lets us provide the care you need with a short phone conversation.  If a prescription is necessary we can send it directly to your pharmacy.  If lab work is needed we can place an order for that and you can then stop by our lab to have the test done at a later time.    Telephone visits are billed at different rates depending on your insurance coverage. During this emergency period, for some insurers they may be billed the same as an in-person visit.  Please reach out to your insurance provider with any questions.    If during the course of the call the physician/provider feels a telephone visit is not appropriate, you will not be charged for this service.\"    Patient has given verbal consent for Telephone visit?  Yes    What phone number would you like to be contacted at? [phone]    How would you like to obtain your AVS? Jose    Андрей Avalos is a 54 year old female who presents via phone visit today for the following health issues:    HPI  URINARY TRACT SYMPTOMS  Onset: ongoing since March     Description:   Painful urination (Dysuria): YES           Frequency: no   Blood in urine (Hematuria): no   Delay in urine (Hesitency): no     Intensity: moderate    Progression of Symptoms:  intermittent    Accompanying Signs & Symptoms:  Fever/chills: no   Flank pain YES- sometimes   Nausea and vomiting: no   Any vaginal symptoms: burning   Abdominal/Pelvic Pain: YES- on and off     History:   History of frequent UTI's: YES  History of kidney stones: no   Sexually Active: no   Possibility of pregnancy: No    Precipitating factors:   Na "     Therapies Tried and outcome: Pyridium , Cranberry juice prn (contraindicated in Coumadin patients) and Increase fluid intakeMikie Raphael is a 53 y/o female who has been struggling with a urinary tract infection for a few months. She was treated with Bactrim followed by Macrobid. Her symptoms improved modestly but then came back.    Wondering if she could have a kidney stone. Some of her pain she describes as pelvic cramping similar to when she had her menstrual cycles (prior to her hysterectomy). Her last several urinalysis here in clinic do show a few red blood cells.     Ijeoma also tells me that when she had her hysterectomy in 2017 she was told that she had some endomterial tissue growth.           Patient Active Problem List   Diagnosis     Gastric acidity     Other specified hypothyroidism     Plantar fasciitis     Gastroesophageal reflux disease, esophagitis presence not specified     TMJ (temporomandibular joint syndrome)     Neck pain     Other headache syndrome     Uterine leiomyoma, unspecified location     Adjustment disorder with mixed anxiety and depressed mood     Low hemoglobin     Dysmenorrhea     Right knee pain, unspecified chronicity     Post-operative nausea and vomiting     New onset type 2 diabetes mellitus (H)     Essential hypertension     Hyperlipidemia with target LDL less than 100     Atopic rhinitis     Nasal congestion     Somatic dysfunction of lumbar region     Chronic right-sided low back pain without sciatica     Somatic dysfunction of sacral region     Sacral pain     Tendonitis     Past Surgical History:   Procedure Laterality Date     APPENDECTOMY        SECTION  2014    Procedure:  SECTION;  Surgeon: Ru Cano MD;  Location:  [facility]     CHOLECYSTECTOMY       cyst removed      left  lower leg on bone sheath     DAVINCI HYSTERECTOMY TOTAL, SALPINGECTOMY BILATERAL N/A 2017    Procedure: DAVINCI HYSTERECTOMY TOTAL, SALPINGECTOMY BILATERAL;   DAVINCI SINGLE SITE HYSTERECTOMY, BILATERAL SALPINGECTOMY, LEFT OOPHORECTOMY, LYSIS OF ADHESIONS (LAPAROSCOPIC BAG TO RETREIVE OVARY);  Surgeon: Ru Cano MD;  [facility]     DAVINCI LYSIS OF ADHESIONS N/A 4/21/2017    Procedure: DAVINCI LYSIS OF ADHESIONS;;  Surgeon: Ru Cano MD;  [facility]     DAVINCI OOPHORECTOMY N/A 4/21/2017    Procedure: DAVINCI OOPHORECTOMY;;  Surgeon: Ru Cano MD;  [facility]     GI SURGERY       MYOMECTOMY UTERUS  sept 2012     ZZ GASTROSCOPY,[place]         Social History     Tobacco Use     Smoking status: Never Smoker     Smokeless tobacco: Never Used   Substance Use Topics     Alcohol use: No     Alcohol/week: 0.0 standard drinks     Comment: social     Family History   Problem Relation Age of Onset     Hypertension Father            Reviewed and updated as needed this visit by Provider         Review of Systems   Constitutional, HEENT, cardiovascular, pulmonary, gi and gu systems are negative, except as otherwise noted.       Objective   Reported vitals:  LMP 04/14/2017    healthy, alert and no distress  PSYCH: Alert and oriented times 3; coherent speech, normal   rate and volume, able to articulate logical thoughts, able   to abstract reason, no tangential thoughts, no hallucinations   or delusions  Her affect is normal  RESP: No cough, no audible wheezing, able to talk in full sentences  Remainder of exam unable to be completed due to telephone visits    Diagnostic Test Results:  Labs reviewed in Epic        Assessment/Plan:    1. Pelvic pain in female  Ijeoma calls in with concerns that her bladder infection has still not improved. Her last UA did not reflex to culture and previous cultures were negative. It is very possible that her symptoms may be due to an alternate diagnosis. She questioned the possibility of a kidney stone, which is certainly possible. She also brings up her history of abnormal endometrial tissue that may have been  found during her hysterectomy (Although when I reviewed her surgical note it does not mention endometriosis, it does mention an abnormal adnexal mass with subsequent ovary removal). I am recommending a repeat urinalysis; if negative and/or persistent hematuria without signs of infection will order CT urogram. Depending on results may refer to Urology or back to gynecology.  - UA with Microscopic; Future  - Urine Culture Aerobic Bacterial; Future    Return in about 2 days (around 6/3/2020) for Lab Work - Not fasting.      Phone call duration:  18 minutes    Jessenia Pryor MD

## 2020-06-01 NOTE — Clinical Note
Please abstract the following data from this visit with this patient into the appropriate field in Epic:    Tests that can be patient reported without a hard copy:    Pap smear done on this date: 7/2018 (approximately), by this group: [facility] , results were normal .     Other Tests found in the patient's chart through Chart Review/Care Everywhere:    {Abstract Quality List (Optional):373960}    Note to Abstraction: If this section is blank, no results were found via Chart Review/Care Everywhere.

## 2020-06-03 DIAGNOSIS — N02.9 RECURRENT AND PERSISTENT HEMATURIA: ICD-10-CM

## 2020-06-03 DIAGNOSIS — R10.2 PELVIC PAIN IN FEMALE: Primary | ICD-10-CM

## 2020-06-03 DIAGNOSIS — R10.2 PELVIC PAIN IN FEMALE: ICD-10-CM

## 2020-06-03 LAB
ALBUMIN UR-MCNC: NEGATIVE MG/DL
APPEARANCE UR: CLEAR
BILIRUB UR QL STRIP: NEGATIVE
COLOR UR AUTO: YELLOW
GLUCOSE UR STRIP-MCNC: NEGATIVE MG/DL
HGB UR QL STRIP: ABNORMAL
KETONES UR STRIP-MCNC: NEGATIVE MG/DL
LEUKOCYTE ESTERASE UR QL STRIP: NEGATIVE
NITRATE UR QL: NEGATIVE
NON-SQ EPI CELLS #/AREA URNS LPF: ABNORMAL /LPF
PH UR STRIP: 6 PH (ref 5–7)
RBC #/AREA URNS AUTO: ABNORMAL /HPF
SOURCE: ABNORMAL
SP GR UR STRIP: 1.01 (ref 1–1.03)
UROBILINOGEN UR STRIP-ACNC: 0.2 EU/DL (ref 0.2–1)
WBC #/AREA URNS AUTO: ABNORMAL /HPF

## 2020-06-03 PROCEDURE — 81001 URINALYSIS AUTO W/SCOPE: CPT | Performed by: INTERNAL MEDICINE

## 2020-06-03 PROCEDURE — 87086 URINE CULTURE/COLONY COUNT: CPT | Performed by: INTERNAL MEDICINE

## 2020-06-04 LAB
BACTERIA SPEC CULT: NORMAL
SPECIMEN SOURCE: NORMAL

## 2020-06-09 ENCOUNTER — MYC MEDICAL ADVICE (OUTPATIENT)
Dept: FAMILY MEDICINE | Facility: CLINIC | Age: 55
End: 2020-06-09

## 2020-06-09 ENCOUNTER — VIRTUAL VISIT (OUTPATIENT)
Dept: PHYSICAL THERAPY | Facility: CLINIC | Age: 55
End: 2020-06-09
Payer: COMMERCIAL

## 2020-06-09 DIAGNOSIS — R30.0 DYSURIA: ICD-10-CM

## 2020-06-09 DIAGNOSIS — N94.10 DYSPAREUNIA IN FEMALE: Primary | ICD-10-CM

## 2020-06-09 DIAGNOSIS — N02.9 RECURRENT AND PERSISTENT HEMATURIA: Primary | ICD-10-CM

## 2020-06-09 PROCEDURE — 97535 SELF CARE MNGMENT TRAINING: CPT | Mod: GT | Performed by: PHYSICAL THERAPIST

## 2020-06-09 NOTE — PROGRESS NOTES
"Physical Therapy Virtual Follow Up Visit      The patient has been notified of following:     \"This virtual visit will be conducted between you and your provider. We have found that certain health care needs can be provided without the need for physical presence.  This service lets us provide the care you need with a virtual visit.\"    Due to external, as well as internal [facility] management of the COVID-19 Virus, Ijeoma Avalos was not seen in our clinic.  As a substitution, we implemented a virtual visit to manage this patient's condition utilizing the OBOOKx virtual visit platform via the patient s existing code.  The provider, Nelida Pantoja, reviewed the patient's chart, PTRx prescription, and spoke with the patient to determine the following telemedicine visit is appropriate and effective for the patient's care.    The following type of visit was completed:   Video Visit:  The [employer]OOK[employer] platform uses a synchronous HIPAA compliant video stream for this patient encounter.        S: Ijeoma Avalos is a 54 year old female. Connected virtually on the PTRx platform to discuss their condition/progress. They noted improvements in none.  They noted ongoing pain or limitations with blood in urine.Md advised to take CT scan as urine test was negative to any bacterial infection.      Current pain level: 3/10      O: Patient demonstrated no changes     PTRx Content from today's visit:  Advised to perform stretches and no exercises which increased intra abdominal pressure more, walking advised.     A:   Patient is not progressing as expected.  Response to therapy has shown lack of progress in  pain level and function      P: Patient will continue with the exercise program assigned on their PTRx code and will add the following measures to manage their pain/condition: stretches, walking, warm water pack     Advised to reach out to PT after CT scan and reports are in      Virtual visit contact time    Time of " service began: 3.40 PM  Time of service ended: 4.05 PM  Total Time for set up, visit, and documentation: 25 minutes    Payor: [employer] / Plan: CRISSY GONZALEZ / Product Type: PPO /   .  Procedure Code/s   Self Care / Home Management Training (11973): 25 minutes    I have reviewed the note as documented above.  This accurately captures the substance of my conversation with the patient.  Provider location: [place] ([facility]/State)  Patient location: home

## 2020-06-15 ENCOUNTER — HOSPITAL ENCOUNTER (OUTPATIENT)
Dept: CT IMAGING | Facility: CLINIC | Age: 55
Discharge: HOME | End: 2020-06-15
Attending: INTERNAL MEDICINE | Admitting: INTERNAL MEDICINE
Payer: COMMERCIAL

## 2020-06-15 DIAGNOSIS — R10.2 PELVIC PAIN IN FEMALE: ICD-10-CM

## 2020-06-15 DIAGNOSIS — N02.9 RECURRENT AND PERSISTENT HEMATURIA: ICD-10-CM

## 2020-06-15 PROCEDURE — 74176 CT ABD & PELVIS W/O CONTRAST: CPT

## 2020-06-23 ENCOUNTER — VIRTUAL VISIT (OUTPATIENT)
Dept: UROLOGY | Facility: CLINIC | Age: 55
End: 2020-06-23
Payer: COMMERCIAL

## 2020-06-23 ENCOUNTER — TRANSFERRED RECORDS (OUTPATIENT)
Dept: HEALTH INFORMATION MANAGEMENT | Facility: CLINIC | Age: 55
End: 2020-06-23

## 2020-06-23 VITALS — BODY MASS INDEX: 25.52 KG/M2 | HEIGHT: 60 IN | WEIGHT: 130 LBS

## 2020-06-23 DIAGNOSIS — R31.29 MICROSCOPIC HEMATURIA: Primary | ICD-10-CM

## 2020-06-23 DIAGNOSIS — R30.0 DYSURIA: ICD-10-CM

## 2020-06-23 PROCEDURE — 99203 OFFICE O/P NEW LOW 30 MIN: CPT | Mod: GT | Performed by: UROLOGY

## 2020-06-23 ASSESSMENT — ENCOUNTER SYMPTOMS
SORE THROAT: 0
COUGH: 0
SWOLLEN GLANDS: 0
DECREASED LIBIDO: 0
DISTURBANCES IN COORDINATION: 0
HOARSE VOICE: 0
EXTREMITY NUMBNESS: 0
TREMORS: 0
POLYPHAGIA: 0
POSTURAL DYSPNEA: 0
DECREASED CONCENTRATION: 0
DOUBLE VISION: 0
VOMITING: 0
NAUSEA: 0
SEIZURES: 0
DYSPNEA ON EXERTION: 0
ORTHOPNEA: 0
NECK PAIN: 0
SHORTNESS OF BREATH: 0
DIZZINESS: 0
DEPRESSION: 0
CLAUDICATION: 0
TROUBLE SWALLOWING: 0
SLEEP DISTURBANCES DUE TO BREATHING: 0
EYE WATERING: 0
NIGHT SWEATS: 0
DYSURIA: 1
MUSCLE CRAMPS: 0
PANIC: 0
HYPERTENSION: 0
POOR WOUND HEALING: 0
WEAKNESS: 0
FATIGUE: 0
HOT FLASHES: 0
EXERCISE INTOLERANCE: 0
MUSCLE WEAKNESS: 0
ABDOMINAL PAIN: 0
WEIGHT GAIN: 0
HYPOTENSION: 0
BLOOD IN STOOL: 0
SPUTUM PRODUCTION: 0
PARALYSIS: 0
NERVOUS/ANXIOUS: 0
DIARRHEA: 0
SKIN CHANGES: 0
MEMORY LOSS: 0
JOINT SWELLING: 0
BRUISES/BLEEDS EASILY: 0
INCREASED ENERGY: 0
SPEECH CHANGE: 0
TINGLING: 0
MYALGIAS: 0
LIGHT-HEADEDNESS: 0
FLANK PAIN: 0
HEMOPTYSIS: 0
WHEEZING: 0
RECTAL BLEEDING: 0
SYNCOPE: 0
STIFFNESS: 0
HEARTBURN: 0
DECREASED APPETITE: 0
ARTHRALGIAS: 0
RESPIRATORY PAIN: 0
BLOATING: 0
FEVER: 0
EYE REDNESS: 0
LEG PAIN: 0
JAUNDICE: 0
LOSS OF CONSCIOUSNESS: 0
DIFFICULTY URINATING: 0
BOWEL INCONTINENCE: 0
CONSTIPATION: 0
HEMATURIA: 1
INSOMNIA: 0
BACK PAIN: 0
BREAST PAIN: 0
LEG SWELLING: 0
RECTAL PAIN: 0
TASTE DISTURBANCE: 0
SNORES LOUDLY: 0
TACHYCARDIA: 0
CHILLS: 0
ALTERED TEMPERATURE REGULATION: 0
EYE PAIN: 0
SINUS PAIN: 0
EYE IRRITATION: 0
NUMBNESS: 0
SMELL DISTURBANCE: 0
COUGH DISTURBING SLEEP: 0
WEIGHT LOSS: 0
SINUS CONGESTION: 0
BREAST MASS: 0
HALLUCINATIONS: 0
NECK MASS: 0
HEADACHES: 0
NAIL CHANGES: 0
PALPITATIONS: 0
POLYDIPSIA: 0

## 2020-06-23 ASSESSMENT — MIFFLIN-ST. JEOR: SCORE: 1121.18

## 2020-06-23 ASSESSMENT — PAIN SCALES - GENERAL: PAINLEVEL: NO PAIN (0)

## 2020-06-23 NOTE — PROGRESS NOTES
"June 23, 2020    Ijeoma Avalos is a 54 year old female who is being evaluated via a billable video visit.      The patient has been notified of following:     \"This video visit will be conducted via a call between you and your physician/provider. We have found that certain health care needs can be provided without the need for an in-person physical exam.  This service lets us provide the care you need with a video conversation.  If a prescription is necessary we can send it directly to your pharmacy.  If lab work is needed we can place an order for that and you can then stop by our lab to have the test done at a later time.    Video visits are billed at different rates depending on your insurance coverage.  Please reach out to your insurance provider with any questions.    If during the course of the call the physician/provider feels a video visit is not appropriate, you will not be charged for this service.\"    Patient has given verbal consent for Video visit? Yes text to [phone]    Will anyone else be joining your video visit? No      Video-Visit Details    Type of service:  Video Visit    Video Start Time: 8:09 AM  Video End Time: 8:29AM    Originating Location (pt. Location): Home    Distant Location (provider location):  [facility] [place]     Platform used for Video Visit: Doximity    Referring Provider: Dr Jessenia Pryor    Primary Care Provider: Dulce Hernandez    CC: Hematuria, dysuria    HPI:  Ijeoma Avalos is a 54 year old  female DM who presents for evaluation of her pelvic floor symptoms.  She started with dysuria and vaginal burning in January/February.  She went to get checked out and she had negative cultures but microscopic hematuria.  Denies constipation but states that she does not always feel that she empties.  She also has pain in the vagina which is preventing intercourse for about a year. Started estrace cream about 2 weeks    She had a " hysterectomy/USO for ovarian cysts     Denies gross hematuria, febrile UTIs, hospitalizations for UTI, urinary incontinence, urinary urgency, urinary frequency, vaginal bleeding, vaginal bulge.    Past Medical History:   Diagnosis Date     Gastric acidity      Gestational diabetes mellitus     DIET CONTROL     Hx of previous reproductive problem     IVF     Hypothyroid      Motion sickness      Ovarian cyst      Post-operative nausea and vomiting 2017     Past Surgical History:   Procedure Laterality Date     APPENDECTOMY        SECTION  2014    Procedure:  SECTION;  Surgeon: Ru Cano MD;  Location:  L+D     CHOLECYSTECTOMY       cyst removed      left  lower leg on bone sheath     DAVINCI HYSTERECTOMY TOTAL, SALPINGECTOMY BILATERAL N/A 2017    Procedure: DAVINCI HYSTERECTOMY TOTAL, SALPINGECTOMY BILATERAL;  DAVINCI SINGLE SITE HYSTERECTOMY, BILATERAL SALPINGECTOMY, LEFT OOPHORECTOMY, LYSIS OF ADHESIONS (LAPAROSCOPIC BAG TO RETREIVE OVARY);  Surgeon: Ru Cano MD;  Location: [facility]     DAVINCI LYSIS OF ADHESIONS N/A 2017    Procedure: DAVINCI LYSIS OF ADHESIONS;;  Surgeon: Ru Cano MD;  Location:  OR     DAVINCI OOPHORECTOMY N/A 2017    Procedure: DAVINCI OOPHORECTOMY;;  Surgeon: Ru Cano MD;  Location:  OR     GI SURGERY       MYOMECTOMY UTERUS  2012     ZZ GASTROSCOPY,[place]       Social History     Socioeconomic History     Marital status:      Spouse name: Not on file     Number of children: 1     Years of education: Not on file     Highest education level: Not on file   Occupational History     Occupation:    Social Needs     Financial resource strain: Not on file     Food insecurity     Worry: Not on file     Inability: Not on file     Transportation needs     Medical: Not on file     Non-medical: Not on file   Tobacco Use     Smoking status: Never Smoker     Smokeless tobacco: Never Used    Substance and Sexual Activity     Alcohol use: No     Alcohol/week: 0.0 standard drinks     Comment: social     Drug use: No     Sexual activity: Yes     Partners: Male   Lifestyle     Physical activity     Days per week: Not on file     Minutes per session: Not on file     Stress: Not on file   Relationships     Social connections     Talks on phone: Not on file     Gets together: Not on file     Attends Religion service: Not on file     Active member of club or organization: Not on file     Attends meetings of clubs or organizations: Not on file     Relationship status: Not on file     Intimate partner violence     Fear of current or ex partner: Not on file     Emotionally abused: Not on file     Physically abused: Not on file     Forced sexual activity: Not on file   Other Topics Concern     Parent/sibling w/ CABG, MI or angioplasty before 65F 55M? Not Asked   Social History Narrative     Not on file     Family History   Problem Relation Age of Onset     Hypertension Father      Review of Systems     Constitutional:  Negative for fever, chills, weight loss, weight gain, fatigue, decreased appetite, night sweats, recent stressors, height gain, height loss, post-operative complications, incisional pain, hallucinations, increased energy, hyperactivity and confused.   HENT:  Negative for ear pain, hearing loss, tinnitus, nosebleeds, trouble swallowing, hoarse voice, mouth sores, sore throat, ear discharge, tooth pain, gum tenderness, taste disturbance, smell disturbance, hearing aid, bleeding gums, dry mouth, sinus pain, sinus congestion and neck mass.    Eyes:  Negative for double vision, pain, redness, eye pain, decreased vision, eye watering, eye bulging, eye dryness, flashing lights, spots, floaters, strabismus, tunnel vision, jaundice and eye irritation.   Respiratory:   Negative for cough, hemoptysis, sputum production, shortness of breath, wheezing, sleep disturbances due to breathing, snores loudly,  respiratory pain, dyspnea on exertion, cough disturbing sleep and postural dyspnea.    Cardiovascular:  Negative for chest pain, dyspnea on exertion, palpitations, orthopnea, claudication, leg swelling, fingers/toes turn blue, hypertension, hypotension, syncope, history of heart murmur, chest pain on exertion, chest pain at rest, pacemaker, few scattered varicosities, leg pain, sleep disturbances due to breathing, tachycardia, light-headedness, exercise intolerance and edema.   Gastrointestinal:  Negative for heartburn, nausea, vomiting, abdominal pain, diarrhea, constipation, blood in stool, melena, rectal pain, bloating, hemorrhoids, bowel incontinence, jaundice, rectal bleeding, coffee ground emesis and change in stool.   Genitourinary:  Positive for dysuria and hematuria. Negative for bladder incontinence, urgency, flank pain, vaginal discharge, difficulty urinating, genital sores, dyspareunia, decreased libido, nocturia, voiding less frequently, arousal difficulty, abnormal vaginal bleeding, excessive menstruation, menstrual changes, hot flashes, vaginal dryness and postmenopausal bleeding.   Musculoskeletal:  Negative for myalgias, back pain, joint swelling, arthralgias, stiffness, muscle cramps, neck pain, bone pain, muscle weakness and fracture.   Skin:  Negative for nail changes, itching, poor wound healing, rash, hair changes, skin changes, acne, warts, poor wound healing, scarring, flaky skin, Raynaud's phenomenon, sensitivity to sunlight and skin thickening.   Neurological:  Negative for dizziness, tingling, tremors, speech change, seizures, loss of consciousness, weakness, light-headedness, numbness, headaches, disturbances in coordination, extremity numbness, memory loss, difficulty walking and paralysis.   Endo/Heme:  Negative for anemia, swollen glands and bruises/bleeds easily.   Psychiatric/Behavioral:  Negative for depression, hallucinations, memory loss, decreased concentration, mood swings and  panic attacks.    Breast:  Negative for breast discharge, breast mass, breast pain and nipple retraction.   Endocrine:  Negative for altered temperature regulation, polyphagia, polydipsia, unwanted hair growth and change in facial hair.      Allergies   Allergen Reactions     Cephalexin Hives     Hives on hands, face and stomach.      Ibuprofen      sneezing     Current Outpatient Medications   Medication     atorvastatin (LIPITOR) 10 MG tablet     blood glucose (ACCU-CHEK GINA PLUS) test strip     blood glucose (NO BRAND SPECIFIED) lancets standard     blood glucose monitoring (NO BRAND SPECIFIED) meter device kit     diclofenac (VOLTAREN) 1 % topical gel     estradiol (ESTRACE) 0.1 MG/GM vaginal cream     fluticasone (FLONASE) 50 MCG/ACT nasal spray     levothyroxine (SYNTHROID/LEVOTHROID) 75 MCG tablet     lisinopril (ZESTRIL) 5 MG tablet     LORazepam (ATIVAN) 0.5 MG tablet     metFORMIN (GLUCOPHAGE-XR) 500 MG 24 hr tablet     omeprazole (PRILOSEC) 40 MG DR capsule     ONETOUCH DELICA LANCETS 33G MISC     nitroFURantoin macrocrystal-monohydrate (MACROBID) 100 MG capsule     No current facility-administered medications for this visit.      LMP 04/14/2017  Patient's last menstrual period was 04/14/2017. There is no height or weight on file to calculate BMI.  GENERAL: healthy, alert and no distress  EYES: Eyes grossly normal to inspection, conjunctivae and sclerae normal  HENT: normal cephalic/atraumatic.  External ears, nose and mouth without ulcers or lesions.  RESP: no audible wheeze, cough, or visible cyanosis.  No visible retractions or increased work of breathing.  Able to speak fully in complete sentences.  NEURO: Cranial nerves grossly intact, mentation intact and speech normal  PSYCH: mentation appears normal, affect normal/bright, judgement and insight intact, normal speech and appearance well-groomed    Urine tests  6/3 normal urinalysis  5/7 2-5 RBC  3/3 3-5 RBC    CT scan images reviewed and there is  no obvious etiology of the microscopic    A/P: Ijeoma Avalos is a 54 year old F with microscopic hematuria, dysuria    Urine cytology    Return for cystoscopy to complete the evaluation (already scheduled)    Diane Patel MD MPH    Urology    CC  Patient Care Team:  Dulce Hernandez MD as PCP - General (Family Practice)  David Waldrop PA-C as Assigned PCP  Katherine Kelly RN as Diabetes Educator (Diabetes Education)

## 2020-06-23 NOTE — LETTER
"6/23/2020       RE: Ijeoma Avalos  [address]  [place] [address]     Dear Colleague,    Thank you for referring your patient, Ijeoma Avalos, to the [facility] [place] at [facility]. Please see a copy of my visit note below.    June 23, 2020    Ijeoma Avalos is a 54 year old female who is being evaluated via a billable video visit.      The patient has been notified of following:     \"This video visit will be conducted via a call between you and your physician/provider. We have found that certain health care needs can be provided without the need for an in-person physical exam.  This service lets us provide the care you need with a video conversation.  If a prescription is necessary we can send it directly to your pharmacy.  If lab work is needed we can place an order for that and you can then stop by our lab to have the test done at a later time.    Video visits are billed at different rates depending on your insurance coverage.  Please reach out to your insurance provider with any questions.    If during the course of the call the physician/provider feels a video visit is not appropriate, you will not be charged for this service.\"    Patient has given verbal consent for Video visit? Yes text to [phone]    Will anyone else be joining your video visit? No      Video-Visit Details    Type of service:  Video Visit    Video Start Time: 8:09 AM  Video End Time: 8:29AM    Originating Location (pt. Location): Home    Distant Location (provider location):  [facility] [place]     Platform used for Video Visit: Doximity    Referring Provider: Dr Jessenia Pryor    Primary Care Provider: Dulce Hernandez    CC: Hematuria, dysuria    HPI:  Ijeoma Avalos is a 54 year old [place] female DM who presents for evaluation of her pelvic floor symptoms.  She started with dysuria and vaginal " burning in January/February.  She went to get checked out and she had negative cultures but microscopic hematuria.  Denies constipation but states that she does not always feel that she empties.  She also has pain in the vagina which is preventing intercourse for about a year. Started estrace cream about 2 weeks    She had a hysterectomy/USO for ovarian cysts 2017    Denies gross hematuria, febrile UTIs, hospitalizations for UTI, urinary incontinence, urinary urgency, urinary frequency, vaginal bleeding, vaginal bulge.    Past Medical History:   Diagnosis Date     Gastric acidity      Gestational diabetes mellitus     DIET CONTROL     Hx of previous reproductive problem     IVF     Hypothyroid      Motion sickness      Ovarian cyst      Post-operative nausea and vomiting 2017     Past Surgical History:   Procedure Laterality Date     APPENDECTOMY        SECTION  2014    Procedure:  SECTION;  Surgeon: Ru Cano MD;  Location:  L+D     CHOLECYSTECTOMY       cyst removed      left  lower leg on bone sheath     DAVINCI HYSTERECTOMY TOTAL, SALPINGECTOMY BILATERAL N/A 2017    Procedure: DAVINCI HYSTERECTOMY TOTAL, SALPINGECTOMY BILATERAL;  DAVINCI SINGLE SITE HYSTERECTOMY, BILATERAL SALPINGECTOMY, LEFT OOPHORECTOMY, LYSIS OF ADHESIONS (LAPAROSCOPIC BAG TO RETREIVE OVARY);  Surgeon: Ru Cano MD;  Location:  OR     DAVINCI LYSIS OF ADHESIONS N/A 2017    Procedure: DAVINCI LYSIS OF ADHESIONS;;  Surgeon: Ru Cano MD;  Location:  OR     DAVINCI OOPHORECTOMY N/A 2017    Procedure: DAVINCI OOPHORECTOMY;;  Surgeon: Ru Cano MD;  Location:  OR     GI SURGERY       MYOMECTOMY UTERUS  2012     ZZ GASTROSCOPY,[place]       Social History     Socioeconomic History     Marital status:      Spouse name: Not on file     Number of children: 1     Years of education: Not on file     Highest education level: Not on file   Occupational  History     Occupation:    Social Needs     Financial resource strain: Not on file     Food insecurity     Worry: Not on file     Inability: Not on file     Transportation needs     Medical: Not on file     Non-medical: Not on file   Tobacco Use     Smoking status: Never Smoker     Smokeless tobacco: Never Used   Substance and Sexual Activity     Alcohol use: No     Alcohol/week: 0.0 standard drinks     Comment: social     Drug use: No     Sexual activity: Yes     Partners: Male   Lifestyle     Physical activity     Days per week: Not on file     Minutes per session: Not on file     Stress: Not on file   Relationships     Social connections     Talks on phone: Not on file     Gets together: Not on file     Attends [employer]ism service: Not on file     Active member of club or organization: Not on file     Attends meetings of clubs or organizations: Not on file     Relationship status: Not on file     Intimate partner violence     Fear of current or ex partner: Not on file     Emotionally abused: Not on file     Physically abused: Not on file     Forced sexual activity: Not on file   Other Topics Concern     Parent/sibling w/ CABG, MI or angioplasty before 65F 55M? Not Asked   Social History Narrative     Not on file     Family History   Problem Relation Age of Onset     Hypertension Father      Review of Systems     Constitutional:  Negative for fever, chills, weight loss, weight gain, fatigue, decreased appetite, night sweats, recent stressors, height gain, height loss, post-operative complications, incisional pain, hallucinations, increased energy, hyperactivity and confused.   HENT:  Negative for ear pain, hearing loss, tinnitus, nosebleeds, trouble swallowing, hoarse voice, mouth sores, sore throat, ear discharge, tooth pain, gum tenderness, taste disturbance, smell disturbance, hearing aid, bleeding gums, dry mouth, sinus pain, sinus congestion and neck mass.    Eyes:  Negative for double vision, pain,  redness, eye pain, decreased vision, eye watering, eye bulging, eye dryness, flashing lights, spots, floaters, strabismus, tunnel vision, jaundice and eye irritation.   Respiratory:   Negative for cough, hemoptysis, sputum production, shortness of breath, wheezing, sleep disturbances due to breathing, snores loudly, respiratory pain, dyspnea on exertion, cough disturbing sleep and postural dyspnea.    Cardiovascular:  Negative for chest pain, dyspnea on exertion, palpitations, orthopnea, claudication, leg swelling, fingers/toes turn blue, hypertension, hypotension, syncope, history of heart murmur, chest pain on exertion, chest pain at rest, pacemaker, few scattered varicosities, leg pain, sleep disturbances due to breathing, tachycardia, light-headedness, exercise intolerance and edema.   Gastrointestinal:  Negative for heartburn, nausea, vomiting, abdominal pain, diarrhea, constipation, blood in stool, melena, rectal pain, bloating, hemorrhoids, bowel incontinence, jaundice, rectal bleeding, coffee ground emesis and change in stool.   Genitourinary:  Positive for dysuria and hematuria. Negative for bladder incontinence, urgency, flank pain, vaginal discharge, difficulty urinating, genital sores, dyspareunia, decreased libido, nocturia, voiding less frequently, arousal difficulty, abnormal vaginal bleeding, excessive menstruation, menstrual changes, hot flashes, vaginal dryness and postmenopausal bleeding.   Musculoskeletal:  Negative for myalgias, back pain, joint swelling, arthralgias, stiffness, muscle cramps, neck pain, bone pain, muscle weakness and fracture.   Skin:  Negative for nail changes, itching, poor wound healing, rash, hair changes, skin changes, acne, warts, poor wound healing, scarring, flaky skin, Raynaud's phenomenon, sensitivity to sunlight and skin thickening.   Neurological:  Negative for dizziness, tingling, tremors, speech change, seizures, loss of consciousness, weakness, light-headedness,  numbness, headaches, disturbances in coordination, extremity numbness, memory loss, difficulty walking and paralysis.   Endo/Heme:  Negative for anemia, swollen glands and bruises/bleeds easily.   Psychiatric/Behavioral:  Negative for depression, hallucinations, memory loss, decreased concentration, mood swings and panic attacks.    Breast:  Negative for breast discharge, breast mass, breast pain and nipple retraction.   Endocrine:  Negative for altered temperature regulation, polyphagia, polydipsia, unwanted hair growth and change in facial hair.      Allergies   Allergen Reactions     Cephalexin Hives     Hives on hands, face and stomach.      Ibuprofen      sneezing     Current Outpatient Medications   Medication     atorvastatin (LIPITOR) 10 MG tablet     blood glucose (ACCU-CHEK GINA PLUS) test strip     blood glucose (NO BRAND SPECIFIED) lancets standard     blood glucose monitoring (NO BRAND SPECIFIED) meter device kit     diclofenac (VOLTAREN) 1 % topical gel     estradiol (ESTRACE) 0.1 MG/GM vaginal cream     fluticasone (FLONASE) 50 MCG/ACT nasal spray     levothyroxine (SYNTHROID/LEVOTHROID) 75 MCG tablet     lisinopril (ZESTRIL) 5 MG tablet     LORazepam (ATIVAN) 0.5 MG tablet     metFORMIN (GLUCOPHAGE-XR) 500 MG 24 hr tablet     omeprazole (PRILOSEC) 40 MG DR capsule     ONETOUCH DELICA LANCETS 33G MISC     nitroFURantoin macrocrystal-monohydrate (MACROBID) 100 MG capsule     No current facility-administered medications for this visit.      LMP 04/14/2017  Patient's last menstrual period was 04/14/2017. There is no height or weight on file to calculate BMI.  GENERAL: healthy, alert and no distress  EYES: Eyes grossly normal to inspection, conjunctivae and sclerae normal  HENT: normal cephalic/atraumatic.  External ears, nose and mouth without ulcers or lesions.  RESP: no audible wheeze, cough, or visible cyanosis.  No visible retractions or increased work of breathing.  Able to speak fully in complete  sentences.  NEURO: Cranial nerves grossly intact, mentation intact and speech normal  PSYCH: mentation appears normal, affect normal/bright, judgement and insight intact, normal speech and appearance well-groomed    Urine tests  6/3 normal urinalysis  5/7 2-5 RBC  3/3 3-5 RBC    CT scan images reviewed and there is no obvious etiology of the microscopic    A/P: Ijeoma Avalos is a 54 year old F with microscopic hematuria, dysuria    Urine cytology    Return for cystoscopy to complete the evaluation (already scheduled)    Diane Patel MD MPH    Urology    CC  Patient Care Team:  Dulce Hernandez MD as PCP - General (Family Practice)  David Waldrop PA-C as Assigned PCP  Katherine Kelly RN as Diabetes Educator (Diabetes Education)

## 2020-06-23 NOTE — PATIENT INSTRUCTIONS
Please do the urine test    Please return for a cystoscopy (procedure to look in the bladder) and pelvic exam    It was a pleasure meeting with you today.  Thank you for allowing me and my team the privilege of caring for you today.  YOU are the reason we are here, and I truly hope we provided you with the excellent service you deserve.  Please let us know if there is anything else we can do for you so that we can be sure you are leaving completely satisfied with your care experience.    Cystoscopy    Cystoscopy is a procedure that lets your doctor look directly inside your urethra and bladder. It can be used to:    Help diagnose a problem with your urethra, bladder, or kidneys.    Take a sample (biopsy) of bladder or urethral tissue.    Treat certain problems (such as removing kidney stones).    Place a stent to bypass an obstruction.    Take special X-rays of the kidneys.  Based on the findings, your doctor may recommend other tests or treatments.  What is a cystoscope?  A cystoscope is a telescope-like instrument that contains lenses and fiberoptics (small glass wires that make bright light). The cystoscope may be straight and rigid, or flexible to bend around curves in the urethra. The doctor may look directly into the cystoscope, or project the image onto a monitor.  Getting ready    Ask your doctor if you should stop taking any medicines before the procedure.    Follow any other instructions your doctor gives you.  Tell your doctor before the exam if you:    Take any medicines, such as aspirin or blood thinners    Have allergies to any medicines    Are pregnant   The procedure  Cystoscopy is done in the doctor s office, surgery center, or hospital. The doctor and a nurse are present during the procedure. It takes only a few minutes, longer if a biopsy, X-ray, or treatment needs to be done.  During the procedure:    You lie on an exam table on your back, knees bent and legs apart. You are covered with a  drape.    Your urethra and the area around it are washed. Anesthetic jelly may be applied to numb the urethra.    The cystoscope is inserted. A sterile fluid is put into the bladder to expand it. You may feel pressure from this fluid.    When the procedure is done, the cystoscope is removed.  After the procedure   Once you re home:    Drink plenty of fluids.    You may have burning or light bleeding when you urinate--this is normal.    Medicines may be prescribed to ease any discomfort or prevent infection. Take these as directed.    Call your doctor if you have heavy bleeding or blood clots, burning that lasts more than a day, a fever over 100 F  (38  C), or trouble urinating.  Date Last Reviewed: 1/1/2017 2000-2017 [employer]. [address] [place] [address], [place] [address]. All rights reserved. This information is not intended as a substitute for professional medical care. Always follow your healthcare professional's instructions.

## 2020-06-26 ENCOUNTER — VIRTUAL VISIT (OUTPATIENT)
Dept: PSYCHOLOGY | Facility: CLINIC | Age: 55
End: 2020-06-26
Payer: COMMERCIAL

## 2020-06-26 DIAGNOSIS — F41.9 ANXIETY DISORDER: Primary | ICD-10-CM

## 2020-06-26 PROBLEM — R31.29 MICROSCOPIC HEMATURIA: Status: ACTIVE | Noted: 2020-06-26

## 2020-06-26 PROBLEM — R30.0 DYSURIA: Status: ACTIVE | Noted: 2020-06-26

## 2020-06-26 PROCEDURE — 90832 PSYTX W PT 30 MINUTES: CPT | Mod: TEL | Performed by: PSYCHOLOGIST

## 2020-07-17 ENCOUNTER — VIRTUAL VISIT (OUTPATIENT)
Dept: PSYCHOLOGY | Facility: CLINIC | Age: 55
End: 2020-07-17
Payer: COMMERCIAL

## 2020-07-17 DIAGNOSIS — F41.9 ANXIETY DISORDER: Primary | ICD-10-CM

## 2020-07-17 PROCEDURE — 90834 PSYTX W PT 45 MINUTES: CPT | Mod: GT | Performed by: PSYCHOLOGIST

## 2020-07-17 NOTE — PROGRESS NOTES
Video Visit Note    Patient Name: Ijeoma Avalos  Date: 7/17/2020     Service Type: Video Visit     Telemedicine Visit: The patient's condition can be safely assessed and treated via synchronous audio and visual telemedicine encounter.      Reason for Telemedicine Visit: in-person visits not possible due to COVID-19    Originating Site (Patient Location): Patient's home    Distant Site (Provider Location): Provider Remote Setting; home office    Consent:  The patient/guardian has verbally consented to: the potential risks and benefits of telemedicine (video visit) versus in person care; bill my insurance or make self-payment for services provided; and responsibility for payment of non-covered services.     Mode of Communication:  Video Conference via i'mma    As the provider I attest to compliance with applicable laws and regulations related to telemedicine.     Session Start Time: 09:08 (due to technology issues) Session End Time: 09:56     Session Length: 37-52 min    Session #: 15 (this episode of care)    Attendees: Client attended alone     DATA      Progress Since Last Session (Related to Symptoms / Goals / Homework):   Symptoms: Variable      Episode of Care Goals: Satisfactory progress - ACTION (Actively working towards change); Intervened by reinforcing change plan / affirming steps taken     Current / Ongoing Stressors and Concerns:   Global COVID-19 outbreak   Family stressors   Work stressors     Intervention:  Initially attempted video visit via Testlio login. This was unsuccessful. Text invitation was successful and the visit was completed via video. Client endorsed some continued worry due to medical issues. Medical workup thus far is negative, with an additional diagnostic procedure scheduled next week. Client stated that she is anxious about the possibility of a serious diagnosis/issue. Also unsure how she will react if there is no known diagnosis at the end of the testing. She  acknowledges that her mind tends to run through worst-case scenarios. Identified a more adaptive mindset for this uncertain situation (e.g., I don't have all the data yet. Once I do, I will face whatever comes up and make a plan, but until then, I can't come to any conclusions. The worry doesn't help anything, I don't feel sick, and I can stay focused on today.). She will practice refocusing to this mindset, with deep breathing, when she notices the worry arising. Offered reinforcement for the coping that she is doing, in the face of multiple stressors. Client on her own had difficulty recognizing these positive choices she is making. Agreed that she does feel better when she gives herself credit for these efforts.     ASSESSMENT: Current Emotional / Mental Status (status of significant symptoms):   Risk status (Self / Other harm or suicidal ideation)   Patient denies current fears or concerns for personal safety.   Patient denies current or recent suicidal ideation or behaviors.   Patient denies current or recent homicidal ideation or behaviors.   Patient denies current or recent self injurious behavior or ideation.   Patient denies other safety concerns.   Patient reports there has been no change in risk factors since her last session.     Patient reports there has been no change in protective factors since her last session.     Recommended that patient call 911 or go to the local ED should there be a change in any of these risk factors.     Appearance:    Appropriate   Psychomotor Behavior: Normal   Eye Contact:   Good   Attitude:   Cooperative    Orientation:   All   Speech    Rate / Production: Normal     Volume:  Normal    Mood:    Anxious    Thought Content:  Worried ruminations, some worst-case scenario thinking   Thought Form:  Coherent  Logical    Insight:    Fair      Medication Compliance:   Yes; infrequent use of lorazepan     Changes in Health Issues:   None reported--continues with urology  workup     Chemical Use Review:   Substance Use: Chemical use reviewed, no active concerns identified      Tobacco Use: No current tobacco use.      Diagnosis:  1. Anxiety disorder        PLAN: (Patient Tasks / Therapist Tasks / Other)  As she awaits further medical information/diagnosis, Client will work on cultivating an adaptive mindset during the uncertainty (e.g., I don't have all the data yet. Once I do, I will face whatever comes up and make a plan, but until then, I can't come to any conclusions. The worry doesn't help anything, I don't feel sick, and I can stay focused on today.). She will practice refocusing to this mindset, with deep breathing, when she notices the worry arising. She will remember to give herself credit for the positive coping efforts that she is making in the face of multiple stressors. Encouraged continued practice with self-compassion, patience, and bobby when unexpected challenges arise or the day doesn't unfold as planned. Return video visits scheduled--plan to use text invitation. Client did request a phone session for our next visit, since she is able to have greater privacy using her headset. She will contact me sooner if needed.     I have reviewed the note as documented above.  This accurately captures the substance of my conversation with the patient.    Rosa Chowdary, LP     _______________________________________________________________________     Treatment Plan     Client's Name: Ijeoma Avalos                 YOB: 1965     Date: 2/9/17     DSM-V Diagnoses: Adjustment Disorders  309.28 (F43.23) With mixed anxiety and depressed mood  Psychosocial / Contextual Factors: physical symptoms; phase of life stressors (full-time work and parent of young child); work demands; family [place]  WHODAS: 18     Referral / Collaboration:  Referral to another professional/service is not indicated at this time.     Anticipated number of session or this episode of care:  will re-evaluate every 90 days        MeasurableTreatment Goal(s) related to diagnosis / functional impairment(s)  Goal 1: Client will build skills for stress management.    I will know I've met my goal when my blood pressure is lower and my reflux symptoms have decreased.       Objective #A (Client Action)                Client will use at least 2 coping skills for anxiety management in the next 6 weeks.  Status: Continued - Date(s):7/17/20      Intervention(s)  Therapist will teach self-regulation strategies, CBT skills, mindfulness techniques.     Objective #B  Client will use cognitive strategies identified in therapy to challenge anxious thoughts.  Status: Continued - Date(s):7/17/20      Intervention(s)  Therapist will teach cognitive strategies, provide education.        Goal 2: Client will increase self-confidence.    I will know I've met my goal when I think more positively about myself.       Objective #A (Client Action)                Status: Continued - Date(s):7/17/20      Client will increase assertive communication.     Intervention(s)  Therapist will teach assertiveness skills.     Objective #B  Client will Identify negative self-talk and behaviors: challenge core beliefs, myths, and actions.                  Status: Continued - Date(s):7/17/20      Intervention(s)  Therapist will provide education, teach CBT skills.        Goal 3: Client will increase connection in close relationships.    I will know I've met my goal when I feel more close with loved ones.       Objective #A (Client Action)                Status: Continued - Date(s):7/17/20      Client will prioritize time for meaningful relationships.     Intervention(s)  Therapist will provide support and accountability.     Objective #B  Client will make use of effective interpersonal communication skills.                         Status: Continued - Date(s): 7/17/20      Intervention(s)  Therapist will teach communication skills.        Client  has reviewed and agreed to the above plan.

## 2020-07-22 ENCOUNTER — OFFICE VISIT (OUTPATIENT)
Dept: UROLOGY | Facility: CLINIC | Age: 55
End: 2020-07-22
Payer: COMMERCIAL

## 2020-07-22 ENCOUNTER — TELEPHONE (OUTPATIENT)
Dept: UROLOGY | Facility: CLINIC | Age: 55
End: 2020-07-22

## 2020-07-22 VITALS
BODY MASS INDEX: 25.6 KG/M2 | WEIGHT: 130.4 LBS | HEIGHT: 60 IN | SYSTOLIC BLOOD PRESSURE: 110 MMHG | DIASTOLIC BLOOD PRESSURE: 80 MMHG | HEART RATE: 75 BPM | OXYGEN SATURATION: 98 %

## 2020-07-22 DIAGNOSIS — R31.29 MICROSCOPIC HEMATURIA: Primary | ICD-10-CM

## 2020-07-22 DIAGNOSIS — M62.89 HIGH-TONE PELVIC FLOOR DYSFUNCTION: ICD-10-CM

## 2020-07-22 DIAGNOSIS — R31.29 OTHER MICROSCOPIC HEMATURIA: Primary | ICD-10-CM

## 2020-07-22 DIAGNOSIS — R31.29 MICROSCOPIC HEMATURIA: ICD-10-CM

## 2020-07-22 LAB
ALBUMIN UR-MCNC: NEGATIVE MG/DL
APPEARANCE UR: CLEAR
BILIRUB UR QL STRIP: NEGATIVE
COLOR UR AUTO: YELLOW
GLUCOSE UR STRIP-MCNC: NEGATIVE MG/DL
HGB UR QL STRIP: ABNORMAL
KETONES UR STRIP-MCNC: NEGATIVE MG/DL
LEUKOCYTE ESTERASE UR QL STRIP: NEGATIVE
NITRATE UR QL: NEGATIVE
PH UR STRIP: 6 PH (ref 5–7)
SOURCE: ABNORMAL
SP GR UR STRIP: 1.02 (ref 1–1.03)
UROBILINOGEN UR STRIP-ACNC: 0.2 EU/DL (ref 0.2–1)

## 2020-07-22 PROCEDURE — 81003 URINALYSIS AUTO W/O SCOPE: CPT | Performed by: UROLOGY

## 2020-07-22 PROCEDURE — 52000 CYSTOURETHROSCOPY: CPT | Performed by: UROLOGY

## 2020-07-22 RX ORDER — LIDOCAINE HYDROCHLORIDE 20 MG/ML
JELLY TOPICAL ONCE
Status: COMPLETED | OUTPATIENT
Start: 2020-07-22 | End: 2020-07-22

## 2020-07-22 RX ADMIN — LIDOCAINE HYDROCHLORIDE: 20 JELLY TOPICAL at 09:19

## 2020-07-22 ASSESSMENT — MIFFLIN-ST. JEOR: SCORE: 1122.99

## 2020-07-22 ASSESSMENT — PAIN SCALES - GENERAL: PAINLEVEL: NO PAIN (0)

## 2020-07-22 NOTE — TELEPHONE ENCOUNTER
Spoke to patient she understands she needs a cytology. She will call her local [place] lab and see if she can go there for collection. If she cannot she will call to make an appointment at our [place] office .Aide Correa LPN

## 2020-07-22 NOTE — PATIENT INSTRUCTIONS
"Websites with free information:    American [employer]ro[employer] patient website: www.voicesforpfd.org    Total Control Program: www.totalcontrolprogram.com    Recommend you return to pelvic floor therapy    At this time we would recommend observation for the blood in the urine.  Per the American Urologic Association guidelines you can have yearly urinalyses.  If you have two consecutive negative annual urinalyses no further urinalyses are indicated for this purpose.  If you have persistent or recurrent asymptomatic microscopic hematuria then would consider nephrology evaluation in the short term and urologic evaluation in 3-5 years.  If you have gross hematuria (blood that you see) or start having UTIs please come back to see us sooner    It was a pleasure meeting with you today.  Thank you for allowing me and my team the privilege of caring for you today.  YOU are the reason we are here, and I truly hope we provided you with the excellent service you deserve.  Please let us know if there is anything else we can do for you so that we can be sure you are leaving completely satisfied with your care experience.      AFTER YOUR CYSTOSCOPY  ?  ?  You have just completed a cystoscopy, or \"cysto\", which allowed your physician to learn more about your bladder (or to remove a stent placed after surgery). We suggest that you continue to avoid caffeine, fruit juice, and alcohol for the next 24 hours, however, you are encouraged to return to your normal activities.  ?  ?  A few things that are considered normal after your cystoscopy:  ?  * small amount of bleeding (or spotting) that clears within the next 24 hours  ?  * slight burning sensation with urination  ?  * sensation of needing to void (urinate) more frequently  ?  * the feeling of \"air\" in your urine  ?  * mild discomfort that is relieved with Tylenol    * bladder spasms  ?  ?  ?  Please contact our office promptly if you:  ?  * develop a fever above 101 " degrees  ?  * are unable to urinate  ?  * develop bright red blood that does not stop  ?  * experience severe pain or swelling  ?  ?  ?  And of course, please contact our office with any concerns or questions [phone]  ?

## 2020-07-22 NOTE — NURSING NOTE
Chief Complaint   Patient presents with     Clinic Care Coordination - Follow-up     micrpscopic hematuria, possible cystoscopy   Prior to the start of the procedure and with procedural staff participation, I verbally confirmed the patient s identity using two indicators, relevant allergies, that the procedure was appropriate and matched the consent or emergent situation, and that the correct equipment/implants were available. Immediately prior to starting the procedure I conducted the Time Out with the procedural staff and re-confirmed the patient s name, procedure, and site/side. I have wiped the patient off with the povidone-Iodine solution, draped them,  used Lidocaine hydrochloride jelly, and instilled sterile water into the bladder. (The Joint Commission universal protocol was followed.)  Yes    Sedation (Moderate or Deep): None  5mL 2% lidocaine hydrochloride Urojet instilled into urethra.    NDC# [address]-7  Lot #: [id number]  Expiration Date:  1-22    Aide Pate LPN

## 2020-07-22 NOTE — TELEPHONE ENCOUNTER
[facility] Call Center    Phone Message    May a detailed message be left on voicemail: yes     Reason for Call: Other: Pt would like a call back to discuss details of urine sample Dr Patel wants her to do.     Action Taken: Message routed to:  [facility] ([facility] Urology     Travel Screening: Not Applicable

## 2020-07-22 NOTE — TELEPHONE ENCOUNTER
Called patient about doing a urine specimen for a cytology test sometime this week. Will call her back as she is driving.  Aide Pate LPN

## 2020-07-22 NOTE — LETTER
7/22/2020       RE: Ijeoma Avalos  [address]  [place] [address]     Dear Colleague,    Thank you for referring your patient, Ijeoma Avalos, to the [facility]. Please see a copy of my visit note below.    July 22, 2020    Return visit    Patient returns today for follow up of her microscopic hematuria.  She denies any changes in her health since last visit aside from having returned to see Dr Cano and she is using the estrogen cream    /80 (BP Location: Right arm, Patient Position: Sitting, Cuff Size: Adult Regular)   Pulse 75   Ht 1.524 m (5')   Wt 59.1 kg (130 lb 6.4 oz)   LMP 04/14/2017   SpO2 98%   BMI 25.47 kg/m    She is comfortable, in no distress, non-labored breathing.  Abdomen is soft, non-tender, non-distended.  Normal external female genitalia.  Negative CST.  Pelvic exam is remarkable for high tone pelvic floor and myofascial tenderness    Cystoscopy Note: After informed consent was obtained patient was prepped and draped in the standard fashion.  The flexible cystoscope was inserted into a normal appearing urethral meatus.  The urothelium was carefully examined and there were no tumors, masses, stones, foreign bodies, or other urothelial abnormalities noted.  Bilateral ureteral orifices were noted in the normal orthotopic position and both effluxed clear urine.  The cystoscope was retroflexed and the bladder neck was unremarkable.  The urethra was carefully examined upon removing the cystoscope and was unremarkable.  Patient tolerated the procedure without complications noted.      A/P: 54 year old F with microscopic hematuria, high tone pelvic floor dysfunction    Given her pain and anxiety a cytology was ordered but patient has not done it.  Will have her leave one next week.    We discussed that at this time we would recommend observation as long as her cytology is  okay.  Per the AUA guidelines she can have yearly urinalyses.  If she has two consecutive negative annual urinalyses no further urinalyses are indicated for this purpose.  If she has persistent or recurrent asymptomatic microscopic hematuria then would consider nephrology evaluation in the short term and urologic evaluation in 3-5 years    Strongly recommended that she return to pelvic floor therapy given the pelvic floor dysfunction    Diane Patel MD MPH   of Urology    CC  Patient Care Team:  Dulce Hernandez MD as PCP - General (Family Practice)  David Waldrop PA-C as Assigned PCP  Katherine Kelly RN as Diabetes Educator (Diabetes Education)

## 2020-07-22 NOTE — PROGRESS NOTES
July 22, 2020    Return visit    Patient returns today for follow up of her microscopic hematuria.  She denies any changes in her health since last visit aside from having returned to see Dr Cano and she is using the estrogen cream    /80 (BP Location: Right arm, Patient Position: Sitting, Cuff Size: Adult Regular)   Pulse 75   Ht 1.524 m (5')   Wt 59.1 kg (130 lb 6.4 oz)   LMP 04/14/2017   SpO2 98%   BMI 25.47 kg/m    She is comfortable, in no distress, non-labored breathing.  Abdomen is soft, non-tender, non-distended.  Normal external female genitalia.  Negative CST.  Pelvic exam is remarkable for high tone pelvic floor and myofascial tenderness    Cystoscopy Note: After informed consent was obtained patient was prepped and draped in the standard fashion.  The flexible cystoscope was inserted into a normal appearing urethral meatus.  The urothelium was carefully examined and there were no tumors, masses, stones, foreign bodies, or other urothelial abnormalities noted.  Bilateral ureteral orifices were noted in the normal orthotopic position and both effluxed clear urine.  The cystoscope was retroflexed and the bladder neck was unremarkable.  The urethra was carefully examined upon removing the cystoscope and was unremarkable.  Patient tolerated the procedure without complications noted.      A/P: 54 year old F with microscopic hematuria, high tone pelvic floor dysfunction    Given her pain and anxiety a cytology was ordered but patient has not done it.  Will have her leave one next week.    We discussed that at this time we would recommend observation as long as her cytology is okay.  Per the AUA guidelines she can have yearly urinalyses.  If she has two consecutive negative annual urinalyses no further urinalyses are indicated for this purpose.  If she has persistent or recurrent asymptomatic microscopic hematuria then would consider nephrology evaluation in the short term and urologic evaluation  in 3-5 years    Strongly recommended that she return to pelvic floor therapy given the pelvic floor dysfunction    Diane Patel MD MPH   of Urology    CC  Patient Care Team:  Dulce Hernandez MD as PCP - General (Family Practice)  David Waldrop PA-C as Assigned PCP  Katherine Kelly RN as Diabetes Educator (Diabetes Education)

## 2020-08-04 DIAGNOSIS — E11.9 NEW ONSET TYPE 2 DIABETES MELLITUS (H): ICD-10-CM

## 2020-08-04 DIAGNOSIS — E03.9 HYPOTHYROIDISM, UNSPECIFIED TYPE: ICD-10-CM

## 2020-08-04 DIAGNOSIS — E11.9 TYPE 2 DIABETES MELLITUS WITHOUT COMPLICATION, WITHOUT LONG-TERM CURRENT USE OF INSULIN (H): ICD-10-CM

## 2020-08-04 RX ORDER — LANCETS
EACH MISCELLANEOUS
Qty: 100 EACH | Refills: 3 | Status: SHIPPED | OUTPATIENT
Start: 2020-08-04 | End: 2021-11-15

## 2020-08-04 RX ORDER — LEVOTHYROXINE SODIUM 75 UG/1
75 TABLET ORAL DAILY
Qty: 30 TABLET | Refills: 0 | Status: SHIPPED | OUTPATIENT
Start: 2020-08-04 | End: 2020-08-24

## 2020-08-04 RX ORDER — METFORMIN HCL 500 MG
TABLET, EXTENDED RELEASE 24 HR ORAL
Qty: 360 TABLET | Refills: 0 | OUTPATIENT
Start: 2020-08-04

## 2020-08-04 RX ORDER — BLOOD SUGAR DIAGNOSTIC
STRIP MISCELLANEOUS
Qty: 100 EACH | Refills: 0 | Status: SHIPPED | OUTPATIENT
Start: 2020-08-04 | End: 2020-11-22

## 2020-08-04 NOTE — TELEPHONE ENCOUNTER
Metformin denied to pharmacy since a new rx was sent on 7/8/20 for # 360 with no refills.    Prescription approved per [facility] Refill Protocol.    Janna LITTLE RN  EP Triage

## 2020-08-04 NOTE — TELEPHONE ENCOUNTER
Routing refill request to provider for review/approval because:  Labs out of range:  TSH 0.34 on 5/7/20    Janna LITTLE RN  EP Triage

## 2020-08-04 NOTE — TELEPHONE ENCOUNTER
Script refill faxed. Remind pt to do follow up for lab only, order is in place and , since she is past due.

## 2020-08-05 NOTE — TELEPHONE ENCOUNTER
Routing to team to inform and assist in scheduling.   Aura Wright RN   [facility] - Triage

## 2020-08-09 DIAGNOSIS — E11.9 TYPE 2 DIABETES MELLITUS WITHOUT COMPLICATION, WITHOUT LONG-TERM CURRENT USE OF INSULIN (H): ICD-10-CM

## 2020-08-10 RX ORDER — METFORMIN HCL 500 MG
TABLET, EXTENDED RELEASE 24 HR ORAL
Qty: 360 TABLET | Refills: 0 | OUTPATIENT
Start: 2020-08-10

## 2020-08-14 ENCOUNTER — VIRTUAL VISIT (OUTPATIENT)
Dept: PSYCHOLOGY | Facility: CLINIC | Age: 55
End: 2020-08-14
Payer: COMMERCIAL

## 2020-08-14 DIAGNOSIS — F41.9 ANXIETY DISORDER: Primary | ICD-10-CM

## 2020-08-14 PROCEDURE — 90837 PSYTX W PT 60 MINUTES: CPT | Mod: TEL | Performed by: PSYCHOLOGIST

## 2020-08-14 PROCEDURE — 99207 ZZC CDG-CODE INCORRECT PER BILLING BASED ON TIME: CPT | Performed by: PSYCHOLOGIST

## 2020-08-14 NOTE — PROGRESS NOTES
"  Video Visit Note    Patient Name: Ijeoma Avalos  Date: 8/14/2020     Service Type: Telephone Visit     The patient has been notified of the following:      \"We have found that certain health care needs can be provided without the need for a face to face visit.  This service lets us provide the care you need with a phone conversation.       I will have full access to your [place] medical record during this entire phone call.   I will be taking notes for your medical record.      Since this is like an office visit, we will bill your insurance company for this service.       There are potential benefits and risks of telephone visits (e.g. limits to patient confidentiality) that differ from in-person visits.?  Confidentiality still applies for telephone services, and nobody will record the visit.  It is important to be in a quiet, private space that is free of distractions (including cell phone or other devices) during the visit.??      If during the course of the call I believe a telephone visit is not appropriate, you will not be charged for this service\"     Consent has been obtained for this service by care team member: Yes     Session Start Time: 08:02    Session End Time: 08:56     Session Length: 37-52 min    Session #: 16 (this episode of care)    Attendees: Client attended alone     DATA      Progress Since Last Session (Related to Symptoms / Goals / Homework):   Symptoms: Variable      Episode of Care Goals: Satisfactory progress - ACTION (Actively working towards change); Intervened by reinforcing change plan / affirming steps taken     Current / Ongoing Stressors and Concerns:   Global COVID-19 outbreak   Family stressors   Work stressors     Intervention:  Session was conducted via telephone at Client's request, as it allows her increased privacy. She stated that further medical testing did not reveal any significant issues or result in any diagnosis. This was both a relief and frustration. She is " working now to manage the uncertainty and to determine how to address the continued symptoms. She plans to persist with PT, although the video sessions are not as helpful as in-person visits would be. Client was interested in talking today about struggling with feelings of disconnection in her marriage. She shared her observations as well as her fears, and noted that one of her fears is that talking about these issues with her  may make things worse. With some discussion, Client acknowledged the possibility that she may be personalizing some of his behaviors (consistent with her patterns), which may in fact be completely unrelated to her. Talked about the many seasons in a long relationship--how it feels to be the one holding the line, how it feels to be the one drifting. This was a helpful concept to Client as she remembers a time when she was consumed with personal struggle and her  was the one to hold the line. This provided clarity for how she would like to talk to him about her feelings. She will include some specific, concrete requests to provide direction for next steps.     ASSESSMENT: Current Emotional / Mental Status (status of significant symptoms):   Risk status (Self / Other harm or suicidal ideation)   Patient denies current fears or concerns for personal safety.   Patient denies current or recent suicidal ideation or behaviors.   Patient denies current or recent homicidal ideation or behaviors.   Patient denies current or recent self injurious behavior or ideation.   Patient denies other safety concerns.   Patient reports there has been no change in risk factors since her last session.     Patient reports there has been no change in protective factors since her last session.     Recommended that patient call 911 or go to the local ED should there be a change in any of these risk factors.     Appearance:    Unable to assess   Psychomotor Behavior: Unable to assess   Eye Contact:   Unable  "to assess   Attitude:   Forthcoming    Orientation:   All   Speech    Rate / Production: Normal     Volume:  Normal    Mood:    Anxious    Thought Content:  Some personalization; thoughts about feeling \"stuck\"   Thought Form:  Coherent  Logical    Insight:    Fair      Medication Compliance:   Yes; infrequent use of lorazepan     Changes in Health Issues:   None reported--urology workup did not reveal any significant results     Chemical Use Review:   Substance Use: Chemical use reviewed, no active concerns identified      Tobacco Use: No current tobacco use.      Diagnosis:  1. Anxiety disorder        PLAN: (Patient Tasks / Therapist Tasks / Other)  Client will challenge herself to avoid personalizing the behaviors of others. She will initiate a conversation with her , conveying a general message of \"I miss us\" and acknowledging the times that he has been the one to work at keeping the connection strong. She will ask for concrete changes that would be meaningful to her (hugs, holding hands). She will remember to give herself credit for the positive coping efforts that she is making in the face of multiple stressors. Return video visit scheduled--plan to use text invitation. She will contact me sooner if needed.     I have reviewed the note as documented above.  This accurately captures the substance of my conversation with the patient.    Rosa Chowdary, LP     _______________________________________________________________________     Treatment Plan     Client's Name: Ijeoma Avalos                 YOB: 1965     Date: 2/9/17     DSM-V Diagnoses: Adjustment Disorders  309.28 (F43.23) With mixed anxiety and depressed mood  Psychosocial / Contextual Factors: physical symptoms; phase of life stressors (full-time work and parent of young child); work demands; family overseas  WHODAS: 16     Referral / Collaboration:  Referral to another professional/service is not indicated at this " time.     Anticipated number of session or this episode of care: will re-evaluate every 90 days        MeasurableTreatment Goal(s) related to diagnosis / functional impairment(s)  Goal 1: Client will build skills for stress management.    I will know I've met my goal when my blood pressure is lower and my reflux symptoms have decreased.       Objective #A (Client Action)                Client will use at least 2 coping skills for anxiety management in the next 6 weeks.  Status: Continued - Date(s):7/17/20      Intervention(s)  Therapist will teach self-regulation strategies, CBT skills, mindfulness techniques.     Objective #B  Client will use cognitive strategies identified in therapy to challenge anxious thoughts.  Status: Continued - Date(s):7/17/20      Intervention(s)  Therapist will teach cognitive strategies, provide education.        Goal 2: Client will increase self-confidence.    I will know I've met my goal when I think more positively about myself.       Objective #A (Client Action)                Status: Continued - Date(s):7/17/20      Client will increase assertive communication.     Intervention(s)  Therapist will teach assertiveness skills.     Objective #B  Client will Identify negative self-talk and behaviors: challenge core beliefs, myths, and actions.                  Status: Continued - Date(s):7/17/20      Intervention(s)  Therapist will provide education, teach CBT skills.        Goal 3: Client will increase connection in close relationships.    I will know I've met my goal when I feel more close with loved ones.       Objective #A (Client Action)                Status: Continued - Date(s):7/17/20      Client will prioritize time for meaningful relationships.     Intervention(s)  Therapist will provide support and accountability.     Objective #B  Client will make use of effective interpersonal communication skills.                         Status: Continued - Date(s): 7/17/20       Intervention(s)  Therapist will teach communication skills.        Client has reviewed and agreed to the above plan.

## 2020-08-17 ENCOUNTER — MYC MEDICAL ADVICE (OUTPATIENT)
Dept: FAMILY MEDICINE | Facility: CLINIC | Age: 55
End: 2020-08-17

## 2020-08-17 DIAGNOSIS — E78.5 HYPERLIPIDEMIA WITH TARGET LDL LESS THAN 100: ICD-10-CM

## 2020-08-17 DIAGNOSIS — E03.8 OTHER SPECIFIED HYPOTHYROIDISM: Primary | ICD-10-CM

## 2020-08-17 DIAGNOSIS — E11.9 NEW ONSET TYPE 2 DIABETES MELLITUS (H): ICD-10-CM

## 2020-08-18 NOTE — TELEPHONE ENCOUNTER
Please review my chart message and advise.    Orders for A1C pended.  Please add labs if needed.    Janna LITTLE RN  EP Triage

## 2020-08-21 DIAGNOSIS — E03.8 OTHER SPECIFIED HYPOTHYROIDISM: ICD-10-CM

## 2020-08-21 DIAGNOSIS — E78.5 HYPERLIPIDEMIA WITH TARGET LDL LESS THAN 100: ICD-10-CM

## 2020-08-21 DIAGNOSIS — E11.9 NEW ONSET TYPE 2 DIABETES MELLITUS (H): ICD-10-CM

## 2020-08-21 LAB
ANION GAP SERPL CALCULATED.3IONS-SCNC: 5 MMOL/L (ref 3–14)
BUN SERPL-MCNC: 17 MG/DL (ref 7–30)
CALCIUM SERPL-MCNC: 8.8 MG/DL (ref 8.5–10.1)
CHLORIDE SERPL-SCNC: 105 MMOL/L (ref 94–109)
CHOLEST SERPL-MCNC: 178 MG/DL
CO2 SERPL-SCNC: 26 MMOL/L (ref 20–32)
CREAT SERPL-MCNC: 0.61 MG/DL (ref 0.52–1.04)
GFR SERPL CREATININE-BSD FRML MDRD: >90 ML/MIN/{1.73_M2}
GLUCOSE SERPL-MCNC: 100 MG/DL (ref 70–99)
HBA1C MFR BLD: 5.9 % (ref 0–5.6)
HDLC SERPL-MCNC: 46 MG/DL
LDLC SERPL CALC-MCNC: 98 MG/DL
NONHDLC SERPL-MCNC: 132 MG/DL
POTASSIUM SERPL-SCNC: 3.7 MMOL/L (ref 3.4–5.3)
SODIUM SERPL-SCNC: 136 MMOL/L (ref 133–144)
T4 FREE SERPL-MCNC: 1.12 NG/DL (ref 0.76–1.46)
TRIGL SERPL-MCNC: 172 MG/DL
TSH SERPL DL<=0.005 MIU/L-ACNC: 7.19 MU/L (ref 0.4–4)

## 2020-08-21 PROCEDURE — 84439 ASSAY OF FREE THYROXINE: CPT | Performed by: INTERNAL MEDICINE

## 2020-08-21 PROCEDURE — 82043 UR ALBUMIN QUANTITATIVE: CPT | Performed by: INTERNAL MEDICINE

## 2020-08-21 PROCEDURE — 80048 BASIC METABOLIC PNL TOTAL CA: CPT | Performed by: INTERNAL MEDICINE

## 2020-08-21 PROCEDURE — 80061 LIPID PANEL: CPT | Performed by: INTERNAL MEDICINE

## 2020-08-21 PROCEDURE — 84443 ASSAY THYROID STIM HORMONE: CPT | Performed by: INTERNAL MEDICINE

## 2020-08-21 PROCEDURE — 83036 HEMOGLOBIN GLYCOSYLATED A1C: CPT | Performed by: INTERNAL MEDICINE

## 2020-08-21 PROCEDURE — 36415 COLL VENOUS BLD VENIPUNCTURE: CPT | Performed by: INTERNAL MEDICINE

## 2020-08-22 LAB
CREAT UR-MCNC: 110 MG/DL
MICROALBUMIN UR-MCNC: 24 MG/L
MICROALBUMIN/CREAT UR: 22.09 MG/G CR (ref 0–25)

## 2020-08-24 ENCOUNTER — MYC MEDICAL ADVICE (OUTPATIENT)
Dept: FAMILY MEDICINE | Facility: CLINIC | Age: 55
End: 2020-08-24

## 2020-08-24 DIAGNOSIS — E03.9 HYPOTHYROIDISM, UNSPECIFIED TYPE: ICD-10-CM

## 2020-08-24 RX ORDER — LEVOTHYROXINE SODIUM 88 UG/1
88 TABLET ORAL DAILY
Qty: 90 TABLET | Refills: 0 | Status: SHIPPED | OUTPATIENT
Start: 2020-08-24 | End: 2020-12-08

## 2020-08-24 NOTE — TELEPHONE ENCOUNTER
Please see My Chart Message and advise. Requesting interpretation of lab results. Informed patient of bobby refill sent to [facility] on 8/4/20.  Triage to contact the patient.  Ruth Yi RN

## 2020-08-26 NOTE — PROGRESS NOTES
Discharge Note    Progress reporting period is from last progress note on   to Jun 9, 2020.    Ijeoma failed to follow up and current status is unknown.  Please see information below for last relevant information on current status.  Patient seen for   visits.    SUBJECTIVE  Subjective changes noted by patient:     .  Current pain level is  .     Previous pain level was   .   Changes in function:  Yes (See Goal flowsheet attached for changes in current functional level)  Adverse reaction to treatment or activity: None    OBJECTIVE  Changes noted in objective findings:       ASSESSMENT/PLAN      Updated problem list and treatment plan:     STG/LTGs have been met or progress has been made towards goals:  Yes, please see goal flowsheet for most current information  Assessment of Progress: current status is unknown.    Last current status:     Self Management Plans:  HEP  I have re-evaluated this patient and find that the nature, scope, duration and intensity of the therapy is appropriate for the medical condition of the patient.  Ijeoma continues to require the following intervention to meet STG and LTG's:  HEP.    Recommendations:  Discharge with current home program.  Patient to follow up with MD as needed.    Please refer to the daily flowsheet for treatment today, total treatment time and time spent performing 1:1 timed codes.

## 2020-08-30 DIAGNOSIS — I10 ESSENTIAL HYPERTENSION: ICD-10-CM

## 2020-08-30 DIAGNOSIS — E11.9 TYPE 2 DIABETES MELLITUS WITHOUT COMPLICATION, WITHOUT LONG-TERM CURRENT USE OF INSULIN (H): ICD-10-CM

## 2020-08-31 RX ORDER — LISINOPRIL 5 MG/1
TABLET ORAL
Qty: 30 TABLET | Refills: 9 | Status: SHIPPED | OUTPATIENT
Start: 2020-08-31 | End: 2021-08-12

## 2020-09-04 DIAGNOSIS — E03.9 HYPOTHYROIDISM, UNSPECIFIED TYPE: ICD-10-CM

## 2020-09-05 RX ORDER — LEVOTHYROXINE SODIUM 75 UG/1
TABLET ORAL
Qty: 30 TABLET | Refills: 0 | OUTPATIENT
Start: 2020-09-05

## 2020-09-11 ENCOUNTER — VIRTUAL VISIT (OUTPATIENT)
Dept: PSYCHOLOGY | Facility: CLINIC | Age: 55
End: 2020-09-11
Payer: COMMERCIAL

## 2020-09-11 DIAGNOSIS — Z03.89 NO DIAGNOSIS ON AXIS I: Primary | ICD-10-CM

## 2020-09-24 ENCOUNTER — VIRTUAL VISIT (OUTPATIENT)
Dept: PSYCHOLOGY | Facility: CLINIC | Age: 55
End: 2020-09-24
Payer: COMMERCIAL

## 2020-09-24 DIAGNOSIS — F41.9 ANXIETY DISORDER: Primary | ICD-10-CM

## 2020-09-24 PROCEDURE — 90834 PSYTX W PT 45 MINUTES: CPT | Mod: TEL | Performed by: PSYCHOLOGIST

## 2020-09-24 NOTE — PROGRESS NOTES
"  Progress Note    Patient Name: Ijeoma Avalos  Date: 9/24/2020     Service Type: Telephone Visit     The patient has been notified of the following:      \"We have found that certain health care needs can be provided without the need for a face to face visit.  This service lets us provide the care you need with a phone conversation.       I will have full access to your [place] medical record during this entire phone call.   I will be taking notes for your medical record.      Since this is like an office visit, we will bill your insurance company for this service.       There are potential benefits and risks of telephone visits (e.g. limits to patient confidentiality) that differ from in-person visits.?  Confidentiality still applies for telephone services, and nobody will record the visit.  It is important to be in a quiet, private space that is free of distractions (including cell phone or other devices) during the visit.??      If during the course of the call I believe a telephone visit is not appropriate, you will not be charged for this service\"     Consent has been obtained for this service by care team member: Yes     Session Start Time: 09:04    Session End Time: 09:56     Session Length: 37-52 min    Session #: 17 (this episode of care)    Attendees: Client attended alone     DATA      Progress Since Last Session (Related to Symptoms / Goals / Homework):   Symptoms: Anxiety--increased      Episode of Care Goals: Satisfactory progress - ACTION (Actively working towards change); Intervened by reinforcing change plan / affirming steps taken     Current / Ongoing Stressors and Concerns:   Global COVID-19 outbreak   Family stressors   Work stressors     Intervention:  Session was conducted via telephone at Client's request, as it allows her increased privacy. Client stated that her brother in [place] was hospitalized with COVID for 3 weeks. He was quite sick and Client experienced extreme worry. She " "reported poor sleep, decreased appetite, reduced concentration. This is resolving slowly as his condition improved and he has returned home. 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 Client endorsed continued sleep disruption--feeling exhausted but unable to slow her mind enough to fall asleep. This was the focus of our session today. Reviewed sleep hygiene, emphasizing that it will be difficult to go from busy activity all day to abruptly transitioning to sleep. Discussed a wind-down procedure for the evening, reducing activity and light exposure (particularly from screens). At least one hour before she intends to go to bed, encouraged Client to write down any lingering worries or loose ends from the day, so that she does not have to track these mentally. Then she can go \"off-duty\" and enjoy a leisure activity such as reading before bed. She will also direct her thoughts when she is in bed to a neutral topic, such as a gratitude list or creative imagining, steering her brain away from worries if it automatically gravitates in that direction. Client reported that she took an art class as a form of self-care. This was helpful for enlisting a different part of her brain, and she felt more soothed and calmed during the activity and afterwards. Talked about how she can continue to make time for this type of activity. Reviewed issues in the marital relationship.      ASSESSMENT: Current Emotional / Mental Status (status of significant symptoms):   Risk status (Self / Other harm or suicidal ideation)   Patient denies current fears or concerns for personal safety.   Patient denies current or recent suicidal ideation or behaviors.   Patient denies current or recent homicidal ideation or behaviors.   Patient denies current or recent self injurious behavior or ideation.   Patient denies other safety concerns.   Patient reports there has been no change in risk " factors since her last session.     Patient reports there has been no change in protective factors since her last session.     Recommended that patient call 911 or go to the local ED should there be a change in any of these risk factors.     Appearance:    Unable to assess   Psychomotor Behavior: Unable to assess   Eye Contact:   Unable to assess   Attitude:   Cooperative    Orientation:   All   Speech    Rate / Production: Normal     Volume:  Normal    Mood:    Anxious    Thought Content:  Frequent worries and catastrophizing   Thought Form:  Coherent  Logical    Insight:    Fair      Medication Compliance:   Yes; infrequent use of lorazepan     Changes in Health Issues:   None reported     Chemical Use Review:   Substance Use: Chemical use reviewed, no active concerns identified      Tobacco Use: No current tobacco use.      Diagnosis:  1. Anxiety disorder        PLAN: (Patient Tasks / Therapist Tasks / Other)  Client will focus on improving sleep, thinking of it as a destination to arrive at in a leisurely fashion rather than a switch to flip. She will practice a slower wind-down procedure in the evening, reducing activity and taking leisure time before going to sleep. She will write down any lingering worries or loose ends from the day, so that she does not have to track these mentally. She will then allow her mind to rest while she engages in a relaxing activity such as reading. As she is in bed and ready to fall asleep, she will practice directing her thoughts to a neutral topic, such as a gratitude list or creative imagining, steering her brain away from worries if it automatically gravitates in that direction. She will continue to prioritize time for creative activities such as painting. She will remember to give herself credit for the positive coping efforts that she is making in the face of multiple stressors. Return appointments scheduled; Client requested telephone visits due to increased opportunities  for privacy. She will contact me sooner if needed.       Rosa Chowdary, LP     _______________________________________________________________________     Treatment Plan     Client's Name: Ijeoma Avalos                 YOB: 1965     Date: 2/9/17     DSM-V Diagnoses: Adjustment Disorders  309.28 (F43.23) With mixed anxiety and depressed mood  Psychosocial / Contextual Factors: physical symptoms; phase of life stressors (full-time work and parent of young child); work demands; family overseas  WHODAS: 16     Referral / Collaboration:  Referral to another professional/service is not indicated at this time.     Anticipated number of session or this episode of care: will re-evaluate every 90 days        MeasurableTreatment Goal(s) related to diagnosis / functional impairment(s)  Goal 1: Client will build skills for stress management.    I will know I've met my goal when my blood pressure is lower and my reflux symptoms have decreased.       Objective #A (Client Action)                Client will use at least 2 coping skills for anxiety management in the next 6 weeks.  Status: Continued - Date(s):7/17/20      Intervention(s)  Therapist will teach self-regulation strategies, CBT skills, mindfulness techniques.     Objective #B  Client will use cognitive strategies identified in therapy to challenge anxious thoughts.  Status: Continued - Date(s):7/17/20      Intervention(s)  Therapist will teach cognitive strategies, provide education.        Goal 2: Client will increase self-confidence.    I will know I've met my goal when I think more positively about myself.       Objective #A (Client Action)                Status: Continued - Date(s):7/17/20      Client will increase assertive communication.     Intervention(s)  Therapist will teach assertiveness skills.     Objective #B  Client will Identify negative self-talk and behaviors: challenge core beliefs, myths, and actions.                  Status:  Continued - Date(s):7/17/20      Intervention(s)  Therapist will provide education, teach CBT skills.        Goal 3: Client will increase connection in close relationships.    I will know I've met my goal when I feel more close with loved ones.       Objective #A (Client Action)                Status: Continued - Date(s):7/17/20      Client will prioritize time for meaningful relationships.     Intervention(s)  Therapist will provide support and accountability.     Objective #B  Client will make use of effective interpersonal communication skills.                         Status: Continued - Date(s): 7/17/20      Intervention(s)  Therapist will teach communication skills.        Client has reviewed and agreed to the above plan.

## 2020-09-27 DIAGNOSIS — K21.9 GASTROESOPHAGEAL REFLUX DISEASE, ESOPHAGITIS PRESENCE NOT SPECIFIED: ICD-10-CM

## 2020-09-28 RX ORDER — OMEPRAZOLE 40 MG/1
CAPSULE, DELAYED RELEASE ORAL
Qty: 90 CAPSULE | Refills: 0 | Status: SHIPPED | OUTPATIENT
Start: 2020-09-28 | End: 2020-11-22

## 2020-09-28 NOTE — TELEPHONE ENCOUNTER
Prescription approved per [facility] Refill Protocol.    Janna LITTLE RN  EP Triage

## 2020-10-21 DIAGNOSIS — E11.9 TYPE 2 DIABETES MELLITUS WITHOUT COMPLICATION, WITHOUT LONG-TERM CURRENT USE OF INSULIN (H): ICD-10-CM

## 2020-10-21 RX ORDER — METFORMIN HCL 500 MG
TABLET, EXTENDED RELEASE 24 HR ORAL
Qty: 360 TABLET | Refills: 0 | Status: SHIPPED | OUTPATIENT
Start: 2020-10-21 | End: 2021-02-02

## 2020-11-05 ENCOUNTER — VIRTUAL VISIT (OUTPATIENT)
Dept: PSYCHOLOGY | Facility: CLINIC | Age: 55
End: 2020-11-05
Payer: COMMERCIAL

## 2020-11-05 DIAGNOSIS — F41.9 ANXIETY DISORDER, UNSPECIFIED TYPE: Primary | ICD-10-CM

## 2020-11-05 PROCEDURE — 90834 PSYTX W PT 45 MINUTES: CPT | Mod: TEL | Performed by: PSYCHOLOGIST

## 2020-11-05 NOTE — PROGRESS NOTES
"  Progress Note    Patient Name: Ijeoma Avalos  Date: 11/5/2020     Service Type: Telephone Visit     The patient has been notified of the following:      \"We have found that certain health care needs can be provided without the need for a face to face visit.  This service lets us provide the care you need with a phone conversation.       I will have full access to your [facility] medical record during this entire phone call.   I will be taking notes for your medical record.      Since this is like an office visit, we will bill your insurance company for this service.       There are potential benefits and risks of telephone visits (e.g. limits to patient confidentiality) that differ from in-person visits.?  Confidentiality still applies for telephone services, and nobody will record the visit.  It is important to be in a quiet, private space that is free of distractions (including cell phone or other devices) during the visit.??      If during the course of the call I believe a telephone visit is not appropriate, you will not be charged for this service\"     Consent has been obtained for this service by care team member: Yes     Session Start Time: 11:01    Session End Time: 11:47     Session Length: 37-52 min    Session #: 18 (this episode of care)    Attendees: Client attended alone     DATA      Progress Since Last Session (Related to Symptoms / Goals / Homework):   Symptoms: Some improvement      Episode of Care Goals: Satisfactory progress - ACTION (Actively working towards change); Intervened by reinforcing change plan / affirming steps taken     Current / Ongoing Stressors and Concerns:   Global COVID-19 outbreak   Family stressors   Work stressors     Intervention:  Session was conducted via telephone at Client's request, as it allows her increased privacy. Reviewed sleep habits over the past month. Client reported that she has been able to use and benefit from the strategy of writing down loose " ends/unfinished tasks before bed. She noted that this helps her to sort out her mind and feel more calm. She has also observed that it is beneficial for directing her efforts the following day at work. She reported that she has been able to sleep through the night more regularly over the past few weeks. Offered reinforcement for her good efforts. Discussed interpersonal issues. Client continues to work on not taking other people's moods and actions personally. Normalized that this is a skill that will be built over time, not all at once. Spent some time talking about Client's experience of the recent election (and politics more generally), including understandable anxiety related to the rise of hate groups in this [place]. 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 Validated this instinct toward action in response to anxiety. As she works to balance current stressors with coping, she has made some positive choices--meeting a friend this weekend to paint and socialize; participating in a writing challenge during this month; allowing herself to be in the moment with her young daughter.       ASSESSMENT: Current Emotional / Mental Status (status of significant symptoms):   Risk status (Self / Other harm or suicidal ideation)   Patient denies current fears or concerns for personal safety.   Patient denies current or recent suicidal ideation or behaviors.   Patient denies current or recent homicidal ideation or behaviors.   Patient denies current or recent self injurious behavior or ideation.   Patient denies other safety concerns.   Patient reports there has been no change in risk factors since her last session.     Patient reports there has been no change in protective factors since her last session.     Recommended that patient call 911 or go to the local ED should there  "be a change in any of these risk factors.     Appearance:    Unable to assess   Psychomotor Behavior: Unable to assess   Eye Contact:   Unable to assess   Attitude:   Engaged, forthcoming    Orientation:   All   Speech    Rate / Production: Normal     Volume:  Normal    Mood:    Anxious --mild, some improvement   Thought Content:  Better able to step back from automatic thoughts and consider alternative perspectives   Thought Form:  Coherent  Logical    Insight:    Fair      Medication Compliance:   Yes; infrequent use of lorazepan     Changes in Health Issues:   None reported     Chemical Use Review:   Substance Use: Chemical use reviewed, no active concerns identified      Tobacco Use: No current tobacco use.      Diagnosis:  1. Anxiety disorder, unspecified type        PLAN: (Patient Tasks / Therapist Tasks / Other)  Client will prioritize active coping to balance ongoing stressors. This includes creative activities, cultivating positive experiences and emotions by staying in the moment, and choosing calming self-talk. She will continue to practice not taking others' emotions and actions personally, \"allowing\" them to have their emotional experiences without feeling compelled to get involved. We will look for potential avenues for Client to take action toward supporting tolerance for racial/ethnic/Holiness/personal differences in [place]. Continue with strategies that have helped with sleep, including writing down any lingering worries or unfinished tasks from the day. As she is in bed and ready to fall asleep, she will practice directing her thoughts to a neutral topic, such as a gratitude list or creative imagining, steering her brain away from worries if it automatically gravitates in that direction. She will continue to prioritize time for creative activities such as painting. She will remember to give herself credit for the positive coping efforts that she is making in the face of multiple stressors. " Return appointments scheduled; Client requested telephone visits due to increased opportunities for privacy. She will contact me sooner if needed.       Rosa Chowdary, LP     _______________________________________________________________________     Treatment Plan     Client's Name: Ijeoma Avalos                 YOB: 1965     Date: 2/9/17     DSM-V Diagnoses: Adjustment Disorders  309.28 (F43.23) With mixed anxiety and depressed mood  Psychosocial / Contextual Factors: physical symptoms; phase of life stressors (full-time work and parent of young child); work demands; family overseas  WHODAS: 16     Referral / Collaboration:  Referral to another professional/service is not indicated at this time.     Anticipated number of session or this episode of care: will re-evaluate every 90 days        MeasurableTreatment Goal(s) related to diagnosis / functional impairment(s)  Goal 1: Client will build skills for stress management.    I will know I've met my goal when my blood pressure is lower and my reflux symptoms have decreased.       Objective #A (Client Action)                Client will use at least 2 coping skills for anxiety management in the next 6 weeks.  Status: Continued - Date(s):11/5/20      Intervention(s)  Therapist will teach self-regulation strategies, CBT skills, mindfulness techniques.     Objective #B  Client will use cognitive strategies identified in therapy to challenge anxious thoughts.  Status: Continued - Date(s):11/5/20      Intervention(s)  Therapist will teach cognitive strategies, provide education.        Goal 2: Client will increase self-confidence.    I will know I've met my goal when I think more positively about myself.       Objective #A (Client Action)                Status: Continued - Date(s)11/5/20      Client will increase assertive communication.     Intervention(s)  Therapist will teach assertiveness skills.     Objective #B  Client will Identify negative  self-talk and behaviors: challenge core beliefs, myths, and actions.                  Status: Continued - Date(s):11/5/20      Intervention(s)  Therapist will provide education, teach CBT skills.        Goal 3: Client will increase connection in close relationships.    I will know I've met my goal when I feel more close with loved ones.       Objective #A (Client Action)                Status: Continued - Date(s):11/5/20      Client will prioritize time for meaningful relationships.     Intervention(s)  Therapist will provide support and accountability.     Objective #B  Client will make use of effective interpersonal communication skills.                         Status: Continued - Date(s): 11/5/20      Intervention(s)  Therapist will teach communication skills.        Client has reviewed and agreed to the above plan.

## 2020-11-21 DIAGNOSIS — E11.9 NEW ONSET TYPE 2 DIABETES MELLITUS (H): ICD-10-CM

## 2020-11-21 DIAGNOSIS — K21.9 GASTROESOPHAGEAL REFLUX DISEASE: ICD-10-CM

## 2020-11-22 RX ORDER — BLOOD SUGAR DIAGNOSTIC
STRIP MISCELLANEOUS
Qty: 100 EACH | Refills: 0 | Status: SHIPPED | OUTPATIENT
Start: 2020-11-22 | End: 2021-05-04

## 2020-11-22 RX ORDER — OMEPRAZOLE 40 MG/1
CAPSULE, DELAYED RELEASE ORAL
Qty: 90 CAPSULE | Refills: 1 | Status: SHIPPED | OUTPATIENT
Start: 2020-11-22 | End: 2021-10-04

## 2020-12-07 DIAGNOSIS — E03.9 HYPOTHYROIDISM, UNSPECIFIED TYPE: ICD-10-CM

## 2020-12-08 RX ORDER — LEVOTHYROXINE SODIUM 88 UG/1
88 TABLET ORAL DAILY
Qty: 30 TABLET | Refills: 0 | Status: SHIPPED | OUTPATIENT
Start: 2020-12-08 | End: 2020-12-28

## 2020-12-09 NOTE — TELEPHONE ENCOUNTER
Small  refill faxed. Remind pt to do follow up for med check and labs, since she is due. Virtual/ Video visit ok

## 2020-12-14 ENCOUNTER — HEALTH MAINTENANCE LETTER (OUTPATIENT)
Age: 55
End: 2020-12-14

## 2020-12-14 ENCOUNTER — MYC MEDICAL ADVICE (OUTPATIENT)
Dept: FAMILY MEDICINE | Facility: CLINIC | Age: 55
End: 2020-12-14

## 2020-12-14 DIAGNOSIS — R73.03 PREDIABETES: Primary | ICD-10-CM

## 2020-12-15 ENCOUNTER — VIRTUAL VISIT (OUTPATIENT)
Dept: PSYCHOLOGY | Facility: CLINIC | Age: 55
End: 2020-12-15
Payer: COMMERCIAL

## 2020-12-15 DIAGNOSIS — F41.9 ANXIETY DISORDER, UNSPECIFIED TYPE: Primary | ICD-10-CM

## 2020-12-15 PROCEDURE — 90834 PSYTX W PT 45 MINUTES: CPT | Mod: TEL | Performed by: PSYCHOLOGIST

## 2020-12-15 NOTE — TELEPHONE ENCOUNTER
Please review my chart message and advise.  TSH is ordered in chart.  Ok for A1C?    Order pended.      Janna LITTLE RN  EP Triage

## 2020-12-15 NOTE — PROGRESS NOTES
"  Progress Note    Patient Name: Ijeoma Avalos  Date: 12/15/2020     Service Type: Telephone Visit     The patient has been notified of the following:      \"We have found that certain health care needs can be provided without the need for a face to face visit. This service lets us provide the care you need with a phone conversation.       I will have full access to your [facility] medical record during this entire phone call. I will be taking notes for your medical record.      Since this is like an office visit, we will bill your insurance company for this service.       There are potential benefits and risks of telephone visits (e.g. limits to patient confidentiality) that differ from in-person visits.?  Confidentiality still applies for telephone services, and nobody will record the visit.  It is important to be in a quiet, private space that is free of distractions (including cell phone or other devices) during the visit.??      If during the course of the call I believe a telephone visit is not appropriate, you will not be charged for this service\"     Consent has been obtained for this service by care team member: Yes     Session Start Time: 14:01    Session End Time: 14:47     Session Length: 37-52 min    Session #: 19 (this episode of care)    Attendees: Client attended alone     DATA      Progress Since Last Session (Related to Symptoms / Goals / Homework):   Symptoms: Stable      Episode of Care Goals: Satisfactory progress - ACTION (Actively working towards change); Intervened by reinforcing change plan / affirming steps taken     Current / Ongoing Stressors and Concerns:   Global COVID-19 outbreak   Family stressors   Work stressors     Intervention:  Session was conducted via telephone at Client's request, as it allows her increased privacy. Client stated that sleep continues to be much improved. She believes she is gaining some skill with managing work along with distance learning for her daughter, " and she reports feeling hopeful about the change that COVID vaccines will (eventually) bring. Continued work on interpersonal relationships and looked at the interpretations Client is sometimes making about other people's behavior. Noted how her emotional experience may change if she chooses a different interpretation. Client stated that she made the decision last month to bow out of a writing challenge she had started. She reported some ambivalence about this decision, didn't like the feeling of quitting. Examined the reasons behind her decision and reframed this as a valid decision to take care of herself. This is new territory for Client, which is part of the reason it feels somewhat uncomfortable.  Allowing for flexibility with how she spends her free time is consistent with how she would like to behave.      ASSESSMENT: Current Emotional / Mental Status (status of significant symptoms):   Risk status (Self / Other harm or suicidal ideation)   Patient denies current fears or concerns for personal safety.   Patient denies current or recent suicidal ideation or behaviors.   Patient denies current or recent homicidal ideation or behaviors.   Patient denies current or recent self injurious behavior or ideation.   Patient denies other safety concerns.   Patient reports there has been no change in risk factors since her last session.     Patient reports there has been no change in protective factors since her last session.     Recommended that patient call 911 or go to the local ED should there be a change in any of these risk factors.     Appearance:    Unable to assess   Psychomotor Behavior: Unable to assess   Eye Contact:   Unable to assess   Attitude:   Open    Orientation:   All   Speech    Rate / Production: Normal     Volume:  Normal    Mood:    Improved--more calm   Thought Content:  Increased flexibility noted   Thought Form:  Coherent  Logical    Insight:    Fair      Medication Compliance:   Yes;  "infrequent use of lorazepan     Changes in Health Issues:   None reported     Chemical Use Review:   Substance Use: Chemical use reviewed, no active concerns identified      Tobacco Use: No current tobacco use.      Diagnosis:  1. Anxiety disorder, unspecified type        PLAN: (Patient Tasks / Therapist Tasks / Other)  Client will work on choosing interpretations that are neutral rather than self-critical (e.g., he feels more calm when the house is clean, rather than why does he always have to do that? He probably thinks I'm not pulling my weight). Focus on her own standards and values to guide her behavior. Support Client in figuring her own wellbeing into decision making; changing course is a valid option. Continue with active coping including creative activities, cultivating positive experiences and emotions by staying in the moment, and choosing calming self-talk. She will continue to practice not taking others' emotions and actions personally, \"allowing\" them to have their emotional experiences without feeling compelled to get involved. She will remember to give herself credit for the positive coping efforts that she is making in the face of multiple stressors. Return appointments scheduled once/month; Client requested telephone visits due to increased opportunities for privacy. She will contact me sooner if needed.       Rosa Chowdary, KATHARINE     _______________________________________________________________________     Treatment Plan     Client's Name: Ijeoma Avalos                 YOB: 1965     Date: 2/9/17     DSM-V Diagnoses: Adjustment Disorders  309.28 (F43.23) With mixed anxiety and depressed mood  Psychosocial / Contextual Factors: physical symptoms; phase of life stressors (full-time work and parent of young child); work demands; family overseas  WHODAS: 16     Referral / Collaboration:  Referral to another professional/service is not indicated at this time.     Anticipated number of " session or this episode of care: will re-evaluate every 90 days        MeasurableTreatment Goal(s) related to diagnosis / functional impairment(s)  Goal 1: Client will build skills for stress management.    I will know I've met my goal when my blood pressure is lower and my reflux symptoms have decreased.       Objective #A (Client Action)                Client will use at least 2 coping skills for anxiety management in the next 6 weeks.  Status: Continued - Date(s):11/5/20      Intervention(s)  Therapist will teach self-regulation strategies, CBT skills, mindfulness techniques.     Objective #B  Client will use cognitive strategies identified in therapy to challenge anxious thoughts.  Status: Continued - Date(s):11/5/20      Intervention(s)  Therapist will teach cognitive strategies, provide education.        Goal 2: Client will increase self-confidence.    I will know I've met my goal when I think more positively about myself.       Objective #A (Client Action)                Status: Continued - Date(s)11/5/20      Client will increase assertive communication.     Intervention(s)  Therapist will teach assertiveness skills.     Objective #B  Client will Identify negative self-talk and behaviors: challenge core beliefs, myths, and actions.                  Status: Continued - Date(s):11/5/20      Intervention(s)  Therapist will provide education, teach CBT skills.        Goal 3: Client will increase connection in close relationships.    I will know I've met my goal when I feel more close with loved ones.       Objective #A (Client Action)                Status: Continued - Date(s):11/5/20      Client will prioritize time for meaningful relationships.     Intervention(s)  Therapist will provide support and accountability.     Objective #B  Client will make use of effective interpersonal communication skills.                         Status: Continued - Date(s): 11/5/20      Intervention(s)  Therapist will teach  communication skills.        Client has reviewed and agreed to the above plan.

## 2020-12-22 DIAGNOSIS — R73.03 PREDIABETES: ICD-10-CM

## 2020-12-22 DIAGNOSIS — E03.9 HYPOTHYROIDISM, UNSPECIFIED TYPE: ICD-10-CM

## 2020-12-22 LAB — HBA1C MFR BLD: 6.1 % (ref 0–5.6)

## 2020-12-22 PROCEDURE — 36415 COLL VENOUS BLD VENIPUNCTURE: CPT | Performed by: INTERNAL MEDICINE

## 2020-12-22 PROCEDURE — 84443 ASSAY THYROID STIM HORMONE: CPT | Performed by: INTERNAL MEDICINE

## 2020-12-22 PROCEDURE — 83036 HEMOGLOBIN GLYCOSYLATED A1C: CPT | Performed by: INTERNAL MEDICINE

## 2020-12-23 LAB — TSH SERPL DL<=0.005 MIU/L-ACNC: 0.62 MU/L (ref 0.4–4)

## 2020-12-28 ENCOUNTER — MYC MEDICAL ADVICE (OUTPATIENT)
Dept: FAMILY MEDICINE | Facility: CLINIC | Age: 55
End: 2020-12-28

## 2020-12-28 DIAGNOSIS — E03.9 HYPOTHYROIDISM, UNSPECIFIED TYPE: ICD-10-CM

## 2020-12-28 RX ORDER — LEVOTHYROXINE SODIUM 88 UG/1
88 TABLET ORAL DAILY
Qty: 90 TABLET | Refills: 1 | Status: SHIPPED | OUTPATIENT
Start: 2020-12-28 | End: 2021-07-28

## 2020-12-28 NOTE — TELEPHONE ENCOUNTER
Please see [employer] message and advise.      Thank you,  Isa MABRYRN BSN  [facility]  [phone]

## 2021-01-26 ENCOUNTER — VIRTUAL VISIT (OUTPATIENT)
Dept: PSYCHOLOGY | Facility: CLINIC | Age: 56
End: 2021-01-26
Payer: COMMERCIAL

## 2021-01-26 DIAGNOSIS — F41.9 ANXIETY DISORDER, UNSPECIFIED TYPE: Primary | ICD-10-CM

## 2021-01-26 PROCEDURE — 90834 PSYTX W PT 45 MINUTES: CPT | Mod: TEL | Performed by: PSYCHOLOGIST

## 2021-01-26 NOTE — PROGRESS NOTES
"  Progress Note    Patient Name: Ijeoma Avalos  Date: 1/26/21     Service Type: Telephone Visit     The patient has been notified of the following:      \"We have found that certain health care needs can be provided without the need for a face to face visit. This service lets us provide the care you need with a phone conversation.       I will have full access to your [facility] medical record during this entire phone call. I will be taking notes for your medical record.      Since this is like an office visit, we will bill your insurance company for this service.       There are potential benefits and risks of telephone visits (e.g. limits to patient confidentiality) that differ from in-person visits.?  Confidentiality still applies for telephone services, and nobody will record the visit.  It is important to be in a quiet, private space that is free of distractions (including cell phone or other devices) during the visit.??      If during the course of the call I believe a telephone visit is not appropriate, you will not be charged for this service\"     Consent has been obtained for this service by care team member: Yes     Session Start Time: 14:03    Session End Time: 14:47     Session Length: 37-52 min    Session #: 19 (this episode of care)    Attendees: Client attended alone     DATA      Progress Since Last Session (Related to Symptoms / Goals / Homework):   Symptoms: Stable      Episode of Care Goals: Satisfactory progress - ACTION (Actively working towards change); Intervened by reinforcing change plan / affirming steps taken     Current / Ongoing Stressors and Concerns:   Global COVID-19 outbreak   Family stressors   Work stressors     Intervention:  Session was conducted via telephone at Client's request, as it allows her increased privacy. Client reported some mild general  malaise due to monotony with work, winter, and the pandemic. Overall, though, symptoms remain relatively manageable. Discussed " "how Client can maintain her own equilibrium when her daughter is upset or frustrated and emotions are escalating. Identified Client's only goal in those moments--manage her own emotions, using deep breathing, calming self-talk, and reminding herself that her emotional control needs to be better than her daughter's. Suggested that Client consider taking time during her work day for a short break, a breather from \"the grind.\" She was open to this idea.       ASSESSMENT: Current Emotional / Mental Status (status of significant symptoms):   Risk status (Self / Other harm or suicidal ideation)   Patient denies current fears or concerns for personal safety.   Patient denies current or recent suicidal ideation or behaviors.   Patient denies current or recent homicidal ideation or behaviors.   Patient denies current or recent self injurious behavior or ideation.   Patient denies other safety concerns.   Patient reports there has been no change in risk factors since her last session.     Patient reports there has been no change in protective factors since her last session.     Recommended that patient call 911 or go to the local ED should there be a change in any of these risk factors.     Appearance:    Unable to assess   Psychomotor Behavior: Unable to assess   Eye Contact:   Unable to assess   Attitude:   Cooperative    Orientation:   All   Speech    Rate / Production: Normal     Volume:  Normal    Mood:    Stable; some boredom, ennui    Affect:     Unable to assess   Thought Content:  Clear    Thought Form:  Coherent  Logical    Insight:    Fair      Medication Compliance:   Yes; infrequent use of lorazepan     Changes in Health Issues:   None reported     Chemical Use Review:   Substance Use: Chemical use reviewed, no active concerns identified      Tobacco Use: No current tobacco use.      Diagnosis:  1. Anxiety disorder, unspecified type        PLAN: (Patient Tasks / Therapist Tasks / Other)  Client will schedule " "10-15 minutes in the afternoon to step away from her desk and relax (e.g., read/listen to a book for pleasure, breathe & meditate, etc.).  This is her time to refresh briefly and replenish. When her daughter is experiencing intense emotions, Client will focus on maintaining her own emotional equilibrium--using slow breathing and calming self-talk (I can be at my best when she is most challenging; it is necessary and appropriate for me to demonstrate better emotional control than her). She will continue to practice not taking others' emotions and actions personally, \"allowing\" them to have their emotional experiences without feeling compelled to get involved. Return appointments scheduled once/month; Client requested telephone visits due to increased opportunities for privacy. She will contact me sooner if needed.       Rosa Chowdary, LP     _______________________________________________________________________     Treatment Plan     Client's Name: Ijeoma Avalos                 YOB: 1965     Date: 2/9/17     DSM-V Diagnoses: Adjustment Disorders  309.28 (F43.23) With mixed anxiety and depressed mood  Psychosocial / Contextual Factors: physical symptoms; phase of life stressors (full-time work and parent of young child); work demands; family overseas  WHODAS: 16     Referral / Collaboration:  Referral to another professional/service is not indicated at this time.     Anticipated number of session or this episode of care: will re-evaluate every 90 days        MeasurableTreatment Goal(s) related to diagnosis / functional impairment(s)  Goal 1: Client will build skills for stress management.    I will know I've met my goal when my blood pressure is lower and my reflux symptoms have decreased.       Objective #A (Client Action)                Client will use at least 2 coping skills for anxiety management in the next 6 weeks.  Status: Continued - Date(s):11/5/20      Intervention(s)  Therapist will " teach self-regulation strategies, CBT skills, mindfulness techniques.     Objective #B  Client will use cognitive strategies identified in therapy to challenge anxious thoughts.  Status: Continued - Date(s):11/5/20      Intervention(s)  Therapist will teach cognitive strategies, provide education.        Goal 2: Client will increase self-confidence.    I will know I've met my goal when I think more positively about myself.       Objective #A (Client Action)                Status: Continued - Date(s)11/5/20      Client will increase assertive communication.     Intervention(s)  Therapist will teach assertiveness skills.     Objective #B  Client will Identify negative self-talk and behaviors: challenge core beliefs, myths, and actions.                  Status: Continued - Date(s):11/5/20      Intervention(s)  Therapist will provide education, teach CBT skills.        Goal 3: Client will increase connection in close relationships.    I will know I've met my goal when I feel more close with loved ones.       Objective #A (Client Action)                Status: Continued - Date(s):11/5/20      Client will prioritize time for meaningful relationships.     Intervention(s)  Therapist will provide support and accountability.     Objective #B  Client will make use of effective interpersonal communication skills.                         Status: Continued - Date(s): 11/5/20      Intervention(s)  Therapist will teach communication skills.        Client has reviewed and agreed to the above plan.

## 2021-02-01 DIAGNOSIS — E11.9 TYPE 2 DIABETES MELLITUS WITHOUT COMPLICATION, WITHOUT LONG-TERM CURRENT USE OF INSULIN (H): ICD-10-CM

## 2021-02-02 RX ORDER — METFORMIN HCL 500 MG
TABLET, EXTENDED RELEASE 24 HR ORAL
Qty: 360 TABLET | Refills: 0 | Status: SHIPPED | OUTPATIENT
Start: 2021-02-02 | End: 2021-04-28

## 2021-02-02 NOTE — TELEPHONE ENCOUNTER
Prescription approved per [facility] Refill Protocol.    Janna LITTLE RN  EP Triage

## 2021-02-23 ENCOUNTER — VIRTUAL VISIT (OUTPATIENT)
Dept: PSYCHOLOGY | Facility: CLINIC | Age: 56
End: 2021-02-23
Payer: COMMERCIAL

## 2021-02-23 DIAGNOSIS — F41.9 ANXIETY DISORDER, UNSPECIFIED TYPE: Primary | ICD-10-CM

## 2021-02-23 PROCEDURE — 90834 PSYTX W PT 45 MINUTES: CPT | Mod: TEL | Performed by: PSYCHOLOGIST

## 2021-02-23 NOTE — PROGRESS NOTES
"  Progress Note    Patient Name: Ijeoma Avalos  Date: 2/23/21     Service Type: Telephone Visit     The patient has been notified of the following:      \"We have found that certain health care needs can be provided without the need for a face to face visit. This service lets us provide the care you need with a phone conversation.       I will have full access to your [facility] medical record during this entire phone call. I will be taking notes for your medical record.      Since this is like an office visit, we will bill your insurance company for this service.       There are potential benefits and risks of telephone visits (e.g. limits to patient confidentiality) that differ from in-person visits.?  Confidentiality still applies for telephone services, and nobody will record the visit.  It is important to be in a quiet, private space that is free of distractions (including cell phone or other devices) during the visit.??      If during the course of the call I believe a telephone visit is not appropriate, you will not be charged for this service\"     Consent has been obtained for this service by care team member: Yes     Session Start Time: 14:04    Session End Time: 14:49     Session Length: 37-52 min    Session #: 20 (this episode of care)    Attendees: Client attended alone     DATA      Progress Since Last Session (Related to Symptoms / Goals / Homework):   Symptoms: Fatigue (emotional)      Episode of Care Goals: Satisfactory progress - ACTION (Actively working towards change); Intervened by reinforcing change plan / affirming steps taken     Current / Ongoing Stressors and Concerns:   Global COVID-19 outbreak   Family stressors   Work stressors     Intervention:  Session was conducted via telephone at Client's request, as it allows her increased privacy. Client reported some personalization distortions seem to be resurfacing. She also notes that she is feeling emotionally fatigued and acknowledges that " "this is due partly to working hard to effect change in relationships, and the change is slow-going. Talked about staying focused on her \"side of the fence,\" with less energy devoted to what others might be doing or how to make them change. This resonated with Client. She reported that she has done well with taking some brief time during the workday for a break, and this has been helpful. She has also started doing yoga 3 days/week with 2 close friends from home, with benefit noted.       ASSESSMENT: Current Emotional / Mental Status (status of significant symptoms):   Risk status (Self / Other harm or suicidal ideation)   Patient denies current fears or concerns for personal safety.   Patient denies current or recent suicidal ideation or behaviors.   Patient denies current or recent homicidal ideation or behaviors.   Patient denies current or recent self injurious behavior or ideation.   Patient denies other safety concerns.   Patient reports there has been no change in risk factors since her last session.     Patient reports there has been no change in protective factors since her last session.     Recommended that patient call 911 or go to the local ED should there be a change in any of these risk factors.     Appearance:    Unable to assess   Psychomotor Behavior: Unable to assess   Eye Contact:   Unable to assess   Attitude:   Engaged    Orientation:   All   Speech    Rate / Production: Normal     Volume:  Normal    Mood:    Fatigued, mild depression    Affect:     Unable to assess   Thought Content:  Some personalization distortions   Thought Form:  Coherent  Logical    Insight:    Fair      Medication Compliance:   Yes; infrequent use of lorazepan     Changes in Health Issues:   None reported     Chemical Use Review:   Substance Use: Chemical use reviewed, no active concerns identified      Tobacco Use: No current tobacco use.      Diagnosis:  1. Anxiety disorder, unspecified type        PLAN: (Patient Tasks / " "Therapist Tasks / Other)  Client will work on adding to the \"coping side\" of the stress equation. Practicing yoga and taking a brief relaxation break during the workday are 2 ways she will work toward this goal. Encouraged Client to stay focused on her \"side of the fence\" and actions that are within her control rather than expending energy trying to elicit change from others. Return appointments scheduled once/month; Client requested telephone visits due to increased opportunities for privacy. She will contact me sooner if needed.       Rosa Chowdary, LP     _______________________________________________________________________     Treatment Plan     Client's Name: Ijeoma Avalos                 YOB: 1965     Date: 2/9/17     DSM-V Diagnoses: Adjustment Disorders  309.28 (F43.23) With mixed anxiety and depressed mood  Psychosocial / Contextual Factors: physical symptoms; phase of life stressors (full-time work and parent of young child); work demands; family overseas  WHODAS: 16     Referral / Collaboration:  Referral to another professional/service is not indicated at this time.     Anticipated number of session or this episode of care: will re-evaluate every 90 days        MeasurableTreatment Goal(s) related to diagnosis / functional impairment(s)  Goal 1: Client will build skills for stress management.    I will know I've met my goal when my blood pressure is lower and my reflux symptoms have decreased.       Objective #A (Client Action)                Client will use at least 2 coping skills for anxiety management in the next 6 weeks.  Status: Continued - Date(s):11/5/20      Intervention(s)  Therapist will teach self-regulation strategies, CBT skills, mindfulness techniques.     Objective #B  Client will use cognitive strategies identified in therapy to challenge anxious thoughts.  Status: Continued - Date(s):11/5/20      Intervention(s)  Therapist will teach cognitive strategies, provide " education.        Goal 2: Client will increase self-confidence.    I will know I've met my goal when I think more positively about myself.       Objective #A (Client Action)                Status: Continued - Date(s)11/5/20      Client will increase assertive communication.     Intervention(s)  Therapist will teach assertiveness skills.     Objective #B  Client will Identify negative self-talk and behaviors: challenge core beliefs, myths, and actions.                  Status: Continued - Date(s):11/5/20      Intervention(s)  Therapist will provide education, teach CBT skills.        Goal 3: Client will increase connection in close relationships.    I will know I've met my goal when I feel more close with loved ones.       Objective #A (Client Action)                Status: Continued - Date(s):11/5/20      Client will prioritize time for meaningful relationships.     Intervention(s)  Therapist will provide support and accountability.     Objective #B  Client will make use of effective interpersonal communication skills.                         Status: Continued - Date(s): 11/5/20      Intervention(s)  Therapist will teach communication skills.        Client has reviewed and agreed to the above plan.

## 2021-02-27 ENCOUNTER — HEALTH MAINTENANCE LETTER (OUTPATIENT)
Age: 56
End: 2021-02-27

## 2021-03-01 ENCOUNTER — TRANSFERRED RECORDS (OUTPATIENT)
Dept: MULTI SPECIALTY CLINIC | Facility: CLINIC | Age: 56
End: 2021-03-01

## 2021-03-01 LAB — RETINOPATHY: NORMAL

## 2021-03-10 ENCOUNTER — OFFICE VISIT (OUTPATIENT)
Dept: FAMILY MEDICINE | Facility: CLINIC | Age: 56
End: 2021-03-10
Payer: COMMERCIAL

## 2021-03-10 VITALS
TEMPERATURE: 98.4 F | HEART RATE: 82 BPM | SYSTOLIC BLOOD PRESSURE: 122 MMHG | WEIGHT: 130 LBS | BODY MASS INDEX: 25.52 KG/M2 | DIASTOLIC BLOOD PRESSURE: 82 MMHG | OXYGEN SATURATION: 100 % | HEIGHT: 60 IN

## 2021-03-10 DIAGNOSIS — M25.562 LEFT KNEE PAIN, UNSPECIFIED CHRONICITY: Primary | ICD-10-CM

## 2021-03-10 DIAGNOSIS — E78.5 HYPERLIPIDEMIA LDL GOAL <130: ICD-10-CM

## 2021-03-10 DIAGNOSIS — M79.605 PAIN OF LEFT LOWER EXTREMITY: ICD-10-CM

## 2021-03-10 PROCEDURE — 99213 OFFICE O/P EST LOW 20 MIN: CPT | Performed by: FAMILY MEDICINE

## 2021-03-10 RX ORDER — ATORVASTATIN CALCIUM 10 MG/1
10 TABLET, FILM COATED ORAL DAILY
Qty: 90 TABLET | Refills: 3 | Status: SHIPPED | OUTPATIENT
Start: 2021-03-10 | End: 2022-04-28

## 2021-03-10 ASSESSMENT — MIFFLIN-ST. JEOR: SCORE: 1116.18

## 2021-03-10 NOTE — PROGRESS NOTES
Assessment & Plan       (M25.562) Left knee pain, unspecified chronicity  Comment:  Plan: diclofenac (VOLTAREN) 1 % topical gel      (M79.605) Pain of left lower extremity  Comment: left gluteal area mostly   Plan: feels like musculoskeletal strain             Knee really sounds like patellofemoral strain . Has ongoing issue with that on and off in the past and with recent aggravation , likely related to some lower extremity exercises that she has been doing. Also has some gluteal strain. feels like musculoskeletal strain     discussed knee  cares and symptomatic treatment including  adequate pain control, rest, heat, stretches etc.  Gave refill on Voltaren as it has worked well for her in past as she does not take OTC NSAID and tylenol not helping enough. Willing to consider going back to PT if needed   she will do follow up if no improvement or problem. Consider further evaluation and  physical therapy if needed.     (E78.5) Hyperlipidemia LDL goal <130  Comment: she had run out and decided to just stop taking it. Willing to go back on it. Refill sent   Plan: atorvastatin (LIPITOR) 10 MG tablet          Check labs. refill sent.Cares and  treatment discussed. follow up if problem   Patient expressed understanding and agreement with treatment plan. All patient's questions were answered, will let me know if has more later.  Medications: Rx's: Reviewed the potential side effects/complications of medications prescribed.        BMI:   Estimated body mass index is 25.39 kg/m  as calculated from the following:    Height as of this encounter: 1.524 m (5').    Weight as of this encounter: 59 kg (130 lb).   Weight management plan: Discussed healthy diet and exercise guidelines      Dulce Hernandze MD  [facility] LAZARUS Raphael is a 55 year old who presents for the following health issues    HPI       Musculoskeletal problem/pain  Onset/Duration: a few months, no recall of injuries  .   Description  Location: leg and knee - left  Joint Swelling: no  Redness: no  Pain: YES- radiates from hip to knee  Warmth: no  Intensity:  moderate  Progression of Symptoms:  worsening  Accompanying signs and symptoms:  Has had  issue with knees previously and had seen ortho as well and she was doing exercises etc and she was better.   has been  doing yoga etc and not sure  if that could have been straining . knee hurts to kneel on it and certain  motions of knee bother her more.  Fevers: no  Numbness/tingling/weakness: no  History  Trauma to the area: no  Recent illness:  no  Previous similar problem: no  Previous evaluation:  YES- left knee but not hip  Precipitating or alleviating factors:  Aggravating factors include: climbing stairs, going up and down the stairs is worse walking flat surface in not bad   Therapies tried and outcome: ice and exercises, tylenol does not help         Review of Systems   CONSTITUTIONAL: NEGATIVE for fever, chills, change in weight  RESP: NEGATIVE for significant cough or SOB  CV: NEGATIVE for chest pain, palpitations or peripheral edema  GI: NEGATIVE for nausea, abdominal pain, heartburn, or change in bowel habits  : NEGATIVE for frequency, dysuria, or hematuria  MUSCULOSKELETAL:NEGATIVE for significant  myalgia, POSITIVE  for knee pain  NEURO: NEGATIVE for weakness, dizziness or paresthesias  ENDOCRINE: POSITIVE  for  Hx diabetes but doing well on current med's . No numbness in feet etc       Objective    /82   Pulse 82   Temp 98.4  F (36.9  C) (Tympanic)   Ht 1.524 m (5')   Wt 59 kg (130 lb)   LMP 04/14/2017   SpO2 100%   BMI 25.39 kg/m    Body mass index is 25.39 kg/m .  Physical Exam   GENERAL: healthy, alert and no distress  RESP: lungs clear to auscultation - no rales, rhonchi or wheezes  CV: regular rate and rhythm, normal S1 S2, no S3 or S4,   ABDOMEN: soft, nontender, no hepatosplenomegaly, no masses and bowel sounds normal  MS:  Hip and knee with normal  range of motion, no leg edema and tenderness to palpation mostly along the medial edge of patella . Back is also with good ROM but she has small tender spot along left gluteal area as well and she thinks that is also feels sore sometimes   SKIN: no suspicious lesions or rashes  NEURO: Normal strength and tone, mentation intact and speech normal  PSYCH: mentation appears normal, affect normal

## 2021-03-10 NOTE — PATIENT INSTRUCTIONS
Check labs  Use  medications as directed.  Treatment  and symptomatic cares discussed   Follow up if problem or concern

## 2021-03-23 ENCOUNTER — VIRTUAL VISIT (OUTPATIENT)
Dept: PSYCHOLOGY | Facility: CLINIC | Age: 56
End: 2021-03-23
Payer: COMMERCIAL

## 2021-03-23 DIAGNOSIS — F41.9 ANXIETY DISORDER, UNSPECIFIED TYPE: Primary | ICD-10-CM

## 2021-03-23 PROCEDURE — 90834 PSYTX W PT 45 MINUTES: CPT | Mod: TEL | Performed by: PSYCHOLOGIST

## 2021-03-23 NOTE — PROGRESS NOTES
"  Progress Note    Patient Name: Ijeoma Avalos  Date: 3/23/21     Service Type: Telephone Visit     The patient has been notified of the following:      \"We have found that certain health care needs can be provided without the need for a face to face visit. This service lets us provide the care you need with a phone conversation.       I will have full access to your [facility] medical record during this entire phone call. I will be taking notes for your medical record.      Since this is like an office visit, we will bill your insurance company for this service.       There are potential benefits and risks of telephone visits (e.g. limits to patient confidentiality) that differ from in-person visits.?  Confidentiality still applies for telephone services, and nobody will record the visit.  It is important to be in a quiet, private space that is free of distractions (including cell phone or other devices) during the visit.??      If during the course of the call I believe a telephone visit is not appropriate, you will not be charged for this service\"     Consent has been obtained for this service by care team member: Yes     Session Start Time: 14:03    Session End Time: 14:50     Session Length: 37-52 min    Session #: 21 (this episode of care)    Attendees: Client attended alone     DATA      Progress Since Last Session (Related to Symptoms / Goals / Homework):   Symptoms: Some improvement      Episode of Care Goals: Satisfactory progress - ACTION (Actively working towards change); Intervened by reinforcing change plan / affirming steps taken     Current / Ongoing Stressors and Concerns:   Global COVID-19 outbreak   Family stressors   Work stressors     Intervention:  Session was conducted via telephone at Client's request, as it allows her increased privacy. Client reported progress in communication/understanding with her , after a period of increased tension. They were able to clear up some " misunderstandings held by each, and Client described relief related to her new perspective that their struggles were not her fault. Offered reinforcement for her efforts toward direct and honest communication. Client shared that time management has been more problematic over the past month. She identifies that overcommitment is part of the problem. 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 Client demonstrated flexibility of thought in approaching this dilemma.       ASSESSMENT: Current Emotional / Mental Status (status of significant symptoms):   Risk status (Self / Other harm or suicidal ideation)   Patient denies current fears or concerns for personal safety.   Patient denies current or recent suicidal ideation or behaviors.   Patient denies current or recent homicidal ideation or behaviors.   Patient denies current or recent self injurious behavior or ideation.   Patient denies other safety concerns.   Patient reports there has been no change in risk factors since her last session.     Patient reports there has been no change in protective factors since her last session.     Recommended that patient call 911 or go to the local ED should there be a change in any of these risk factors.     Appearance:    Unable to assess   Psychomotor Behavior: Unable to assess   Eye Contact:   Unable to assess   Attitude:   Open    Orientation:   All   Speech    Rate / Production: Normal     Volume:  Normal    Mood:    Some improvement--reduced anxiety    Affect:     Unable to assess   Thought Content:  Cognitions around time pressure   Thought Form:  Coherent  Logical    Insight:    Fair      Medication Compliance:   Yes; infrequent use of lorazepan     Changes in Health Issues:   None reported     Chemical Use Review:   Substance Use: Chemical  use reviewed, no active concerns identified      Tobacco Use: No current tobacco use.      Diagnosis:  1. Anxiety disorder, unspecified type        PLAN: (Patient Tasks / Therapist Tasks / Other)  Client will designate one evening/week for volunteer activity and reclaim her other evenings to help her feel more balanced. She will look forward to a time when she is able to  more volunteer work again, but for now, other demands are taking precedence. This is practice against all-or-nothing thinking/behaviors. Continue with direct and honest communication with her . She continues with yoga/social interaction with 2 close friends a few times/week and with tending plants for intentional relaxation. Return appointments scheduled once/month; Client requested telephone visits due to increased opportunities for privacy. She will contact me sooner if needed.       Rosa Chowdary, KATHARINE     _______________________________________________________________________     Treatment Plan     Client's Name: Ijeoma Avalos                 YOB: 1965     Date: 2/9/17     DSM-V Diagnoses: Adjustment Disorders  309.28 (F43.23) With mixed anxiety and depressed mood  Psychosocial / Contextual Factors: physical symptoms; phase of life stressors (full-time work and parent of young child); work demands; family overseas  WHODAS: 18     Referral / Collaboration:  Referral to another professional/service is not indicated at this time.     Anticipated number of session or this episode of care: will re-evaluate every 90 days        MeasurableTreatment Goal(s) related to diagnosis / functional impairment(s)  Goal 1: Client will build skills for stress management.    I will know I've met my goal when my blood pressure is lower and my reflux symptoms have decreased.       Objective #A (Client Action)                Client will use at least 2 coping skills for anxiety management in the next 6 weeks.  Status: Continued -  Date(s):3/23/21      Intervention(s)  Therapist will teach self-regulation strategies, CBT skills, mindfulness techniques.     Objective #B  Client will use cognitive strategies identified in therapy to challenge anxious thoughts.  Status: Continued - Date(s):3/23/21      Intervention(s)  Therapist will teach cognitive strategies, provide education.        Goal 2: Client will increase self-confidence.    I will know I've met my goal when I think more positively about myself.       Objective #A (Client Action)                Status: Continued - Date(s)3/23/21      Client will increase assertive communication.     Intervention(s)  Therapist will teach assertiveness skills.     Objective #B  Client will Identify negative self-talk and behaviors: challenge core beliefs, myths, and actions.                  Status: Continued - Date(s):3/23/21      Intervention(s)  Therapist will provide education, teach CBT skills.        Goal 3: Client will increase connection in close relationships.    I will know I've met my goal when I feel more close with loved ones.       Objective #A (Client Action)                Status: Continued - Date(s):3/23/21      Client will prioritize time for meaningful relationships.     Intervention(s)  Therapist will provide support and accountability.     Objective #B  Client will make use of effective interpersonal communication skills.                         Status: Continued - Date(s): 3/23/21      Intervention(s)  Therapist will teach communication skills.        Client has reviewed and agreed to the above plan.

## 2021-04-20 ENCOUNTER — VIRTUAL VISIT (OUTPATIENT)
Dept: PSYCHOLOGY | Facility: CLINIC | Age: 56
End: 2021-04-20
Payer: COMMERCIAL

## 2021-04-20 DIAGNOSIS — F41.9 ANXIETY DISORDER, UNSPECIFIED TYPE: Primary | ICD-10-CM

## 2021-04-20 PROCEDURE — 90834 PSYTX W PT 45 MINUTES: CPT | Mod: 95 | Performed by: PSYCHOLOGIST
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
## 2021-04-28 DIAGNOSIS — E11.9 TYPE 2 DIABETES MELLITUS WITHOUT COMPLICATION, WITHOUT LONG-TERM CURRENT USE OF INSULIN (H): ICD-10-CM

## 2021-04-28 RX ORDER — METFORMIN HCL 500 MG
TABLET, EXTENDED RELEASE 24 HR ORAL
Qty: 360 TABLET | Refills: 1 | Status: SHIPPED | OUTPATIENT
Start: 2021-04-28 | End: 2021-10-25

## 2021-05-03 DIAGNOSIS — E11.9 NEW ONSET TYPE 2 DIABETES MELLITUS (H): ICD-10-CM

## 2021-05-04 RX ORDER — BLOOD SUGAR DIAGNOSTIC
STRIP MISCELLANEOUS
Qty: 200 STRIP | Refills: 1 | Status: SHIPPED | OUTPATIENT
Start: 2021-05-04 | End: 2022-05-27

## 2021-05-04 NOTE — TELEPHONE ENCOUNTER
Prescription approved per [facility] Refill Protocol.  Neha Quinteros RN

## 2021-05-14 ENCOUNTER — MYC MEDICAL ADVICE (OUTPATIENT)
Dept: FAMILY MEDICINE | Facility: CLINIC | Age: 56
End: 2021-05-14

## 2021-05-14 DIAGNOSIS — E11.9 NEW ONSET TYPE 2 DIABETES MELLITUS (H): Primary | ICD-10-CM

## 2021-05-14 DIAGNOSIS — E03.8 SUBCLINICAL HYPOTHYROIDISM: ICD-10-CM

## 2021-05-14 NOTE — TELEPHONE ENCOUNTER
Please see [employer] message and advise.     Future order for TSH but no Lipid panel.      Neha Quinteros RN

## 2021-05-18 ENCOUNTER — VIRTUAL VISIT (OUTPATIENT)
Dept: PSYCHOLOGY | Facility: CLINIC | Age: 56
End: 2021-05-18
Payer: COMMERCIAL

## 2021-05-18 DIAGNOSIS — F41.9 ANXIETY DISORDER, UNSPECIFIED TYPE: Primary | ICD-10-CM

## 2021-05-18 PROCEDURE — 90834 PSYTX W PT 45 MINUTES: CPT | Mod: TEL | Performed by: PSYCHOLOGIST

## 2021-05-18 NOTE — PROGRESS NOTES
"  Progress Note    Patient Name: Ijeoma Avalos  Date: 5/18/21     Service Type: Telephone Visit     The patient has been notified of the following:      \"We have found that certain health care needs can be provided without the need for a face to face visit. This service lets us provide the care you need with a phone conversation.       I will have full access to your [facility] medical record during this entire phone call. I will be taking notes for your medical record.      Since this is like an office visit, we will bill your insurance company for this service.       There are potential benefits and risks of telephone visits (e.g. limits to patient confidentiality) that differ from in-person visits.?  Confidentiality still applies for telephone services, and nobody will record the visit.  It is important to be in a quiet, private space that is free of distractions (including cell phone or other devices) during the visit.??      If during the course of the call I believe a telephone visit is not appropriate, you will not be charged for this service\"     Consent has been obtained for this service by care team member: Yes     Session Start Time: 14:01    Session End Time: 14:49     Session Length: 37-52 min    Session #: 23 (this episode of care)    Attendees: Client attended alone     DATA      Progress Since Last Session (Related to Symptoms / Goals / Homework):   Symptoms: Increased anxiety      Episode of Care Goals: Satisfactory progress - ACTION (Actively working towards change); Intervened by reinforcing change plan / affirming steps taken     Current / Ongoing Stressors and Concerns:   Global COVID-19 outbreak   Family stressors   Work stressors     Intervention:  Session was conducted via telephone at Client's request, as it allows her increased privacy. Client reported increased anxiety/burn out related to the unrelenting death toll of COVID in [place], including an ever-increasing number of acquaintances " and people she knows. Discussed the challenges of being far away and still so affected by a situation that is fully beyond her control. Reivewed coping including: getting out of her mind and into her body (e.g., gardening, walking), doing small acts of positivity that are within her control, and allowing time for her mind to be engaged in activities of interest (e.g., reading, making jewelry, playing with her daughter). She reported interest in expanding her [place] of friends/support locally as conditions continue to open up in the coming months. Client reported some benefit from using the plan for increasing structure around her time on the weekend.        ASSESSMENT: Current Emotional / Mental Status (status of significant symptoms):   Risk status (Self / Other harm or suicidal ideation)   Patient denies current fears or concerns for personal safety.   Patient denies current or recent suicidal ideation or behaviors.   Patient denies current or recent homicidal ideation or behaviors.   Patient denies current or recent self injurious behavior or ideation.   Patient denies other safety concerns.   Patient reports there has been no change in risk factors since her last session.     Patient reports there has been no change in protective factors since her last session.     Recommended that patient call 911 or go to the local ED should there be a change in any of these risk factors.     Appearance:    Unable to assess   Psychomotor Behavior: Unable to assess   Eye Contact:   Unable to assess   Attitude:   Cooperative    Orientation:   All   Speech    Rate / Production: Normal     Volume:  Normal    Mood:    Anxious, stressed    Affect:     Unable to assess   Thought Content:  Periods of rumination; better able to notice negative automatic thoughts and find alternative perspectives   Thought Form:  Coherent  Logical    Insight:    Fair      Medication Compliance:   Yes; infrequent use of lorazepan     Changes in Health  "Issues:   None reported     Chemical Use Review:   Substance Use: Chemical use reviewed, no active concerns identified      Tobacco Use: No current tobacco use.      Diagnosis:  1. Anxiety disorder, unspecified type        PLAN: (Patient Tasks / Therapist Tasks / Other)  Focus on balancing out increased stress with increased coping. Specifically Client will intentionally drop out of her mind and into her body (e.g., gardening, walking), do small acts of positivity that are within her control, and allow time for her mind to be engaged in activities of interest (e.g., reading, making jewelry, playing with her daughter). She will offer a prayer or wish for light/love for situations that are beyond her control. She will look for the sunshine within the storm. Will look for potential opportunities to expand her local [place] of support as conditions open up in the coming months. Continue: for time/priority management, Client will experiment with the following strategies: choose 1 of the 7 \"blocks\" of weekend time for a project she would like to make progress on; do the most difficult or taxing task first while brain power and resources are the strongest, save easier tasks for later; when feeling overwhelmed, try the \"just one thing\" rule. Client will keep in mind realistic expectations (i.e., humans are not robots, cannot be optimally productive all of the time; the nature of the to-do list is that it's never complete) and at the end of the day will try choosing to feel good about what she has done rather than focusing on what is left to be done. She continues with yoga/social interaction with 2 close friends a few times/week and with tending plants for intentional relaxation. Return appointments scheduled once/month; Client requested telephone visits due to increased opportunities for privacy. She will contact me sooner if needed.       Rosa Chowdary, LP "     _______________________________________________________________________     Treatment Plan     Client's Name: Ijeoma Avalos                 YOB: 1965     Date: 2/9/17     DSM-V Diagnoses: Adjustment Disorders  309.28 (F43.23) With mixed anxiety and depressed mood  Psychosocial / Contextual Factors: physical symptoms; phase of life stressors (full-time work and parent of young child); work demands; family overseas  WHODAS: 16     Referral / Collaboration:  Referral to another professional/service is not indicated at this time.     Anticipated number of session or this episode of care: will re-evaluate every 90 days        MeasurableTreatment Goal(s) related to diagnosis / functional impairment(s)  Goal 1: Client will build skills for stress management.    I will know I've met my goal when my blood pressure is lower and my reflux symptoms have decreased.       Objective #A (Client Action)                Client will use at least 2 coping skills for anxiety management in the next 6 weeks.  Status: Continued - Date(s):3/23/21      Intervention(s)  Therapist will teach self-regulation strategies, CBT skills, mindfulness techniques.     Objective #B  Client will use cognitive strategies identified in therapy to challenge anxious thoughts.  Status: Continued - Date(s):3/23/21      Intervention(s)  Therapist will teach cognitive strategies, provide education.        Goal 2: Client will increase self-confidence.    I will know I've met my goal when I think more positively about myself.       Objective #A (Client Action)                Status: Continued - Date(s)3/23/21      Client will increase assertive communication.     Intervention(s)  Therapist will teach assertiveness skills.     Objective #B  Client will Identify negative self-talk and behaviors: challenge core beliefs, myths, and actions.                  Status: Continued - Date(s):3/23/21      Intervention(s)  Therapist will provide  education, teach CBT skills.        Goal 3: Client will increase connection in close relationships.    I will know I've met my goal when I feel more close with loved ones.       Objective #A (Client Action)                Status: Continued - Date(s):3/23/21      Client will prioritize time for meaningful relationships.     Intervention(s)  Therapist will provide support and accountability.     Objective #B  Client will make use of effective interpersonal communication skills.                         Status: Continued - Date(s): 3/23/21      Intervention(s)  Therapist will teach communication skills.        Client has reviewed and agreed to the above plan.

## 2021-06-04 DIAGNOSIS — E11.9 NEW ONSET TYPE 2 DIABETES MELLITUS (H): ICD-10-CM

## 2021-06-04 DIAGNOSIS — E03.8 SUBCLINICAL HYPOTHYROIDISM: ICD-10-CM

## 2021-06-04 LAB
HBA1C MFR BLD: 6.1 % (ref 0–5.6)
TSH SERPL DL<=0.005 MIU/L-ACNC: 0.97 MU/L (ref 0.4–4)

## 2021-06-04 PROCEDURE — 83036 HEMOGLOBIN GLYCOSYLATED A1C: CPT | Performed by: INTERNAL MEDICINE

## 2021-06-04 PROCEDURE — 84443 ASSAY THYROID STIM HORMONE: CPT | Performed by: INTERNAL MEDICINE

## 2021-06-04 PROCEDURE — 36415 COLL VENOUS BLD VENIPUNCTURE: CPT | Performed by: INTERNAL MEDICINE

## 2021-06-22 ENCOUNTER — VIRTUAL VISIT (OUTPATIENT)
Dept: PSYCHOLOGY | Facility: CLINIC | Age: 56
End: 2021-06-22
Payer: COMMERCIAL

## 2021-06-22 DIAGNOSIS — F41.9 ANXIETY DISORDER, UNSPECIFIED TYPE: Primary | ICD-10-CM

## 2021-06-22 PROCEDURE — 90834 PSYTX W PT 45 MINUTES: CPT | Mod: 95 | Performed by: PSYCHOLOGIST

## 2021-06-22 NOTE — PROGRESS NOTES
"  Progress Note    Patient Name: Ijeoma Avalos  Date: 6/22/21     Service Type: Telephone Visit     The patient has been notified of the following:      \"We have found that certain health care needs can be provided without the need for a face to face visit. This service lets us provide the care you need with a phone conversation.       I will have full access to your [place] medical record during this entire phone call. I will be taking notes for your medical record.      Since this is like an office visit, we will bill your insurance company for this service.       There are potential benefits and risks of telephone visits (e.g. limits to patient confidentiality) that differ from in-person visits.?  Confidentiality still applies for telephone services, and nobody will record the visit.  It is important to be in a quiet, private space that is free of distractions (including cell phone or other devices) during the visit.??      If during the course of the call I believe a telephone visit is not appropriate, you will not be charged for this service\"     Consent has been obtained for this service by care team member: Yes     Session Start Time: 14:02    Session End Time: 14:51     Session Length: 37-52 min    Session #: 24 (this episode of care)    Attendees: Client attended alone     DATA      Progress Since Last Session (Related to Symptoms / Goals / Homework):   Symptoms: Sadness, loneliness      Episode of Care Goals: Satisfactory progress - ACTION (Actively working towards change); Intervened by reinforcing change plan / affirming steps taken     Current / Ongoing Stressors and Concerns:   Family stressors   Work stressors     Intervention:  Session was conducted via telephone at Client's request, as it allows her increased privacy. Reviewed treatment plan and goals for continued therapeutic work. Client recognizes progress she has made toward her goals. Session focused on increased feelings of disconnection " "in the marriage. Client reported that she is working hard not to blame herself, and she recognizes that this will require effort from both parties in order to make change. With some further exploration, she realized that her brain is churning on this as a \"problem to be solved,\" with the implication that she must be the one to solve it. Client stated that she would like to pursue marital therapy; she will make arrangements and invite her  to join her. Worked on creating an intention for Client to use as a touchstone in the meantime, noting the power of thoughts to crete our beliefs and our reality (e.g, as long as we keep trying, we can make progress. I can be OK even if this is a hard time). Client was open to this suggestion. Also reviewed the importance of engaging in regular self-care to keep herself rejuvenated during a difficult time.         ASSESSMENT: Current Emotional / Mental Status (status of significant symptoms):   Risk status (Self / Other harm or suicidal ideation)   Patient denies current fears or concerns for personal safety.   Patient denies current or recent suicidal ideation or behaviors.   Patient denies current or recent homicidal ideation or behaviors.   Patient denies current or recent self injurious behavior or ideation.   Patient denies other safety concerns.   Patient reports there has been no change in risk factors since her last session.     Patient reports there has been no change in protective factors since her last session.     Recommended that patient call 911 or go to the local ED should there be a change in any of these risk factors.     Appearance:    Unable to assess   Psychomotor Behavior: Unable to assess   Eye Contact:   Unable to assess   Attitude:   Forthcoming    Orientation:   All   Speech    Rate / Production: Normal     Volume:  Normal    Mood:    Sad, lonely, anxious    Affect:     Unable to assess   Thought Content:  With effort, able to choose more adaptive " thoughts; some ruminative worries   Thought Form:  Coherent  Logical    Insight:    Fair      Medication Compliance:   Yes; infrequent use of lorazepan     Changes in Health Issues:   None reported     Chemical Use Review:   Substance Use: Chemical use reviewed, no active concerns identified      Tobacco Use: No current tobacco use.      Diagnosis:  1. Anxiety disorder, unspecified type        PLAN: (Patient Tasks / Therapist Tasks / Other)  Client will make arrangements for marital therapy and will invite her  to participate. She will let me know if referrals are needed. In the meantime, she wrote down the following intentions to use as a touchstone when anxiety and uncertainty escalate (e.g, as long as we keep trying, we can make progress. I can be OK even if this is a hard time). She will prioritize self-care, particularly preferred activities that all her to be in her body instead of her mind (e.g., gardening, walking, yoga). Return appointments scheduled once/month; Client requested telephone visits due to increased opportunities for privacy. She will contact me sooner if needed.       Rosa Chowdary, KATHARINE     _______________________________________________________________________     Treatment Plan     Client's Name: Ijeoma Avalos                 YOB: 1965     Date: 2/9/17     DSM-V Diagnoses: Adjustment Disorders  309.28 (F43.23) With mixed anxiety and depressed mood  Psychosocial / Contextual Factors: physical symptoms; phase of life stressors (full-time work and parent of young child); work demands; family overseas  WHODAS: 18     Referral / Collaboration:  Referral to another professional/service is not indicated at this time.     Anticipated number of session or this episode of care: will re-evaluate every 90 days     MeasurableTreatment Goal(s) related to diagnosis / functional impairment(s)  Goal 1: Client will build skills for stress management.    I will know I've met my goal  when my blood pressure is lower and my reflux symptoms have decreased.   6/22 *Some improvement noted, particularly with reflux.     Objective #A (Client Action)                Client will use at least 2 coping skills for anxiety management in the next 6 weeks.  Status: Continued - Date(s): 6/22/21      Intervention(s)  Therapist will teach self-regulation strategies, CBT skills, mindfulness techniques.     Objective #B  Client will use cognitive strategies identified in therapy to challenge anxious thoughts.  Status: Continued - Date(s): 6/22/21      Intervention(s)  Therapist will teach cognitive strategies, provide education.        Goal 2: Client will increase self-confidence.    I will know I've met my goal when I think more positively about myself.   *6/22/21 Progress noted, continue work     Objective #A (Client Action)                Status: Continued - Date(s)3/23/21      Client will increase assertive communication.     Intervention(s)  Therapist will teach assertiveness skills.     Objective #B  Client will Identify negative self-talk and behaviors: challenge core beliefs, myths, and actions.                  Status: Continued - Date(s):3/23/21      Intervention(s)  Therapist will provide education, teach CBT skills.        Goal 3: Client will increase connection in close relationships.    I will know I've met my goal when I feel more close with loved ones.       Objective #A (Client Action)                Status: Continued - Date(s):6/22/21      Client will prioritize time for meaningful relationships.     Intervention(s)  Therapist will provide support and accountability.     Objective #B  Client will make use of effective interpersonal communication skills.                         Status: Continued - Date(s): 3/23/21      Intervention(s)  Therapist will teach communication skills.        Client has reviewed and agreed to the above plan.

## 2021-07-20 ENCOUNTER — VIRTUAL VISIT (OUTPATIENT)
Dept: PSYCHOLOGY | Facility: CLINIC | Age: 56
End: 2021-07-20
Payer: COMMERCIAL

## 2021-07-20 DIAGNOSIS — F41.9 ANXIETY DISORDER, UNSPECIFIED TYPE: Primary | ICD-10-CM

## 2021-07-20 PROCEDURE — 90834 PSYTX W PT 45 MINUTES: CPT | Mod: 95 | Performed by: PSYCHOLOGIST

## 2021-07-20 NOTE — PROGRESS NOTES
"  Progress Note    Patient Name: Ijeoma Avalos  Date: 7/20/21     Service Type: Telephone Visit     The patient has been notified of the following:      \"We have found that certain health care needs can be provided without the need for a face to face visit. This service lets us provide the care you need with a phone conversation.       I will have full access to your [facility] medical record during this entire phone call. I will be taking notes for your medical record.      Since this is like an office visit, we will bill your insurance company for this service.       There are potential benefits and risks of telephone visits (e.g. limits to patient confidentiality) that differ from in-person visits.?  Confidentiality still applies for telephone services, and nobody will record the visit.  It is important to be in a quiet, private space that is free of distractions (including cell phone or other devices) during the visit.??      If during the course of the call I believe a telephone visit is not appropriate, you will not be charged for this service\"     Consent has been obtained for this service by care team member: Yes     Session Start Time: 14:01    Session End Time: 14:48     Session Length: 37-52 min    Session #: 25 (this episode of care)    Attendees: Client attended alone     DATA      Progress Since Last Session (Related to Symptoms / Goals / Homework):   Symptoms: Sadness, anxiety      Episode of Care Goals: Satisfactory progress - ACTION (Actively working towards change); Intervened by reinforcing change plan / affirming steps taken     Current / Ongoing Stressors and Concerns:   Family stressors   Work stressors     Intervention:  Session was conducted via telephone at Client's request, as it allows her increased privacy. Client stated that she talked to her  about martial therapy, and he agreed to go because it is important to her. She said that she has not yet looked for a therapist or made " an appointment. She identified a broader theme of wanting to make progress in a number of areas but not taking concrete action. Examined reasons for this and Client concluded that this is her pattern when she is not sure what to do next (next step is not clear). Discussed strategies that have been helpful to Client in the past that may be useful in this situation. She will try scheduling time in her calendar to address specific issues, giving herself a designated time to look into the issue and determine what is needed to move forward. She set time in her schedule during the next week to review therapist bios and make some calls. Discussed book resource, The Gifts of Imperfection by Marlon, as useful support for the work she is doing around worthiness and self-esteem.          ASSESSMENT: Current Emotional / Mental Status (status of significant symptoms):   Risk status (Self / Other harm or suicidal ideation)   Patient denies current fears or concerns for personal safety.   Patient denies current or recent suicidal ideation or behaviors.   Patient denies current or recent homicidal ideation or behaviors.   Patient denies current or recent self injurious behavior or ideation.   Patient denies other safety concerns.   Patient reports there has been no change in risk factors since her last session.     Patient reports there has been no change in protective factors since her last session.     Recommended that patient call 911 or go to the local ED should there be a change in any of these risk factors.     Appearance:    Unable to assess   Psychomotor Behavior: Unable to assess   Eye Contact:   Unable to assess   Attitude:   Open, grateful    Orientation:   All   Speech    Rate / Production: Normal     Volume:  Normal    Mood:    Stressed, drained    Affect:     Unable to assess   Thought Content:  Improvement noted in judging herself less harshly, more reasonable expectations   Thought Form:  Coherent  Logical  "   Insight:    Fair      Medication Compliance:   Yes; infrequent use of lorazepan     Changes in Health Issues:   None reported     Chemical Use Review:   Substance Use: Chemical use reviewed, no active concerns identified      Tobacco Use: No current tobacco use.      Diagnosis:  1. Anxiety disorder, unspecified type        PLAN: (Patient Tasks / Therapist Tasks / Other)  Client has scheduled time on her calendar to review therapist bios and make phone calls. She will practice using this strategy of \"increasing structure\" for situations where she feels stuck. Her goal is to have an appointment for marital therapy scheduled in the next week. Offered book resource: The Gifts of Imperfection by Marlon. Continue to use the following intention as a touchstone when anxiety and uncertainty escalate: I will be OK come what may. She will prioritize self-care, particularly preferred activities that all her to be in her body instead of her mind (e.g., gardening, walking, yoga). Return appointments scheduled once/month; Client requested telephone visits due to increased opportunities for privacy. She will contact me sooner if needed.       Rosa Chowdary, KATHARINE     _______________________________________________________________________     Treatment Plan     Client's Name: Ijeoma Avalos                 YOB: 1965     Date: 2/9/17     DSM-V Diagnoses: Adjustment Disorders  309.28 (F43.23) With mixed anxiety and depressed mood  Psychosocial / Contextual Factors: physical symptoms; phase of life stressors (full-time work and parent of young child); work demands; family overseas  WHODAS: 18     Referral / Collaboration:  Referral to another professional/service is not indicated at this time.     Anticipated number of session or this episode of care: will re-evaluate every 90 days     MeasurableTreatment Goal(s) related to diagnosis / functional impairment(s)  Goal 1: Client will build skills for stress management.   "  I will know I've met my goal when my blood pressure is lower and my reflux symptoms have decreased.   6/22 *Some improvement noted, particularly with reflux.     Objective #A (Client Action)                Client will use at least 2 coping skills for anxiety management in the next 6 weeks.  Status: Continued - Date(s): 6/22/21      Intervention(s)  Therapist will teach self-regulation strategies, CBT skills, mindfulness techniques.     Objective #B  Client will use cognitive strategies identified in therapy to challenge anxious thoughts.  Status: Continued - Date(s): 6/22/21      Intervention(s)  Therapist will teach cognitive strategies, provide education.        Goal 2: Client will increase self-confidence.    I will know I've met my goal when I think more positively about myself.   *6/22/21 Progress noted, continue work     Objective #A (Client Action)                Status: Continued - Date(s)3/23/21      Client will increase assertive communication.     Intervention(s)  Therapist will teach assertiveness skills.     Objective #B  Client will Identify negative self-talk and behaviors: challenge core beliefs, myths, and actions.                  Status: Continued - Date(s):3/23/21      Intervention(s)  Therapist will provide education, teach CBT skills.        Goal 3: Client will increase connection in close relationships.    I will know I've met my goal when I feel more close with loved ones.       Objective #A (Client Action)                Status: Continued - Date(s):6/22/21      Client will prioritize time for meaningful relationships.     Intervention(s)  Therapist will provide support and accountability.     Objective #B  Client will make use of effective interpersonal communication skills.                         Status: Continued - Date(s): 3/23/21      Intervention(s)  Therapist will teach communication skills.        Client has reviewed and agreed to the above plan.

## 2021-07-27 ENCOUNTER — OFFICE VISIT (OUTPATIENT)
Dept: FAMILY MEDICINE | Facility: CLINIC | Age: 56
End: 2021-07-27
Payer: COMMERCIAL

## 2021-07-27 VITALS
DIASTOLIC BLOOD PRESSURE: 74 MMHG | BODY MASS INDEX: 26.7 KG/M2 | RESPIRATION RATE: 10 BRPM | WEIGHT: 136 LBS | SYSTOLIC BLOOD PRESSURE: 120 MMHG | HEIGHT: 60 IN | HEART RATE: 80 BPM | OXYGEN SATURATION: 98 % | TEMPERATURE: 98.4 F

## 2021-07-27 DIAGNOSIS — H69.93 DYSFUNCTION OF BOTH EUSTACHIAN TUBES: Primary | ICD-10-CM

## 2021-07-27 DIAGNOSIS — Z12.11 SPECIAL SCREENING FOR MALIGNANT NEOPLASMS, COLON: ICD-10-CM

## 2021-07-27 DIAGNOSIS — Z12.31 ENCOUNTER FOR SCREENING MAMMOGRAM FOR BREAST CANCER: ICD-10-CM

## 2021-07-27 DIAGNOSIS — E03.9 HYPOTHYROIDISM, UNSPECIFIED TYPE: ICD-10-CM

## 2021-07-27 PROCEDURE — 99213 OFFICE O/P EST LOW 20 MIN: CPT | Performed by: PHYSICIAN ASSISTANT

## 2021-07-27 RX ORDER — FLUTICASONE PROPIONATE 50 MCG
1 SPRAY, SUSPENSION (ML) NASAL DAILY
Qty: 16 G | Refills: 3 | Status: SHIPPED | OUTPATIENT
Start: 2021-07-27 | End: 2022-07-05

## 2021-07-27 RX ORDER — LORATADINE 10 MG/1
10 TABLET ORAL DAILY
Qty: 90 TABLET | Refills: 1 | Status: SHIPPED | OUTPATIENT
Start: 2021-07-27 | End: 2022-07-05

## 2021-07-27 ASSESSMENT — PAIN SCALES - GENERAL: PAINLEVEL: NO PAIN (0)

## 2021-07-27 ASSESSMENT — MIFFLIN-ST. JEOR: SCORE: 1143.39

## 2021-07-27 NOTE — PROGRESS NOTES
"  Assessment & Plan   Dysfunction of both eustachian tubes  Pt's physical exam was unremarkable. Etiology unclear, but suspicious that symptoms may be secondary to eustachian tube dysfunction worsened by allergies. She was given a prescription for Flonase and Claritin today. Discussed findings and plan with the patient, who voiced understanding and agreement. She is overdue for a routine physical exam, and she was advised to schedule this in 4 weeks, at which time we can reassess her symptoms. She can return to care sooner if worsening.  - fluticasone (FLONASE) 50 MCG/ACT nasal spray  Dispense: 16 g; Refill: 3  - loratadine (CLARITIN) 10 MG tablet  Dispense: 90 tablet; Refill: 1    Special screening for malignant neoplasms, colon  Routine screening  - Adult Gastro Ref - Procedure Only    Encounter for screening mammogram for breast cancer  Routine Screening  - *MA Screening Digital Bilateral          Return in about 4 weeks (around 8/24/2021) for Physical Exam.    NATAN Jeter [facility]    Андрей Raphael is a 55 year old who presents for the following health issues     HPI       Ijeoma presents for evaluation of bilateral ear \"pressure\" type pain. She reports 3 weeks of waxing and waning symptoms, associated with hearing loss, occasional headaches, and 1-2 brief episodes of poor balance. She also complains of ongoing allergies, which she reports are worse this year than normal. She states \"it feels like I'm on a plane and my ears need to pop\". She denies any fevers, sore throat, nasal congestion, room-spinning dizziness, or respiratory problems.    Review of Systems   Constitutional, HEENT, cardiovascular, pulmonary, GI, neuro, skin, and psych systems are negative, except as otherwise noted.      Objective    /74   Pulse 80   Temp 98.4  F (36.9  C) (Tympanic)   Resp 10   Ht 1.524 m (5')   Wt 61.7 kg (136 lb)   LMP 04/14/2017   SpO2 98%   BMI 26.56 " kg/m    Body mass index is 26.56 kg/m .  Physical Exam   GENERAL: healthy, alert and no distress  HENT: ear canals and TM's normal, nose and mouth without ulcers or lesions  NECK: no adenopathy, no asymmetry, masses, or scars and thyroid normal to palpation  RESP: lungs clear to auscultation - no rales, rhonchi or wheezes  ABDOMEN: soft, nontender, no hepatosplenomegaly, no masses and bowel sounds normal  MS: no gross musculoskeletal defects noted, no edema  NEURO: Normal strength and tone, mentation intact and speech normal

## 2021-07-28 RX ORDER — LEVOTHYROXINE SODIUM 88 UG/1
TABLET ORAL
Qty: 90 TABLET | Refills: 3 | Status: SHIPPED | OUTPATIENT
Start: 2021-07-28 | End: 2021-12-15

## 2021-08-04 ENCOUNTER — ANCILLARY PROCEDURE (OUTPATIENT)
Dept: MAMMOGRAPHY | Facility: CLINIC | Age: 56
End: 2021-08-04
Attending: PHYSICIAN ASSISTANT
Payer: COMMERCIAL

## 2021-08-04 DIAGNOSIS — Z12.31 ENCOUNTER FOR SCREENING MAMMOGRAM FOR BREAST CANCER: ICD-10-CM

## 2021-08-04 PROCEDURE — 77067 SCR MAMMO BI INCL CAD: CPT | Mod: TC | Performed by: RADIOLOGY

## 2021-08-12 DIAGNOSIS — E11.9 TYPE 2 DIABETES MELLITUS WITHOUT COMPLICATION, WITHOUT LONG-TERM CURRENT USE OF INSULIN (H): ICD-10-CM

## 2021-08-12 DIAGNOSIS — I10 ESSENTIAL HYPERTENSION: ICD-10-CM

## 2021-08-12 RX ORDER — LISINOPRIL 5 MG/1
TABLET ORAL
Qty: 90 TABLET | Refills: 0 | Status: SHIPPED | OUTPATIENT
Start: 2021-08-12 | End: 2021-11-22

## 2021-08-12 NOTE — TELEPHONE ENCOUNTER
Medication is being filled for 1 time refill only due to:  Needs physical exam.     Janna LITTLE RN  EP Triage

## 2021-08-17 ENCOUNTER — VIRTUAL VISIT (OUTPATIENT)
Dept: PSYCHOLOGY | Facility: CLINIC | Age: 56
End: 2021-08-17
Payer: COMMERCIAL

## 2021-08-17 DIAGNOSIS — F41.9 ANXIETY DISORDER, UNSPECIFIED TYPE: Primary | ICD-10-CM

## 2021-08-17 PROCEDURE — 90834 PSYTX W PT 45 MINUTES: CPT | Mod: 95 | Performed by: PSYCHOLOGIST

## 2021-08-17 NOTE — PROGRESS NOTES
"  Progress Note    Patient Name: Ijeoma Avalos  Date: 8/17/21     Service Type: Telephone Visit     The patient has been notified of the following:      \"We have found that certain health care needs can be provided without the need for a face to face visit. This service lets us provide the care you need with a phone conversation.       I will have full access to your [facility] medical record during this entire phone call. I will be taking notes for your medical record.      Since this is like an office visit, we will bill your insurance company for this service.       There are potential benefits and risks of telephone visits (e.g. limits to patient confidentiality) that differ from in-person visits.?  Confidentiality still applies for telephone services, and nobody will record the visit.  It is important to be in a quiet, private space that is free of distractions (including cell phone or other devices) during the visit.??      If during the course of the call I believe a telephone visit is not appropriate, you will not be charged for this service\"     Consent has been obtained for this service by care team member: Yes     Session Start Time: 14:02    Session End Time: 14:50     Session Length: 37-52 min    Session #: 26 (this episode of care)    Attendees: Client attended alone     DATA      Progress Since Last Session (Related to Symptoms / Goals / Homework):   Symptoms: Some improvement--less stressed      Episode of Care Goals: Satisfactory progress - ACTION (Actively working towards change); Intervened by reinforcing change plan / affirming steps taken     Current / Ongoing Stressors and Concerns:   Family stressors   Work stressors     Intervention:  Session was conducted via telephone at Client's request, as it allows her increased privacy. Discussed progress toward establishing with a marital therapist. Client reported that she forwarded a few names to her  and is awaiting his input. Her " "impression is that he is \"stalling.\" Discussed his potential ambivalence as well as the choices Client has. Observed that this may be more important to her than it is to him, and that's OK. After some discussion, she decided that she will likely give him a date by which she is going to make an appointment and invite him to offer any input he may have before that date. If he does not give any input, she can still move forward, knowing that the opportunity was made available to him. Talked about the unfamiliar experience Client has had recently of having more free time. 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 Talked about building the skill of tolerating contentment, being present in the moment and appreciating this time where she doesn't feel overcommited & spread too thin. She will practice redirecting her brain from its familiar habit of scanning for \"what's wrong?\"          ASSESSMENT: Current Emotional / Mental Status (status of significant symptoms):   Risk status (Self / Other harm or suicidal ideation)   Patient denies current fears or concerns for personal safety.   Patient denies current or recent suicidal ideation or behaviors.   Patient denies current or recent homicidal ideation or behaviors.   Patient denies current or recent self injurious behavior or ideation.   Patient denies other safety concerns.   Patient reports there has been no change in risk factors since her last session.     Patient reports there has been no change in protective factors since her last session.     Recommended that patient call 911 or go to the local ED should there be a change in any of these risk factors.     Appearance:    Unable to " "assess   Psychomotor Behavior: Unable to assess   Eye Contact:   Unable to assess   Attitude:   Cooperative    Orientation:   All   Speech    Rate / Production: Normal     Volume:  Normal    Mood:    Some improvement--reduced anxiety    Affect:     Unable to assess   Thought Content:  Automatic patterns of vague/general worry present   Thought Form:  Coherent  Logical    Insight:    Fair      Medication Compliance:   Yes; infrequent use of lorazepan     Changes in Health Issues:   None reported     Chemical Use Review:   Substance Use: Chemical use reviewed, no active concerns identified      Tobacco Use: No current tobacco use.      Diagnosis:  1. Anxiety disorder, unspecified type        PLAN: (Patient Tasks / Therapist Tasks / Other)  Client will move forward with plan for scheduling marital therapy, allowing a clear time frame for her  to provide input if desired. She will remind herself that it is OK for her to pursue this even if it is less important to him than it is to her. During a time of relatively lower stress (after a year+ of extreme demands), Client will work on building the skill of \"appreciating the now\" and enjoying the creative pursuits that she has time to pursue (rejuvenating herself is a valuable use of time). As she notices that her brain is running its usual program of searching for \"what's wrong?\" she will redirect herself to the thought of \"just be here now.\" Return appointments scheduled once/month; Client requested telephone visits due to increased opportunities for privacy. She will contact me sooner if needed. She would be interested in in-person visits when available.      Rosa Chowdary, KATHARINE     _______________________________________________________________________     Treatment Plan     Client's Name: Ijeoma Avalos                 YOB: 1965     Date: 2/9/17     DSM-V Diagnoses: Adjustment Disorders  309.28 (F43.23) With mixed anxiety and depressed " mood  Psychosocial / Contextual Factors: physical symptoms; phase of life stressors (full-time work and parent of young child); work demands; family [place]  WHODAS: 18     Referral / Collaboration:  Referral to another professional/service is not indicated at this time.     Anticipated number of session or this episode of care: will re-evaluate every 90 days     MeasurableTreatment Goal(s) related to diagnosis / functional impairment(s)  Goal 1: Client will build skills for stress management.    I will know I've met my goal when my blood pressure is lower and my reflux symptoms have decreased.   6/22 *Some improvement noted, particularly with reflux.     Objective #A (Client Action)                Client will use at least 2 coping skills for anxiety management in the next 6 weeks.  Status: Continued - Date(s): 6/22/21      Intervention(s)  Therapist will teach self-regulation strategies, CBT skills, mindfulness techniques.     Objective #B  Client will use cognitive strategies identified in therapy to challenge anxious thoughts.  Status: Continued - Date(s): 6/22/21      Intervention(s)  Therapist will teach cognitive strategies, provide education.        Goal 2: Client will increase self-confidence.    I will know I've met my goal when I think more positively about myself.   *6/22/21 Progress noted, continue work     Objective #A (Client Action)                Status: Continued - Date(s)3/23/21      Client will increase assertive communication.     Intervention(s)  Therapist will teach assertiveness skills.     Objective #B  Client will Identify negative self-talk and behaviors: challenge core beliefs, myths, and actions.                  Status: Continued - Date(s):3/23/21      Intervention(s)  Therapist will provide education, teach CBT skills.        Goal 3: Client will increase connection in close relationships.    I will know I've met my goal when I feel more close with loved ones.       Objective #A (Client  Action)                Status: Continued - Date(s):6/22/21      Client will prioritize time for meaningful relationships.     Intervention(s)  Therapist will provide support and accountability.     Objective #B  Client will make use of effective interpersonal communication skills.                         Status: Continued - Date(s): 3/23/21      Intervention(s)  Therapist will teach communication skills.        Client has reviewed and agreed to the above plan.

## 2021-08-23 ASSESSMENT — ENCOUNTER SYMPTOMS
EYE PAIN: 0
NERVOUS/ANXIOUS: 0
WEAKNESS: 0
MYALGIAS: 0
ARTHRALGIAS: 1
HEMATURIA: 0
PALPITATIONS: 0
JOINT SWELLING: 0
SORE THROAT: 0
SHORTNESS OF BREATH: 0
DIARRHEA: 0
NAUSEA: 0
BREAST MASS: 0
HEMATOCHEZIA: 0
DIZZINESS: 0
CONSTIPATION: 0
COUGH: 0
ABDOMINAL PAIN: 0
PARESTHESIAS: 0
CHILLS: 0
DYSURIA: 0
HEADACHES: 0
FREQUENCY: 0
FEVER: 0
HEARTBURN: 0

## 2021-08-26 ENCOUNTER — DOCUMENTATION ONLY (OUTPATIENT)
Dept: LAB | Facility: CLINIC | Age: 56
End: 2021-08-26

## 2021-08-26 ENCOUNTER — OFFICE VISIT (OUTPATIENT)
Dept: FAMILY MEDICINE | Facility: CLINIC | Age: 56
End: 2021-08-26
Payer: COMMERCIAL

## 2021-08-26 VITALS
OXYGEN SATURATION: 98 % | WEIGHT: 135 LBS | TEMPERATURE: 98 F | HEART RATE: 78 BPM | RESPIRATION RATE: 12 BRPM | BODY MASS INDEX: 26.5 KG/M2 | HEIGHT: 60 IN | DIASTOLIC BLOOD PRESSURE: 82 MMHG | SYSTOLIC BLOOD PRESSURE: 120 MMHG

## 2021-08-26 DIAGNOSIS — Z12.11 SCREEN FOR COLON CANCER: ICD-10-CM

## 2021-08-26 DIAGNOSIS — E11.9 TYPE 2 DIABETES MELLITUS WITHOUT COMPLICATION, WITHOUT LONG-TERM CURRENT USE OF INSULIN (H): ICD-10-CM

## 2021-08-26 DIAGNOSIS — E78.5 HYPERLIPIDEMIA WITH TARGET LDL LESS THAN 100: ICD-10-CM

## 2021-08-26 DIAGNOSIS — Z00.00 ROUTINE HISTORY AND PHYSICAL EXAMINATION OF ADULT: Primary | ICD-10-CM

## 2021-08-26 DIAGNOSIS — Z11.59 NEED FOR HEPATITIS C SCREENING TEST: ICD-10-CM

## 2021-08-26 LAB
HBA1C MFR BLD: 6.1 % (ref 0–5.6)
HCV AB SERPL QL IA: NONREACTIVE

## 2021-08-26 PROCEDURE — 99396 PREV VISIT EST AGE 40-64: CPT | Performed by: FAMILY MEDICINE

## 2021-08-26 PROCEDURE — 99214 OFFICE O/P EST MOD 30 MIN: CPT | Mod: 25 | Performed by: FAMILY MEDICINE

## 2021-08-26 PROCEDURE — 82043 UR ALBUMIN QUANTITATIVE: CPT | Performed by: FAMILY MEDICINE

## 2021-08-26 PROCEDURE — 80053 COMPREHEN METABOLIC PANEL: CPT | Performed by: FAMILY MEDICINE

## 2021-08-26 PROCEDURE — 83036 HEMOGLOBIN GLYCOSYLATED A1C: CPT | Performed by: FAMILY MEDICINE

## 2021-08-26 PROCEDURE — 86803 HEPATITIS C AB TEST: CPT | Performed by: FAMILY MEDICINE

## 2021-08-26 PROCEDURE — 36415 COLL VENOUS BLD VENIPUNCTURE: CPT | Performed by: FAMILY MEDICINE

## 2021-08-26 PROCEDURE — 80061 LIPID PANEL: CPT | Performed by: FAMILY MEDICINE

## 2021-08-26 ASSESSMENT — ANXIETY QUESTIONNAIRES
6. BECOMING EASILY ANNOYED OR IRRITABLE: SEVERAL DAYS
5. BEING SO RESTLESS THAT IT IS HARD TO SIT STILL: NOT AT ALL
GAD7 TOTAL SCORE: 4
2. NOT BEING ABLE TO STOP OR CONTROL WORRYING: SEVERAL DAYS
7. FEELING AFRAID AS IF SOMETHING AWFUL MIGHT HAPPEN: NOT AT ALL
3. WORRYING TOO MUCH ABOUT DIFFERENT THINGS: SEVERAL DAYS
1. FEELING NERVOUS, ANXIOUS, OR ON EDGE: NOT AT ALL
IF YOU CHECKED OFF ANY PROBLEMS ON THIS QUESTIONNAIRE, HOW DIFFICULT HAVE THESE PROBLEMS MADE IT FOR YOU TO DO YOUR WORK, TAKE CARE OF THINGS AT HOME, OR GET ALONG WITH OTHER PEOPLE: NOT DIFFICULT AT ALL

## 2021-08-26 ASSESSMENT — ENCOUNTER SYMPTOMS
WEAKNESS: 0
ABDOMINAL PAIN: 0
HEARTBURN: 0
ARTHRALGIAS: 1
SHORTNESS OF BREATH: 0
JOINT SWELLING: 0
SORE THROAT: 0
DIZZINESS: 0
FREQUENCY: 0
COUGH: 0
EYE PAIN: 0
CONSTIPATION: 0
HEMATURIA: 0
HEADACHES: 0
PALPITATIONS: 0
DIARRHEA: 0
CHILLS: 0
DYSURIA: 0
FEVER: 0
PARESTHESIAS: 0
NAUSEA: 0
MYALGIAS: 0
HEMATOCHEZIA: 0
NERVOUS/ANXIOUS: 0
BREAST MASS: 0

## 2021-08-26 ASSESSMENT — PATIENT HEALTH QUESTIONNAIRE - PHQ9
SUM OF ALL RESPONSES TO PHQ QUESTIONS 1-9: 3
5. POOR APPETITE OR OVEREATING: SEVERAL DAYS

## 2021-08-26 ASSESSMENT — MIFFLIN-ST. JEOR: SCORE: 1132.61

## 2021-08-26 NOTE — PROGRESS NOTES
SUBJECTIVE:   CC: Ijeoma Avalos is an 55 year old woman who presents for preventive health visit.       Patient has been advised of split billing requirements and indicates understanding: Yes  Healthy Habits:     Getting at least 3 servings of Calcium per day:  Yes    Bi-annual eye exam:  Yes    Dental care twice a year:  Yes    Sleep apnea or symptoms of sleep apnea:  None    Diet:  Diabetic and Carbohydrate counting    Frequency of exercise:  2-3 days/week    Duration of exercise:  15-30 minutes    Taking medications regularly:  Yes    Medication side effects:  None    PHQ-2 Total Score: 1    Additional concerns today:  No        Diabetes Follow-up      How often are you checking your blood sugar? Twice per week - in the mornings , numbers have been good, due for labs     What concerns do you have today about your diabetes? None     Do you have any of these symptoms? (Select all that apply)  No numbness or tingling in feet.  No redness, sores or blisters on feet.  No complaints of excessive thirst.  No reports of blurry vision.  No significant changes to weight.    Have you had a diabetic eye exam in the last 12 months? Yes- Date of last eye exam: March 2021,  Location: [facility]          Hyperlipidemia Follow-Up      Are you regularly taking any medication or supplement to lower your cholesterol?   Yes- Atorvastatin due for fasting labs and need refill     Are you having muscle aches or other side effects that you think could be caused by your cholesterol lowering medication?  No    Hypertension Follow-up      Do you check your blood pressure regularly outside of the clinic? No but has been ok mostly when checked and she is feeling well. Denies any chest pain, sob leg edema etc     Are you following a low salt diet? No    Are your blood pressures ever more than 140 on the top number (systolic) OR more   than 90 on the bottom number (diastolic), for example 140/90? NA    BP Readings  from Last 2 Encounters:   08/26/21 120/82   07/27/21 120/74     Hemoglobin A1C (%)   Date Value   06/04/2021 6.1 (H)   12/22/2020 6.1 (H)     LDL Cholesterol Calculated (mg/dL)   Date Value   08/21/2020 98   12/26/2019 86         Today's PHQ-2 Score:   PHQ-2 ( 1999 [employer]) 8/23/2021   Q1: Little interest or pleasure in doing things 1   Q2: Feeling down, depressed or hopeless 0   PHQ-2 Score 1   Q1: Little interest or pleasure in doing things Several days   Q2: Feeling down, depressed or hopeless Not at all   PHQ-2 Score 1       Abuse: Current or Past (Physical, Sexual or Emotional) - No  Do you feel safe in your environment? Yes    Have you ever done Advance Care Planning? (For example, a Health Directive, POLST, or a discussion with a medical provider or your loved ones about your wishes): No, advance care planning information given to patient to review.  Patient plans to discuss their wishes with loved ones or provider.      Social History     Tobacco Use     Smoking status: Never Smoker     Smokeless tobacco: Never Used   Substance Use Topics     Alcohol use: No     Alcohol/week: 0.0 standard drinks     Comment: social         Alcohol Use 8/23/2021   Prescreen: >3 drinks/day or >7 drinks/week? No   Prescreen: >3 drinks/day or >7 drinks/week? -       Reviewed orders with patient.  Reviewed health maintenance and updated orders accordingly - Yes  Patient Active Problem List   Diagnosis     Gastric acidity     Other specified hypothyroidism     Plantar fasciitis     Gastroesophageal reflux disease, esophagitis presence not specified     TMJ (temporomandibular joint syndrome)     Neck pain     Other headache syndrome     Uterine leiomyoma, unspecified location     Adjustment disorder with mixed anxiety and depressed mood     Low hemoglobin     Dysmenorrhea     Right knee pain, unspecified chronicity     Post-operative nausea and vomiting     New onset type 2 diabetes mellitus (H)     Essential hypertension      Hyperlipidemia with target LDL less than 100     Atopic rhinitis     Nasal congestion     Somatic dysfunction of lumbar region     Chronic right-sided low back pain without sciatica     Somatic dysfunction of sacral region     Sacral pain     Tendonitis     Microscopic hematuria     Dysuria     High-tone pelvic floor dysfunction     Past Surgical History:   Procedure Laterality Date     APPENDECTOMY        SECTION  2014    Procedure:  SECTION;  Surgeon: Ru Cano MD;  Location:  [facility]     CHOLECYSTECTOMY       cyst removed      left  lower leg on bone sheath     DAVINCI HYSTERECTOMY TOTAL, SALPINGECTOMY BILATERAL N/A 2017    Procedure: DAVINCI HYSTERECTOMY TOTAL, SALPINGECTOMY BILATERAL;  DAVINCI SINGLE SITE HYSTERECTOMY, BILATERAL SALPINGECTOMY, LEFT OOPHORECTOMY, LYSIS OF ADHESIONS (LAPAROSCOPIC BAG TO RETREIVE OVARY);  Surgeon: Ru Cano MD;  Location:  OR     DAVINCI LYSIS OF ADHESIONS N/A 2017    Procedure: DAVINCI LYSIS OF ADHESIONS;;  Surgeon: Ru Cano MD;  Location:  OR     DAVINCI OOPHORECTOMY N/A 2017    Procedure: DAVINCI OOPHORECTOMY;;  Surgeon: Ru Cano MD;  Location:  OR     GI SURGERY       MYOMECTOMY UTERUS  2012     [facility] GASTROSCOPY,[place]         Social History     Tobacco Use     Smoking status: Never Smoker     Smokeless tobacco: Never Used   Substance Use Topics     Alcohol use: No     Alcohol/week: 0.0 standard drinks     Comment: social     Family History   Problem Relation Age of Onset     Hypertension Father            Breast Cancer Screening:    Breast CA Risk Assessment (FHS-7) 2021   Do you have a family history of breast, colon, or ovarian cancer? No / Unknown         Mammogram Screening: Recommended annual mammography  Pertinent mammograms are reviewed under the imaging tab.    History of abnormal Pap smear: NO - age 30- 65 PAP every 3 years recommended  PAP / HPV Latest Ref Rng & Units  11/7/2016   PAP (Historical) - OTHER-NIL, See Result   HPV16 NEG Negative   HPV18 NEG Negative   HRHPV NEG Negative     Reviewed and updated as needed this visit by clinical staff  Tobacco  Allergies  Meds              Reviewed and updated as needed this visit by Provider                Past Medical History:   Diagnosis Date     Gastric acidity      Gestational diabetes mellitus     DIET CONTROL     Hx of previous reproductive problem     IVF     Hypothyroid      Motion sickness      Ovarian cyst      Post-operative nausea and vomiting 4/21/2017        Review of Systems   Constitutional: Negative for chills and fever.   HENT: Negative for congestion, ear pain, hearing loss and sore throat.    Eyes: Negative for pain and visual disturbance.   Respiratory: Negative for cough and shortness of breath.    Cardiovascular: Negative for chest pain, palpitations and peripheral edema.   Gastrointestinal: Negative for abdominal pain, constipation, diarrhea, heartburn, hematochezia and nausea.   Breasts:  Negative for tenderness, breast mass and discharge.   Genitourinary: Negative for dysuria, frequency, genital sores, hematuria, pelvic pain, urgency, vaginal bleeding and vaginal discharge.   Musculoskeletal: Positive for arthralgias. Negative for joint swelling and myalgias.   Skin: Negative for rash.   Neurological: Negative for dizziness, weakness, headaches and paresthesias.   Psychiatric/Behavioral: Negative for mood changes. The patient is not nervous/anxious.      CONSTITUTIONAL: NEGATIVE for fever, chills, change in weight  INTEGUMENTARY/SKIN: NEGATIVE for worrisome rashes, moles or lesions  EYES: NEGATIVE for vision changes or irritation  ENT: NEGATIVE for ear, mouth and throat problems  RESP: NEGATIVE for significant cough or SOB  BREAST: NEGATIVE for masses, tenderness or discharge  CV: NEGATIVE for chest pain, palpitations or peripheral edema  GI: NEGATIVE for nausea, abdominal pain, heartburn, or change in  "bowel habits, except has some intermittent  frequent tool lately but not diarrhea and no pain etc   : NEGATIVE for unusual urinary or vaginal symptoms. No vaginal bleeding.  MUSCULOSKELETAL: NEGATIVE for significant  Myalgia . Some arthralgias but not too concerned   NEURO: NEGATIVE for weakness, dizziness or paresthesias  ENDOCRINE: NEGATIVE for temperature intolerance, skin/hair changes except DM follow up as per HPI   HEME/ALLERGY/IMMUNE: NEGATIVE for bleeding problems  PSYCHIATRIC: NEGATIVE for changes in mood or affect      OBJECTIVE:   /82   Pulse 78   Temp 98  F (36.7  C) (Tympanic)   Resp 12   Ht 1.514 m (4' 11.61\")   Wt 61.2 kg (135 lb)   LMP 04/14/2017   SpO2 98%   BMI 26.72 kg/m    Physical Exam  GENERAL: healthy, alert and no distress  EYES: Eyes grossly normal to inspection, PERRL and conjunctivae and sclerae normal  HENT: ear canals and TM's normal, nose and mouth without ulcers or lesions  NECK: no adenopathy, no asymmetry, masses, or scars and thyroid normal to palpation  RESP: lungs clear to auscultation - no rales, rhonchi or wheezes  BREAST: normal without masses, tenderness or nipple discharge and no palpable axillary masses or adenopathy  CV: regular rate and rhythm, normal S1 S2, no S3 or S4, no murmur, click or rub, no peripheral edema and peripheral pulses strong  ABDOMEN: soft, nontender, no hepatosplenomegaly, no masses and bowel sounds normal   (female): deferred  MS: no gross musculoskeletal defects noted, no edema  SKIN: no suspicious lesions or rashes  NEURO: Normal strength and tone, mentation intact and speech normal  PSYCH: mentation appears normal, affect normal/bright  Foot exam : No lesion , normal sensation by monofilament. Normal peripheral pulses  .         ASSESSMENT/PLAN:       (Z00.00) Routine history and physical examination of adult  (primary encounter diagnosis)  Comment:   Plan:            (Z12.11) Screen for colon cancer  Comment:   Plan:refferal has " "been given previously , she will schedule soon     (Z11.59) Need for hepatitis C screening test  Comment:   Plan: Hepatitis C Screen Reflex to HCV RNA Quant and         Genotype          (E78.5) Hyperlipidemia with target LDL less than 100  Comment:   Plan:  Lipid panel         reflex to direct LDL Fasting    (E11.9) Type 2 diabetes mellitus without complication, without long-term current use of insulin (H)  Comment:   Plan: COMPREHENSIVE METABOLIC PANEL, Lipid panel         reflex to direct LDL Fasting, Albumin Random         Urine Quantitative with Creat Ratio, Hemoglobin        A1c,    Discussed cares, diet/ exercise . Talked about DM management , health risk etc. Stable on current   meds. Check lab, inform pt with results.Follow up as needed           Check labs. refill sent.Cares and  treatment discussed.  follow. up if problem   Patient expressed understanding and agreement with treatment plan. All patient's questions were answered, will let me know if has more later.  Medications: Rx's: Reviewed the potential side effects/complications of medications prescribed.       COUNSELING:  Reviewed preventive health counseling, as reflected in patient instructions       Regular exercise       Healthy diet/nutrition       Vision screening       Hearing screening       Immunizations    Declined: Zoster due to Other will check with insurance and plan on getting later                Aspirin prophylaxis       Osteoporosis prevention/bone health       Colon cancer screening       Consider Hep C screening for all patients one time for ages 18-79 years       HIV screeninx in teen years, 1x in adult years, and at intervals if high risk       (Juani)menopause management    Estimated body mass index is 26.72 kg/m  as calculated from the following:    Height as of this encounter: 1.514 m (4' 11.61\").    Weight as of this encounter: 61.2 kg (135 lb).    Weight management plan: Discussed healthy diet and exercise " guidelines    She reports that she has never smoked. She has never used smokeless tobacco.      Counseling Resources:  ATP IV Guidelines  Pooled Cohorts Equation Calculator  Breast Cancer Risk Calculator  BRCA-Related Cancer Risk Assessment: FHS-7 Tool  FRAX Risk Assessment  ICSI Preventive Guidelines  Dietary Guidelines for Americans, 2010  USDA's MyPlate  ASA Prophylaxis  Lung CA Screening    Dulce Hernandez MD  [facility]

## 2021-08-26 NOTE — TELEPHONE ENCOUNTER
"I have ordered urine test for microalbumin ( diabetes follow up) but not the \" UA to follow on blood in urine\"     I think she should discuss this  with her urologist, as I see that last year Dr. Pryor had placed order and referral for urology, so best would be that she follows up with urology regarding that.   "

## 2021-08-26 NOTE — PATIENT INSTRUCTIONS
Patient Education     Prevention Guidelines, Women Ages 50 to 64  Screening tests and vaccines are an important part of managing your health. A screening test is done to find possible disorders or diseases in people who don't have any symptoms. The goal is to find a disease early so lifestyle changes can be made and you can be watched more closely to reduce the risk of disease, or to detect it early enough to treat it most effectively. Screening tests are not considered diagnostic, but are used to determine if more testing is needed. Health counseling is essential, too. Below are guidelines for these, for women ages 50 to 64. Keep in mind that screening advice varies among expert groups. Talk with your healthcare provider about which tests are best for you and to make sure you re up to date on what you need.   Screening  Who needs it  How often    Type 2 diabetes or prediabetes  All women beginning at age 45 and women without symptoms at any age who are overweight or obese and have 1 or more additional risk factors for diabetes.  At  least every 3 years    Type 2 diabetes or prediabetes  All women diagnosed with gestational diabetes  Lifelong testing at least every 3 years    Type 2 diabetes All women with prediabetes  Every year   Unhealthy alcohol use  All women in this age group  At routine exams   Blood pressure All women in this age group  Yearly checkup if your blood pressure is normal   Normal blood pressure is less than 120/80 mm Hg   If your blood pressure reading is higher than normal, follow the advice of your healthcare provider    Breast cancer All women at average risk in this age group  Yearly mammogram should be done until age 54. At age 55, you can switch to every other year or choose to continue yearly.   All women should know how their breasts normally look and feel and know the possible benefits and risks of breast cancer screening with mammograms.    Cervical cancer All women in this age  group, except women who have had a complete hysterectomy  Pap test every 3 years or Pap test with human papillomavirus (HPV) test every 5 years    Chlamydia Women who are sexually active and at increased risk for infection  At yearly routine exams    Colorectal cancer All women at average risk in this age group  Multiple tests are available and are used at different times. Possible tests include:     Flexible sigmoidoscopy every 5 years, or    Colonoscopy every 10 years, or    CT colonography (virtual colonoscopy) every 5 years, or    Yearly fecal occult blood test, or    Yearly fecal immunochemical test every year, or    Stool DNA test, every 3 years  If you choose a test other than a colonoscopy and have an abnormal test result, you will need to follow up with a colonoscopy. Screening advice varies among expert groups. Talk with your healthcare provider about which tests are best for you.   Some people should be screened using a different schedule because of their personal or family health history. Talk with your healthcare provider about your health history.    Depression All women in this age group  At routine exams   Gonorrhea Sexually active women at increased risk for infection  At yearly routine exams    Hepatitis C Anyone at increased risk; 1 time for those born between 1945 and 1965  At routine exams   High cholesterol or triglycerides  All women in this age group who are at risk for coronary artery disease  At least every 5 years; talk with your healthcare provider about your risk    HIV All women At least once during your lifetime; yearly if at high risk    Lung cancer Women between the ages of 55 to 74 who are in fairly good health and are at higher risk for lung cancer         Currently smoke or have  quit within past 15 years         30-pack-year smoking history  , Eligibility criteria and age limit (possibly up to age 80) may vary across major organizations  Yearly lung cancer screening with a  low-dose CT scan (LDCT) Talk with your healthcare provider for more information.    Obesity All women in this age group  At yearly routine exams    Osteoporosis Women who are postmenopausal  Talk with your healthcare provider    Syphilis Women at increased risk for infection  At routine exams; talk with your healthcare provider    Tuberculosis Women at increased risk for infection  Talk with your healthcare provider    Vision All women in this age group  Talk with your healthcare provider    Vaccine Who needs it How often   Chickenpox (varicella)  All women in this age group who have no record of this infection or vaccine  2 doses; the second dose should be given at least 4 weeks after the first dose    Hepatitis A Women at increased risk for infection  2 or 3 doses (depending on the vaccine) given at least 6 months apart; talk with your healthcare provider    Hepatitis B Women at increased risk for infection  2 or 3 doses (depending on the vaccine) ; second dose should be given 1 month after the first dose; if a third dose, it should be given at least 2 months after the second dose and at least 4 months after the first dose; talk with your healthcare provider    Haemophilus influenzae Type B (HIB)  Women at increased risk for infection  1 or 3 doses; talk with your healthcare provider    Influenza (flu) All women in this age group  Once a year   Measles, mumps, rubella (MMR)  Women in this age group born in 1957 or later who have no record of these infections or vaccines  1 or 2doses   Meningococcal Women at increased risk for infection  1 or more doses; talk with your healthcare provider    Pneumococcal conjugate vaccine (PCV13) and pneumococcal polysaccharide vaccine (PPSV23)  Women at increased risk for infection  PCV13: 1 dose ages 19 to 64 (protects against 13 types of pneumococcal bacteria)   PPSV23: 1 or 2 doses through age 64(protects against 23 types of pneumococcal bacteria)   Talk with your healthcare  provider   Tetanus/diphtheria/pertussis (Td/Tdap) booster All women in this age group  Td every 10 years, or a 1-time dose of Tdap instead of a Td booster after age 18, then Td every 10 years    Recombinant zoster vaccine (RZV)  All women ages 50 and older  If 2 doses; the 2nd dose is given 2 to 6 months after the first. This is given even if you've had shingles before or had a previous zoster live vaccine.    Counseling Who needs it How often   BRCA gene mutation testing for breast and ovarian cancer susceptibility  Women with increased risk for having gene mutation  When your risk is known; talk with your healthcare provider    Breast cancer and chemoprevention  Women at high risk for breast cancer  When your risk is known; talk with your healthcare provider    Diet and exercise Women who are overweight or obese  When diagnosed, and then at routine exams    Sexually transmitted infection prevention  Women at increased risk for infection  At routine exams; talk with your healthcare provider    Use of daily aspirin  Women ages 50 and up who are at high risk for cardiovascular health problems and not at increased risk for bleeding as identified by their healthcare provider  When your risk is known; talk with your healthcare provider    Use of tobacco and the health effects it can cause  All women in this age group  Every exam   [employer] last reviewed this educational content on 6/1/2020 2000-2021 [employer]. All rights reserved. This information is not intended as a substitute for professional medical care. Always follow your healthcare professional's instructions.

## 2021-08-26 NOTE — PROGRESS NOTES
Davon Hernandez,  When I was drawing Ijeoma's blood this morning she asked if we were doing the urine sample for her hematuria. I did explain the test you ordered is not specifically for blood. But more about kidney function, she asked if you would please order a urinalysis to check for the blood.   Thank You,  [place]

## 2021-08-27 LAB
ALBUMIN SERPL-MCNC: 4 G/DL (ref 3.4–5)
ALP SERPL-CCNC: 73 U/L (ref 40–150)
ALT SERPL W P-5'-P-CCNC: 35 U/L (ref 0–50)
ANION GAP SERPL CALCULATED.3IONS-SCNC: 9 MMOL/L (ref 3–14)
AST SERPL W P-5'-P-CCNC: 16 U/L (ref 0–45)
BILIRUB SERPL-MCNC: 0.5 MG/DL (ref 0.2–1.3)
BUN SERPL-MCNC: 13 MG/DL (ref 7–30)
CALCIUM SERPL-MCNC: 8.8 MG/DL (ref 8.5–10.1)
CHLORIDE BLD-SCNC: 106 MMOL/L (ref 94–109)
CHOLEST SERPL-MCNC: 133 MG/DL
CO2 SERPL-SCNC: 24 MMOL/L (ref 20–32)
CREAT SERPL-MCNC: 0.68 MG/DL (ref 0.52–1.04)
CREAT UR-MCNC: 117 MG/DL
FASTING STATUS PATIENT QL REPORTED: YES
GFR SERPL CREATININE-BSD FRML MDRD: >90 ML/MIN/1.73M2
GLUCOSE BLD-MCNC: 97 MG/DL (ref 70–99)
HDLC SERPL-MCNC: 55 MG/DL
LDLC SERPL CALC-MCNC: 49 MG/DL
MICROALBUMIN UR-MCNC: 51 MG/L
MICROALBUMIN/CREAT UR: 43.59 MG/G CR (ref 0–25)
NONHDLC SERPL-MCNC: 78 MG/DL
POTASSIUM BLD-SCNC: 4 MMOL/L (ref 3.4–5.3)
PROT SERPL-MCNC: 7.5 G/DL (ref 6.8–8.8)
SODIUM SERPL-SCNC: 139 MMOL/L (ref 133–144)
TRIGL SERPL-MCNC: 144 MG/DL

## 2021-08-27 ASSESSMENT — ANXIETY QUESTIONNAIRES: GAD7 TOTAL SCORE: 4

## 2021-10-01 DIAGNOSIS — K21.9 GASTROESOPHAGEAL REFLUX DISEASE: ICD-10-CM

## 2021-10-02 ENCOUNTER — HEALTH MAINTENANCE LETTER (OUTPATIENT)
Age: 56
End: 2021-10-02

## 2021-10-04 RX ORDER — OMEPRAZOLE 40 MG/1
CAPSULE, DELAYED RELEASE ORAL
Qty: 90 CAPSULE | Refills: 1 | Status: SHIPPED | OUTPATIENT
Start: 2021-10-04 | End: 2022-04-15

## 2021-10-25 DIAGNOSIS — E11.9 TYPE 2 DIABETES MELLITUS WITHOUT COMPLICATION, WITHOUT LONG-TERM CURRENT USE OF INSULIN (H): ICD-10-CM

## 2021-10-25 RX ORDER — METFORMIN HCL 500 MG
TABLET, EXTENDED RELEASE 24 HR ORAL
Qty: 360 TABLET | Refills: 0 | Status: SHIPPED | OUTPATIENT
Start: 2021-10-25 | End: 2022-01-24

## 2021-10-25 NOTE — TELEPHONE ENCOUNTER
Prescription approved per [facility] Refill Protocol.    Janna LITTLE RN  EP Triage

## 2021-11-11 DIAGNOSIS — E11.9 NEW ONSET TYPE 2 DIABETES MELLITUS (H): ICD-10-CM

## 2021-11-15 RX ORDER — LANCETS
EACH MISCELLANEOUS
Qty: 100 EACH | Refills: 1 | Status: SHIPPED | OUTPATIENT
Start: 2021-11-15 | End: 2023-09-19

## 2021-11-15 NOTE — TELEPHONE ENCOUNTER
Prescription approved per [facility] Refill Protocol.  Amanda Huang RN on 11/15/2021 at 9:58 AM

## 2021-11-18 ENCOUNTER — VIRTUAL VISIT (OUTPATIENT)
Dept: PSYCHOLOGY | Facility: CLINIC | Age: 56
End: 2021-11-18
Payer: COMMERCIAL

## 2021-11-18 DIAGNOSIS — F41.9 ANXIETY DISORDER, UNSPECIFIED TYPE: Primary | ICD-10-CM

## 2021-11-18 PROCEDURE — 90834 PSYTX W PT 45 MINUTES: CPT | Mod: 95 | Performed by: PSYCHOLOGIST

## 2021-11-18 NOTE — PROGRESS NOTES
"  Progress Note    Patient Name: Ijeoma Avalos  Date: 11/18/21     Service Type: Telephone Visit     The patient has been notified of the following:      \"We have found that certain health care needs can be provided without the need for a face to face visit. This service lets us provide the care you need with a phone conversation.       I will have full access to your [facility] medical record during this entire phone call. I will be taking notes for your medical record.      Since this is like an office visit, we will bill your insurance company for this service.       There are potential benefits and risks of telephone visits (e.g. limits to patient confidentiality) that differ from in-person visits.?  Confidentiality still applies for telephone services, and nobody will record the visit.  It is important to be in a quiet, private space that is free of distractions (including cell phone or other devices) during the visit.??      If during the course of the call I believe a telephone visit is not appropriate, you will not be charged for this service\"     Consent has been obtained for this service by care team member: Yes     Session Start Time: 11:01    Session End Time: 11:52     Session Length: 37-52 min    Session #: 27 (this episode of care)    Attendees: Client attended alone     DATA      Progress Since Last Session (Related to Symptoms / Goals / Homework):   Symptoms: Variable--periods of increased anxiety      Episode of Care Goals: Satisfactory progress - ACTION (Actively working towards change); Intervened by reinforcing change plan / affirming steps taken     Current / Ongoing Stressors and Concerns:   Family stressors   Work stressors     Intervention:  Session was conducted via telephone at Client's request, as it allows her increased privacy. Client endorsed continued progress with limiting her commitments, including declining requests and saying no at times. Discussed her strong pull to \"fill the " "void\" both at work and in volunteer activities. Identified how this pattern prevents any new solutions from being generated. If she doesn't jump in to solve the problem, then new solutions will be discovered. Client will practice: \"just because there's a void doesn't mean I have to fill it.\" Client has noticed a tendency toward \"controlling\" behavior in some circumstances. Observed that this is often an attempt at managing anxiety. Talked about labeling this for herself as she becomes aware of it, then instead of trying to control other people/situations that are beyond her control, she can instead \"find a way to be OK\" even if nothing else changes. Client stated that she has developed a practice of writing haiku, and this has been quite calming and therapeutic--requires her to be in the moment and present with nature. Discussed status of marital therapy--Client reports she and her  are still considering this, have not yet moved forward with establishing any sessions.      ASSESSMENT: Current Emotional / Mental Status (status of significant symptoms):   Risk status (Self / Other harm or suicidal ideation)   Patient denies current fears or concerns for personal safety.   Patient denies current or recent suicidal ideation or behaviors.   Patient denies current or recent homicidal ideation or behaviors.   Patient denies current or recent self injurious behavior or ideation.   Patient denies other safety concerns.   Patient reports there has been no change in risk factors since her last session.     Patient reports there has been no change in protective factors since her last session.     Recommended that patient call 911 or go to the local ED should there be a change in any of these risk factors.     Appearance:    Unable to assess   Psychomotor Behavior: Unable to assess   Eye Contact:   Unable to assess   Attitude:   Open, thoughtful    Orientation:   All   Speech    Rate / Production: Normal " "    Volume:  Normal    Mood:    Variable--periods of increased anxiety    Affect:     Unable to assess   Thought Content:  Working to shift toward internal rather than external validation   Thought Form:  Coherent  Logical    Insight:    Fair      Medication Compliance:   Yes; infrequent use of lorazepan     Changes in Health Issues:   None reported     Chemical Use Review:   Substance Use: Chemical use reviewed, no active concerns identified      Tobacco Use: No current tobacco use.      Diagnosis:  1. Anxiety disorder, unspecified type        PLAN: (Patient Tasks / Therapist Tasks / Other)  Both at work and in her personal life, Client will practice: \"just because there's a void doesn't mean I have to fill it.\" She will work on labeling \"controlling\" behavior as she becomes aware of it, and recognizing that anxiety management is what is called for, then \"find a way to be OK\" even if nothing/no one else changes. She will continue writing haiku as an outlet for relaxation and regulation. Continue to work on building the skill of \"appreciating the now\" and enjoying the creative pursuits that she has time to pursue (rejuvenating herself is a valuable use of time). As she notices that her brain is running its usual program of searching for \"what's wrong?\" she will redirect herself to the thought of \"just be here now.\" Return appointments scheduled once/month; Client requested telephone visits due to increased opportunities for privacy. She will contact me sooner if needed. She would be interested in in-person visits when available.      Rosa Chowdary, LP     _______________________________________________________________________     Treatment Plan     Client's Name: Ijeoma Avalos                 YOB: 1965     Date: 2/9/17     DSM-V Diagnoses: Adjustment Disorders  309.28 (F43.23) With mixed anxiety and depressed mood  Psychosocial / Contextual Factors: physical symptoms; phase of life stressors " (full-time work and parent of young child); work demands; family [place]  WHODAS: 18     Referral / Collaboration:  Referral to another professional/service is not indicated at this time.     Anticipated number of session or this episode of care: will re-evaluate every 90 days     MeasurableTreatment Goal(s) related to diagnosis / functional impairment(s)  Goal 1: Client will build skills for stress management.    I will know I've met my goal when my blood pressure is lower and my reflux symptoms have decreased.   6/22 *Some improvement noted, particularly with reflux.     Objective #A (Client Action)                Client will use at least 2 coping skills for anxiety management in the next 6 weeks.  Status: Continued - Date(s): 11/18/21      Intervention(s)  Therapist will teach self-regulation strategies, CBT skills, mindfulness techniques.     Objective #B  Client will use cognitive strategies identified in therapy to challenge anxious thoughts.  Status: Continued - Date(s): 11/18/21      Intervention(s)  Therapist will teach cognitive strategies, provide education.        Goal 2: Client will increase self-confidence.    I will know I've met my goal when I think more positively about myself.   *6/22/21 Progress noted, continue work     Objective #A (Client Action)                Status: Continued - Date(s) 11/18/21      Client will increase assertive communication.     Intervention(s)  Therapist will teach assertiveness skills.     Objective #B  Client will Identify negative self-talk and behaviors: challenge core beliefs, myths, and actions.                  Status: Continued - Date(s):11/18/21      Intervention(s)  Therapist will provide education, teach CBT skills.        Goal 3: Client will increase connection in close relationships.    I will know I've met my goal when I feel more close with loved ones.       Objective #A (Client Action)                Status: Continued - Date(s):11/18/21      Client will  prioritize time for meaningful relationships.     Intervention(s)  Therapist will provide support and accountability.     Objective #B  Client will make use of effective interpersonal communication skills.                         Status: Continued - Date(s): 11/18/21      Intervention(s)  Therapist will teach communication skills.        Client has reviewed and agreed to the above plan.

## 2021-11-20 DIAGNOSIS — E11.9 TYPE 2 DIABETES MELLITUS WITHOUT COMPLICATION, WITHOUT LONG-TERM CURRENT USE OF INSULIN (H): ICD-10-CM

## 2021-11-20 DIAGNOSIS — I10 ESSENTIAL HYPERTENSION: ICD-10-CM

## 2021-11-22 RX ORDER — LISINOPRIL 5 MG/1
TABLET ORAL
Qty: 90 TABLET | Refills: 2 | Status: SHIPPED | OUTPATIENT
Start: 2021-11-22 | End: 2022-08-25

## 2021-12-05 ENCOUNTER — MYC MEDICAL ADVICE (OUTPATIENT)
Dept: FAMILY MEDICINE | Facility: CLINIC | Age: 56
End: 2021-12-05
Payer: COMMERCIAL

## 2021-12-05 DIAGNOSIS — E11.9 TYPE 2 DIABETES MELLITUS WITHOUT COMPLICATION, WITHOUT LONG-TERM CURRENT USE OF INSULIN (H): Primary | ICD-10-CM

## 2021-12-05 DIAGNOSIS — E03.9 HYPOTHYROIDISM, UNSPECIFIED TYPE: ICD-10-CM

## 2021-12-07 NOTE — TELEPHONE ENCOUNTER
Please review my chart message and advise.    Dear Dr. Hernandez, I have been monitoring my blood sugar and my fasting level is fluctuating between high values in the morning (132 today) and ok values (109 yesterday). You'd mentioned that I get checked, when we discussed in our annual checkup meeting. You'd recommended I go down to 3 tablets metformin/day. But a couple of weeks into 3 tablets and not good levels, I switched back to 4 tablets. The levels have still not settled down. I'm also getting headaches again.   I also am worried about my TSH levels.   Please advise.    Janna LITTLE RN  EP Triage

## 2021-12-13 ENCOUNTER — LAB (OUTPATIENT)
Dept: LAB | Facility: CLINIC | Age: 56
End: 2021-12-13
Attending: PHYSICIAN ASSISTANT
Payer: COMMERCIAL

## 2021-12-13 DIAGNOSIS — E11.9 TYPE 2 DIABETES MELLITUS WITHOUT COMPLICATION, WITHOUT LONG-TERM CURRENT USE OF INSULIN (H): ICD-10-CM

## 2021-12-13 DIAGNOSIS — E03.9 HYPOTHYROIDISM, UNSPECIFIED TYPE: ICD-10-CM

## 2021-12-13 LAB — HBA1C MFR BLD: 6.4 % (ref 0–5.6)

## 2021-12-13 PROCEDURE — 36415 COLL VENOUS BLD VENIPUNCTURE: CPT

## 2021-12-13 PROCEDURE — 83036 HEMOGLOBIN GLYCOSYLATED A1C: CPT

## 2021-12-13 PROCEDURE — 84439 ASSAY OF FREE THYROXINE: CPT

## 2021-12-13 PROCEDURE — 84443 ASSAY THYROID STIM HORMONE: CPT

## 2021-12-14 LAB
T4 FREE SERPL-MCNC: 1.49 NG/DL (ref 0.76–1.46)
TSH SERPL DL<=0.005 MIU/L-ACNC: 0.22 MU/L (ref 0.4–4)

## 2021-12-15 DIAGNOSIS — E11.9 TYPE 2 DIABETES MELLITUS WITHOUT COMPLICATION, WITHOUT LONG-TERM CURRENT USE OF INSULIN (H): ICD-10-CM

## 2021-12-15 DIAGNOSIS — E03.9 HYPOTHYROIDISM, UNSPECIFIED TYPE: ICD-10-CM

## 2021-12-15 RX ORDER — LEVOTHYROXINE SODIUM 75 UG/1
75 TABLET ORAL DAILY
Qty: 60 TABLET | Refills: 1 | Status: SHIPPED | OUTPATIENT
Start: 2021-12-15 | End: 2022-09-03

## 2021-12-15 NOTE — RESULT ENCOUNTER NOTE
Ijeoma    Your lab tests are complete and I have reviewed the results.     - Your A1c is up slightly from last time.  Please continue taking the metformin and we will recheck again in 3 months.     - Your TSH was LOW and your T4 was high, suggesting your thryoid medication dose may be too high.  I have sent in a lower medication dose to your pharmacy.  We should recheck your labs in 6-8 weeks to make sure we are now at the correct dose. This can be done at any [facility] if another location is more convient for you.    If you have any questions or concerns, please feel free to call or send a [employer] message.    Sincerely,  Allen Madrid PA-C

## 2021-12-23 ENCOUNTER — VIRTUAL VISIT (OUTPATIENT)
Dept: PSYCHOLOGY | Facility: CLINIC | Age: 56
End: 2021-12-23
Payer: COMMERCIAL

## 2021-12-23 DIAGNOSIS — F41.9 ANXIETY DISORDER, UNSPECIFIED TYPE: Primary | ICD-10-CM

## 2021-12-23 PROCEDURE — 90834 PSYTX W PT 45 MINUTES: CPT | Mod: 95 | Performed by: PSYCHOLOGIST

## 2021-12-23 NOTE — PROGRESS NOTES
"  Progress Note    Patient Name: Ijeoma Avalos  Date: 12/23/21     Service Type: Telephone Visit     The patient has been notified of the following:      \"We have found that certain health care needs can be provided without the need for a face to face visit. This service lets us provide the care you need with a phone conversation.       I will have full access to your [facility] medical record during this entire phone call. I will be taking notes for your medical record.      Since this is like an office visit, we will bill your insurance company for this service.       There are potential benefits and risks of telephone visits (e.g. limits to patient confidentiality) that differ from in-person visits.?  Confidentiality still applies for telephone services, and nobody will record the visit.  It is important to be in a quiet, private space that is free of distractions (including cell phone or other devices) during the visit.??      If during the course of the call I believe a telephone visit is not appropriate, you will not be charged for this service\"     Consent has been obtained for this service by care team member: Yes     Session Start Time: 13:00    Session End Time: 13:51     Session Length: 37-52 min    Session #: 28 (this episode of care)    Attendees: Client attended alone     DATA      Progress Since Last Session (Related to Symptoms / Goals / Homework):   Symptoms: Some improvement--lower anxiety      Episode of Care Goals: Satisfactory progress - ACTION (Actively working towards change); Intervened by reinforcing change plan / affirming steps taken     Current / Ongoing Stressors and Concerns:   Family stressors   Work stressors     Intervention:  Session was conducted via telephone at Client's request, as it allows her increased privacy. Client stated that she has accepted a new position at work. Through the interview process and the feedback that she received, she has noted increased confidence and " belief in her own abilities. She stated that she is looking forward to the new team and new responsibilities. Discussed what will be most important to her in starting the new position--forming relationships, taking time to listen and understand current state. Also identified pitfalls she would like to avoid (overfunctioning, covering for under-performers). Client described lower stress overall. Upon reflection, she observed that much of this change may be attributed to the benefit she finds in her daily practice of writing haiku. She finds that her thoughts throughout the day are focused on the haiku--the words, the feelings, the experience--which directs her mind in a positive direction and leaves limited time for rumination or worry. Discussed how this has impacted her relationships in a positive way as well.      ASSESSMENT: Current Emotional / Mental Status (status of significant symptoms):   Risk status (Self / Other harm or suicidal ideation)   Patient denies current fears or concerns for personal safety.   Patient denies current or recent suicidal ideation or behaviors.   Patient denies current or recent homicidal ideation or behaviors.   Patient denies current or recent self injurious behavior or ideation.   Patient denies other safety concerns.   Patient reports there has been no change in risk factors since her last session.     Patient reports there has been no change in protective factors since her last session.     Recommended that patient call 911 or go to the local ED should there be a change in any of these risk factors.     Appearance:    Unable to assess   Psychomotor Behavior: Unable to assess   Eye Contact:   Unable to assess   Attitude:   Cooperative    Orientation:   All   Speech    Rate / Production: Normal     Volume:  Normal    Mood:    Some improvement--less anxious   Affect:     Unable to assess   Thought Content:  More positive, deafult mindset now is thinking about haiku   Thought  "Form:  Coherent  Logical    Insight:    Fair      Medication Compliance:   Yes; infrequent use of lorazepan     Changes in Health Issues:   None reported     Chemical Use Review:   Substance Use: Chemical use reviewed, no active concerns identified      Tobacco Use: No current tobacco use.      Diagnosis:  1. Anxiety disorder, unspecified type        PLAN: (Patient Tasks / Therapist Tasks / Other)  As Client prepares to start a new position next month, she will focus on the most important first steps to her (building relationships, listening and absorbing current state) and be aware of pitfalls she may be prone to (e.g., overfunctioning). She will continue with her practice of writing a daily haiku, directing her thoughts to the writing throughout the day. She is becoming more aware of the benefits of this practice on her stress level, sleep, and relationships. Continue to work on building the skill of \"appreciating the now\" and enjoying the creative pursuits that she has time to pursue (rejuvenating herself is a valuable use of time). As she notices that her brain is running its usual program of searching for \"what's wrong?\" she will redirect herself to the thought of \"just be here now.\" Return appointments scheduled once/month; Client requested telephone visits due to increased opportunities for privacy. She will contact me sooner if needed. She would be interested in in-person visits when available.      Rosa Chowdary, LP     _______________________________________________________________________     Treatment Plan     Client's Name: Ijeoma Avalos                 YOB: 1965     Date: 2/9/17     DSM-V Diagnoses: Adjustment Disorders  309.28 (F43.23) With mixed anxiety and depressed mood  Psychosocial / Contextual Factors: physical symptoms; phase of life stressors (full-time work and parent of young child); work demands; family overseas  WHODAS: 16     Referral / Collaboration:  Referral to " another professional/service is not indicated at this time.     Anticipated number of session or this episode of care: will re-evaluate every 90 days     MeasurableTreatment Goal(s) related to diagnosis / functional impairment(s)  Goal 1: Client will build skills for stress management.    I will know I've met my goal when my blood pressure is lower and my reflux symptoms have decreased.   6/22 *Some improvement noted, particularly with reflux.     Objective #A (Client Action)                Client will use at least 2 coping skills for anxiety management in the next 6 weeks.  Status: Continued - Date(s): 11/18/21      Intervention(s)  Therapist will teach self-regulation strategies, CBT skills, mindfulness techniques.     Objective #B  Client will use cognitive strategies identified in therapy to challenge anxious thoughts.  Status: Continued - Date(s): 11/18/21      Intervention(s)  Therapist will teach cognitive strategies, provide education.        Goal 2: Client will increase self-confidence.    I will know I've met my goal when I think more positively about myself.   *6/22/21 Progress noted, continue work     Objective #A (Client Action)                Status: Continued - Date(s) 11/18/21      Client will increase assertive communication.     Intervention(s)  Therapist will teach assertiveness skills.     Objective #B  Client will Identify negative self-talk and behaviors: challenge core beliefs, myths, and actions.                  Status: Continued - Date(s):11/18/21      Intervention(s)  Therapist will provide education, teach CBT skills.        Goal 3: Client will increase connection in close relationships.    I will know I've met my goal when I feel more close with loved ones.       Objective #A (Client Action)                Status: Continued - Date(s):11/18/21      Client will prioritize time for meaningful relationships.     Intervention(s)  Therapist will provide support and  accountability.     Objective #B  Client will make use of effective interpersonal communication skills.                         Status: Continued - Date(s): 11/18/21      Intervention(s)  Therapist will teach communication skills.        Client has reviewed and agreed to the above plan.

## 2022-01-22 DIAGNOSIS — E11.9 TYPE 2 DIABETES MELLITUS WITHOUT COMPLICATION, WITHOUT LONG-TERM CURRENT USE OF INSULIN (H): ICD-10-CM

## 2022-01-24 RX ORDER — METFORMIN HCL 500 MG
TABLET, EXTENDED RELEASE 24 HR ORAL
Qty: 360 TABLET | Refills: 0 | Status: SHIPPED | OUTPATIENT
Start: 2022-01-24 | End: 2022-04-19

## 2022-01-24 NOTE — TELEPHONE ENCOUNTER
Prescription approved per [facility] Refill Protocol.    Janna LITTLE RN  EP Triage

## 2022-01-27 ENCOUNTER — TRANSFERRED RECORDS (OUTPATIENT)
Dept: HEALTH INFORMATION MANAGEMENT | Facility: CLINIC | Age: 57
End: 2022-01-27
Payer: COMMERCIAL

## 2022-01-27 ENCOUNTER — VIRTUAL VISIT (OUTPATIENT)
Dept: PSYCHOLOGY | Facility: CLINIC | Age: 57
End: 2022-01-27

## 2022-01-27 DIAGNOSIS — F41.9 ANXIETY DISORDER, UNSPECIFIED TYPE: Primary | ICD-10-CM

## 2022-01-27 PROCEDURE — 90834 PSYTX W PT 45 MINUTES: CPT | Mod: 95 | Performed by: PSYCHOLOGIST

## 2022-01-27 NOTE — PROGRESS NOTES
"  Progress Note    Patient Name: Ijeoma Avalos  Date: 1/27/21     Service Type: Telephone Visit     The patient has been notified of the following:      \"We have found that certain health care needs can be provided without the need for a face to face visit. This service lets us provide the care you need with a phone conversation.       I will have full access to your [place] medical record during this entire phone call. I will be taking notes for your medical record.      Since this is like an office visit, we will bill your insurance company for this service.       There are potential benefits and risks of telephone visits (e.g. limits to patient confidentiality) that differ from in-person visits.?  Confidentiality still applies for telephone services, and nobody will record the visit.  It is important to be in a quiet, private space that is free of distractions (including cell phone or other devices) during the visit.??      If during the course of the call I believe a telephone visit is not appropriate, you will not be charged for this service\"     Consent has been obtained for this service by care team member: Yes     Session Start Time: 13:05    Session End Time: 13:53     Session Length: 37-52 min    Session #: 29 (this episode of care)    Attendees: Client attended alone     DATA      Progress Since Last Session (Related to Symptoms / Goals / Homework):   Symptoms: Anxiety a bit higher      Episode of Care Goals: Satisfactory progress - ACTION (Actively working towards change); Intervened by reinforcing change plan / affirming steps taken     Current / Ongoing Stressors and Concerns:   Family stressors   Work stressors     Intervention:  Session was conducted via telephone at Client's request, as it allows her increased privacy. Client reported that anxiety has been creeping up again, as COVID spreads and cases rise. She has once again restricted activities outside of the house, feeling more confined " and limited. She stated that it has been more difficult to focus on writing or stay present enough to create haikus. She is managing the stress associated with her new position at work relatively well, tolerating the uncertainty and unfamiliarity, and recognizing this as normative under the circumstances. She noted that her new manager provides a positive model for work/life balance, and she believes this will be helpful. Client stated that she is looking for some outlets for her and her  to spend enjoyable time together. Talked through her options for responding to his apparent lack of engagement in some circumstances, remembering that she cannot control him or his emotions, only herself.      ASSESSMENT: Current Emotional / Mental Status (status of significant symptoms):   Risk status (Self / Other harm or suicidal ideation)   Patient denies current fears or concerns for personal safety.   Patient denies current or recent suicidal ideation or behaviors.   Patient denies current or recent homicidal ideation or behaviors.   Patient denies current or recent self injurious behavior or ideation.   Patient denies other safety concerns.   Patient reports there has been no change in risk factors since her last session.     Patient reports there has been no change in protective factors since her last session.     Recommended that patient call 911 or go to the local ED should there be a change in any of these risk factors.     Appearance:    Unable to assess   Psychomotor Behavior: Unable to assess   Eye Contact:   Unable to assess   Attitude:   Engaged    Orientation:   All   Speech    Rate / Production: Normal/ Responsive    Volume:  Normal    Mood:    Slightly increased anxiety   Affect:     Unable to assess   Thought Content:  More frequent worries   Thought Form:  Coherent  Logical    Insight:    Fair      Medication Compliance:   Yes; infrequent use of lorazepan     Changes in Health Issues:   None  "reported     Chemical Use Review:   Substance Use: Chemical use reviewed, no active concerns identified      Tobacco Use: No current tobacco use.      Diagnosis:  1. Anxiety disorder, unspecified type        PLAN: (Patient Tasks / Therapist Tasks / Other)  Client will consider possible activities for her and her  to enjoy together. In situations where his engagement appears lower (but he is agreeable to participating), Client will choose to continue engaging (rather than withdrawing), to remain consistent with the person she would like to be, and allowing for his engagement to increase in response to hers. She will work on having her own emotional experience separate from his (I could enjoy it even if it doesn't seem like he is). She will continue with her practice of writing a daily haiku, directing her thoughts to the writing throughout the day. As she notices that her brain is running its usual program of searching for \"what's wrong?\" she will redirect herself to the thought of \"just be here now.\" Return appointments scheduled once/month; Client requested telephone visits due to increased opportunities for privacy. She will contact me sooner if needed. She would be interested in in-person visits when available.      Rosa Chowdary, KATHRAINE     _______________________________________________________________________     Treatment Plan     Client's Name: Ijeoma Avalos                 YOB: 1965     Date: 2/9/17     DSM-V Diagnoses: Adjustment Disorders  309.28 (F43.23) With mixed anxiety and depressed mood  Psychosocial / Contextual Factors: physical symptoms; phase of life stressors (full-time work and parent of young child); work demands; family overseas  WHODAS: 16     Referral / Collaboration:  Referral to another professional/service is not indicated at this time.     Anticipated number of session or this episode of care: will re-evaluate every 90 days     MeasurableTreatment Goal(s) related " to diagnosis / functional impairment(s)  Goal 1: Client will build skills for stress management.    I will know I've met my goal when my blood pressure is lower and my reflux symptoms have decreased.   6/22 *Some improvement noted, particularly with reflux.     Objective #A (Client Action)                Client will use at least 2 coping skills for anxiety management in the next 6 weeks.  Status: Continued - Date(s): 11/18/21      Intervention(s)  Therapist will teach self-regulation strategies, CBT skills, mindfulness techniques.     Objective #B  Client will use cognitive strategies identified in therapy to challenge anxious thoughts.  Status: Continued - Date(s): 11/18/21      Intervention(s)  Therapist will teach cognitive strategies, provide education.        Goal 2: Client will increase self-confidence.    I will know I've met my goal when I think more positively about myself.   *6/22/21 Progress noted, continue work     Objective #A (Client Action)                Status: Continued - Date(s) 11/18/21      Client will increase assertive communication.     Intervention(s)  Therapist will teach assertiveness skills.     Objective #B  Client will Identify negative self-talk and behaviors: challenge core beliefs, myths, and actions.                  Status: Continued - Date(s):11/18/21      Intervention(s)  Therapist will provide education, teach CBT skills.        Goal 3: Client will increase connection in close relationships.    I will know I've met my goal when I feel more close with loved ones.       Objective #A (Client Action)                Status: Continued - Date(s):11/18/21      Client will prioritize time for meaningful relationships.     Intervention(s)  Therapist will provide support and accountability.     Objective #B  Client will make use of effective interpersonal communication skills.                         Status: Continued - Date(s): 11/18/21      Intervention(s)  Therapist will teach  communication skills.        Client has reviewed and agreed to the above plan.

## 2022-02-02 ENCOUNTER — TELEPHONE (OUTPATIENT)
Dept: UROLOGY | Facility: CLINIC | Age: 57
End: 2022-02-02

## 2022-02-02 ENCOUNTER — LAB (OUTPATIENT)
Dept: LAB | Facility: CLINIC | Age: 57
End: 2022-02-02
Payer: COMMERCIAL

## 2022-02-02 DIAGNOSIS — R31.29 MICROSCOPIC HEMATURIA: ICD-10-CM

## 2022-02-02 DIAGNOSIS — R31.29 MICROSCOPIC HEMATURIA: Primary | ICD-10-CM

## 2022-02-02 LAB
ALBUMIN UR-MCNC: ABNORMAL MG/DL
APPEARANCE UR: CLEAR
BILIRUB UR QL STRIP: NEGATIVE
COLOR UR AUTO: YELLOW
GLUCOSE UR STRIP-MCNC: NEGATIVE MG/DL
HGB UR QL STRIP: ABNORMAL
KETONES UR STRIP-MCNC: NEGATIVE MG/DL
LEUKOCYTE ESTERASE UR QL STRIP: NEGATIVE
NITRATE UR QL: NEGATIVE
PH UR STRIP: 5.5 [PH] (ref 5–7)
SP GR UR STRIP: 1.02 (ref 1–1.03)
UROBILINOGEN UR STRIP-ACNC: 0.2 E.U./DL

## 2022-02-02 PROCEDURE — 87086 URINE CULTURE/COLONY COUNT: CPT

## 2022-02-02 PROCEDURE — 81003 URINALYSIS AUTO W/O SCOPE: CPT | Mod: QW

## 2022-02-02 PROCEDURE — 88112 CYTOPATH CELL ENHANCE TECH: CPT | Performed by: PATHOLOGY

## 2022-02-02 NOTE — TELEPHONE ENCOUNTER
[facility] Call Center    Phone Message    May a detailed message be left on voicemail: yes     Reason for Call: Other: Pt calling back regarding missed call.  Please try calling her again     Action Taken: Message routed to:  [facility] ([facility]): URO    Travel Screening: Not Applicable

## 2022-02-02 NOTE — TELEPHONE ENCOUNTER
Spoke with patient,she will go a [place] lab and get a UA/UC and cytology and after labs are done will follow up with .  Malissa Martin LPN

## 2022-02-02 NOTE — TELEPHONE ENCOUNTER
[facility] Call Center    Phone Message    May a detailed message be left on voicemail: yes     Reason for Call: Symptoms or Concerns     If patient has red-flag symptoms, warm transfer to triage line    Current symptom or concern: Blood in urine    Symptoms have been present for:  1 day    Has patient previously been seen for this? Yes    By: Jorge      Are there any new or worsening symptoms? Yes      Action Taken: Other: uro    Travel Screening: Not Applicable

## 2022-02-03 LAB
BACTERIA UR CULT: NORMAL
PATH REPORT.COMMENTS IMP SPEC: NORMAL
PATH REPORT.FINAL DX SPEC: NORMAL
PATH REPORT.GROSS SPEC: NORMAL
PATH REPORT.MICROSCOPIC SPEC OTHER STN: NORMAL
PATH REPORT.RELEVANT HX SPEC: NORMAL

## 2022-02-15 ENCOUNTER — TRANSFERRED RECORDS (OUTPATIENT)
Dept: HEALTH INFORMATION MANAGEMENT | Facility: CLINIC | Age: 57
End: 2022-02-15
Payer: COMMERCIAL

## 2022-02-25 ENCOUNTER — VIRTUAL VISIT (OUTPATIENT)
Dept: PSYCHOLOGY | Facility: CLINIC | Age: 57
End: 2022-02-25
Payer: COMMERCIAL

## 2022-02-25 DIAGNOSIS — F41.9 ANXIETY DISORDER, UNSPECIFIED TYPE: Primary | ICD-10-CM

## 2022-02-25 PROCEDURE — 90834 PSYTX W PT 45 MINUTES: CPT | Mod: 95 | Performed by: PSYCHOLOGIST

## 2022-02-25 NOTE — PROGRESS NOTES
"  Progress Note    Patient Name: Ijeoma Avalos  Date: 2/25/22     Service Type: Telephone Visit     The patient has been notified of the following:      \"We have found that certain health care needs can be provided without the need for a face to face visit. This service lets us provide the care you need with a phone conversation.       I will have full access to your [facility] medical record during this entire phone call. I will be taking notes for your medical record.      Since this is like an office visit, we will bill your insurance company for this service.       There are potential benefits and risks of telephone visits (e.g. limits to patient confidentiality) that differ from in-person visits.?  Confidentiality still applies for telephone services, and nobody will record the visit.  It is important to be in a quiet, private space that is free of distractions (including cell phone or other devices) during the visit.??      If during the course of the call I believe a telephone visit is not appropriate, you will not be charged for this service\"     Consent has been obtained for this service by care team member: Yes     Session Start Time: 08:00    Session End Time: 08:51     Session Length: 37-52 min    Session #: 30 (this episode of care)    Attendees: Client attended alone     DATA      Progress Since Last Session (Related to Symptoms / Goals / Homework):   Symptoms: Mild anxiety, intermittent frustration      Episode of Care Goals: Satisfactory progress - ACTION (Actively working towards change); Intervened by reinforcing change plan / affirming steps taken     Current / Ongoing Stressors and Concerns:   Family stressors   Work stressors     Intervention:  Session was conducted via telephone at Client's request, as it allows her increased privacy. Reviewed new position at work, which has been a positive shift for Client on multiple levels. Work-related stress is reduced. She does endorse anxiety related " to global events and personal health issues as well. Looked at where action-focused coping might apply (e.g., making appointments with medical team; taking small actions in own community even though global issues are well beyond her control) vs where distress tolerance and self-regulation may be indicated (e.g., yoga, deep breathing, exercise, prayer). Writing, particularly Haiku, continues to be a good method for calming as well. Client has joined some online communities for additional support and connection around haiku writing. Discussed new developments in marital dynamics and how Client is reacting to these. Identified next steps for her in this process.      ASSESSMENT: Current Emotional / Mental Status (status of significant symptoms):   Risk status (Self / Other harm or suicidal ideation)   Patient denies current fears or concerns for personal safety.   Patient denies current or recent suicidal ideation or behaviors.   Patient denies current or recent homicidal ideation or behaviors.   Patient denies current or recent self injurious behavior or ideation.   Patient denies other safety concerns.   Patient reports there has been no change in risk factors since her last session.     Patient reports there has been no change in protective factors since her last session.     Recommended that patient call 911 or go to the local ED should there be a change in any of these risk factors.     Appearance:    Unable to assess   Psychomotor Behavior: Unable to assess   Eye Contact:   Unable to assess   Attitude:   Open    Orientation:   All   Speech    Rate / Production: Normal     Volume:  Normal    Mood:    Mildly anxious   Affect:     Unable to assess   Thought Content:  Self-evaluation less negative; tendency toward worried rumination   Thought Form:  Coherent  Logical    Insight:    Fair      Medication Compliance:   Yes; infrequent use of lorazepan     Changes in Health Issues:   None reported     Chemical Use  "Review:   Substance Use: Chemical use reviewed, no active concerns identified      Tobacco Use: No current tobacco use.      Diagnosis:  1. Anxiety disorder, unspecified type        PLAN: (Patient Tasks / Therapist Tasks / Other)  Client will remember that she has varied sets of coping skills available to her for use in different circumstances (action-focused vs. distress tolerance). In interactions with her , she will consider new ways of verbal communication that seem to work better for him. She will also try conversation while engaged in joint activity (such as taking a walk). She may wish to raise the option of couples therapy with him again, perhaps in the context of acknowledging what is working well between them, in addition to the changes she would like some support with. She will continue with her practice of writing a daily haiku, directing her thoughts to the writing throughout the day. As she notices that her brain is running its usual program of searching for \"what's wrong?\" she will redirect herself to the thought of \"just be here now.\" Return appointments scheduled once/month; Client requested telephone visits due to increased opportunities for privacy. She will contact me sooner if needed. She would be interested in in-person visits when available.      Rosa Chowdary, LP     _______________________________________________________________________     Treatment Plan     Client's Name: Ijeoma Avalos                 YOB: 1965     Date: 2/9/17     DSM-V Diagnoses: Adjustment Disorders  309.28 (F43.23) With mixed anxiety and depressed mood  Psychosocial / Contextual Factors: physical symptoms; phase of life stressors (full-time work and parent of young child); work demands; family overseas  WHODAS: 18     Referral / Collaboration:  Referral to another professional/service is not indicated at this time.     Anticipated number of session or this episode of care: will re-evaluate " every 90 days     MeasurableTreatment Goal(s) related to diagnosis / functional impairment(s)  Goal 1: Client will build skills for stress management.    I will know I've met my goal when my blood pressure is lower and my reflux symptoms have decreased.   6/22 *Some improvement noted, particularly with reflux.     Objective #A (Client Action)                Client will use at least 2 coping skills for anxiety management in the next 6 weeks.  Status: Continued - Date(s): 11/18/21      Intervention(s)  Therapist will teach self-regulation strategies, CBT skills, mindfulness techniques.     Objective #B  Client will use cognitive strategies identified in therapy to challenge anxious thoughts.  Status: Continued - Date(s): 11/18/21      Intervention(s)  Therapist will teach cognitive strategies, provide education.        Goal 2: Client will increase self-confidence.    I will know I've met my goal when I think more positively about myself.   *6/22/21 Progress noted, continue work     Objective #A (Client Action)                Status: Continued - Date(s) 11/18/21      Client will increase assertive communication.     Intervention(s)  Therapist will teach assertiveness skills.     Objective #B  Client will Identify negative self-talk and behaviors: challenge core beliefs, myths, and actions.                  Status: Continued - Date(s):11/18/21      Intervention(s)  Therapist will provide education, teach CBT skills.        Goal 3: Client will increase connection in close relationships.    I will know I've met my goal when I feel more close with loved ones.       Objective #A (Client Action)                Status: Continued - Date(s):11/18/21      Client will prioritize time for meaningful relationships.     Intervention(s)  Therapist will provide support and accountability.     Objective #B  Client will make use of effective interpersonal communication skills.                         Status: Continued - Date(s):  11/18/21      Intervention(s)  Therapist will teach communication skills.        Client has reviewed and agreed to the above plan.

## 2022-03-25 ENCOUNTER — VIRTUAL VISIT (OUTPATIENT)
Dept: PSYCHOLOGY | Facility: CLINIC | Age: 57
End: 2022-03-25
Payer: COMMERCIAL

## 2022-03-25 DIAGNOSIS — F41.9 ANXIETY DISORDER, UNSPECIFIED TYPE: Primary | ICD-10-CM

## 2022-03-25 PROCEDURE — 90834 PSYTX W PT 45 MINUTES: CPT | Mod: 95 | Performed by: PSYCHOLOGIST

## 2022-03-25 NOTE — PROGRESS NOTES
"[facility]                                     Progress Note    Patient Name: Ijeoma Avalos  Date: 3/25/2022         Service Type: Individual      Session Start Time: 09:00  Session End Time: 09:52     Session Length: 38-52 mins    Session #: 31 (this episode of care)    Attendees: Client attended alone    Service Modality:  Telephone Visit:    The patient has been notified of the following:      \"We have found that certain health care needs can be provided without the need for a face to face visit. This service lets us provide the care you need with a phone conversation.       I will have full access to your [place] medical record during this entire phone call. I will be taking notes for your medical record.      Since this is like an office visit, we will bill your insurance company for this service.       There are potential benefits and risks of telephone visits (e.g. limits to patient confidentiality) that differ from in-person visits. Confidentiality still applies for telephone services, and nobody will record the visit. It is important to be in a quiet, private space that is free of distractions (including cell phone or other devices) during the visit.??      If during the course of the call I believe a telephone visit is not appropriate, you will not be charged for this service\"     Consent has been obtained for this service by care team member: Yes         DATA  Interactive Complexity: No  Crisis: No        Progress Since Last Session (Related to Symptoms / Goals / Homework):   Symptoms: stable; mild anxiety, intermittent frustration    Homework: In progress; Client is making efforts to notice the ways in which her  demonstrates love      Episode of Care Goals: Satisfactory progress - ACTION (Actively working towards change); Intervened by reinforcing change plan / affirming steps taken     Current / Ongoing Stressors and Concerns:   Family stressors              Work " "stressors     Treatment Objective(s) Addressed in This Session:   increase connection in close relationships   build skills for stress management   increase self-confidence     Intervention:   Continued work on interpersonal concerns. Identified that part of what Client is struggling with is a lack of external feedback--not know where she stands--which makes her worry that the rug could be pulled out from under her at any time. Discussed how she might initiate a conversation with her spouse around this issue. Also talked about articulating her own standards and values for how she would like to \"show up\" in the relationship, and evaluating herself, finding reassurance, based on living up to her own standards. Client was open to this feedback. Discussed the possibility of having different needs met through different relationships (not all have to be met in the marriage). Also talked about Client's goal of managing her emotions during frustrating times with her daughter.     Assessments completed prior to visit:  The following assessments were completed by patient for this visit: N/A     ASSESSMENT: Current Emotional / Mental Status (status of significant symptoms):   Risk status (Self / Other harm or suicidal ideation)   Patient denies current fears or concerns for personal safety.   Patient denies current or recent suicidal ideation or behaviors.   Patient denies current or recent homicidal ideation or behaviors.   Patient denies current or recent self injurious behavior or ideation.   Patient denies other safety concerns.   Patient reports there has been no change in risk factors since her last session.     Patient reports there has been no change in protective factors since her last session.     Recommended that patient call 911 or go to the local ED should there be a change in any of these risk factors.     Appearance:   Unable to assess    Eye Contact:   Unable to assess    Psychomotor Behavior: Unable to assess " "   Attitude:   Thoughtful    Orientation:   All   Speech    Rate / Production: Normal/ Responsive    Volume:  Normal    Mood:    Anxious, mild   Affect:    Unable to assess    Thought Content:  Increased uncertainty/uneasiness with lack of external feedback   Thought Form:  Coherent  Logical    Insight:    Fair      Medication Review:   No changes to current psychiatric medication(s)     Medication Compliance:   Yes; infrequent use of lorazepam     Changes in Health Issues:   None reported     Chemical Use Review:   Substance Use: Chemical use reviewed, no active concerns identified      Tobacco Use: No current tobacco use.      Diagnosis:  1. Anxiety disorder, unspecified type        Collateral Reports Completed:   Telephone consent    PLAN: (Patient Tasks / Therapist Tasks / Other)  Complete PROMIS next session. Client will consider an exercise in which she asks herself what would she need in order to make the current circumstances OK. She will also look for an opportunity to initiate a conversation with her spouse about what behaviors/indicators would be red flags for him. She will seek to provide reassurance for herself in the relationship by defining her own standards and values for how she would like to \"show up\" in the relationship, and evaluating herself in relation to her own standards. When faced with frustrating situations with her daughter, she will relieve herself of the responsibility of needing to control her daughter's behavior and instead focus on maintaining her own equilibrium. She will remind herself that such circumstances are normal and can be expected with children (not indicating any broader problems or failings). Return appointments scheduled once/month. Client requested telephone visits due to increased opportunities for privacy. She will contact me sooner if needed. She may be interested in in-person visits when available.      Rosa Chowdary LP                                              "         ______________________________________________________________________    Individual Treatment Plan    Patient's Name: Ijeoma Avalos  YOB: 1965    Date of Creation: 3/25/2022  Date Treatment Plan Last Reviewed/Revised: 3/25/2022    DSM5 Diagnoses: 300.00 (F41.9) Unspecified Anxiety Disorder  Psychosocial / Contextual Factors: family stressors, work stressors  PROMIS (reviewed every 90 days):     Referral / Collaboration:  Referral to another professional/service is not indicated at this time.    Anticipated number of session for this episode of care: estimated 12-16 sessions/year  Anticipation frequency of session: Monthly  Anticipated Duration of each session: 38-52 minutes  Treatment plan will be reviewed in 90 days or when goals have been changed.       MeasurableTreatment Goal(s) related to diagnosis / functional impairment(s)  Goal 1: Patient will build skills for stress management.    I will know I've met my goal when my blood pressure is lower and my reflux symptoms have decreased.   6/22 *Some improvement noted, particularly with reflux.    Objective #A (Patient Action)    Patient will use at least 2 coping skills for anxiety management in the next 6 weeks.  Status: Continued - Date(s): 3/25/2022    Intervention(s)  Therapist will teach teach self-regulation strategies, CBT skills, mindfulness techniques.    Objective #B  Patient will use cognitive strategies identified in therapy to challenge anxious thoughts.  Status: Continued - Date(s): 3/25/2022    Intervention(s)  Therapist will teach cognitive strategies, provide education.      Goal 2: Patient will increase self-confidence.    I will know I've met my goal when I think more positively about myself.   *6/22/21 Progress noted, continue work    Objective #A (Patient Action)    Status: Continued - Date(s): 3/25/2002    Patient will increase assertive communication.    Intervention(s)  Therapist will teach assertiveness  skills.    Objective #B  Patient will Identify negative self-talk and behaviors: challenge core beliefs, myths, and actions.    Status: Continued - Date(s): 3/25/2022    Intervention(s)  Therapist will provide education, teach CBT skills.      Goal 3: Patiient will increase connection in close relationships.    I will know I've met my goal when I feel more close with loved ones.      Objective #A (Patient Action)    Status: Continued - Date(s): 3/25/2022    Patient will prioritize time for meaningful relationships.    Intervention(s)  Therapist will provide support and accountability.    Objective #B  Patient will make use of effective interpersonal communication skills.    Status: Continued - Date(s): 3/25/2022    Intervention(s)  Therapist will teach communication skills.      Patient has reviewed and agreed to the above plan.      Rosa Chowdary LP  March 25, 2022

## 2022-04-01 ENCOUNTER — TRANSFERRED RECORDS (OUTPATIENT)
Dept: HEALTH INFORMATION MANAGEMENT | Facility: CLINIC | Age: 57
End: 2022-04-01

## 2022-04-01 LAB — RETINOPATHY: NEGATIVE

## 2022-04-13 DIAGNOSIS — K21.9 GASTROESOPHAGEAL REFLUX DISEASE: ICD-10-CM

## 2022-04-14 ENCOUNTER — VIRTUAL VISIT (OUTPATIENT)
Dept: FAMILY MEDICINE | Facility: CLINIC | Age: 57
End: 2022-04-14
Payer: COMMERCIAL

## 2022-04-14 DIAGNOSIS — M62.838 NECK MUSCLE SPASM: Primary | ICD-10-CM

## 2022-04-14 PROCEDURE — 99213 OFFICE O/P EST LOW 20 MIN: CPT | Mod: 95 | Performed by: INTERNAL MEDICINE

## 2022-04-14 NOTE — PATIENT INSTRUCTIONS
As discussed, sent in muscle relaxor to your pharmacy. Please avoid it while driving as it causes drowsiness and can take at bedtime as needed. Follow the Neck flexibility exercises gently.     =======================

## 2022-04-14 NOTE — PROGRESS NOTES
Ijeoma is a 56 year old who is being evaluated via a billable telephone visit.      What phone number would you like to be contacted at? [phone]  [facility]    Assessment and Plan  1. Neck muscle spasm  New problem, as per HPI which pt states she  was at the dentist and neck got sprained due to change in position as she got up. Currently 7/10 severity with neck ,back of shoulder blade and left arm pain and not radiating to the forearm and hand. Will treat emperically with Flexibility exercises sent in AVS. If no improvement will need Inclinic visit . Suggested ER if uncontrolled pain for possible ACS cannot be ruled out given pt age which pt understood and agreed.   - tiZANidine (ZANAFLEX) 4 MG tablet; Take 1 tablet (4 mg) by mouth nightly as needed for muscle spasms  Dispense: 30 tablet; Refill: 0     Patient Instructions   As discussed, sent in muscle relaxor to your pharmacy. Please avoid it while driving as it causes drowsiness and can take at bedtime as needed. Follow the Neck flexibility exercises gently.     =======================        Return in about 2 weeks (around 4/28/2022), or if symptoms worsen or fail to improve, for Follow up of last visit, If symptoms persist.    Norah Zamorano MD  [facility] HealthcareCARROLL Raphael is a 56 year old who presents for the following health issues         History of Present Illness       Reason for visit:  Sprained neck and shoulder.  Symptom onset:  1-3 days ago  Symptoms include:  Pain in neck and back  Symptom intensity:  Moderate  Symptom progression:  Worsening  Had these symptoms before:  No  What makes it worse:  Mobility  What makes it better:  Nothing    She eats 2-3 servings of fruits and vegetables daily.She consumes 0 sweetened beverage(s) daily.She exercises with enough effort to increase her heart rate 10 to 19 minutes per day.  She exercises with enough effort to increase her  heart rate 3 or less days per week.   She is taking medications regularly.          Allergies   Allergen Reactions     Cephalexin Hives     Hives on hands, face and stomach.      Ibuprofen      sneezing        Past Medical History:   Diagnosis Date     Gastric acidity      Gestational diabetes mellitus     DIET CONTROL     Hx of previous reproductive problem     IVF     Hypothyroid      Motion sickness      Ovarian cyst      Post-operative nausea and vomiting 2017       Past Surgical History:   Procedure Laterality Date     APPENDECTOMY        SECTION  2014    Procedure:  SECTION;  Surgeon: Ru Cano MD;  Location:  L+D     CHOLECYSTECTOMY       cyst removed      left  lower leg on bone sheath     DAVINCI HYSTERECTOMY TOTAL, SALPINGECTOMY BILATERAL N/A 2017    Procedure: DAVINCI HYSTERECTOMY TOTAL, SALPINGECTOMY BILATERAL;  DAVINCI SINGLE SITE HYSTERECTOMY, BILATERAL SALPINGECTOMY, LEFT OOPHORECTOMY, LYSIS OF ADHESIONS (LAPAROSCOPIC BAG TO RETREIVE OVARY);  Surgeon: Ru Cano MD;  Location:  OR     DAVINCI LYSIS OF ADHESIONS N/A 2017    Procedure: DAVINCI LYSIS OF ADHESIONS;;  Surgeon: Ru Cano MD;  Location:  OR     DAVINCI OOPHORECTOMY N/A 2017    Procedure: DAVINCI OOPHORECTOMY;;  Surgeon: Ru Cano MD;  Location:  OR     GI SURGERY       MYOMECTOMY UTERUS  2012     ZZ GASTROSCOPY,FL         Family History   Problem Relation Age of Onset     Hypertension Father      Diabetes Other         maternal aunt -2 of them     Breast Cancer No family hx of      Colon Cancer No family hx of      Coronary Artery Disease No family hx of        Social History     Tobacco Use     Smoking status: Never Smoker     Smokeless tobacco: Never Used   Substance Use Topics     Alcohol use: No     Alcohol/week: 0.0 standard drinks     Comment: social        Current Outpatient Medications   Medication     ACCU-CHEK GINA PLUS test strip      atorvastatin (LIPITOR) 10 MG tablet     blood glucose monitoring (NO BRAND SPECIFIED) meter device kit     blood glucose monitoring (SOFTCLIX) lancets     diclofenac (VOLTAREN) 1 % topical gel     fluticasone (FLONASE) 50 MCG/ACT nasal spray     fluticasone (FLONASE) 50 MCG/ACT nasal spray     lisinopril (ZESTRIL) 5 MG tablet     loratadine (CLARITIN) 10 MG tablet     LORazepam (ATIVAN) 0.5 MG tablet     metFORMIN (GLUCOPHAGE-XR) 500 MG 24 hr tablet     omeprazole (PRILOSEC) 40 MG DR capsule     ONETOUCH DELICA LANCETS 33G MISC     tiZANidine (ZANAFLEX) 4 MG tablet     levothyroxine (SYNTHROID/LEVOTHROID) 75 MCG tablet     No current facility-administered medications for this visit.        Review of Systems   Constitutional, HEENT, cardiovascular, pulmonary, GI, , musculoskeletal, neuro, skin, endocrine and psych systems are negative, except as otherwise noted.      Objective           Vitals:  No vitals were obtained today due to virtual visit.    Physical Exam   healthy, alert and no distress  PSYCH: Alert and oriented times 3; coherent speech, normal   rate and volume, able to articulate logical thoughts, able   to abstract reason, no tangential thoughts, no hallucinations   or delusions  Her affect is normal  RESP: No cough, no audible wheezing, able to talk in full sentences  Remainder of exam unable to be completed due to telephone visits      Phone call duration: 7 minutes

## 2022-04-15 RX ORDER — OMEPRAZOLE 40 MG/1
CAPSULE, DELAYED RELEASE ORAL
Qty: 90 CAPSULE | Refills: 1 | Status: SHIPPED | OUTPATIENT
Start: 2022-04-15 | End: 2022-12-09

## 2022-04-19 ENCOUNTER — TRANSFERRED RECORDS (OUTPATIENT)
Dept: HEALTH INFORMATION MANAGEMENT | Facility: CLINIC | Age: 57
End: 2022-04-19
Payer: COMMERCIAL

## 2022-04-22 ENCOUNTER — VIRTUAL VISIT (OUTPATIENT)
Dept: PSYCHOLOGY | Facility: CLINIC | Age: 57
End: 2022-04-22
Payer: COMMERCIAL

## 2022-04-22 DIAGNOSIS — F41.9 ANXIETY DISORDER, UNSPECIFIED TYPE: Primary | ICD-10-CM

## 2022-04-22 PROCEDURE — 90834 PSYTX W PT 45 MINUTES: CPT | Mod: 95 | Performed by: PSYCHOLOGIST

## 2022-04-22 NOTE — PROGRESS NOTES
"[facility] Counseling                                     Progress Note    Patient Name: jIeoma Avalos  Date: 4/22/2022         Service Type: Individual      Session Start Time: 08:00  Session End Time: 08:49     Session Length: 38-52 mins    Session #: 32 (this episode of care)    Attendees: Client attended alone    Service Modality:  Telephone Visit:    The patient has been notified of the following:      \"We have found that certain health care needs can be provided without the need for a face to face visit. This service lets us provide the care you need with a phone conversation.       I will have full access to your [place] medical record during this entire phone call. I will be taking notes for your medical record.      Since this is like an office visit, we will bill your insurance company for this service.       There are potential benefits and risks of telephone visits (e.g. limits to patient confidentiality) that differ from in-person visits. Confidentiality still applies for telephone services, and nobody will record the visit. It is important to be in a quiet, private space that is free of distractions (including cell phone or other devices) during the visit.??      If during the course of the call I believe a telephone visit is not appropriate, you will not be charged for this service\"     Consent has been obtained for this service by care team member: Yes         DATA  Interactive Complexity: No  Crisis: No        Progress Since Last Session (Related to Symptoms / Goals / Homework):   Symptoms: Slight improvement    Homework: Some progress; Client doing well with managing her own equilibrium during times of frustration with her daughter; markedly reduced anxious thoughts about such times, she has internalized acceptance of this as normative      Episode of Care Goals: Satisfactory progress - ACTION (Actively working towards change); Intervened by reinforcing change plan / affirming " "steps taken     Current / Ongoing Stressors and Concerns:   Family stressors              Work stressors     Treatment Objective(s) Addressed in This Session:   increase connection in close relationships   build skills for stress management   increase self-confidence     Intervention:   Client shared that knee pain has been impacting her recently (particularly when climbing stairs). Workup including MRI suggests arthritis, so Client is adapting to this information. Continued work on interactions in the marital relationship. Processed Client's reactions to recent anniversary celebration. Looked at the interpretations she is making, evidence for and against, and potential alternative interpretations. Identified next steps for Client in light of this information. Revisited the idea of defining her own standards and values for how she would like to \"show up\" in the relationship and evaluating herself in relation to her own standards. Client acknowledged that she has not had the energy or motivation for much of this work--feeling drained in this area--but she would like to consider this again in the future. She shared that she made plans to visit her family in [facility] later this month. She is greatly looking forward to the chance to connect with her loved ones after more than 2 years. Notably, she is not anxious about leaving her family behind, is not having catastrophizing thoughts about what may happen to them or to herself. Pointed out this progress.     Assessments completed prior to visit:  The following assessments were completed by patient for this visit:  PHQ2:   PHQ-2 ( 1999 [employer]) 4/14/2022 8/23/2021 3/10/2021 7/22/2020 6/23/2020 3/3/2020 12/26/2019   Q1: Little interest or pleasure in doing things 0 1 0 0 0 0 0   Q2: Feeling down, depressed or hopeless 0 0 0 0 0 0 0   PHQ-2 Score 0 1 0 0 0 0 0   PHQ-2 Total Score (12-17 Years)- Positive if 3 or more points; Administer PHQ-A if positive - 1 0 0 0 0 0   Q1: " Little interest or pleasure in doing things Not at all Several days - - - - -   Q2: Feeling down, depressed or hopeless Not at all Not at all - - - - -   PHQ-2 Score 0 1 - - - - -     PROMIS 10-Global Health (only subscores and total score):   PROMIS-10 Scores Only 4/22/2022   Global Mental Health Score 12   Global Physical Health Score 11   PROMIS TOTAL - SUBSCORES 23    N/A     ASSESSMENT: Current Emotional / Mental Status (status of significant symptoms):   Risk status (Self / Other harm or suicidal ideation)   Patient denies current fears or concerns for personal safety.   Patient denies current or recent suicidal ideation or behaviors.   Patient denies current or recent homicidal ideation or behaviors.   Patient denies current or recent self injurious behavior or ideation.   Patient denies other safety concerns.   Patient reports there has been no change in risk factors since her last session.     Patient reports there has been no change in protective factors since her last session.     Recommended that patient call 911 or go to the local ED should there be a change in any of these risk factors.     Appearance:   Unable to assess    Eye Contact:   Unable to assess    Psychomotor Behavior: Unable to assess    Attitude:   Engaged    Orientation:   All   Speech    Rate / Production: Normal     Volume:  Normal    Mood:    Pensive   Affect:    Unable to assess    Thought Content:  Fewer anxious thoughts; still some personalization with her 's behavior   Thought Form:  Coherent  Logical    Insight:    Fair      Medication Review:   No changes to current psychiatric medication(s)     Medication Compliance:   Yes; infrequent use of lorazepam     Changes in Health Issues:   Yes: knee pain, thought to be osteoarthritis     Chemical Use Review:   Substance Use: Chemical use reviewed, no active concerns identified      Tobacco Use: No current tobacco use.      Diagnosis:  1. Anxiety disorder, unspecified type   "      Collateral Reports Completed:   Telephone consent    PROMIS 10    PLAN: (Patient Tasks / Therapist Tasks / Other)  Client will challenge herself to look for evidence that supports or disconfirms her interpretations before accepting them as fact. She will keep in mind that, most often, other people's behavior is not about her. As she feels less drained/more motivated, she will work on defining her own standards and values for how she would like to \"show up\" in relationships. She can then consider her progress in relation to her own standards.  Return appointments scheduled once/month. Client requested telephone visits due to increased opportunities for privacy. She will contact me sooner if needed. She may be interested in in-person visits when available.      Rosa Chowdary, LP                                                      ______________________________________________________________________    Individual Treatment Plan    Patient's Name: Ijeoma Avalos  YOB: 1965    Date of Creation: 3/25/2022  Date Treatment Plan Last Reviewed/Revised: 3/25/2022    DSM5 Diagnoses: 300.00 (F41.9) Unspecified Anxiety Disorder  Psychosocial / Contextual Factors: family stressors, work stressors  PROMIS (reviewed every 90 days): 23    Referral / Collaboration:  Referral to another professional/service is not indicated at this time.    Anticipated number of session for this episode of care: estimated 12-16 sessions/year  Anticipation frequency of session: Monthly  Anticipated Duration of each session: 38-52 minutes  Treatment plan will be reviewed in 90 days or when goals have been changed.       MeasurableTreatment Goal(s) related to diagnosis / functional impairment(s)  Goal 1: Patient will build skills for stress management.    I will know I've met my goal when my blood pressure is lower and my reflux symptoms have decreased.   6/22 *Some improvement noted, particularly with reflux.    Objective #A " (Patient Action)    Patient will use at least 2 coping skills for anxiety management in the next 6 weeks.  Status: Continued - Date(s): 3/25/2022    Intervention(s)  Therapist will teach teach self-regulation strategies, CBT skills, mindfulness techniques.    Objective #B  Patient will use cognitive strategies identified in therapy to challenge anxious thoughts.  Status: Continued - Date(s): 3/25/2022    Intervention(s)  Therapist will teach cognitive strategies, provide education.      Goal 2: Patient will increase self-confidence.    I will know I've met my goal when I think more positively about myself.   *6/22/21 Progress noted, continue work    Objective #A (Patient Action)    Status: Continued - Date(s): 3/25/2002    Patient will increase assertive communication.    Intervention(s)  Therapist will teach assertiveness skills.    Objective #B  Patient will Identify negative self-talk and behaviors: challenge core beliefs, myths, and actions.    Status: Continued - Date(s): 3/25/2022    Intervention(s)  Therapist will provide education, teach CBT skills.      Goal 3: Patiient will increase connection in close relationships.    I will know I've met my goal when I feel more close with loved ones.      Objective #A (Patient Action)    Status: Continued - Date(s): 3/25/2022    Patient will prioritize time for meaningful relationships.    Intervention(s)  Therapist will provide support and accountability.    Objective #B  Patient will make use of effective interpersonal communication skills.    Status: Continued - Date(s): 3/25/2022    Intervention(s)  Therapist will teach communication skills.      Patient has reviewed and agreed to the above plan.      Rosa Chowdary LP  March 25, 2022

## 2022-04-26 DIAGNOSIS — E78.5 HYPERLIPIDEMIA LDL GOAL <130: ICD-10-CM

## 2022-04-27 ENCOUNTER — MYC MEDICAL ADVICE (OUTPATIENT)
Dept: FAMILY MEDICINE | Facility: CLINIC | Age: 57
End: 2022-04-27
Payer: COMMERCIAL

## 2022-04-28 NOTE — TELEPHONE ENCOUNTER
Patient called to check on status of this she is going out of town and completely out of medication so is needing it filled today     She would like a call when filled  Carlos Eduardo Monge RN

## 2022-04-28 NOTE — TELEPHONE ENCOUNTER
Routing refill request to provider for review/approval because:  Drug interaction warning- Lactation warning  Lachelle Rios RN

## 2022-04-29 RX ORDER — ATORVASTATIN CALCIUM 10 MG/1
10 TABLET, FILM COATED ORAL DAILY
Qty: 90 TABLET | Refills: 3 | Status: SHIPPED | OUTPATIENT
Start: 2022-04-29 | End: 2022-09-03

## 2022-04-29 NOTE — TELEPHONE ENCOUNTER
Pharmacy called for follow up awaiting signed order for refill.  Routing to PCP for follow up.    Jaylin Fernandez RN

## 2022-05-23 ENCOUNTER — VIRTUAL VISIT (OUTPATIENT)
Dept: PSYCHOLOGY | Facility: CLINIC | Age: 57
End: 2022-05-23
Payer: COMMERCIAL

## 2022-05-23 DIAGNOSIS — F41.9 ANXIETY DISORDER, UNSPECIFIED TYPE: Primary | ICD-10-CM

## 2022-05-23 PROCEDURE — 90834 PSYTX W PT 45 MINUTES: CPT | Mod: 95 | Performed by: PSYCHOLOGIST

## 2022-05-23 NOTE — PROGRESS NOTES
"[facility] Counseling                                     Progress Note    Patient Name: Ijeoma Avalos  Date: 5/23/2022         Service Type: Individual      Session Start Time: 08:17  Session End Time: 09:03     Session Length: 38-52 mins    Session #: 33 (this episode of care)    Attendees: Client attended alone    Service Modality:  Telephone Visit:    The patient has been notified of the following:      \"We have found that certain health care needs can be provided without the need for a face to face visit. This service lets us provide the care you need with a phone conversation.       I will have full access to your [place] medical record during this entire phone call. I will be taking notes for your medical record.      Since this is like an office visit, we will bill your insurance company for this service.       There are potential benefits and risks of telephone visits (e.g. limits to patient confidentiality) that differ from in-person visits. Confidentiality still applies for telephone services, and nobody will record the visit. It is important to be in a quiet, private space that is free of distractions (including cell phone or other devices) during the visit.??      If during the course of the call I believe a telephone visit is not appropriate, you will not be charged for this service\"     Consent has been obtained for this service by care team member: Yes         DATA  Interactive Complexity: No  Crisis: No        Progress Since Last Session (Related to Symptoms / Goals / Homework):   Symptoms: Stable    Homework: In progress; Client is doing better with not personalizing others' behavior       Episode of Care Goals: Satisfactory progress - ACTION (Actively working towards change); Intervened by reinforcing change plan / affirming steps taken     Current / Ongoing Stressors and Concerns:   Family stressors              Work stressors     Treatment Objective(s) Addressed in This " Session:   increase connection in close relationships   build skills for stress management   increase self-confidence     Intervention:   Reviewed Client's recent visit to see family in [place]. Processed some of the emotions related to seeing parents' decline in functioning, also Client's subsequent thoughts about her own aging process. Knee pain remains a consideration in activity level. Continued work on Client's dissatisfaction in the marital relationship. Reviewed insights gained recently, including the possibility that her 's withdrawal may be related to overstimulation/introversion. Client is gradually gaining acceptance of the idea that they may be able to connect in ways other than conversation. She will look for opportunities for joint activity or joint presence and take note of how this feels. She will work on using most of her energy for regulating herself and determining how to meet her own needs (this includes writing, rest, time with friends), instead of trying to regulate him.    Assessments completed prior to visit:  The following assessments were completed by patient for this visit: N/A       ASSESSMENT: Current Emotional / Mental Status (status of significant symptoms):   Risk status (Self / Other harm or suicidal ideation)   Patient denies current fears or concerns for personal safety.   Patient denies current or recent suicidal ideation or behaviors.   Patient denies current or recent homicidal ideation or behaviors.   Patient denies current or recent self injurious behavior or ideation.   Patient denies other safety concerns.   Patient reports there has been no change in risk factors since her last session.     Patient reports there has been no change in protective factors since her last session.     Recommended that patient call 911 or go to the local ED should there be a change in any of these risk factors.     Appearance:   Unable to assess    Eye Contact:   Unable to assess  "   Psychomotor Behavior: Unable to assess    Attitude:   Cooperative    Orientation:   All   Speech    Rate / Production: Normal     Volume:  Normal    Mood:    Mildly anxious   Affect:    Unable to assess    Thought Content:  Open to new interpretations   Thought Form:  Coherent  Logical    Insight:    Fair      Medication Review:   No changes to current psychiatric medication(s)     Medication Compliance:   Yes; infrequent use of lorazepam     Changes in Health Issues:   None reported--knee pain persists     Chemical Use Review:   Substance Use: Chemical use reviewed, no active concerns identified      Tobacco Use: No current tobacco use.      Diagnosis:  1. Anxiety disorder, unspecified type        Collateral Reports Completed:   Telephone consent     PLAN: (Patient Tasks / Therapist Tasks / Other)  Client will invite her  to go grocery shopping with her and will experiment with being with him without talking much. She will spend most of her energy on her own regulation (this includes writing, rest, time with friends), instead of trying to regulate him. Continue to practice looking for evidence that supports or disconfirms her interpretations before accepting them as fact. Continue to work on defining her own standards and values for how she would like to \"show up\" in relationships. She can then consider her progress in relation to her own standards.  Return appointment scheduled for next month. Client expressed interest in in-person visits, but Tuesdays are not a good option for her. Will contimue with telephone visits (more privacy than video visits) for the time being.     Rosa Chowdary, KATHARINE                                                      ______________________________________________________________________    Individual Treatment Plan    Patient's Name: Ijeoma Avalos  YOB: 1965    Date of Creation: 3/25/2022  Date Treatment Plan Last Reviewed/Revised: 3/25/2022    DSM5 " Diagnoses: 300.00 (F41.9) Unspecified Anxiety Disorder  Psychosocial / Contextual Factors: family stressors, work stressors  PROMIS (reviewed every 90 days): 23    Referral / Collaboration:  Referral to another professional/service is not indicated at this time.    Anticipated number of session for this episode of care: estimated 12-16 sessions/year  Anticipation frequency of session: Monthly  Anticipated Duration of each session: 38-52 minutes  Treatment plan will be reviewed in 90 days or when goals have been changed.       MeasurableTreatment Goal(s) related to diagnosis / functional impairment(s)  Goal 1: Patient will build skills for stress management.    I will know I've met my goal when my blood pressure is lower and my reflux symptoms have decreased.   6/22 *Some improvement noted, particularly with reflux.    Objective #A (Patient Action)    Patient will use at least 2 coping skills for anxiety management in the next 6 weeks.  Status: Continued - Date(s): 3/25/2022    Intervention(s)  Therapist will teach teach self-regulation strategies, CBT skills, mindfulness techniques.    Objective #B  Patient will use cognitive strategies identified in therapy to challenge anxious thoughts.  Status: Continued - Date(s): 3/25/2022    Intervention(s)  Therapist will teach cognitive strategies, provide education.      Goal 2: Patient will increase self-confidence.    I will know I've met my goal when I think more positively about myself.   *6/22/21 Progress noted, continue work    Objective #A (Patient Action)    Status: Continued - Date(s): 3/25/2002    Patient will increase assertive communication.    Intervention(s)  Therapist will teach assertiveness skills.    Objective #B  Patient will Identify negative self-talk and behaviors: challenge core beliefs, myths, and actions.    Status: Continued - Date(s): 3/25/2022    Intervention(s)  Therapist will provide education, teach CBT skills.      Goal 3: Patiient will  increase connection in close relationships.    I will know I've met my goal when I feel more close with loved ones.      Objective #A (Patient Action)    Status: Continued - Date(s): 3/25/2022    Patient will prioritize time for meaningful relationships.    Intervention(s)  Therapist will provide support and accountability.    Objective #B  Patient will make use of effective interpersonal communication skills.    Status: Continued - Date(s): 3/25/2022    Intervention(s)  Therapist will teach communication skills.      Patient has reviewed and agreed to the above plan.      Rosa Chowdary, KATHARINE  March 25, 2022

## 2022-05-25 DIAGNOSIS — E11.9 NEW ONSET TYPE 2 DIABETES MELLITUS (H): ICD-10-CM

## 2022-05-27 RX ORDER — BLOOD SUGAR DIAGNOSTIC
STRIP MISCELLANEOUS
Qty: 200 STRIP | Refills: 1 | Status: SHIPPED | OUTPATIENT
Start: 2022-05-27 | End: 2023-09-18

## 2022-05-27 RX ORDER — BLOOD-GLUCOSE METER
EACH MISCELLANEOUS
Qty: 1 KIT | Refills: 0 | Status: SHIPPED | OUTPATIENT
Start: 2022-05-27

## 2022-05-27 NOTE — TELEPHONE ENCOUNTER
Pharmacy called [facility] is discontinued they are going to fill with [facility] for meter and strips    Carlos Eduardo Monge RN

## 2022-05-27 NOTE — TELEPHONE ENCOUNTER
Prescription approved per [facility] Refill Protocol.  Shaila Negrete RN  [facility]

## 2022-07-01 ENCOUNTER — VIRTUAL VISIT (OUTPATIENT)
Dept: PSYCHOLOGY | Facility: CLINIC | Age: 57
End: 2022-07-01
Payer: COMMERCIAL

## 2022-07-01 DIAGNOSIS — F41.9 ANXIETY DISORDER, UNSPECIFIED TYPE: Primary | ICD-10-CM

## 2022-07-01 PROCEDURE — 90834 PSYTX W PT 45 MINUTES: CPT | Mod: 95 | Performed by: PSYCHOLOGIST

## 2022-07-01 NOTE — PROGRESS NOTES
"[facility]                                     Progress Note    Patient Name: Ijeoma Avalos  Date: 7/1/2022         Service Type: Individual      Session Start Time: 09:00  Session End Time: 09:51     Session Length: 38-52 mins    Session #: 34 (this episode of care)    Attendees: Client attended alone    Service Modality:  Telephone Visit:    The patient has been notified of the following:      \"We have found that certain health care needs can be provided without the need for a face to face visit. This service lets us provide the care you need with a phone conversation.       I will have full access to your [facility] medical record during this entire phone call. I will be taking notes for your medical record.      Since this is like an office visit, we will bill your insurance company for this service.       There are potential benefits and risks of telephone visits (e.g. limits to patient confidentiality) that differ from in-person visits. Confidentiality still applies for telephone services, and nobody will record the visit. It is important to be in a quiet, private space that is free of distractions (including cell phone or other devices) during the visit.??      If during the course of the call I believe a telephone visit is not appropriate, you will not be charged for this service\"     Consent has been obtained for this service by care team member: Yes         DATA  Interactive Complexity: No  Crisis: No        Progress Since Last Session (Related to Symptoms / Goals / Homework):   Symptoms: Slight improvement in mood; sleep quality has declined    Homework: Did not complete; Client stated that she has not yet tried engaging her  in a shared activity with reduced focus on conversation       Episode of Care Goals: Satisfactory progress - ACTION (Actively working towards change); Intervened by reinforcing change plan / affirming steps taken     Current / Ongoing Stressors and " Concerns:   Family stressors              Work stressors     Treatment Objective(s) Addressed in This Session:   increase connection in close relationships   build skills for stress management   increase self-confidence     Intervention:   Completed treatment plan review and agreed upon goals for continued work. Reviewed recent interactions in the marital relationship. Client is better able to recognize her 's love as expressed through action, but was able to articulate today how much it would mean to her if he expressed his interest in spending time with her, enjoying each other's company. Validated this reasonable and understandable desire. Discussed how the roles of parenthood have impacted the marriage and identified some of the roots of their patterns in how they were parented. This opens up some new lines of communication that they may explore together. Client shared that sleep has been more disrupted lately, with frequent wakings and lower quality of sleep overall. Reviewed strategy of writing down any lingering issues/worries/loose ends before bed, and then putting these away until the morning. Client was open to implementing this again. Will monitor any impact on sleep quality.     Assessments completed prior to visit:  The following assessments were completed by patient for this visit: N/A       ASSESSMENT: Current Emotional / Mental Status (status of significant symptoms):   Risk status (Self / Other harm or suicidal ideation)   Patient denies current fears or concerns for personal safety.   Patient denies current or recent suicidal ideation or behaviors.   Patient denies current or recent homicidal ideation or behaviors.   Patient denies current or recent self injurious behavior or ideation.   Patient denies other safety concerns.   Patient reports there has been no change in risk factors since her last session.     Patient reports there has been no change in protective factors since her last  "session.     Recommended that patient call 911 or go to the local ED should there be a change in any of these risk factors.     Appearance:   Unable to assess    Eye Contact:   Unable to assess    Psychomotor Behavior: Unable to assess    Attitude:   Thoughtful    Orientation:   All   Speech    Rate / Production: Normal     Volume:  Normal    Mood:    Stable overall; mild anxiety   Affect:    Unable to assess    Thought Content:  Frequent worries   Thought Form:  Coherent  Logical    Insight:    Fair      Medication Review:   No changes to current psychiatric medication(s)     Medication Compliance:   Yes; infrequent use of lorazepam     Changes in Health Issues:   None reported--continued knee pain     Chemical Use Review:   Substance Use: Chemical use reviewed, no active concerns identified      Tobacco Use: No current tobacco use.      Diagnosis:  1. Anxiety disorder, unspecified type        Collateral Reports Completed:   Telephone consent     PLAN: (Patient Tasks / Therapist Tasks / Other)  Client will implement sleep strategy of writing down any lingering issues/worries/loose ends before bed, and then putting these away until the morning. She will talk with her  about their own experiences of being parented and of being parents, will introduce the idea of making room for the couple in addition to the family. Continue to encourage her to spend most of her energy on her own regulation (this includes writing, rest, time with friends), instead of trying to regulate hier . Continue to work on defining her own standards and values for how she would like to \"show up\" in relationships. She can then consider her progress in relation to her own standards.  Return appointment scheduled for next month. Client is interested in in-person visits, but Tuesdays are not a good option for her. Will contimue with telephone visits (more privacy than video visits) for the time being.     Rosa Chowdary, KATHARINE              "                                         ______________________________________________________________________    Individual Treatment Plan    Patient's Name: Ijoema Avalos  YOB: 1965    Date of Creation: 3/25/2022  Date Treatment Plan Last Reviewed/Revised: 7/1/2022    DSM5 Diagnoses: 300.00 (F41.9) Unspecified Anxiety Disorder  Psychosocial / Contextual Factors: family stressors, work stressors  PROMIS (reviewed every 90 days): 23    Referral / Collaboration:  Referral to another professional/service is not indicated at this time.    Anticipated number of session for this episode of care: estimated 12-16 sessions/year  Anticipation frequency of session: Monthly  Anticipated Duration of each session: 38-52 minutes  Treatment plan will be reviewed in 90 days or when goals have been changed.       MeasurableTreatment Goal(s) related to diagnosis / functional impairment(s)  Goal 1: Patient will build skills for stress management.    I will know I've met my goal when I am sleeping better and not waking up so often, when I don't feel worried so much of the time.   *updated on 7/1/22 (Client believes physical symptoms such as blood pressure and reflux are not the best way to measure this outcome).    Objective #A (Patient Action)    Patient will use at least 2 coping skills for anxiety management in the next 6 weeks.  Status: Continued - Date(s): 7/1/2022    Intervention(s)  Therapist will teach teach self-regulation strategies, CBT skills, mindfulness techniques.    Objective #B  Patient will use cognitive strategies identified in therapy to challenge anxious thoughts.  Status: Continued - Date(s): 7/1/2022    Intervention(s)  Therapist will teach cognitive strategies, provide education.      Goal 2: Patient will increase self-confidence.    I will know I've met my goal when I think more positively about myself.   *7/1/22 progress noted, particularly at work, as well as in some aspects of  parenting. Client would like to continue as ongoing goal.    Objective #A (Patient Action)    Status: Continued - Date(s): 7/1/2002    Patient will increase assertive communication.    Intervention(s)  Therapist will teach assertiveness skills.    Objective #B  Patient will Identify negative self-talk and behaviors: challenge core beliefs, myths, and actions.    Status: Continued - Date(s): 7/1/2022    Intervention(s)  Therapist will provide education, teach CBT skills.      Goal 3: Patiient will increase connection in close relationships.    I will know I've met my goal when I feel more close with loved ones.  *7/1/22--Client identifies this as primary goal currently.    Objective #A (Patient Action)    Status: Continued - Date(s): 7/1/2022    Patient will prioritize time for meaningful relationships.    Intervention(s)  Therapist will provide support and accountability.    Objective #B  Patient will make use of effective interpersonal communication skills.    Status: Continued - Date(s): 7/1/2022    Intervention(s)  Therapist will teach communication skills.      Patient has reviewed and agreed to the above plan.      Rosa Chowdary, KATHARINE  March 25, 2022

## 2022-07-05 ENCOUNTER — OFFICE VISIT (OUTPATIENT)
Dept: FAMILY MEDICINE | Facility: CLINIC | Age: 57
End: 2022-07-05
Payer: COMMERCIAL

## 2022-07-05 VITALS
DIASTOLIC BLOOD PRESSURE: 90 MMHG | SYSTOLIC BLOOD PRESSURE: 140 MMHG | RESPIRATION RATE: 18 BRPM | HEART RATE: 82 BPM | TEMPERATURE: 98.8 F | HEIGHT: 59 IN | BODY MASS INDEX: 27.54 KG/M2 | OXYGEN SATURATION: 100 % | WEIGHT: 136.6 LBS

## 2022-07-05 DIAGNOSIS — Z79.899 MEDICATION MANAGEMENT: ICD-10-CM

## 2022-07-05 DIAGNOSIS — Z13.0 SCREENING FOR DEFICIENCY ANEMIA: ICD-10-CM

## 2022-07-05 DIAGNOSIS — L03.119 CELLULITIS AND ABSCESS OF LEG: Primary | ICD-10-CM

## 2022-07-05 DIAGNOSIS — E11.9 TYPE 2 DIABETES MELLITUS WITHOUT COMPLICATION, WITHOUT LONG-TERM CURRENT USE OF INSULIN (H): ICD-10-CM

## 2022-07-05 DIAGNOSIS — Z12.11 SCREEN FOR COLON CANCER: ICD-10-CM

## 2022-07-05 DIAGNOSIS — L02.419 CELLULITIS AND ABSCESS OF LEG: Primary | ICD-10-CM

## 2022-07-05 LAB
BASOPHILS # BLD AUTO: 0.1 10E3/UL (ref 0–0.2)
BASOPHILS NFR BLD AUTO: 1 %
EOSINOPHIL # BLD AUTO: 0.3 10E3/UL (ref 0–0.7)
EOSINOPHIL NFR BLD AUTO: 4 %
ERYTHROCYTE [DISTWIDTH] IN BLOOD BY AUTOMATED COUNT: 14.5 % (ref 10–15)
HBA1C MFR BLD: 6.6 % (ref 0–5.6)
HCT VFR BLD AUTO: 36.6 % (ref 35–47)
HGB BLD-MCNC: 11.8 G/DL (ref 11.7–15.7)
IMM GRANULOCYTES # BLD: 0 10E3/UL
IMM GRANULOCYTES NFR BLD: 0 %
LYMPHOCYTES # BLD AUTO: 2.7 10E3/UL (ref 0.8–5.3)
LYMPHOCYTES NFR BLD AUTO: 35 %
MCH RBC QN AUTO: 25.7 PG (ref 26.5–33)
MCHC RBC AUTO-ENTMCNC: 32.2 G/DL (ref 31.5–36.5)
MCV RBC AUTO: 80 FL (ref 78–100)
MONOCYTES # BLD AUTO: 0.5 10E3/UL (ref 0–1.3)
MONOCYTES NFR BLD AUTO: 7 %
NEUTROPHILS # BLD AUTO: 4.2 10E3/UL (ref 1.6–8.3)
NEUTROPHILS NFR BLD AUTO: 54 %
PLATELET # BLD AUTO: 360 10E3/UL (ref 150–450)
RBC # BLD AUTO: 4.6 10E6/UL (ref 3.8–5.2)
WBC # BLD AUTO: 7.8 10E3/UL (ref 4–11)

## 2022-07-05 PROCEDURE — 99214 OFFICE O/P EST MOD 30 MIN: CPT | Performed by: INTERNAL MEDICINE

## 2022-07-05 PROCEDURE — 82728 ASSAY OF FERRITIN: CPT | Performed by: INTERNAL MEDICINE

## 2022-07-05 PROCEDURE — 83036 HEMOGLOBIN GLYCOSYLATED A1C: CPT | Performed by: INTERNAL MEDICINE

## 2022-07-05 PROCEDURE — 36415 COLL VENOUS BLD VENIPUNCTURE: CPT | Performed by: INTERNAL MEDICINE

## 2022-07-05 PROCEDURE — 80053 COMPREHEN METABOLIC PANEL: CPT | Performed by: INTERNAL MEDICINE

## 2022-07-05 PROCEDURE — 85025 COMPLETE CBC W/AUTO DIFF WBC: CPT | Performed by: INTERNAL MEDICINE

## 2022-07-05 RX ORDER — METFORMIN HCL 500 MG
2000 TABLET, EXTENDED RELEASE 24 HR ORAL
Qty: 360 TABLET | Refills: 1 | Status: SHIPPED | OUTPATIENT
Start: 2022-07-05 | End: 2023-01-16

## 2022-07-05 RX ORDER — DOXYCYCLINE 100 MG/1
100 TABLET ORAL 2 TIMES DAILY
Qty: 20 TABLET | Refills: 0 | Status: SHIPPED | OUTPATIENT
Start: 2022-07-05 | End: 2022-09-02

## 2022-07-05 ASSESSMENT — PAIN SCALES - GENERAL: PAINLEVEL: MILD PAIN (3)

## 2022-07-05 NOTE — PROGRESS NOTES
Assessment & Plan     Ijeoma was seen today for insect bites.    Diagnoses and all orders for this visit:    Cellulitis and abscess of leg  Comments:  left medial side of thigh  there is possible abscess formation  Orders:  -     doxycycline monohydrate (ADOXA) 100 MG tablet; Take 1 tablet (100 mg) by mouth 2 times daily  -     CBC with platelets and differential; Future  -     CBC with platelets and differential  Patient is started having symptoms yesterday  She thinks it could be a bug bite  It was on medial side of left thigh  That area could indurated and tender and there was some discharge  Now there is redness around that  There is induration in the middle  It appears like either it was a bug bite or she scratched it  That area got infected  There is beginning of abscess formation  Middle area is indurated and there is drainage  Surrounding that area there is extensive area of cellulitis about 2 to 3 inches in diameter surrounding that indurated area  So she had abscess and complicated by several.his   She did not have any tick bite or did not see any ticks but she works in her garden  The lesion did not look like target lesion  She can have Lyme's titer done in 2 to 3 weeks  She is allergic to cephalexin  I prescribed doxycycline which can cover cellulitis, MRSA as well as if it is target lesion  Has taken that antibiotic in the past  Follow-up with PCP in 1 week  Discussed to seek attention in case if it does not improve any start getting worse  Sometimes person needs IV antibiotics      Type 2 diabetes mellitus without complication, without long-term current use of insulin (H)  -     HEMOGLOBIN A1C; Future  -     metFORMIN (GLUCOPHAGE XR) 500 MG 24 hr tablet; Take 4 tablets (2,000 mg) by mouth daily (with dinner)  -     HEMOGLOBIN A1C  Lab Results   Component Value Date    A1C 6.6 07/05/2022    A1C 6.4 12/13/2021    A1C 6.1 08/26/2021    A1C 6.1 06/04/2021    A1C 6.1 12/22/2020    A1C 5.9 08/21/2020     "A1C 6.0 05/07/2020    A1C 6.0 12/26/2019       Medication management  -     Comprehensive metabolic panel (BMP + Alb, Alk Phos, ALT, AST, Total. Bili, TP); Future  -     Comprehensive metabolic panel (BMP + Alb, Alk Phos, ALT, AST, Total. Bili, TP)    Screen for colon cancer  -     Fecal colorectal cancer screen (FIT); Future  -     Fecal colorectal cancer screen (FIT)  Discussed with her about importance of colonoscopy  She chose to do fit test at this point    Other orders  -     REVIEW OF HEALTH MAINTENANCE PROTOCOL ORDERS    She is following endocrinologist for her thyroid at [facility] endocrinology       BMI:   Estimated body mass index is 27.59 kg/m  as calculated from the following:    Height as of this encounter: 1.499 m (4' 11\").    Weight as of this encounter: 62 kg (136 lb 9.6 oz).       See Patient Instructions  Patient Instructions   Monitor your blood pressure once a week  at home.  Bring those readings on your next visit.  Notify us if your blood pressure readings consistently stays greater than 140/90.   You are due for PCV 20 pneumonia shot   There is a new shingles vaccine available called shingrex  It is a series of 2 shots 2-6 months apart.  Considered more than 90% effective.  Please go to any pharmacy to get the  vaccine  You should take OTC vitamin B12 1000 micrograms every day.  You are on Metformin that affects the absorption of vitamin B12.  Warm compresses   Follow up in one week  Seek sooner medical attention if there is any worsening of symptoms or problems.           Return in about 1 week (around 7/12/2022) for follow up of cellulitis, Diabetes.    April Brambila MD  [facility] KEVON Raphael is a 56 year old, presenting for the following health issues:  Insect Bites      History of Present Illness       Reason for visit:  Bug bite which developed into a blister  Symptom onset:  1-3 days ago  Symptoms include:  Blister, swelling  Symptom " "intensity:  Moderate  Symptom progression:  Staying the same  Had these symptoms before:  No    She eats 2-3 servings of fruits and vegetables daily.She consumes 0 sweetened beverage(s) daily.She exercises with enough effort to increase her heart rate 9 or less minutes per day.  She exercises with enough effort to increase her heart rate 3 or less days per week.   She is taking medications regularly.       Itchy blister yesterday morning   Slight swelling  She thinks it is bite   It did burst   Now it is red   Saw endocrinologist for thyroid     Had eye exam  3 months ago at  [facility]     Review of Systems   Constitutional, HEENT, cardiovascular, pulmonary, GI, , musculoskeletal, neuro, skin, endocrine and psych systems are negative, except as otherwise noted.      Objective    BP (!) 140/90   Pulse 82   Temp 98.8  F (37.1  C) (Tympanic)   Resp 18   Ht 1.499 m (4' 11\")   Wt 62 kg (136 lb 9.6 oz)   LMP 04/14/2017   SpO2 100%   BMI 27.59 kg/m    Body mass index is 27.59 kg/m .  Physical Exam   She is very nice and pleasant.  She is comfortable and not in any kind of distress.  She is fully alert awake oriented.  There is redness medial side of left thigh  It is about 2 to 3 inch in diameter  Hardening in middle   Some fluid was squeezed out  We cleaned the area and provided dressing        Disclaimer: This note consists of symbols derived from keyboarding, dictation and/or voice recognition software. As a result, there may be errors in the script that have gone undetected. Please consider this when interpreting information found in this chart.        .  ..  "

## 2022-07-05 NOTE — Clinical Note
Patient had diabetic  eye exam approximately on 4/1/2022 at  [facility] and it was normal

## 2022-07-05 NOTE — PATIENT INSTRUCTIONS
Monitor your blood pressure once a week  at home.  Bring those readings on your next visit.  Notify us if your blood pressure readings consistently stays greater than 140/90.   You are due for PCV 20 pneumonia shot   There is a new shingles vaccine available called shingrex  It is a series of 2 shots 2-6 months apart.  Considered more than 90% effective.  Please go to any pharmacy to get the  vaccine  You should take OTC vitamin B12 1000 micrograms every day.  You are on Metformin that affects the absorption of vitamin B12.  Warm compresses   Follow up in one week  Seek sooner medical attention if there is any worsening of symptoms or problems.

## 2022-07-06 ENCOUNTER — MYC MEDICAL ADVICE (OUTPATIENT)
Dept: FAMILY MEDICINE | Facility: CLINIC | Age: 57
End: 2022-07-06

## 2022-07-06 DIAGNOSIS — Z12.11 SCREEN FOR COLON CANCER: ICD-10-CM

## 2022-07-06 DIAGNOSIS — E61.1 IRON DEFICIENCY: Primary | ICD-10-CM

## 2022-07-06 LAB
ALBUMIN SERPL-MCNC: 4.1 G/DL (ref 3.4–5)
ALP SERPL-CCNC: 72 U/L (ref 40–150)
ALT SERPL W P-5'-P-CCNC: 20 U/L (ref 0–50)
ANION GAP SERPL CALCULATED.3IONS-SCNC: 8 MMOL/L (ref 3–14)
AST SERPL W P-5'-P-CCNC: 14 U/L (ref 0–45)
BILIRUB SERPL-MCNC: 0.7 MG/DL (ref 0.2–1.3)
BUN SERPL-MCNC: 12 MG/DL (ref 7–30)
CALCIUM SERPL-MCNC: 8.9 MG/DL (ref 8.5–10.1)
CHLORIDE BLD-SCNC: 106 MMOL/L (ref 94–109)
CO2 SERPL-SCNC: 24 MMOL/L (ref 20–32)
CREAT SERPL-MCNC: 0.65 MG/DL (ref 0.52–1.04)
FERRITIN SERPL-MCNC: 4 NG/ML (ref 8–252)
GFR SERPL CREATININE-BSD FRML MDRD: >90 ML/MIN/1.73M2
GLUCOSE BLD-MCNC: 133 MG/DL (ref 70–99)
POTASSIUM BLD-SCNC: 3.9 MMOL/L (ref 3.4–5.3)
PROT SERPL-MCNC: 7.7 G/DL (ref 6.8–8.8)
SODIUM SERPL-SCNC: 138 MMOL/L (ref 133–144)

## 2022-07-06 NOTE — RESULT ENCOUNTER NOTE
Bimal Raphael,    This is to inform you regarding your test result.      I tried to contact you but got the VM.  Ferritin which is iron stores in the body is very low.  We want Ferritin level greater than   Take OTC Ferrous Sulfate 325 mg po once daily or every other day if you tolerate.  Take with vit C as vit C helps to absorb iron.  Iron can make you constipated so take stool softener.  Recheck ferritin in 4 months  We need to find out the cause of iron deficiency.  Are you purely vegetarian?  As people who are vegetarian can have low iron  Are you having any bleeding from anywhere ?  You will need work-up which included endoscopy and colonoscopy  If you agree then let me know and I can order those test  Other option is that you can make an appointment with your primary care doctor to discuss this further        Sincerely,      Dr.Nasima Kosta MD,FACP

## 2022-07-06 NOTE — RESULT ENCOUNTER NOTE
Bimal Raphael,    This is to inform you regarding your test result.    CBC result which includes Hemoglobin and  Platelet Counts is satisfactory.  HbA1c which is average glucose during last 3 months is 6.6%        Sincerely,      Dr.Nasima Kosta MD,FACP

## 2022-07-06 NOTE — RESULT ENCOUNTER NOTE
Bimal Raphael,    This is to inform you regarding your test result.    The testing of your kidney function, liver function and electrolytes was satisfactory   Glucose which is your blood sugar is elevated.  Watch your sugar containing food.    Sincerely,      Dr.Nasima Kosta MD,FACP

## 2022-07-07 NOTE — CONFIDENTIAL NOTE
See pt's message back.  Scripts were already sent to pharmacy.  Pt agreeable to endoscopy.  Liliane Stahl, RN  [facility] RN Triage Team

## 2022-07-20 ENCOUNTER — ANCILLARY PROCEDURE (OUTPATIENT)
Dept: MAMMOGRAPHY | Facility: CLINIC | Age: 57
End: 2022-07-20
Attending: FAMILY MEDICINE
Payer: COMMERCIAL

## 2022-07-20 DIAGNOSIS — Z12.31 VISIT FOR SCREENING MAMMOGRAM: ICD-10-CM

## 2022-07-20 PROCEDURE — 77067 SCR MAMMO BI INCL CAD: CPT | Mod: TC | Performed by: RADIOLOGY

## 2022-07-20 PROCEDURE — 77063 BREAST TOMOSYNTHESIS BI: CPT | Mod: TC | Performed by: RADIOLOGY

## 2022-07-29 ENCOUNTER — VIRTUAL VISIT (OUTPATIENT)
Dept: PSYCHOLOGY | Facility: CLINIC | Age: 57
End: 2022-07-29
Payer: COMMERCIAL

## 2022-07-29 DIAGNOSIS — F41.9 ANXIETY DISORDER, UNSPECIFIED TYPE: Primary | ICD-10-CM

## 2022-07-29 PROCEDURE — 90834 PSYTX W PT 45 MINUTES: CPT | Mod: 95 | Performed by: PSYCHOLOGIST

## 2022-07-29 NOTE — PROGRESS NOTES
"    [facility]                                     Progress Note    Patient Name: Ijeoma Avalos  Date: 7/29/2022         Service Type: Individual      Session Start Time: 09:01  Session End Time: 09:50     Session Length: 38-52 mins    Session #: 35 (this episode of care)    Attendees: Client attended alone    Service Modality:  Telephone Visit:    The patient has been notified of the following:      \"We have found that certain health care needs can be provided without the need for a face to face visit. This service lets us provide the care you need with a phone conversation.       I will have full access to your [place] medical record during this entire phone call. I will be taking notes for your medical record.      Since this is like an office visit, we will bill your insurance company for this service.       There are potential benefits and risks of telephone visits (e.g. limits to patient confidentiality) that differ from in-person visits. Confidentiality still applies for telephone services, and nobody will record the visit. It is important to be in a quiet, private space that is free of distractions (including cell phone or other devices) during the visit.??      If during the course of the call I believe a telephone visit is not appropriate, you will not be charged for this service\"     Consent has been obtained for this service by care team member: Yes         DATA  Interactive Complexity: No  Crisis: No        Progress Since Last Session (Related to Symptoms / Goals / Homework):   Symptoms: Recent shift--slight improvement    Homework: Good progress; Client stated that she has been writing down loose ends before bed and this has been helpful, sleep quality has improved      Episode of Care Goals: Satisfactory progress - ACTION (Actively working towards change); Intervened by reinforcing change plan / affirming steps taken     Current / Ongoing Stressors and Concerns:   Family " stressors              Work stressors     Treatment Objective(s) Addressed in This Session:   increase connection in close relationships   build skills for stress management   increase self-confidence     Intervention:   Completed PROMIS-10 in session. Client shared information about recent developments in the marital relationship, including shifting interpretations in her own mind, allowing her to feel more secure and a bit more hopeful in the relationship. This is a recent shift, and we spent some time in session exploring it. Offered reinforcement for Client's effective approach in talking to her  about her concerns in a compassionate and collaborative way. Discussed next steps for Client to continue the trend in this positive direction.      Assessments completed prior to visit:  The following assessments were completed by patient for this visit:  PROMIS 10-Global Health (only subscores and total score):   PROMIS-10 Scores Only 4/22/2022 7/29/2022   Global Mental Health Score 12 12   Global Physical Health Score 11 11   PROMIS TOTAL - SUBSCORES 23 23        ASSESSMENT: Current Emotional / Mental Status (status of significant symptoms):   Risk status (Self / Other harm or suicidal ideation)   Patient denies current fears or concerns for personal safety.   Patient denies current or recent suicidal ideation or behaviors.   Patient denies current or recent homicidal ideation or behaviors.   Patient denies current or recent self injurious behavior or ideation.   Patient denies other safety concerns.   Patient reports there has been no change in risk factors since her last session.     Patient reports there has been no change in protective factors since her last session.     Recommended that patient call 911 or go to the local ED should there be a change in any of these risk factors.     Appearance:   Unable to assess    Eye Contact:   Unable to assess    Psychomotor Behavior: Unable to assess     Attitude:   Open, grateful    Orientation:   All   Speech    Rate / Production: Normal/ Responsive    Volume:  Normal    Mood:    Slight improvement, more hopeful (recent)   Affect:    Unable to assess    Thought Content:  Reduced personalization noted, increased perspective   Thought Form:  Coherent  Logical    Insight:    Fair      Medication Review:   No changes to current psychiatric medication(s)     Medication Compliance:   Yes; infrequent use of lorazepam     Changes in Health Issues:   None reported--knee pain persists     Chemical Use Review:   Substance Use: Chemical use reviewed, no active concerns identified      Tobacco Use: No current tobacco use.      Diagnosis:  1. Anxiety disorder, unspecified type      Collateral Reports Completed:   Telephone consent     PLAN: (Patient Tasks / Therapist Tasks / Other)  Client will offer verbal and non-verbal (e.g., smiling, affectionate touch) reinforcement for the efforts she sess her  making in response to their recent conversations. She will be direct in her requests to him (e.g., I'd like you to plan a night out for us). Continue to look for alternative interpretations rather than her default of assuming she must be falling short. Continue to use bedtime strategy of writing down any lingering issues/worries/loose ends before going to sleep, and then putting these away until the morning. Return appointment scheduled for next month. Client is interested in in-person visits, but Tuesdays are not a good option for her. Will contimue with telephone visits (more privacy than video visits) for the time being.     Rosa Chowdary, KATHARINE                                                      ______________________________________________________________________    Individual Treatment Plan    Patient's Name: Ijeoma Avalos  YOB: 1965    Date of Creation: 3/25/2022  Date Treatment Plan Last Reviewed/Revised: 7/1/2022    DSM5 Diagnoses: 300.00  (F41.9) Unspecified Anxiety Disorder  Psychosocial / Contextual Factors: family stressors, work stressors  PROMIS (reviewed every 90 days): 23    Referral / Collaboration:  Referral to another professional/service is not indicated at this time.    Anticipated number of session for this episode of care: estimated 12-16 sessions/year  Anticipation frequency of session: Monthly  Anticipated Duration of each session: 38-52 minutes  Treatment plan will be reviewed in 90 days or when goals have been changed.       MeasurableTreatment Goal(s) related to diagnosis / functional impairment(s)  Goal 1: Patient will build skills for stress management.    I will know I've met my goal when I am sleeping better and not waking up so often, when I don't feel worried so much of the time.   *updated on 7/1/22 (Client believes physical symptoms such as blood pressure and reflux are not the best way to measure this outcome).    Objective #A (Patient Action)    Patient will use at least 2 coping skills for anxiety management in the next 6 weeks.  Status: Continued - Date(s): 7/1/2022    Intervention(s)  Therapist will teach teach self-regulation strategies, CBT skills, mindfulness techniques.    Objective #B  Patient will use cognitive strategies identified in therapy to challenge anxious thoughts.  Status: Continued - Date(s): 7/1/2022    Intervention(s)  Therapist will teach cognitive strategies, provide education.      Goal 2: Patient will increase self-confidence.    I will know I've met my goal when I think more positively about myself.   *7/1/22 progress noted, particularly at work, as well as in some aspects of parenting. Client would like to continue as ongoing goal.    Objective #A (Patient Action)    Status: Continued - Date(s): 7/1/2002    Patient will increase assertive communication.    Intervention(s)  Therapist will teach assertiveness skills.    Objective #B  Patient will Identify negative self-talk and behaviors:  challenge core beliefs, myths, and actions.    Status: Continued - Date(s): 7/1/2022    Intervention(s)  Therapist will provide education, teach CBT skills.      Goal 3: Patiient will increase connection in close relationships.    I will know I've met my goal when I feel more close with loved ones.  *7/1/22--Client identifies this as primary goal currently.    Objective #A (Patient Action)    Status: Continued - Date(s): 7/1/2022    Patient will prioritize time for meaningful relationships.    Intervention(s)  Therapist will provide support and accountability.    Objective #B  Patient will make use of effective interpersonal communication skills.    Status: Continued - Date(s): 7/1/2022    Intervention(s)  Therapist will teach communication skills.      Patient has reviewed and agreed to the above plan.      Rosa Chowdary LP  March 25, 2022

## 2022-08-21 DIAGNOSIS — E11.9 TYPE 2 DIABETES MELLITUS WITHOUT COMPLICATION, WITHOUT LONG-TERM CURRENT USE OF INSULIN (H): ICD-10-CM

## 2022-08-21 DIAGNOSIS — I10 ESSENTIAL HYPERTENSION: ICD-10-CM

## 2022-08-24 NOTE — TELEPHONE ENCOUNTER
Routing refill request to provider for review/approval because:  Patient's blood pressure is above protocol range.  BP Readings from Last 3 Encounters:   07/05/22 (!) 140/90   08/26/21 120/82   07/27/21 120/74     Fátima Mckenna RN

## 2022-08-25 RX ORDER — LISINOPRIL 5 MG/1
TABLET ORAL
Qty: 90 TABLET | Refills: 0 | Status: SHIPPED | OUTPATIENT
Start: 2022-08-25 | End: 2022-09-02

## 2022-08-31 ASSESSMENT — ENCOUNTER SYMPTOMS
HEMATURIA: 0
DIZZINESS: 0
HEADACHES: 1
ABDOMINAL PAIN: 0
SORE THROAT: 0
PALPITATIONS: 0
CONSTIPATION: 0
EYE PAIN: 0
DYSURIA: 0
HEMATOCHEZIA: 0
NAUSEA: 0
ARTHRALGIAS: 1
SHORTNESS OF BREATH: 0
MYALGIAS: 0
JOINT SWELLING: 0
FREQUENCY: 0
NERVOUS/ANXIOUS: 0
PARESTHESIAS: 0
FEVER: 0
COUGH: 0
HEARTBURN: 0
DIARRHEA: 0
WEAKNESS: 0
BREAST MASS: 0
CHILLS: 0

## 2022-09-02 ENCOUNTER — OFFICE VISIT (OUTPATIENT)
Dept: FAMILY MEDICINE | Facility: CLINIC | Age: 57
End: 2022-09-02
Payer: COMMERCIAL

## 2022-09-02 VITALS
SYSTOLIC BLOOD PRESSURE: 134 MMHG | BODY MASS INDEX: 27.34 KG/M2 | OXYGEN SATURATION: 98 % | HEIGHT: 59 IN | TEMPERATURE: 98.5 F | DIASTOLIC BLOOD PRESSURE: 100 MMHG | WEIGHT: 135.6 LBS | HEART RATE: 79 BPM

## 2022-09-02 DIAGNOSIS — Z00.00 ROUTINE GENERAL MEDICAL EXAMINATION AT A HEALTH CARE FACILITY: Primary | ICD-10-CM

## 2022-09-02 DIAGNOSIS — Z23 NEED FOR PROPHYLACTIC VACCINATION AND INOCULATION AGAINST INFLUENZA: ICD-10-CM

## 2022-09-02 DIAGNOSIS — E61.1 IRON DEFICIENCY: ICD-10-CM

## 2022-09-02 DIAGNOSIS — R79.0 LOW FERRITIN LEVEL: ICD-10-CM

## 2022-09-02 DIAGNOSIS — H69.92 DYSFUNCTION OF LEFT EUSTACHIAN TUBE: ICD-10-CM

## 2022-09-02 DIAGNOSIS — E55.9 VITAMIN D DEFICIENCY: ICD-10-CM

## 2022-09-02 DIAGNOSIS — E11.69 TYPE 2 DIABETES MELLITUS WITH OTHER SPECIFIED COMPLICATION, WITHOUT LONG-TERM CURRENT USE OF INSULIN (H): ICD-10-CM

## 2022-09-02 DIAGNOSIS — E03.9 HYPOTHYROIDISM, UNSPECIFIED TYPE: ICD-10-CM

## 2022-09-02 DIAGNOSIS — E66.3 OVERWEIGHT WITH BODY MASS INDEX (BMI) OF 27 TO 27.9 IN ADULT: ICD-10-CM

## 2022-09-02 DIAGNOSIS — L03.116 CELLULITIS OF LEFT THIGH: ICD-10-CM

## 2022-09-02 DIAGNOSIS — E78.5 HYPERLIPIDEMIA WITH TARGET LDL LESS THAN 100: ICD-10-CM

## 2022-09-02 DIAGNOSIS — M17.11 OSTEOARTHRITIS OF RIGHT KNEE, UNSPECIFIED OSTEOARTHRITIS TYPE: ICD-10-CM

## 2022-09-02 DIAGNOSIS — I10 ESSENTIAL HYPERTENSION: ICD-10-CM

## 2022-09-02 DIAGNOSIS — D25.9 UTERINE LEIOMYOMA, UNSPECIFIED LOCATION: ICD-10-CM

## 2022-09-02 DIAGNOSIS — E53.9 B-COMPLEX DEFICIENCY: ICD-10-CM

## 2022-09-02 PROBLEM — E11.9 TYPE 2 DIABETES MELLITUS (H): Status: ACTIVE | Noted: 2017-12-19

## 2022-09-02 LAB
CHOLEST SERPL-MCNC: 207 MG/DL
CREAT UR-MCNC: 59 MG/DL
DEPRECATED CALCIDIOL+CALCIFEROL SERPL-MC: 14 UG/L (ref 20–75)
FASTING STATUS PATIENT QL REPORTED: YES
HDLC SERPL-MCNC: 47 MG/DL
IRON SATN MFR SERPL: 10 % (ref 15–46)
IRON SERPL-MCNC: 41 UG/DL (ref 35–180)
LDLC SERPL CALC-MCNC: 129 MG/DL
MICROALBUMIN UR-MCNC: 36 MG/L
MICROALBUMIN/CREAT UR: 61.02 MG/G CR (ref 0–25)
NONHDLC SERPL-MCNC: 160 MG/DL
T4 FREE SERPL-MCNC: 1.3 NG/DL (ref 0.76–1.46)
TIBC SERPL-MCNC: 420 UG/DL (ref 240–430)
TRIGL SERPL-MCNC: 155 MG/DL
TSH SERPL DL<=0.005 MIU/L-ACNC: 4.66 MU/L (ref 0.4–4)
VIT B12 SERPL-MCNC: 228 PG/ML (ref 232–1245)

## 2022-09-02 PROCEDURE — 90682 RIV4 VACC RECOMBINANT DNA IM: CPT | Performed by: INTERNAL MEDICINE

## 2022-09-02 PROCEDURE — 82607 VITAMIN B-12: CPT | Performed by: INTERNAL MEDICINE

## 2022-09-02 PROCEDURE — 80061 LIPID PANEL: CPT | Performed by: INTERNAL MEDICINE

## 2022-09-02 PROCEDURE — 99207 PR FOOT EXAM NO CHARGE: CPT | Mod: 25 | Performed by: INTERNAL MEDICINE

## 2022-09-02 PROCEDURE — 90471 IMMUNIZATION ADMIN: CPT | Performed by: INTERNAL MEDICINE

## 2022-09-02 PROCEDURE — 90472 IMMUNIZATION ADMIN EACH ADD: CPT | Performed by: INTERNAL MEDICINE

## 2022-09-02 PROCEDURE — 83550 IRON BINDING TEST: CPT | Performed by: INTERNAL MEDICINE

## 2022-09-02 PROCEDURE — 84443 ASSAY THYROID STIM HORMONE: CPT | Performed by: INTERNAL MEDICINE

## 2022-09-02 PROCEDURE — 36415 COLL VENOUS BLD VENIPUNCTURE: CPT | Performed by: INTERNAL MEDICINE

## 2022-09-02 PROCEDURE — 84439 ASSAY OF FREE THYROXINE: CPT | Performed by: INTERNAL MEDICINE

## 2022-09-02 PROCEDURE — 99396 PREV VISIT EST AGE 40-64: CPT | Mod: 25 | Performed by: INTERNAL MEDICINE

## 2022-09-02 PROCEDURE — 82306 VITAMIN D 25 HYDROXY: CPT | Performed by: INTERNAL MEDICINE

## 2022-09-02 PROCEDURE — 90677 PCV20 VACCINE IM: CPT | Performed by: INTERNAL MEDICINE

## 2022-09-02 PROCEDURE — 82043 UR ALBUMIN QUANTITATIVE: CPT | Performed by: INTERNAL MEDICINE

## 2022-09-02 PROCEDURE — 99215 OFFICE O/P EST HI 40 MIN: CPT | Mod: 25 | Performed by: INTERNAL MEDICINE

## 2022-09-02 RX ORDER — LISINOPRIL 10 MG/1
10 TABLET ORAL DAILY
Qty: 90 TABLET | Refills: 1 | Status: SHIPPED | OUTPATIENT
Start: 2022-09-02 | End: 2023-04-10

## 2022-09-02 RX ORDER — ESTRADIOL 0.1 MG/G
CREAM VAGINAL
COMMUNITY
End: 2023-02-02

## 2022-09-02 ASSESSMENT — ENCOUNTER SYMPTOMS
NAUSEA: 0
CHILLS: 0
JOINT SWELLING: 0
SORE THROAT: 0
DYSURIA: 0
HEMATOCHEZIA: 0
PARESTHESIAS: 0
HEADACHES: 1
SHORTNESS OF BREATH: 0
MYALGIAS: 0
PALPITATIONS: 0
COUGH: 0
DIARRHEA: 0
NERVOUS/ANXIOUS: 0
HEMATURIA: 0
DIZZINESS: 0
ABDOMINAL PAIN: 0
EYE PAIN: 0
WEAKNESS: 0
BREAST MASS: 0
FEVER: 0
CONSTIPATION: 0
FREQUENCY: 0
HEARTBURN: 0
ARTHRALGIAS: 1

## 2022-09-02 ASSESSMENT — PAIN SCALES - GENERAL: PAINLEVEL: NO PAIN (0)

## 2022-09-02 NOTE — PATIENT INSTRUCTIONS
As discussed , please do fasting lab work ordered.     Increased the dose of your Lisinopril to 10 mg daily. Please send me BP log for next 1 week on mychart.     =============================    Preventive Health Recommendations  Female Ages 50 - 64    Yearly exam: See your health care provider every year in order to  Review health changes.   Discuss preventive care.    Review your medicines if your doctor has prescribed any.    Get a Pap test every three years (unless you have an abnormal result and your provider advises testing more often).  If you get Pap tests with HPV test, you only need to test every 5 years, unless you have an abnormal result.   You do not need a Pap test if your uterus was removed (hysterectomy) and you have not had cancer.  You should be tested each year for STDs (sexually transmitted diseases) if you're at risk.   Have a mammogram every 1 to 2 years.  Have a colonoscopy at age 50, or have a yearly FIT test (stool test). These exams screen for colon cancer.    Have a cholesterol test every 5 years, or more often if advised.  Have a diabetes test (fasting glucose) every three years. If you are at risk for diabetes, you should have this test more often.   If you are at risk for osteoporosis (brittle bone disease), think about having a bone density scan (DEXA).    Shots: Get a flu shot each year. Get a tetanus shot every 10 years.    Nutrition:   Eat at least 5 servings of fruits and vegetables each day.  Eat whole-grain bread, whole-wheat pasta and brown rice instead of white grains and rice.  Get adequate Calcium and Vitamin D.     Lifestyle  Exercise at least 150 minutes a week (30 minutes a day, 5 days a week). This will help you control your weight and prevent disease.  Limit alcohol to one drink per day.  No smoking.   Wear sunscreen to prevent skin cancer.   See your dentist every six months for an exam and cleaning.  See your eye doctor every 1 to 2 years.

## 2022-09-02 NOTE — PROGRESS NOTES
SUBJECTIVE:   CC: Ijeoma Avalos is an 56 year old woman who presents for preventive health visit.     Patient has been advised of split billing requirements and indicates understanding: Yes  Healthy Habits:     Getting at least 3 servings of Calcium per day:  Yes    Bi-annual eye exam:  Yes    Dental care twice a year:  Yes    Sleep apnea or symptoms of sleep apnea:  None    Diet:  Diabetic and Carbohydrate counting    Frequency of exercise:  1 day/week    Duration of exercise:  15-30 minutes    Taking medications regularly:  Yes    Medication side effects:  None    PHQ-2 Total Score: 0    Additional concerns today:  Yes      Today's PHQ-2 Score:   PHQ-2 ( 1999 [employer]) 8/31/2022   Q1: Little interest or pleasure in doing things 0   Q2: Feeling down, depressed or hopeless 0   PHQ-2 Score 0   PHQ-2 Total Score (12-17 Years)- Positive if 3 or more points; Administer PHQ-A if positive -   Q1: Little interest or pleasure in doing things Not at all   Q2: Feeling down, depressed or hopeless Not at all   PHQ-2 Score 0       Abuse: Current or Past (Physical, Sexual or Emotional) - No  Do you feel safe in your environment? Yes        Social History     Tobacco Use     Smoking status: Never Smoker     Smokeless tobacco: Never Used   Substance Use Topics     Alcohol use: No     Alcohol/week: 0.0 standard drinks     Comment: social     If you drink alcohol do you typically have >3 drinks per day or >7 drinks per week? Yes      Alcohol Use 8/31/2022   Prescreen: >3 drinks/day or >7 drinks/week? No   Prescreen: >3 drinks/day or >7 drinks/week? -   No flowsheet data found.    Reviewed orders with patient.  Reviewed health maintenance and updated orders accordingly - Yes  Lab work is in process  Labs reviewed in EPIC    Breast Cancer Screening:    Breast CA Risk Assessment (FHS-7) 8/23/2021   Do you have a family history of breast, colon, or ovarian cancer? No / Unknown       click delete button to remove this line  now  Mammogram Screening: Recommended annual mammography  Pertinent mammograms are reviewed under the imaging tab.    History of abnormal Pap smear: NO - age 30- 65 PAP every 3 years recommended  PAP / HPV Latest Ref Rng & Units 2016   PAP (Historical) - OTHER-NIL, See Result   HPV16 NEG Negative   HPV18 NEG Negative   HRHPV NEG Negative     Reviewed and updated as needed this visit by clinical staff   Tobacco  Allergies  Meds  Problems  Med Hx  Surg Hx  Fam Hx            Reviewed and updated as needed this visit by Provider   Tobacco  Allergies  Meds  Problems  Med Hx  Surg Hx  Fam Hx           Past Medical History:   Diagnosis Date     Gastric acidity      Gestational diabetes mellitus     DIET CONTROL     Hx of previous reproductive problem     IVF     Hypothyroid      Motion sickness      Ovarian cyst      Post-operative nausea and vomiting 2017      Past Surgical History:   Procedure Laterality Date     APPENDECTOMY        SECTION  2014    Procedure:  SECTION;  Surgeon: Ru Cano MD;  Location:  [facility]     CHOLECYSTECTOMY       cyst removed      left  lower leg on bone sheath     DAVINCI HYSTERECTOMY TOTAL, SALPINGECTOMY BILATERAL N/A 2017    Procedure: DAVINCI HYSTERECTOMY TOTAL, SALPINGECTOMY BILATERAL;  DAVINCI SINGLE SITE HYSTERECTOMY, BILATERAL SALPINGECTOMY, LEFT OOPHORECTOMY, LYSIS OF ADHESIONS (LAPAROSCOPIC BAG TO RETREIVE OVARY);  Surgeon: Ru Cano MD;  Location:  OR     DAVINCI LYSIS OF ADHESIONS N/A 2017    Procedure: DAVINCI LYSIS OF ADHESIONS;;  Surgeon: Ru Cano MD;  Location:  OR     DAVINCI OOPHORECTOMY N/A 2017    Procedure: DAVINCI OOPHORECTOMY;;  Surgeon: Ru Cano MD;  Location:  OR     GI SURGERY       MYOMECTOMY UTERUS  2012     ZZ GASTROSCOPY,FL       OB History    Para Term  AB Living   1 1 1 0 0 1   SAB IAB Ectopic Multiple Live Births   0 0 0 0 1      #  Outcome Date GA Lbr Chung/2nd Weight Sex Delivery Anes PTL Lv   1 Term 05/28/14 37w3d  3.725 kg (8 lb 3.4 oz) F    LINA      Name: VIRGINIA KELSEY      Apgar1: 9  Apgar5: 9      Obstetric Comments   Gestational Diabetic, , + for GBS, , BP  elevated times one week., Carpel Tunnel both wrists       Review of Systems   Constitutional: Negative for chills and fever.   HENT: Positive for ear pain. Negative for congestion, hearing loss and sore throat.    Eyes: Negative for pain and visual disturbance.   Respiratory: Negative for cough and shortness of breath.    Cardiovascular: Negative for chest pain, palpitations and peripheral edema.   Gastrointestinal: Negative for abdominal pain, constipation, diarrhea, heartburn, hematochezia and nausea.   Breasts:  Negative for tenderness, breast mass and discharge.   Genitourinary: Negative for dysuria, frequency, genital sores, hematuria, pelvic pain, urgency, vaginal bleeding and vaginal discharge.   Musculoskeletal: Positive for arthralgias. Negative for joint swelling and myalgias.   Skin: Negative for rash.   Neurological: Positive for headaches. Negative for dizziness, weakness and paresthesias.   Psychiatric/Behavioral: Negative for mood changes. The patient is not nervous/anxious.      CONSTITUTIONAL: NEGATIVE for fever, chills, change in weight  INTEGUMENTARY/SKIN: NEGATIVE for worrisome rashes, moles or lesions  EYES: NEGATIVE for vision changes or irritation  ENT: NEGATIVE for ear, mouth and throat problems  RESP: NEGATIVE for significant cough or SOB  BREAST: NEGATIVE for masses, tenderness or discharge  CV: NEGATIVE for chest pain, palpitations or peripheral edema  GI: NEGATIVE for nausea, abdominal pain, heartburn, or change in bowel habits  : NEGATIVE for unusual urinary or vaginal symptoms. No vaginal bleeding.  MUSCULOSKELETAL: NEGATIVE for significant arthralgias or myalgia  NEURO: NEGATIVE for weakness, dizziness or paresthesias  PSYCHIATRIC: NEGATIVE  "for changes in mood or affect      OBJECTIVE:   BP (!) 134/100   Pulse 79   Temp 98.5  F (36.9  C) (Temporal)   Ht 1.499 m (4' 11\")   Wt 61.5 kg (135 lb 9.6 oz)   LMP 04/14/2017   SpO2 98%   BMI 27.39 kg/m    Physical Exam  GENERAL APPEARANCE: healthy, alert and no distress  EYES: Eyes grossly normal to inspection, PERRL and conjunctivae and sclerae normal  HENT: ear canals and TM's normal, nose and mouth without ulcers or lesions, oropharynx clear and oral mucous membranes moist  NECK: no adenopathy, no asymmetry, masses, or scars and thyroid normal to palpation  RESP: lungs clear to auscultation - no rales, rhonchi or wheezes  BREAST: Defered as recent Mammogram  CV: regular rate and rhythm, normal S1 S2, no S3 or S4, no murmur, click or rub, no peripheral edema and peripheral pulses strong  ABDOMEN: soft, nontender, no hepatosplenomegaly, no masses and bowel sounds normal  MS: no musculoskeletal defects are noted and gait is age appropriate without ataxia  SKIN: no suspicious lesions or rashes  NEURO: Normal strength and tone, sensory exam grossly normal, mentation intact and speech normal  PSYCH: mentation appears normal and affect normal/bright  LEFT THIGH : Positive for well healed cellulitis on the left thigh , no tenderness on palpation.       Diagnostic Test Results:  Labs reviewed in Epic    ASSESSMENT/PLAN:     Assessment and Plan  1. Routine general medical examination at a health care facility  Last seen pt on telephone vusit 4/2022 for neck muscle spasm.  She is here for ANnual physical. Pt doestake Lisniopril, Metformin , LT , Lipitor. Will do at this time.   - Pneumococcal 20 Valent Conjugate (Prevnar 20)  - Lipid panel reflex to direct LDL Non-fasting; Future  - Albumin Random Urine Quantitative with Creat Ratio; Future  - estradiol (ESTRACE) 0.1 MG/GM vaginal cream; estradiol 0.01% (0.1 mg/gram) vaginal cream  - INFLUENZA QUAD, RECOMBINANT, P-FREE (RIV4) (FLUBLOK)  - FOOT EXAM  - Lipid panel " reflex to direct LDL Fasting; Future  - Vitamin D Deficiency; Future  - Iron and iron binding capacity; Future  - Vitamin B12; Future  - lisinopril (ZESTRIL) 10 MG tablet; Take 1 tablet (10 mg) by mouth daily  Dispense: 90 tablet; Refill: 1  - TSH with free T4 reflex; Future  - Comprehensive Weight Management; Future  - Lipid panel reflex to direct LDL Non-fasting  - Albumin Random Urine Quantitative with Creat Ratio  - Vitamin D Deficiency  - Iron and iron binding capacity  - Vitamin B12  - TSH with free T4 reflex    2. Hyperlipidemia with target LDL less than 100  - Lipid panel reflex to direct LDL Fasting; Future    3. Type 2 diabetes mellitus with other specified complication, without long-term current use of insulin (H)  - FOOT EXAM    4. Uterine leiomyoma, unspecified location  Stable, recent OBGYN visit as per the pat and no further concerns at this time.    5. Vitamin D deficiency  - Vitamin D Deficiency; Future  - Vitamin D Deficiency    6. Need for prophylactic vaccination and inoculation against influenza  - INFLUENZA QUAD, RECOMBINANT, P-FREE (RIV4) (FLUBLOK)    7. Osteoarthritis of right knee, unspecified osteoarthritis type  New diagnosis added to list today.Recent MRI of Rt knee with degenerative Arthritis as per the patient. Await records from [facility].  To follow up with [facility] recommendations.     8. Hypothyroidism, unspecified type  - TSH with free T4 reflex; Future  - TSH with free T4 reflex    9. Overweight with body mass index (BMI) of 27 to 27.9 in adult  Pt working hard on losing weight and opting for weight management   - Comprehensive Weight Management; Future    10. Essential hypertension  Uncontrolled, will increase the dose of Lisinopril . Discussed on BP goals given pt risk factors.   - lisinopril (ZESTRIL) 10 MG tablet; Take 1 tablet (10 mg) by mouth daily  Dispense: 90 tablet; Refill: 1    11. Dysfunction of left eustachian tube  New problem, as per physical exam normal on Ear exam does have  chronic sinusitis. Will recommend OTC Flonase on regular basis for next 10 days before considering possible need of ENT at that time if no improvement.     12. Cellulitis of left thigh  Resolved. Recent use of Doxycycline given pt had possible insect bite in her garden triggering this, normal on my exam today.     13. Low ferritin level  New finding on the recent labs done for the above condition patient concerned. Will check additional labs and do further recommendations.   - Iron and iron binding capacity; Future  - Vitamin B12; Future  - Iron and iron binding capacity  - Vitamin B12    11. Iron deficiency  POSITIVE for Iron deficiency. Supplements sent to your pharmacy.  - ferrous sulfate (FE TABS) 325 (65 Fe) MG EC tablet; Take 1 tablet (325 mg) by mouth daily  Dispense: 90 tablet; Refill: 1    12. Vitamin D deficiency  Vitamin D levels are abnormally low, Sent high dose Vitamin D 50,000 units once a week to your pharmacy for 3 months. After you complete the 3 months course resume back to Over the Counter Vitamin D 2000 units daily.  - Vitamin D Deficiency; Future  - Vitamin D Deficiency  - vitamin D2 (ERGOCALCIFEROL) 97239 units (1250 mcg) capsule; Take 1 capsule (50,000 Units) by mouth once a week  Dispense: 12 capsule; Refill: 0    13. Hyperlipidemia with target LDL less than 100  Your Cholesterol is remaining abnormal inspite of current Atorvastatin, have increased the dose to 20 mg daily and sent to your pharmacy for improvement.   - Lipid panel reflex to direct LDL Fasting; Future  - atorvastatin (LIPITOR) 20 MG tablet; Take 1 tablet (20 mg) by mouth daily  Dispense: 90 tablet; Refill: 1    14. B-complex deficiency  Vitamin B12 levels are low causing your symptoms. Recommend once weekly B12 shots for 1 month and once monthly for one year. Placed the orders for the same , which you can get them at Nurse only appointment at your nearby [facility].  - cyanocobalamin injection 1,000 mcg    15. Hypothyroidism,  unspecified type  Change in dosage of Levothyroxine  - levothyroxine (SYNTHROID/LEVOTHROID) 75 MCG tablet; Take 1 tablet (75 mcg) by mouth daily And 2 tabs every Sunday  Dispense: 60 tablet; Refill: 1        Your thyroid function is borderline high depiction you will need slightly higher dose than current Levothyroxine. Recommend to take one tablet daily and 2 tabs every Sunday.         Over 40 minutes spent on reviewing patient chart,  face to face encounter, greater than 50% time spent with plan/cordination of care and documentation as above in my A/P.           Patient Instructions   As discussed , please do fasting lab work ordered.     Increased the dose of your Lisinopril to 10 mg daily. Please send me BP log for next 1 week on [employer].     =============================    Preventive Health Recommendations  Female Ages 50 - 64    Yearly exam: See your health care provider every year in order to  o Review health changes.   o Discuss preventive care.    o Review your medicines if your doctor has prescribed any.      Get a Pap test every three years (unless you have an abnormal result and your provider advises testing more often).    If you get Pap tests with HPV test, you only need to test every 5 years, unless you have an abnormal result.     You do not need a Pap test if your uterus was removed (hysterectomy) and you have not had cancer.    You should be tested each year for STDs (sexually transmitted diseases) if you're at risk.     Have a mammogram every 1 to 2 years.    Have a colonoscopy at age 50, or have a yearly FIT test (stool test). These exams screen for colon cancer.      Have a cholesterol test every 5 years, or more often if advised.    Have a diabetes test (fasting glucose) every three years. If you are at risk for diabetes, you should have this test more often.     If you are at risk for osteoporosis (brittle bone disease), think about having a bone density scan (DEXA).    Shots: Get a flu shot  "each year. Get a tetanus shot every 10 years.    Nutrition:     Eat at least 5 servings of fruits and vegetables each day.    Eat whole-grain bread, whole-wheat pasta and brown rice instead of white grains and rice.    Get adequate Calcium and Vitamin D.     Lifestyle    Exercise at least 150 minutes a week (30 minutes a day, 5 days a week). This will help you control your weight and prevent disease.    Limit alcohol to one drink per day.    No smoking.     Wear sunscreen to prevent skin cancer.     See your dentist every six months for an exam and cleaning.    See your eye doctor every 1 to 2 years.      Return in about 6 months (around 3/2/2023), or if symptoms worsen or fail to improve, for diabetes, If symptoms persist.    Norah Zamorano MD  [facility]      Patient has been advised of split billing requirements and indicates understanding: Yes    COUNSELING:  Reviewed preventive health counseling, as reflected in patient instructions  Special attention given to:        Regular exercise       Healthy diet/nutrition       Vision screening       Hearing screening       Immunizations    Vaccinated for: Influenza and Pneumococcal             Osteoporosis prevention/bone health       Colorectal Cancer Screening       Consider Hep C screening for all patients one time for ages 18-79 years       (Juani)menopause management    Estimated body mass index is 27.39 kg/m  as calculated from the following:    Height as of this encounter: 1.499 m (4' 11\").    Weight as of this encounter: 61.5 kg (135 lb 9.6 oz).    Weight management plan: Patient referred to endocrine and/or weight management specialty    She reports that she has never smoked. She has never used smokeless tobacco.      Counseling Resources:  ATP IV Guidelines  Pooled Cohorts Equation Calculator  Breast Cancer Risk Calculator  BRCA-Related Cancer Risk Assessment: FHS-7 Tool  FRAX Risk Assessment  ICSI Preventive Guidelines  Dietary " Guidelines for Americans, 2010  USDA's MyPlate  ASA Prophylaxis  Lung CA Screening    Norah Zamorano MD  [facility]

## 2022-09-03 RX ORDER — ATORVASTATIN CALCIUM 20 MG/1
20 TABLET, FILM COATED ORAL DAILY
Qty: 90 TABLET | Refills: 1 | Status: SHIPPED | OUTPATIENT
Start: 2022-09-03 | End: 2023-09-05

## 2022-09-03 RX ORDER — ERGOCALCIFEROL 1.25 MG/1
50000 CAPSULE, LIQUID FILLED ORAL WEEKLY
Qty: 12 CAPSULE | Refills: 0 | Status: SHIPPED | OUTPATIENT
Start: 2022-09-03 | End: 2022-11-23

## 2022-09-03 RX ORDER — FERROUS SULFATE 325(65) MG
325 TABLET, DELAYED RELEASE (ENTERIC COATED) ORAL DAILY
Qty: 90 TABLET | Refills: 1 | Status: SHIPPED | OUTPATIENT
Start: 2022-09-03 | End: 2023-06-26

## 2022-09-03 RX ORDER — LEVOTHYROXINE SODIUM 75 UG/1
75 TABLET ORAL DAILY
Qty: 60 TABLET | Refills: 1 | Status: SHIPPED | OUTPATIENT
Start: 2022-09-03 | End: 2023-09-07

## 2022-09-03 RX ORDER — CYANOCOBALAMIN 1000 UG/ML
1000 INJECTION, SOLUTION INTRAMUSCULAR; SUBCUTANEOUS
Status: ACTIVE | OUTPATIENT
Start: 2022-09-03 | End: 2022-10-01

## 2022-09-04 NOTE — ADDENDUM NOTE
Addended by: ANABELA RUDOLPH on: 9/3/2022 10:18 PM     Modules accepted: Orders, Level of Service, SmartSet

## 2022-09-05 ENCOUNTER — TELEPHONE (OUTPATIENT)
Dept: FAMILY MEDICINE | Facility: CLINIC | Age: 57
End: 2022-09-05

## 2022-09-05 NOTE — TELEPHONE ENCOUNTER
Reason for Call: Request for an order or referral:    Order or referral being requested: B12    Date needed: at your convenience    Has the patient been seen by the PCP for this problem? YES    Additional comments: Pt said she received a messgae to schedule b12 injection but cannot locate order in chart    Phone number Patient can be reached at:  Cell number on file:    Telephone Information:   Mobile [phone]       Best Time:  Unknown    Can we leave a detailed message on this number?  Unknown    Call taken on 9/5/2022 at 10:14 AM by Milagro Link

## 2022-09-07 NOTE — TELEPHONE ENCOUNTER
Pt does have order in the chart. Routing to team to assist with scheduling.     Amanda BUTT RN  [facility]

## 2022-09-08 ENCOUNTER — ALLIED HEALTH/NURSE VISIT (OUTPATIENT)
Dept: FAMILY MEDICINE | Facility: CLINIC | Age: 57
End: 2022-09-08
Payer: COMMERCIAL

## 2022-09-08 DIAGNOSIS — E53.9 B-COMPLEX DEFICIENCY: Primary | ICD-10-CM

## 2022-09-08 PROCEDURE — 99207 PR NO CHARGE NURSE ONLY: CPT

## 2022-09-08 PROCEDURE — 96372 THER/PROPH/DIAG INJ SC/IM: CPT | Performed by: INTERNAL MEDICINE

## 2022-09-08 RX ADMIN — CYANOCOBALAMIN 1000 MCG: 1000 INJECTION, SOLUTION INTRAMUSCULAR; SUBCUTANEOUS at 14:36

## 2022-09-09 NOTE — TELEPHONE ENCOUNTER
Pt has MA-only appts scheduled for 9/16 and 9/23. Called pt to ask if she is needing any other appts beyond those dates. Pt states she will have those 2 appointments and will then check-in with Dr. Zamorano for following steps. No further assistance needed at this time.    Cynthia Hung,  [facility]

## 2022-09-16 ENCOUNTER — ALLIED HEALTH/NURSE VISIT (OUTPATIENT)
Dept: FAMILY MEDICINE | Facility: CLINIC | Age: 57
End: 2022-09-16
Payer: COMMERCIAL

## 2022-09-16 DIAGNOSIS — E53.9 B-COMPLEX DEFICIENCY: Primary | ICD-10-CM

## 2022-09-16 PROCEDURE — 99207 PR NO CHARGE NURSE ONLY: CPT

## 2022-09-16 PROCEDURE — 96372 THER/PROPH/DIAG INJ SC/IM: CPT | Performed by: INTERNAL MEDICINE

## 2022-09-16 RX ADMIN — CYANOCOBALAMIN 1000 MCG: 1000 INJECTION, SOLUTION INTRAMUSCULAR; SUBCUTANEOUS at 10:39

## 2022-09-23 ENCOUNTER — ALLIED HEALTH/NURSE VISIT (OUTPATIENT)
Dept: FAMILY MEDICINE | Facility: CLINIC | Age: 57
End: 2022-09-23
Payer: COMMERCIAL

## 2022-09-23 DIAGNOSIS — E53.8 VITAMIN B12 DEFICIENCY (NON ANEMIC): Primary | ICD-10-CM

## 2022-09-23 PROCEDURE — 99207 PR NO CHARGE NURSE ONLY: CPT

## 2022-09-30 ENCOUNTER — VIRTUAL VISIT (OUTPATIENT)
Dept: PSYCHOLOGY | Facility: CLINIC | Age: 57
End: 2022-09-30
Payer: COMMERCIAL

## 2022-09-30 DIAGNOSIS — F41.9 ANXIETY DISORDER, UNSPECIFIED TYPE: Primary | ICD-10-CM

## 2022-09-30 PROCEDURE — 90834 PSYTX W PT 45 MINUTES: CPT | Mod: 95 | Performed by: PSYCHOLOGIST

## 2022-09-30 NOTE — PROGRESS NOTES
"[facility]                                     Progress Note    Patient Name: Ijeoma Avalos  Date: 9/30/2022         Service Type: Individual      Session Start Time: 09:00  Session End Time: 09:51     Session Length: 38-52 mins    Session #: 36 (this episode of care)    Attendees: Client attended alone    Service Modality:  Telephone Visit:    The patient has been notified of the following:      \"We have found that certain health care needs can be provided without the need for a face to face visit. This service lets us provide the care you need with a phone conversation.       I will have full access to your [facility] medical record during this entire phone call. I will be taking notes for your medical record.      Since this is like an office visit, we will bill your insurance company for this service.       There are potential benefits and risks of telephone visits (e.g. limits to patient confidentiality) that differ from in-person visits. Confidentiality still applies for telephone services, and nobody will record the visit. It is important to be in a quiet, private space that is free of distractions (including cell phone or other devices) during the visit.??      If during the course of the call I believe a telephone visit is not appropriate, you will not be charged for this service\"     Consent has been obtained for this service by care team member: Yes         DATA  Interactive Complexity: No  Crisis: No        Progress Since Last Session (Related to Symptoms / Goals / Homework):   Symptoms: Worry is prominent; incremental increases in closeness with  noted    Homework: Some progress; Client is improving her ability to recognize her 's efforts and  to express her appreciation       Episode of Care Goals: Satisfactory progress - ACTION (Actively working towards change); Intervened by reinforcing change plan / affirming steps taken     Current / Ongoing Stressors and " "Concerns:   Family stressors              Work stressors     Treatment Objective(s) Addressed in This Session:   increase connection in close relationships   build skills for stress management   increase self-confidence     Intervention:   Reviewed instances of incremental progress in increasing connection in the marital relationship. Client reports feeling slightly more hopeful, slightly more confident that this trend will continue. Discussed frequent worry that tends to be Client's \"default\" mindset when she is not actively engaged in some other task. Discussed possible evolutionary roots of this habit of mentally scanning for danger, also the superstitious belief that worry is a way of keeping her guard up. Noted that she can't help the automatic thoughts that pop into her mind, but she can then decide how to respond. Talked about the process of stepping back from thought or of redirecting to a more neutral topic The act of gently shifting away from worry, repeatedly over time is how she can achieve a more neutral emotional state. Client expressed her openness to practicing this.       Assessments completed prior to visit: N/A  The following assessments were previously completed by patient:  PROMIS 10-Global Health (only subscores and total score):   PROMIS-10 Scores Only 4/22/2022 7/29/2022   Global Mental Health Score 12 12   Global Physical Health Score 11 11   PROMIS TOTAL - SUBSCORES 23 23        ASSESSMENT: Current Emotional / Mental Status (status of significant symptoms):   Risk status (Self / Other harm or suicidal ideation)   Patient denies current fears or concerns for personal safety.   Patient denies current or recent suicidal ideation or behaviors.   Patient denies current or recent homicidal ideation or behaviors.   Patient denies current or recent self injurious behavior or ideation.   Patient denies other safety concerns.   Patient reports there has been no change in risk factors since her last " session.     Patient reports there has been no change in protective factors since her last session.     Recommended that patient call 911 or go to the local ED should there be a change in any of these risk factors.     Appearance:   Unable to assess    Eye Contact:   Unable to assess    Psychomotor Behavior: Unable to assess    Attitude:   Cooperative    Orientation:   All   Speech    Rate / Production: Normal     Volume:  Normal    Mood:    Stable overall; mild anxiety present   Affect:    Unable to assess    Thought Content:  Worry as default mindset   Thought Form:  Coherent  Logical    Insight:    Fair      Medication Review:   No changes to current psychiatric medication(s)     Medication Compliance:   Yes; infrequent use of lorazepam     Changes in Health Issues:   None reported--arthritis pain in knee persists     Chemical Use Review:   Substance Use: Chemical use reviewed, no active concerns identified      Tobacco Use: No current tobacco use.      Diagnosis:  1. Anxiety disorder, unspecified type      Collateral Reports Completed:   Telephone consent     PLAN: (Patient Tasks / Therapist Tasks / Other)  Client will work on shifting away from patterns of default worrying, choosing instead to gently shift her thinking--stepping back from the thoughts (they are separate from her) or redirecting, repeatedly as needed, to a more neutral topic. She will continue to look for and acknowledge the small efforts she sees her  making toward her preferred ways of connecting. Continue to use bedtime strategy of writing down any lingering issues/worries/loose ends before going to sleep, and then putting these away until the morning. Return appointment scheduled for next month. We will plan to meet in person. She has my contact information and is aware that she can reach out sooner if needed.     Rosa Chowdary, KATHARINE                                                       ______________________________________________________________________    Individual Treatment Plan    Patient's Name: Ijeoma Avalos  YOB: 1965    Date of Creation: 3/25/2022  Date Treatment Plan Last Reviewed/Revised: 7/1/2022    DSM5 Diagnoses: 300.00 (F41.9) Unspecified Anxiety Disorder  Psychosocial / Contextual Factors: family stressors, work stressors  PROMIS (reviewed every 90 days): 23    Referral / Collaboration:  Referral to another professional/service is not indicated at this time.    Anticipated number of session for this episode of care: estimated 12-16 sessions/year  Anticipation frequency of session: Monthly  Anticipated Duration of each session: 38-52 minutes  Treatment plan will be reviewed in 90 days or when goals have been changed.       MeasurableTreatment Goal(s) related to diagnosis / functional impairment(s)  Goal 1: Patient will build skills for stress management.    I will know I've met my goal when I am sleeping better and not waking up so often, when I don't feel worried so much of the time.   *updated on 7/1/22 (Client believes physical symptoms such as blood pressure and reflux are not the best way to measure this outcome).    Objective #A (Patient Action)    Patient will use at least 2 coping skills for anxiety management in the next 6 weeks.  Status: Continued - Date(s): 7/1/2022    Intervention(s)  Therapist will teach teach self-regulation strategies, CBT skills, mindfulness techniques.    Objective #B  Patient will use cognitive strategies identified in therapy to challenge anxious thoughts.  Status: Continued - Date(s): 7/1/2022    Intervention(s)  Therapist will teach cognitive strategies, provide education.      Goal 2: Patient will increase self-confidence.    I will know I've met my goal when I think more positively about myself.   *7/1/22 progress noted, particularly at work, as well as in some aspects of parenting. Client would like to continue as  ongoing goal.    Objective #A (Patient Action)    Status: Continued - Date(s): 7/1/2002    Patient will increase assertive communication.    Intervention(s)  Therapist will teach assertiveness skills.    Objective #B  Patient will Identify negative self-talk and behaviors: challenge core beliefs, myths, and actions.    Status: Continued - Date(s): 7/1/2022    Intervention(s)  Therapist will provide education, teach CBT skills.      Goal 3: Patiient will increase connection in close relationships.    I will know I've met my goal when I feel more close with loved ones.  *7/1/22--Client identifies this as primary goal currently.    Objective #A (Patient Action)    Status: Continued - Date(s): 7/1/2022    Patient will prioritize time for meaningful relationships.    Intervention(s)  Therapist will provide support and accountability.    Objective #B  Patient will make use of effective interpersonal communication skills.    Status: Continued - Date(s): 7/1/2022    Intervention(s)  Therapist will teach communication skills.      Patient has reviewed and agreed to the above plan.      Rosa Chowdary LP  March 25, 2022

## 2022-10-07 ENCOUNTER — MYC MEDICAL ADVICE (OUTPATIENT)
Dept: FAMILY MEDICINE | Facility: CLINIC | Age: 57
End: 2022-10-07
Payer: COMMERCIAL

## 2022-10-07 DIAGNOSIS — E53.9 B-COMPLEX DEFICIENCY: Primary | ICD-10-CM

## 2022-10-07 PROCEDURE — 99207 PR NO CHARGE NURSE ONLY: CPT | Performed by: INTERNAL MEDICINE

## 2022-10-10 ENCOUNTER — ALLIED HEALTH/NURSE VISIT (OUTPATIENT)
Dept: FAMILY MEDICINE | Facility: CLINIC | Age: 57
End: 2022-10-10
Payer: COMMERCIAL

## 2022-10-10 DIAGNOSIS — E53.9 B-COMPLEX DEFICIENCY: Primary | ICD-10-CM

## 2022-10-10 PROCEDURE — 96372 THER/PROPH/DIAG INJ SC/IM: CPT | Performed by: INTERNAL MEDICINE

## 2022-10-10 PROCEDURE — 99207 PR NO CHARGE NURSE ONLY: CPT

## 2022-10-10 RX ORDER — LANOLIN ALCOHOL/MO/W.PET/CERES
1000 CREAM (GRAM) TOPICAL DAILY
Qty: 90 TABLET | Refills: 1 | Status: SHIPPED | OUTPATIENT
Start: 2022-10-10

## 2022-10-10 RX ORDER — CYANOCOBALAMIN 1000 UG/ML
1000 INJECTION, SOLUTION INTRAMUSCULAR; SUBCUTANEOUS
Status: ACTIVE | OUTPATIENT
Start: 2022-10-10 | End: 2023-09-05

## 2022-10-10 RX ADMIN — CYANOCOBALAMIN 1000 MCG: 1000 INJECTION, SOLUTION INTRAMUSCULAR; SUBCUTANEOUS at 11:46

## 2022-10-10 NOTE — TELEPHONE ENCOUNTER
Please see [employer] message and advise. Pharmacy is pended if needed.    Fátima Mckenna RN  [facility] Triage Team

## 2022-10-10 NOTE — TELEPHONE ENCOUNTER
Pt is here today for Vit B12 injection.  She was to have 1 shot weekly x 4 doses, and then monthly. Can you please place a CAM order for monthly B12 orders?    Thanks.  Sourav SUAREZ CMA

## 2022-10-11 NOTE — TELEPHONE ENCOUNTER
Scheduled patient for monthly MA nurse injections until April    Olga Mason/Rizwana-  [facility]

## 2022-10-25 ENCOUNTER — OFFICE VISIT (OUTPATIENT)
Dept: PSYCHOLOGY | Facility: CLINIC | Age: 57
End: 2022-10-25
Payer: COMMERCIAL

## 2022-10-25 DIAGNOSIS — F41.9 ANXIETY DISORDER, UNSPECIFIED TYPE: Primary | ICD-10-CM

## 2022-10-25 PROCEDURE — 90834 PSYTX W PT 45 MINUTES: CPT | Performed by: PSYCHOLOGIST

## 2022-10-25 ASSESSMENT — PATIENT HEALTH QUESTIONNAIRE - PHQ9
SUM OF ALL RESPONSES TO PHQ QUESTIONS 1-9: 6
10. IF YOU CHECKED OFF ANY PROBLEMS, HOW DIFFICULT HAVE THESE PROBLEMS MADE IT FOR YOU TO DO YOUR WORK, TAKE CARE OF THINGS AT HOME, OR GET ALONG WITH OTHER PEOPLE: NOT DIFFICULT AT ALL
SUM OF ALL RESPONSES TO PHQ QUESTIONS 1-9: 6

## 2022-10-25 NOTE — PROGRESS NOTES
[facility]                                     Progress Note    Patient Name: Ijeoma Avalos  Date: 10/25/2022         Service Type: Individual      Session Start Time: 14:05  Session End Time: 14:56     Session Length: 38-52 mins    Session #: 37 (this episode of care)    Attendees: Client attended alone    Service Modality:  In person:    DATA  Interactive Complexity: No  Crisis: No        Progress Since Last Session (Related to Symptoms / Goals / Homework):   Symptoms: Low energy, worry    Homework: Limited progress; Client reported that she has not had much success with shifting away from worry as her default mode        Episode of Care Goals: Satisfactory progress - ACTION (Actively working towards change); Intervened by reinforcing change plan / affirming steps taken     Current / Ongoing Stressors and Concerns:   Family stressors              Work stressors     Treatment Objective(s) Addressed in This Session:   increase connection in close relationships   build skills for stress management   increase self-confidence     Intervention:   Client reported that redirecting worry thoughts remains a work in progress. This work has been complicated partly by persistent fatigue (likely due to thyroid dysfunction, also low ferritin, B12, and Vitamin D) as well as chronic knee pain, which drain her inner resources and her energy for working on changing thought patterns. Continued work on Client's goal of increasing emotional connectedness in the marriage relationship. Talked through fears and barriers and ultimately identified next steps for Client to take. Agreed that she may need to recover some energy/rejuvenate herself before taking on this issue.      Assessments completed prior to visit:  The following assessments were completed by patient for this visit:  PHQ9:   PHQ-9 SCORE 12/13/2017 2/21/2018 9/13/2019 11/12/2019 4/14/2020 8/26/2021 10/25/2022   PHQ-9 Total Score Raúlhart - - -  - - - 6 (Mild depression)   PHQ-9 Total Score 3 1 1 1 4 3 6     GAD2:   SOSA-2 10/25/2022   Feeling nervous, anxious, or on edge 0   Not being able to stop or control worrying 1   SOSA-2 Total Score 1     PROMIS 10-Global Health (only subscores and total score):   PROMIS-10 Scores Only 4/22/2022 7/29/2022 10/25/2022   Global Mental Health Score 12 12 10   Global Physical Health Score 11 11 11   PROMIS TOTAL - SUBSCORES 23 23 21      ASSESSMENT: Current Emotional / Mental Status (status of significant symptoms):   Risk status (Self / Other harm or suicidal ideation)   Patient denies current fears or concerns for personal safety.   Patient denies current or recent suicidal ideation or behaviors.   Patient denies current or recent homicidal ideation or behaviors.   Patient denies current or recent self injurious behavior or ideation.   Patient denies other safety concerns.   Patient reports there has been no change in risk factors since her last session.     Patient reports there has been no change in protective factors since her last session.     Recommended that patient call 911 or go to the local ED should there be a change in any of these risk factors.     Appearance:   Appropriate ; wearing mask as required   Eye Contact:   Good    Psychomotor Behavior: Normal    Attitude:   Engaged    Orientation:   All   Speech    Rate / Production: Normal     Volume:  Normal    Mood:    Anxiety, low energy   Affect:    Appropriate ; limited observation possible due to mask covering nose & mouth   Thought Content:  Frequent worries--may be diffuse or specific   Thought Form:  Coherent  Logical    Insight:    Fair      Medication Review:   No changes to current psychiatric medication(s)     Medication Compliance:   Yes; infrequent use of lorazepam     Changes in Health Issues:   None reported--arthritis pain in knee persists; treating fatigue with B12, Vitamin D, ferritin; also working to regulate thyroid functioning     Chemical  Use Review:   Substance Use: Chemical use reviewed, no active concerns identified      Tobacco Use: No current tobacco use.      Diagnosis:  1. Anxiety disorder, unspecified type         Collateral Reports Completed:   PHQ-9    SOSA-2   PROMIS-10    PLAN: (Patient Tasks / Therapist Tasks / Other)  Client will write about the message she'd like to convey in a conversation with her , as a way of organizing her own thoughts and clarifying her message. When she is ready, she will invite him to the conversation with some advance notice so that he can prepare his thoughts. She will proceed with a collaborative problem solving approach. Continue to practice shifting away from patterns of default worrying, choosing instead to gently shift her thinking--stepping back from the thoughts (they are separate from her) or redirecting, repeatedly as needed, to a more neutral topic. She will continue to look for and acknowledge the small efforts she sees her  making toward her preferred ways of connecting. Continue to use bedtime strategy of writing down any lingering issues/worries/loose ends before going to sleep, and then putting these away until the morning. Return appointment scheduled for next month. We will plan to resume phone visits as they are more convenient for Client, with occasional in person meetings as well. She has my contact information and is aware that she can reach out sooner if needed.     Rosa Chowdary, KATHARINE                                                      ______________________________________________________________________    Individual Treatment Plan    Patient's Name: Ijeoma Avalos  YOB: 1965    Date of Creation: 3/25/2022  Date Treatment Plan Last Reviewed/Revised: 10/25/2022    DSM5 Diagnoses: 300.00 (F41.9) Unspecified Anxiety Disorder  Psychosocial / Contextual Factors: family stressors, work stressors  PROMIS (reviewed every 90 days): 21    Referral /  Collaboration:  Referral to another professional/service is not indicated at this time.    Anticipated number of session for this episode of care: estimated 12-16 sessions/year  Anticipation frequency of session: Monthly  Anticipated Duration of each session: 38-52 minutes  Treatment plan will be reviewed in 90 days or when goals have been changed.       MeasurableTreatment Goal(s) related to diagnosis / functional impairment(s)  Goal 1: Patient will build skills for stress management.    I will know I've met my goal when I am sleeping better and not waking up so often, when I don't feel worried so much of the time.   *updated on 7/1/22 (Client believes physical symptoms such as blood pressure and reflux are not the best way to measure this outcome).    Objective #A (Patient Action)    Patient will use at least 2 coping skills for anxiety management in the next 6 weeks.  Status: Continued - Date(s): 10/25/2022    Intervention(s)  Therapist will teach teach self-regulation strategies, CBT skills, mindfulness techniques.    Objective #B  Patient will use cognitive strategies identified in therapy to challenge anxious thoughts.  Status: Continued - Date(s): 10/25/2022    Intervention(s)  Therapist will teach cognitive strategies, provide education.      Goal 2: Patient will increase self-confidence.    I will know I've met my goal when I think more positively about myself.   *7/1/22 progress noted, particularly at work, as well as in some aspects of parenting. Client would like to continue as ongoing goal.    Objective #A (Patient Action)    Status: Maintenance - Date(s): 10/25/2002    Patient will increase assertive communication.    Intervention(s)  Therapist will teach assertiveness skills.    Objective #B  Patient will Identify negative self-talk and behaviors: challenge core beliefs, myths, and actions.    Status: Continued - Date(s): 10/25/2022    Intervention(s)  Therapist will provide education, teach CBT  skills.      Goal 3: Patiient will increase connection in close relationships.    I will know I've met my goal when I feel more close with loved ones.  *7/1/22--Client identifies this as primary goal currently.    Objective #A (Patient Action)    Status: Continued - Date(s): 10/25/2022    Patient will prioritize time for meaningful relationships.    Intervention(s)  Therapist will provide support and accountability.    Objective #B  Patient will make use of effective interpersonal communication skills.    Status: Continued - Date(s): 10/25/2022    Intervention(s)  Therapist will teach communication skills.      Patient has reviewed and agreed to the above plan.      Rosa Chowdary LP  March 25, 2022      Answers for HPI/ROS submitted by the patient on 10/25/2022  If you checked off any problems, how difficult have these problems made it for you to do your work, take care of things at home, or get along with other people?: Not difficult at all  PHQ9 TOTAL SCORE: 6

## 2022-11-11 ENCOUNTER — ALLIED HEALTH/NURSE VISIT (OUTPATIENT)
Dept: FAMILY MEDICINE | Facility: CLINIC | Age: 57
End: 2022-11-11
Payer: COMMERCIAL

## 2022-11-11 DIAGNOSIS — E53.8 VITAMIN B12 DEFICIENCY (NON ANEMIC): Primary | ICD-10-CM

## 2022-11-11 PROCEDURE — 96372 THER/PROPH/DIAG INJ SC/IM: CPT | Performed by: INTERNAL MEDICINE

## 2022-11-11 PROCEDURE — 99207 PR NO CHARGE NURSE ONLY: CPT

## 2022-11-11 RX ADMIN — CYANOCOBALAMIN 1000 MCG: 1000 INJECTION, SOLUTION INTRAMUSCULAR; SUBCUTANEOUS at 10:36

## 2022-12-05 ENCOUNTER — OFFICE VISIT (OUTPATIENT)
Dept: FAMILY MEDICINE | Facility: CLINIC | Age: 57
End: 2022-12-05
Payer: COMMERCIAL

## 2022-12-05 VITALS
DIASTOLIC BLOOD PRESSURE: 64 MMHG | HEIGHT: 60 IN | SYSTOLIC BLOOD PRESSURE: 136 MMHG | TEMPERATURE: 97.6 F | WEIGHT: 136 LBS | HEART RATE: 92 BPM | RESPIRATION RATE: 12 BRPM | BODY MASS INDEX: 26.7 KG/M2 | OXYGEN SATURATION: 98 %

## 2022-12-05 DIAGNOSIS — H60.8X2 CHRONIC ECZEMATOUS OTITIS EXTERNA OF LEFT EAR: Primary | ICD-10-CM

## 2022-12-05 DIAGNOSIS — E11.9 TYPE 2 DIABETES MELLITUS WITHOUT COMPLICATION, WITHOUT LONG-TERM CURRENT USE OF INSULIN (H): ICD-10-CM

## 2022-12-05 LAB — HBA1C MFR BLD: 6.8 % (ref 0–5.6)

## 2022-12-05 PROCEDURE — 99214 OFFICE O/P EST MOD 30 MIN: CPT | Performed by: PHYSICIAN ASSISTANT

## 2022-12-05 PROCEDURE — 36415 COLL VENOUS BLD VENIPUNCTURE: CPT | Performed by: PHYSICIAN ASSISTANT

## 2022-12-05 PROCEDURE — 83036 HEMOGLOBIN GLYCOSYLATED A1C: CPT | Performed by: PHYSICIAN ASSISTANT

## 2022-12-05 RX ORDER — NEOMYCIN SULFATE, POLYMYXIN B SULFATE, HYDROCORTISONE 3.5; 10000; 1 MG/ML; [USP'U]/ML; MG/ML
3 SOLUTION/ DROPS AURICULAR (OTIC) 2 TIMES DAILY
Qty: 10 ML | Refills: 1 | Status: SHIPPED | OUTPATIENT
Start: 2022-12-05 | End: 2022-12-12

## 2022-12-05 ASSESSMENT — PAIN SCALES - GENERAL: PAINLEVEL: NO PAIN (0)

## 2022-12-05 NOTE — PROGRESS NOTES
Assessment & Plan   Problem List Items Addressed This Visit        Endocrine    Type 2 diabetes mellitus (H)    Relevant Orders    Hemoglobin A1c   Other Visit Diagnoses     Chronic eczematous otitis externa of left ear    -  Primary    Relevant Medications    neomycin-polymyxin-hydrocortisone (CORTISPORIN) 3.5-14169-5 otic solution         Both ear canals are dry and flaky - we will try 1 week of cortisporin, then use as needed.   Update A1C                 Return in about 9 months (around 9/5/2023) for Preventive Care Visit.    NATAN Lenz [facility] LAZARUS Raphael is a 57 year old, presenting for the following health issues:  Ear Problem      History of Present Illness       Reason for visit:  Ear trouble -ache and drainage  Symptom onset:  More than a month  Symptoms include:  Excessive ear fluid and sometimes ear ache  Symptom intensity:  Mild  Symptom progression:  Staying the same  Had these symptoms before:  No    She eats 2-3 servings of fruits and vegetables daily.She consumes 0 sweetened beverage(s) daily.She exercises with enough effort to increase her heart rate 9 or less minutes per day.  She exercises with enough effort to increase her heart rate 3 or less days per week.   She is taking medications regularly.             Review of Systems         Objective    /64   Pulse 92   Temp 97.6  F (36.4  C) (Temporal)   Resp 12   Ht 1.524 m (5')   Wt 61.7 kg (136 lb)   LMP 04/14/2017   SpO2 98%   BMI 26.56 kg/m    Body mass index is 26.56 kg/m .  Physical Exam  Constitutional:       General: She is not in acute distress.     Appearance: She is well-developed and well-nourished. She is not diaphoretic.   HENT:      Head: Normocephalic.      Right Ear: External ear normal. Swelling (dry, flaky) present. No drainage. No middle ear effusion. There is no impacted cerumen.      Left Ear: External ear normal. Swelling (dry, flaky) present. No  drainage.  No middle ear effusion. There is no impacted cerumen.      Nose: Nose normal.   Eyes:      Extraocular Movements: EOM normal.      Conjunctiva/sclera: Conjunctivae normal.   Pulmonary:      Effort: Pulmonary effort is normal.   Musculoskeletal:      Cervical back: Normal range of motion.   Neurological:      Mental Status: She is alert and oriented to person, place, and time.   Psychiatric:         Mood and Affect: Mood and affect normal.         Judgment: Judgment normal.

## 2022-12-07 NOTE — RESULT ENCOUNTER NOTE
Ijeoma    Your lab tests are complete and I have reviewed the results.     - Your A1c is slightly worsened from your last test.  It is still under 7.0, which is the goal.  No need to change any medications.  Recheck again in 6 months.     If you have any questions or concerns, please feel free to call or send a [employer] message.    Sincerely,  Allen Madrid PA-C

## 2022-12-09 ENCOUNTER — ALLIED HEALTH/NURSE VISIT (OUTPATIENT)
Dept: FAMILY MEDICINE | Facility: CLINIC | Age: 57
End: 2022-12-09
Payer: COMMERCIAL

## 2022-12-09 DIAGNOSIS — E53.8 VITAMIN B12 DEFICIENCY (NON ANEMIC): Primary | ICD-10-CM

## 2022-12-09 DIAGNOSIS — K21.9 GASTROESOPHAGEAL REFLUX DISEASE: ICD-10-CM

## 2022-12-09 PROCEDURE — 99207 PR NO CHARGE NURSE ONLY: CPT

## 2022-12-09 PROCEDURE — 96372 THER/PROPH/DIAG INJ SC/IM: CPT | Performed by: INTERNAL MEDICINE

## 2022-12-09 RX ORDER — OMEPRAZOLE 40 MG/1
CAPSULE, DELAYED RELEASE ORAL
Qty: 90 CAPSULE | Refills: 3 | Status: SHIPPED | OUTPATIENT
Start: 2022-12-09 | End: 2023-12-13

## 2022-12-09 RX ADMIN — CYANOCOBALAMIN 1000 MCG: 1000 INJECTION, SOLUTION INTRAMUSCULAR; SUBCUTANEOUS at 08:42

## 2022-12-09 NOTE — TELEPHONE ENCOUNTER
Prescription approved per [facility] Refill Protocol.  Carlos Eduardo Monge RN  [facility] Triage Nurse

## 2022-12-16 ENCOUNTER — VIRTUAL VISIT (OUTPATIENT)
Dept: PSYCHOLOGY | Facility: CLINIC | Age: 57
End: 2022-12-16
Payer: COMMERCIAL

## 2022-12-16 DIAGNOSIS — F41.9 ANXIETY DISORDER, UNSPECIFIED TYPE: Primary | ICD-10-CM

## 2022-12-16 PROCEDURE — 90834 PSYTX W PT 45 MINUTES: CPT | Mod: 95 | Performed by: PSYCHOLOGIST

## 2022-12-16 NOTE — PROGRESS NOTES
"[facility]                                     Progress Note    Patient Name: Ijeoma Avalos  Date: 12/16/2022         Service Type: Individual      Session Start Time: 08:00  Session End Time: 08:51     Session Length: 38-52 mins    Session #: 38 (this episode of care)    Attendees: Client attended alone    Service Modality:  Telephone    The patient has been notified of the following:      \"We have found that certain health care needs can be provided without the need for a face to face visit.  This service lets us provide the care you need with a phone conversation.       I will have full access to your [place] medical record during this entire phone call.   I will be taking notes for your medical record.      Since this is like an office visit, we will bill your insurance company for this service.       There are potential benefits and risks of telephone visits (e.g. limits to patient confidentiality) that differ from in-person visits.?  Confidentiality still applies for telephone services, and nobody will record the visit.  It is important to be in a quiet, private space that is free of distractions (including cell phone or other devices) during the visit.??      If during the course of the call I believe a telephone visit is not appropriate, you will not be charged for this service\"     Consent has been obtained for this service by care team member: Yes     DATA  Interactive Complexity: No  Crisis: No        Progress Since Last Session (Related to Symptoms / Goals / Homework):   Symptoms: Anxiety, stress    Homework: Partially completed; since the last session, Client had an honest conversation with her spouse about her concerns and frustrations         Episode of Care Goals: Satisfactory progress - ACTION (Actively working towards change); Intervened by reinforcing change plan / affirming steps taken     Current / Ongoing Stressors and Concerns:   Family stressors              " Work stressors     Treatment Objective(s) Addressed in This Session:   increase connection in close relationships   build skills for stress management   increase self-confidence     Intervention:   Session focused on stress management. Client expressed concern about her blood pressure and noted that she recognizes the impact of chronic stress on her health. Agreed that it would be helpful to look at the physiological component of stress, in addition to the cognitive component that has been the focus of our work more recently. Provided the rationale and some psychoeducation around self-regulation strategies such as deep breathing to turn off the stress response in the body and shift to the relaxation response. Taught a basic relaxation breathing exercise in session; Client reported initial positive response. Will provide online resources for further practice so that Client can incorporate this into her daily routine. Took some time to identify the primary stressors in her life, examining which aspects may need to be addressed as she works toward improved stress management.       Assessments completed prior to visit:  The following assessments were completed by patient for this visit:  PHQ9:   PHQ-9 SCORE 12/13/2017 2/21/2018 9/13/2019 11/12/2019 4/14/2020 8/26/2021 10/25/2022   PHQ-9 Total Score MyChart - - - - - - 6 (Mild depression)   PHQ-9 Total Score 3 1 1 1 4 3 6     GAD2:   SOSA-2 10/25/2022   Feeling nervous, anxious, or on edge 0   Not being able to stop or control worrying 1   SOSA-2 Total Score 1     PROMIS 10-Global Health (only subscores and total score):   PROMIS-10 Scores Only 4/22/2022 7/29/2022 10/25/2022   Global Mental Health Score 12 12 10   Global Physical Health Score 11 11 11   PROMIS TOTAL - SUBSCORES 23 23 21      ASSESSMENT: Current Emotional / Mental Status (status of significant symptoms):   Risk status (Self / Other harm or suicidal ideation)   Patient denies current fears or concerns for  personal safety.   Patient denies current or recent suicidal ideation or behaviors.   Patient denies current or recent homicidal ideation or behaviors.   Patient denies current or recent self injurious behavior or ideation.   Patient denies other safety concerns.   Patient reports there has been no change in risk factors since her last session.    Patient reports there has been no change in protective factors since her last session.     Recommended that patient call 911 or go to the local ED should there be a change in any of these risk factors.     Appearance:   Unable to assess    Eye Contact:   Unable to assess    Psychomotor Behavior: Unable to assess    Attitude:   Open    Orientation:   All   Speech    Rate / Production: Normal     Volume:  Normal    Mood:    Stressed   Affect:    Unable to assess     Thought Content:  Some negative ruminations   Thought Form:  Coherent  Logical    Insight:    Fair      Medication Review:   No changes to current psychiatric medication(s)     Medication Compliance:   Yes; infrequent use of lorazepam     Changes in Health Issues:   Yes: eye discomfort--possibly residual from previous viral illness, has been evaluated     Chemical Use Review:   Substance Use: Chemical use reviewed, no active concerns identified      Tobacco Use: No current tobacco use.      Diagnosis:  1. Anxiety disorder, unspecified type         Collateral Reports Completed:   Telemedicine consent     PLAN: (Patient Tasks / Therapist Tasks / Other)  Client will schedule into her calendar 10 minutes in the morning and 10 minutes in the afternoon to engage in deep breathing/meditation--to downshift the sympathetic nervous system and bring the parasympathetic nervous system online. Some online resources to help with this practice: [email or web address]-[email or web address]-meditations  Or  https://www.[employer]/guided-meditations/  She will talk with her  about a system of  checking in about the relationship more regularly (perhaps red/yellow/green, 1-5 scale)--allowing a chance to express rather than suppress thoughts and feelings.   Continue: practice shifting away from patterns of default worrying, choosing instead to gently shift her thinking--stepping back from the thoughts (they are separate from her) or redirecting, repeatedly as needed, to a more neutral topic. Use bedtime strategy of writing down any lingering issues/worries/loose ends before going to sleep, and then putting these away until the morning. Return appointment scheduled for next month, in person. Client has my contact information and is aware that she can reach out sooner if needed.     Rosa Chowdary, LP                                                      ______________________________________________________________________    Individual Treatment Plan    Patient's Name: Ijeoma Avalos  YOB: 1965    Date of Creation: 3/25/2022  Date Treatment Plan Last Reviewed/Revised: 10/25/2022    DSM5 Diagnoses: 300.00 (F41.9) Unspecified Anxiety Disorder  Psychosocial / Contextual Factors: family stressors, work stressors  PROMIS (reviewed every 90 days): 21    Referral / Collaboration:  Referral to another professional/service is not indicated at this time.    Anticipated number of session for this episode of care: estimated 12-16 sessions/year  Anticipation frequency of session: Monthly  Anticipated Duration of each session: 38-52 minutes  Treatment plan will be reviewed in 90 days or when goals have been changed.       MeasurableTreatment Goal(s) related to diagnosis / functional impairment(s)  Goal 1: Patient will build skills for stress management.    I will know I've met my goal when I am sleeping better and not waking up so often, when I don't feel worried so much of the time.   *updated on 7/1/22 (Client believes physical symptoms such as blood pressure and reflux are not the best way to measure  this outcome).    Objective #A (Patient Action)    Patient will use at least 2 coping skills for anxiety management in the next 6 weeks.  Status: Continued - Date(s): 10/25/2022    Intervention(s)  Therapist will teach teach self-regulation strategies, CBT skills, mindfulness techniques.    Objective #B  Patient will use cognitive strategies identified in therapy to challenge anxious thoughts.  Status: Continued - Date(s): 10/25/2022    Intervention(s)  Therapist will teach cognitive strategies, provide education.      Goal 2: Patient will increase self-confidence.    I will know I've met my goal when I think more positively about myself.   *7/1/22 progress noted, particularly at work, as well as in some aspects of parenting. Client would like to continue as ongoing goal.    Objective #A (Patient Action)    Status: Maintenance - Date(s): 10/25/2022    Patient will increase assertive communication.    Intervention(s)  Therapist will teach assertiveness skills.    Objective #B  Patient will Identify negative self-talk and behaviors: challenge core beliefs, myths, and actions.    Status: Continued - Date(s): 10/25/2022    Intervention(s)  Therapist will provide education, teach CBT skills.      Goal 3: Patiient will increase connection in close relationships.    I will know I've met my goal when I feel more close with loved ones.  *7/1/22--Client identifies this as primary goal currently.    Objective #A (Patient Action)    Status: Continued - Date(s): 10/25/2022    Patient will prioritize time for meaningful relationships.    Intervention(s)  Therapist will provide support and accountability.    Objective #B  Patient will make use of effective interpersonal communication skills.    Status: Continued - Date(s): 10/25/2022    Intervention(s)  Therapist will teach communication skills.      Patient has reviewed and agreed to the above plan.      Rosa Chowdary LP  March 25, 2022      Answers for HPI/ROS submitted by the  patient on 10/25/2022  If you checked off any problems, how difficult have these problems made it for you to do your work, take care of things at home, or get along with other people?: Not difficult at all  PHQ9 TOTAL SCORE: 6

## 2023-01-10 ENCOUNTER — TRANSFERRED RECORDS (OUTPATIENT)
Dept: HEALTH INFORMATION MANAGEMENT | Facility: CLINIC | Age: 58
End: 2023-01-10

## 2023-01-13 ENCOUNTER — ALLIED HEALTH/NURSE VISIT (OUTPATIENT)
Dept: FAMILY MEDICINE | Facility: CLINIC | Age: 58
End: 2023-01-13
Payer: COMMERCIAL

## 2023-01-13 DIAGNOSIS — E53.8 VITAMIN B12 DEFICIENCY (NON ANEMIC): Primary | ICD-10-CM

## 2023-01-13 PROCEDURE — 99207 PR NO CHARGE NURSE ONLY: CPT

## 2023-01-13 PROCEDURE — 96372 THER/PROPH/DIAG INJ SC/IM: CPT | Performed by: INTERNAL MEDICINE

## 2023-01-13 RX ADMIN — CYANOCOBALAMIN 1000 MCG: 1000 INJECTION, SOLUTION INTRAMUSCULAR; SUBCUTANEOUS at 08:32

## 2023-01-14 DIAGNOSIS — E11.9 TYPE 2 DIABETES MELLITUS WITHOUT COMPLICATION, WITHOUT LONG-TERM CURRENT USE OF INSULIN (H): ICD-10-CM

## 2023-01-16 RX ORDER — METFORMIN HCL 500 MG
TABLET, EXTENDED RELEASE 24 HR ORAL
Qty: 360 TABLET | Refills: 0 | Status: SHIPPED | OUTPATIENT
Start: 2023-01-16 | End: 2023-05-23

## 2023-01-24 ENCOUNTER — OFFICE VISIT (OUTPATIENT)
Dept: PSYCHOLOGY | Facility: CLINIC | Age: 58
End: 2023-01-24
Payer: COMMERCIAL

## 2023-01-24 DIAGNOSIS — F41.9 ANXIETY DISORDER, UNSPECIFIED TYPE: Primary | ICD-10-CM

## 2023-01-24 PROCEDURE — 90834 PSYTX W PT 45 MINUTES: CPT | Performed by: PSYCHOLOGIST

## 2023-01-24 NOTE — PROGRESS NOTES
[facility]                                     Progress Note    Patient Name: Ijeoma Avalos  Date: 1/24/2022         Service Type: Individual      Session Start Time: 14:01  Session End Time: 14:50     Session Length: 38-52 mins    Session #: 39 (this episode of care)    Attendees: Client attended alone    Service Modality:  In person    DATA  Interactive Complexity: No  Crisis: No        Progress Since Last Session (Related to Symptoms / Goals / Homework):   Symptoms: Some improvement    Homework: Good progress; Client has been regularly engaging in relaxation exercises, with benefit noted          Episode of Care Goals: Satisfactory progress - ACTION (Actively working towards change); Intervened by reinforcing change plan / affirming steps taken     Current / Ongoing Stressors and Concerns:   Family stressors              Work stressors     Treatment Objective(s) Addressed in This Session:   increase connection in close relationships   build skills for stress management   increase self-confidence     Intervention:   Offered reinforcement for Client's good progress with incorporating relaxation exercises into her daily routine. Engaged in emotion-focused processing of Client's experiences in the marital relationship, identifying the needs that have not yet been met. Practiced alternative ways of interacting/approaching that may result in a more satisfying encounter for her. Practiced the language she could use in doing so. Noted that she can be more focused on her need than on her 's reaction. Ended the session with a deep breathing/muscle relaxation activity.       Assessments completed prior to visit:  The following assessments were completed by patient for this visit:  PHQ9:   PHQ-9 SCORE 12/13/2017 2/21/2018 9/13/2019 11/12/2019 4/14/2020 8/26/2021 10/25/2022   PHQ-9 Total Score MyChart - - - - - - 6 (Mild depression)   PHQ-9 Total Score 3 1 1 1 4 3 6     GAD2:   SOSA-2  10/25/2022 1/24/2023   Feeling nervous, anxious, or on edge 0 0   Not being able to stop or control worrying 1 1   SOSA-2 Total Score 1 1     PROMIS 10-Global Health (only subscores and total score):   PROMIS-10 Scores Only 4/22/2022 7/29/2022 10/25/2022 1/24/2023   Global Mental Health Score 12 12 10 14   Global Physical Health Score 11 11 11 14   PROMIS TOTAL - SUBSCORES 23 23 21 28      ASSESSMENT: Current Emotional / Mental Status (status of significant symptoms):   Risk status (Self / Other harm or suicidal ideation)   Patient denies current fears or concerns for personal safety.   Patient denies current or recent suicidal ideation or behaviors.   Patient denies current or recent homicidal ideation or behaviors.   Patient denies current or recent self injurious behavior or ideation.   Patient denies other safety concerns.   Patient reports there has been no change in risk factors since her last session.    Patient reports there has been no change in protective factors since her last session.     Recommended that patient call 911 or go to the local ED should there be a change in any of these risk factors.     Appearance:   Appropriate ; wearing mask as required   Eye Contact:   Good    Psychomotor Behavior: Normal    Attitude:   Engaged    Orientation:   All   Speech    Rate / Production: Normal     Volume:  Normal    Mood:    Slight improvement--less anxious   Affect:    Appropriate ; limited observation possible due to mask covering nose and mouth    Thought Content:  Working hard to fill in the gaps in the absence of information   Thought Form:  Coherent  Logical    Insight:    Fair      Medication Review:   No changes to current psychiatric medication(s)     Medication Compliance:   Yes; infrequent use of lorazepam     Changes in Health Issues:   None reported     Chemical Use Review:   Substance Use: Chemical use reviewed, no active concerns identified      Tobacco Use: No current tobacco use.       Diagnosis:  1. Anxiety disorder, unspecified type         Collateral Reports Completed:   PHQ-2    SOSA-2   PROMIS-10     PLAN: (Patient Tasks / Therapist Tasks / Other)  Client will try new ways of responding to anxieties in the relationship--sharing these with her  (instead of keeping them to herself), risking some moments of vulnerability in the interest of having her needs met. She will continue with regular use of deep breathing/meditation throughout the day. Continue: use bedtime strategy of writing down any lingering issues/worries/loose ends before going to sleep, and then putting these away until the morning. She will also practice using relaxation exercises when she awakens during the night. Return appointment scheduled for next month. Client has my contact information and is aware that she can reach out sooner if needed.     Rosa Chowdary, KATHARINE                                                      ______________________________________________________________________    Individual Treatment Plan    Patient's Name: Ijeoma Avalos  YOB: 1965    Date of Creation: 3/25/2022  Date Treatment Plan Last Reviewed/Revised: 10/25/2022    DSM5 Diagnoses: 300.00 (F41.9) Unspecified Anxiety Disorder  Psychosocial / Contextual Factors: family stressors, work stressors  PROMIS (reviewed every 90 days): 28    Referral / Collaboration:  Referral to another professional/service is not indicated at this time.    Anticipated number of session for this episode of care: estimated 12-16 sessions/year  Anticipation frequency of session: Monthly  Anticipated Duration of each session: 38-52 minutes  Treatment plan will be reviewed in 90 days or when goals have been changed.       MeasurableTreatment Goal(s) related to diagnosis / functional impairment(s)  Goal 1: Patient will build skills for stress management.    I will know I've met my goal when I am sleeping better and not waking up so often, when I  don't feel worried so much of the time.   *updated on 7/1/22 (Client believes physical symptoms such as blood pressure and reflux are not the best way to measure this outcome).    Objective #A (Patient Action)    Patient will use at least 2 coping skills for anxiety management in the next 6 weeks.  Status: Continued - Date(s): 10/25/2022    Intervention(s)  Therapist will teach teach self-regulation strategies, CBT skills, mindfulness techniques.    Objective #B  Patient will use cognitive strategies identified in therapy to challenge anxious thoughts.  Status: Continued - Date(s): 10/25/2022    Intervention(s)  Therapist will teach cognitive strategies, provide education.      Goal 2: Patient will increase self-confidence.    I will know I've met my goal when I think more positively about myself.   *7/1/22 progress noted, particularly at work, as well as in some aspects of parenting. Client would like to continue as ongoing goal.    Objective #A (Patient Action)    Status: Maintenance - Date(s): 10/25/2022    Patient will increase assertive communication.    Intervention(s)  Therapist will teach assertiveness skills.    Objective #B  Patient will Identify negative self-talk and behaviors: challenge core beliefs, myths, and actions.    Status: Continued - Date(s): 10/25/2022    Intervention(s)  Therapist will provide education, teach CBT skills.      Goal 3: Patiient will increase connection in close relationships.    I will know I've met my goal when I feel more close with loved ones.  *7/1/22--Client identifies this as primary goal currently.    Objective #A (Patient Action)    Status: Continued - Date(s): 10/25/2022    Patient will prioritize time for meaningful relationships.    Intervention(s)  Therapist will provide support and accountability.    Objective #B  Patient will make use of effective interpersonal communication skills.    Status: Continued - Date(s): 10/25/2022    Intervention(s)  Therapist will  teach communication skills.      Patient has reviewed and agreed to the above plan.      Rosa Chowdary LP  March 25, 2022      Answers for HPI/ROS submitted by the patient on 10/25/2022  If you checked off any problems, how difficult have these problems made it for you to do your work, take care of things at home, or get along with other people?: Not difficult at all  PHQ9 TOTAL SCORE: 6

## 2023-02-01 ENCOUNTER — TRANSFERRED RECORDS (OUTPATIENT)
Dept: MULTI SPECIALTY CLINIC | Facility: CLINIC | Age: 58
End: 2023-02-01

## 2023-02-01 LAB — RETINOPATHY: NORMAL

## 2023-02-02 ENCOUNTER — VIRTUAL VISIT (OUTPATIENT)
Dept: FAMILY MEDICINE | Facility: CLINIC | Age: 58
End: 2023-02-02
Payer: COMMERCIAL

## 2023-02-02 ENCOUNTER — TELEPHONE (OUTPATIENT)
Dept: FAMILY MEDICINE | Facility: CLINIC | Age: 58
End: 2023-02-02

## 2023-02-02 DIAGNOSIS — U07.1 INFECTION DUE TO 2019 NOVEL CORONAVIRUS: Primary | ICD-10-CM

## 2023-02-02 PROCEDURE — 99213 OFFICE O/P EST LOW 20 MIN: CPT | Mod: CS | Performed by: PHYSICIAN ASSISTANT

## 2023-02-02 NOTE — TELEPHONE ENCOUNTER
RN COVID TREATMENT VISIT  02/02/23    Ijeoma Avalos  57 year old  Current weight?   Wt Readings from Last 2 Encounters:   12/05/22 61.7 kg (136 lb)   09/02/22 61.5 kg (135 lb 9.6 oz)     AST   Date Value Ref Range Status   07/05/2022 14 0 - 45 U/L Final   12/26/2019 9 0 - 45 U/L Final     Lab Results   Component Value Date    ALT 20 07/05/2022    ALT 19 12/26/2019         Has the patient been seen by a primary care provider at an [facility] or [facility] within the past two years? Yes.   Have you been in close proximity to/do you have a known exposure to a person with a confirmed case of influenza? No.     Date of positive COVID test (PCR or at home)?  2/2/23    Current COVID symptoms: fever or chills, fatigue, muscle or body aches and headache    Date COVID symptoms began: 2/2/23    Do you have any of the following conditions that place you at risk of being very sick from COVID-19? diabetes    Is patient eligible to continue? Yes, established patient, 12 years or older weighing at least 88.2 lbs, who has COVID symptoms that started in the past 5 days and is at risk for being very sick from COVID-19.       Have you received monoclonal antibodies or oral antiviral medications since testing positive to COVID-19? No    Are you currently hospitalized for COVID-19? No    Do you have a history of hepatitis?hepatitis with jaundice 25 years ago     Is patient eligible to continue? No, patient does not meet all eligibility requirements for the RN COVID treatment (as denoted by yes response(s) above). Patient informed they will need a virtual provider visit to assess treatment options.  Patient will be transferred to a  at the end of this call.   Liliane Stahl RN

## 2023-02-02 NOTE — PROGRESS NOTES
Assessment & Plan     Infection due to 2019 novel coronavirus  Appears well today over the phone.  Speaking in complete sentences without difficulty.  Will qualify for antiviral therapy.  She wishes to pursue this.  Discussed side effects of the medication.  We will hold statin.  Discussed about care with over-the-counter measures.  Discussed signs and symptoms that warrant urgent/emergent evaluation as well as proper isolation/quarantine period of time.  Comfortable with plan.   - nirmatrelvir and ritonavir (PAXLOVID) therapy pack  Dispense: 30 tablet; Refill: 0      20 minutes spent on the date of the encounter doing chart review, review of test results, interpretation of tests, patient visit and documentation          Return in about 3 months (around 5/2/2023).    The likelihood of other entities in the differential is insufficient to justify any further testing for them at this time. This was explained to the patient. The patient was advised that persistent or worsening symptoms would require further evaluation. Patient advised to call the office and if unable to reach to go to the emergency room if they develop any new or worsening symptoms. Expressed understanding and agreement with above stated plan.     Ezekiel Pan PA-C  [facility]   Ijeoma Avalos is a 57 year old female presenting for the following health issues:  Patient presents with:  Covid Concern    Tested positive for COVID-19 today.  Symptoms began today.  Congestion, headache, sore throat, mild cough and body aches.  Took Tylenol which improved her symptoms.  Afebrile.  No shortness of breath, chest pain, leg swelling, nausea, vomiting, or abdominal pain.    History of diabetes and hypertension.  Vaccinated x4 for COVID-19.  First time having COVID-19.    Review of Systems   Constitutional, HEENT, cardiovascular, pulmonary, GI, , musculoskeletal, neuro, skin, endocrine and psych systems are  negative, except as otherwise noted.      Objective    Unable to obtain due to video visit.    Allergies   Allergen Reactions     Cephalexin Hives and Rash     Hives on hands, face and stomach.      Ibuprofen      sneezing     Nsaids Other (See Comments)     Shellfish Allergy Nausea and Nausea and Vomiting     Sulfamethoxazole Rash     Trimethoprim Rash     Current Outpatient Medications   Medication Sig Dispense Refill     ACCU-CHEK GINA PLUS test strip USE TO TEST BLOOD SUGAR 1 TO 2 TIMES DAILY OR AS DIRECTED 200 strip 1     atorvastatin (LIPITOR) 20 MG tablet Take 1 tablet (20 mg) by mouth daily 90 tablet 1     blood glucose monitoring (ACCU-CHEK GINA PLUS) meter device kit Use to test blood sugar 1-2 times daily or as directed. 1 kit 0     blood glucose monitoring (SOFTCLIX) lancets Use to test blood sugar 1-2 times daily or as directed. 100 each 1     cyanocobalamin (VITAMIN B-12) 1000 MCG tablet Take 1 tablet (1,000 mcg) by mouth daily 90 tablet 1     ferrous sulfate (FE TABS) 325 (65 Fe) MG EC tablet Take 1 tablet (325 mg) by mouth daily 90 tablet 1     levothyroxine (SYNTHROID/LEVOTHROID) 75 MCG tablet Take 1 tablet (75 mcg) by mouth daily And 2 tabs every Sunday 60 tablet 1     lisinopril (ZESTRIL) 10 MG tablet Take 1 tablet (10 mg) by mouth daily 90 tablet 1     metFORMIN (GLUCOPHAGE XR) 500 MG 24 hr tablet TAKE 4 TABLETS (2,000 MG) BY MOUTH DAILY WITH DINNER 360 tablet 0     nirmatrelvir and ritonavir (PAXLOVID) therapy pack Take 3 tablets by mouth 2 times daily for 5 days 30 tablet 0     omeprazole (PRILOSEC) 40 MG DR capsule Take 1 capsule by mouth once daily 90 capsule 3     vitamin D2 (ERGOCALCIFEROL) 20975 units (1250 mcg) capsule Take 1 capsule (50,000 Units) by mouth once a week 12 capsule 0     fluticasone (FLONASE) 50 MCG/ACT nasal spray Spray 1-2 sprays into both nostrils daily (Patient not taking: Reported on 2/2/2023) 16 g 3     Past Medical History:   Diagnosis Date     Gastric acidity       Gestational diabetes mellitus     DIET CONTROL     Hx of previous reproductive problem     IVF     Hypothyroid      Motion sickness      Ovarian cyst      Post-operative nausea and vomiting 2017     Past Surgical History:   Procedure Laterality Date     APPENDECTOMY        SECTION  2014    Procedure:  SECTION;  Surgeon: Ru Cano MD;  Location:  L+D     CHOLECYSTECTOMY       cyst removed      left  lower leg on bone sheath     DAVINCI HYSTERECTOMY TOTAL, SALPINGECTOMY BILATERAL N/A 2017    Procedure: DAVINCI HYSTERECTOMY TOTAL, SALPINGECTOMY BILATERAL;  DAVINCI SINGLE SITE HYSTERECTOMY, BILATERAL SALPINGECTOMY, LEFT OOPHORECTOMY, LYSIS OF ADHESIONS (LAPAROSCOPIC BAG TO RETREIVE OVARY);  Surgeon: Ru Cano MD;  Location:  OR     DAVINCI LYSIS OF ADHESIONS N/A 2017    Procedure: DAVINCI LYSIS OF ADHESIONS;;  Surgeon: Ru Cano MD;  Location:  OR     DAVINCI OOPHORECTOMY N/A 2017    Procedure: DAVINCI OOPHORECTOMY;;  Surgeon: Ru Cano MD;  Location:  OR     GI SURGERY       MYOMECTOMY UTERUS  2012     ZZ GASTROSCOPY,FL         Physical Exam   Unable to perform full physical exam given video visit.  GENERAL: healthy, alert and no distress  EYES: Eyes grossly normal to inspection  NECK:  no asymmetry  RESP: Breathing not labored.  Speaking in full and complete sentences.  SKIN: no suspicious lesions or rashes visible on camera.  NEURO:  mentation intact and speech normal  PSYCH: affect normal/bright      Answers for HPI/ROS submitted by the patient on 2023  How many servings of fruits and vegetables do you eat daily?: 2-3  On average, how many sweetened beverages do you drink each day (Examples: soda, juice, sweet tea, etc.  Do NOT count diet or artificially sweetened beverages)?: 0  How many minutes a day do you exercise enough to make your heart beat faster?: 10 to 19  How many days a week do you exercise enough to  make your heart beat faster?: 4  How many days per week do you miss taking your medication?: 0  What is the reason for your visit today?: Covid 19  When did your symptoms begin?: Today  How would you describe these symptoms?: Mild  Are your symptoms:: Staying the same  Have you had these symptoms before?: No       [employer]

## 2023-02-10 ENCOUNTER — ALLIED HEALTH/NURSE VISIT (OUTPATIENT)
Dept: FAMILY MEDICINE | Facility: CLINIC | Age: 58
End: 2023-02-10
Payer: COMMERCIAL

## 2023-02-10 DIAGNOSIS — E53.8 VITAMIN B12 DEFICIENCY (NON ANEMIC): Primary | ICD-10-CM

## 2023-02-10 PROCEDURE — 96372 THER/PROPH/DIAG INJ SC/IM: CPT | Performed by: INTERNAL MEDICINE

## 2023-02-10 PROCEDURE — 99207 PR NO CHARGE NURSE ONLY: CPT

## 2023-02-10 RX ADMIN — CYANOCOBALAMIN 1000 MCG: 1000 INJECTION, SOLUTION INTRAMUSCULAR; SUBCUTANEOUS at 08:40

## 2023-02-16 ENCOUNTER — OFFICE VISIT (OUTPATIENT)
Dept: FAMILY MEDICINE | Facility: CLINIC | Age: 58
End: 2023-02-16
Payer: COMMERCIAL

## 2023-02-16 VITALS
DIASTOLIC BLOOD PRESSURE: 88 MMHG | WEIGHT: 137 LBS | HEART RATE: 90 BPM | TEMPERATURE: 97.7 F | BODY MASS INDEX: 26.76 KG/M2 | OXYGEN SATURATION: 98 % | SYSTOLIC BLOOD PRESSURE: 139 MMHG | RESPIRATION RATE: 16 BRPM

## 2023-02-16 DIAGNOSIS — Z71.84 TRAVEL ADVICE ENCOUNTER: Primary | ICD-10-CM

## 2023-02-16 DIAGNOSIS — Z01.84 IMMUNITY STATUS TESTING: ICD-10-CM

## 2023-02-16 PROCEDURE — 90471 IMMUNIZATION ADMIN: CPT | Mod: GA | Performed by: NURSE PRACTITIONER

## 2023-02-16 PROCEDURE — 99402 PREV MED CNSL INDIV APPRX 30: CPT | Mod: 25 | Performed by: NURSE PRACTITIONER

## 2023-02-16 PROCEDURE — 90691 TYPHOID VACCINE IM: CPT | Mod: GA | Performed by: NURSE PRACTITIONER

## 2023-02-16 RX ORDER — AZITHROMYCIN 500 MG/1
500 TABLET, FILM COATED ORAL DAILY
Qty: 3 TABLET | Refills: 0 | Status: SHIPPED | OUTPATIENT
Start: 2023-02-16 | End: 2023-02-19

## 2023-02-16 RX ORDER — ATOVAQUONE AND PROGUANIL HYDROCHLORIDE 250; 100 MG/1; MG/1
1 TABLET, FILM COATED ORAL DAILY
Qty: 30 TABLET | Refills: 0 | Status: SHIPPED | OUTPATIENT
Start: 2023-02-16 | End: 2023-08-11

## 2023-02-16 NOTE — PROGRESS NOTES
Nurse Note ( Pre-Travel Consult)    Itinerary:  [place]      Departure Date: 03/16/2023      Return Date: 04/04/2023      Length of Trip 20 days       Reason for Travel: Tourism           Urban or rural: urban      Accommodations: Family home        IMMUNIZATION HISTORY  Have you received any immunizations within the past 4 weeks?  No  Have you ever fainted from having your blood drawn or from an injection?  No  Have you ever had a fever reaction to vaccination?  No  Have you ever had any bad reaction or side effect from any vaccination?  No  Have you ever had hepatitis A or B vaccine?  No  Do you live (or work closely) with anyone who has AIDS, an AIDS-like condition, any other immune disorder or who is on chemotherapy for cancer?  No  Do you have a family history of immunodeficiency?  No  Have you received any injection of immune globulin or any blood products during the past 12 months?  No    Patient roomed by Janet Bhatt MA.     Андрей Avalos is a 57 year old female seen today with spouse for counsultation for international travel.   Patient will be departing in  1 month(s) and  traveling with spouse.      Patient itinerary :  will be in the urban region of  [facility]  which risk for Malaria, Dengue Fever and food borne illnesses. exposure.      Patient's activities will include visiting friends and relatives.    Patient's country of birth is [place]    Special medical concerns: Type 2 diabetes  Pre-travel questionnaire was completed by patient and reviewed by provider.     Vitals: /88   Pulse 90   Temp 97.7  F (36.5  C) (Temporal)   LMP 04/14/2017   SpO2 98%   BMI= There is no height or weight on file to calculate BMI.    EXAM:  General:  Well-nourished, well-developed in no acute distress.  Appears to be stated age, interacts appropriately and expresses understanding of information given to patient.    Current Outpatient Medications   Medication Sig Dispense Refill     ACCU-CHEK GINA  PLUS test strip USE TO TEST BLOOD SUGAR 1 TO 2 TIMES DAILY OR AS DIRECTED 200 strip 1     atorvastatin (LIPITOR) 20 MG tablet Take 1 tablet (20 mg) by mouth daily 90 tablet 1     blood glucose monitoring (ACCU-CHEK GINA PLUS) meter device kit Use to test blood sugar 1-2 times daily or as directed. 1 kit 0     blood glucose monitoring (SOFTCLIX) lancets Use to test blood sugar 1-2 times daily or as directed. 100 each 1     cyanocobalamin (VITAMIN B-12) 1000 MCG tablet Take 1 tablet (1,000 mcg) by mouth daily 90 tablet 1     ferrous sulfate (FE TABS) 325 (65 Fe) MG EC tablet Take 1 tablet (325 mg) by mouth daily 90 tablet 1     levothyroxine (SYNTHROID/LEVOTHROID) 75 MCG tablet Take 1 tablet (75 mcg) by mouth daily And 2 tabs every Sunday 60 tablet 1     lisinopril (ZESTRIL) 10 MG tablet Take 1 tablet (10 mg) by mouth daily 90 tablet 1     metFORMIN (GLUCOPHAGE XR) 500 MG 24 hr tablet TAKE 4 TABLETS (2,000 MG) BY MOUTH DAILY WITH DINNER 360 tablet 0     omeprazole (PRILOSEC) 40 MG DR capsule Take 1 capsule by mouth once daily 90 capsule 3     vitamin D2 (ERGOCALCIFEROL) 46234 units (1250 mcg) capsule Take 1 capsule (50,000 Units) by mouth once a week 12 capsule 0     fluticasone (FLONASE) 50 MCG/ACT nasal spray Spray 1-2 sprays into both nostrils daily (Patient not taking: Reported on 2/2/2023) 16 g 3     Patient Active Problem List   Diagnosis     Gastric acidity     Plantar fasciitis     Gastroesophageal reflux disease, esophagitis presence not specified     TMJ (temporomandibular joint syndrome)     Neck pain     Other headache syndrome     Uterine leiomyoma, unspecified location     Adjustment disorder with mixed anxiety and depressed mood     Low hemoglobin     Dysmenorrhea     Right knee pain, unspecified chronicity     Type 2 diabetes mellitus (H)     Essential hypertension     Hyperlipidemia with target LDL less than 100     Atopic rhinitis     Nasal congestion     Somatic dysfunction of lumbar region      Chronic right-sided low back pain without sciatica     Somatic dysfunction of sacral region     Sacral pain     Tendonitis     Microscopic hematuria     Dysuria     High-tone pelvic floor dysfunction     Hypothyroidism     Allergies   Allergen Reactions     Cephalexin Hives and Rash     Hives on hands, face and stomach.      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  Hepatitis B: Declined  Not concerned about risk of disease  Advised titers  Influenza: Declined  Not concerned about risk of disease  Typhoid: Ordered/given today, risks, benefits and side effects reviewed  Rabies: Declined  reviewed managment of a animal bite or scratch (washing wound, seek medical care within 24 hours for post exposure prophylaxis )  Yellow Fever: Not indicated  Japanese Encephalitis: Not indicated  Meningococcus: Not indicated  Tetanus/Diphtheria: Up to date  Measles/Mumps/Rubella: recommend titer  Cholera: Not needed  Polio: Not indicated  Pneumococcal: Up to date  Varicella: Immune by disease history per patient report  Shingrix: deferred  HPV:  Not indicated     TB: low risk     Altitude Exposure on this trip: no  Past tolerance to Altitude: na    ASSESSMENT/PLAN:  Ijeoma was seen today for travel clinic.    Diagnoses and all orders for this visit:    Travel advice encounter  -     TYPHOID VACCINE, IM  -     atovaquone-proguanil (MALARONE) 250-100 MG tablet; Take 1 tablet by mouth daily Start 2 days before exposure to Malaria and continue daily till  7 days after exposure.  -     azithromycin (ZITHROMAX) 500 MG tablet; Take 1 tablet (500 mg) by mouth daily for 3 doses Take 1 tablet a day for up to 3 days for severe diarrhea    Immunity status testing  -     Hepatitis B Surface Antibody; Standing      I have  reviewed general recommendations for safe travel   including: food/water precautions, insect precautions,   roadway safety. Educational materials and Travax report provided.    Malaraia prophylaxis recommended: Malarone  Symptomatic treatment for traveler's diarrhea: azithromycin    Evacuation insurance advised and resources were provided to patient.    Total visit time 30 minutes  with over 50% of time spent counseling patient and shared decision making as detailed above.    Shruthi Mcclure CNP  Certificate in Travel Health

## 2023-02-16 NOTE — PROGRESS NOTES
Nurse Note ( Pre-Travel Consult)      Itinerary:  [place]      Departure Date: 03/16/2023      Return Date: 04/04/2023      Length of Trip ***      Reason for Travel: Tourism           Urban or rural: urban      Accommodations: Family home        IMMUNIZATION HISTORY  Have you received any immunizations within the past 4 weeks?  No  Have you ever fainted from having your blood drawn or from an injection?  No  Have you ever had a fever reaction to vaccination?  No  Have you ever had any bad reaction or side effect from any vaccination?  No  Have you ever had hepatitis A or B vaccine?  No  Do you live (or work closely) with anyone who has AIDS, an AIDS-like condition, any other immune disorder or who is on chemotherapy for cancer?  No  Do you have a family history of immunodeficiency?  No  Have you received any injection of immune globulin or any blood products during the past 12 months?  No    Patient roomed by ***    [facility]  Ijeoma Avalos is a 57 year old female seen today { :381097} for counsultation for international travel.   Patient will be departing in {DAYS/WEEK(S)/MONTH(S):545850} and  traveling { Travel :485228}.      Patient itinerary :  will be in the *** region of *** which risk for { Diseases:708587}. exposure.      Patient's activities will include { Activities:661385}.    Patient's country of birth is { Country of birth:[address]}    Special medical concerns: {885671:635880}  Pre-travel questionnaire was completed by patient and reviewed by provider.     Vitals: LMP 04/14/2017   BMI= There is no height or weight on file to calculate BMI.    EXAM:  General:  Well-nourished, well-developed in no acute distress.  Appears to be stated age, interacts appropriately and expresses understanding of information given to patient.    Current Outpatient Medications   Medication Sig Dispense Refill     ACCU-CHEK IGNA PLUS test strip USE TO TEST BLOOD SUGAR 1 TO 2 TIMES DAILY OR AS  DIRECTED 200 strip 1     atorvastatin (LIPITOR) 20 MG tablet Take 1 tablet (20 mg) by mouth daily 90 tablet 1     blood glucose monitoring (ACCU-CHEK GINA PLUS) meter device kit Use to test blood sugar 1-2 times daily or as directed. 1 kit 0     blood glucose monitoring (SOFTCLIX) lancets Use to test blood sugar 1-2 times daily or as directed. 100 each 1     cyanocobalamin (VITAMIN B-12) 1000 MCG tablet Take 1 tablet (1,000 mcg) by mouth daily 90 tablet 1     ferrous sulfate (FE TABS) 325 (65 Fe) MG EC tablet Take 1 tablet (325 mg) by mouth daily 90 tablet 1     fluticasone (FLONASE) 50 MCG/ACT nasal spray Spray 1-2 sprays into both nostrils daily (Patient not taking: Reported on 2/2/2023) 16 g 3     levothyroxine (SYNTHROID/LEVOTHROID) 75 MCG tablet Take 1 tablet (75 mcg) by mouth daily And 2 tabs every Sunday 60 tablet 1     lisinopril (ZESTRIL) 10 MG tablet Take 1 tablet (10 mg) by mouth daily 90 tablet 1     metFORMIN (GLUCOPHAGE XR) 500 MG 24 hr tablet TAKE 4 TABLETS (2,000 MG) BY MOUTH DAILY WITH DINNER 360 tablet 0     omeprazole (PRILOSEC) 40 MG DR capsule Take 1 capsule by mouth once daily 90 capsule 3     vitamin D2 (ERGOCALCIFEROL) 48556 units (1250 mcg) capsule Take 1 capsule (50,000 Units) by mouth once a week 12 capsule 0     Patient Active Problem List   Diagnosis     Gastric acidity     Plantar fasciitis     Gastroesophageal reflux disease, esophagitis presence not specified     TMJ (temporomandibular joint syndrome)     Neck pain     Other headache syndrome     Uterine leiomyoma, unspecified location     Adjustment disorder with mixed anxiety and depressed mood     Low hemoglobin     Dysmenorrhea     Right knee pain, unspecified chronicity     Type 2 diabetes mellitus (H)     Essential hypertension     Hyperlipidemia with target LDL less than 100     Atopic rhinitis     Nasal congestion     Somatic dysfunction of lumbar region     Chronic right-sided low back pain without sciatica     Somatic  dysfunction of sacral region     Sacral pain     Tendonitis     Microscopic hematuria     Dysuria     High-tone pelvic floor dysfunction     Hypothyroidism     Allergies   Allergen Reactions     Cephalexin Hives and Rash     Hives on hands, face and stomach.      Ibuprofen      sneezing     Nsaids Other (See Comments)     Shellfish Allergy Nausea and Nausea and Vomiting     Sulfamethoxazole Rash     Trimethoprim Rash         Immunizations discussed include:   Covid 19: { IMMUNIZATIONS:905043}  Hepatitis A:  { IMMUNIZATIONS:266160}  Hepatitis B: { IMMUNIZATIONS:648592}  Influenza: { IMMUNIZATIONS:790263}  Typhoid: { IMMUNIZATIONS:950870}  Rabies: { IMMUNIZATIONS:058426}  Yellow Fever: { Yellow Fever:840079}  Hungarian Encephalitis: { Hungarian Encephalitis:815106}  Meningococcus: { IMMUNIZATIONS:386258}  Tetanus/Diphtheria: { IMMUNIZATIONS:207371}  Measles/Mumps/Rubella: { MMR:648563}  Cholera: { Cholera:838522}  Polio: { IMMUNIZATIONS:648573}  Pneumococcal: { Pneumococcal:690961}  Varicella: { IMMUNIZATIONS:462901}  Shingrix: { IMMUNIZATIONS:766281}  HPV:  { IMMUNIZATIONS:075377}     TB: ***    Altitude Exposure on this trip: ***  Past tolerance to Altitude: ***    ASSESSMENT/PLAN:  {Diag Picklist:920191}  I have reviewed general recommendations for safe travel   including: food/water precautions, insect precautions, safer sex   practices given high prevalence of Zika, HIV and other STDs,   roadway safety. Educational materials and Travax report provided.    Malaraia prophylaxis recommended: {PTMALARIAPX:839498}  Symptomatic treatment for traveler's diarrhea: {PTTRAVELDIARRHEA:333495:x}  Altitude illness prevention and treatment: ***      Evacuation insurance advised and resources were provided to patient.    Total visit time {MINUTES:418468} minutes  with over 50% of time spent counseling patient and shared decision making as detailed above.    Shruthi Mcclure CNP  Certificate in  Travel Health

## 2023-02-16 NOTE — PATIENT INSTRUCTIONS
Thank you for visiting the [facility] : [phone]  Today February 16, 2023 you received the    Typhoid - injectable. This vaccine is valid for two years.     Follow up vaccine appointments can be made as a NURSE ONLY visit at the Travel Clinic, (BE PREPARED TO WAIT, ) or at designated [facility].    If you are receiving the Rabies vaccines series, it is important that you follow the exact schedule ordered.     Pre-travel     We recommend that you purchase Medical Evacuation Insurance prior to your departure.  Https://wwwnc.cdc.gov/travel/page/insurance    [place] your travel plans with the  Department of State through STEP ( Smart Traveler Enrollment Program ) https://step.state.gov.  STEP is a free service to allow [place] citizens and nationals traveling and living abroad to enroll their trip with the nearest [place] Embassy or Consulate.    Animal Exposure: Avoid all mammals even if they look healthy.  If there is a bite, scratch or even a lick, wash area immediately with soap and water for 15 minutes and seek medical care within 24 hours for evaluation of Rabies post exposure treatment.  Contact your Medical Evacuation Insurance.    COVID 19 (Sars Cov2) prevention strategies  Physical distancing: Maintain 6 foot (2m) from others.              Avoid large gatherings and public transportation.   Avoid indoor shopping malls, theaters and restaurants   Practice consistent mask wearing covering the nose, mouth and underneath the chin when unable to maintain 6 foot distance from others.  Hand washing: frequent, thorough handwashing with soap and water for 20 seconds (or using a hand  containing 60% alcohol)   Avoid touching face, nose, eyes, mouth unless you have done appropriate hand washing as above.   Clean high touch surfaces with approved disinfectant against Covid 19  (70% Ethanol ) or a bleach solution (add 20 mL (4 teaspoons) of bleach to 1 L (1 quart)  of water;)  Be careful not to breath or touch bleach.      Travel Covid 19 Testing:  updated 12/06/2021  International travelers: Pre-travel: diagnostic testing (antigen or PCR) may be required for entry:  See country specific Embassy websites or airline websites.    Post travel: CDC recommends getting tested 3-5 days after your trip     COVID-19 testing scheduling number for pre-travel through [facility]  [phone] (Must have an order). Available 24 hours a day.  You can also schedule through My Chart.     Post-travel illness:  Contact your provider or [facility] if you develop a fever, rash, cough, diarrhea or other symptoms for up to 1 year after travel.  Inform your healthcare provider when and where you traveled to.    Please call the [facility] with any questions [phone]  Or send your provider a 'My Chart' note.

## 2023-03-03 ENCOUNTER — VIRTUAL VISIT (OUTPATIENT)
Dept: PSYCHOLOGY | Facility: CLINIC | Age: 58
End: 2023-03-03
Payer: COMMERCIAL

## 2023-03-03 DIAGNOSIS — F41.9 ANXIETY DISORDER, UNSPECIFIED TYPE: Primary | ICD-10-CM

## 2023-03-03 PROCEDURE — 90834 PSYTX W PT 45 MINUTES: CPT | Mod: VID | Performed by: PSYCHOLOGIST

## 2023-03-03 NOTE — PROGRESS NOTES
"        [facility]                                     Progress Note    Patient Name: Ijeoma Avalos  Date: 3/3/2023         Service Type: Individual      Session Start Time: 10:00  Session End Time: 14:50     Session Length: 38-52 mins    Session #: 40 (this episode of care)    Attendees: Client attended alone    Service Modality:  Phone Visit:      Provider verified identity through the following two step process.  Patient provided:  Patient is known previously to provider    Telephone Visit: The patient's condition can be safely assessed and treated via synchronous audio telemedicine encounter.      Reason for Audio Telemedicine Visit: Patient convenience (e.g. access to timely appointments / distance to available provider)    Originating Site (Patient Location): Patient's home    Distant Site (Provider Location): [facility]    Consent:  The patient/guardian has verbally consented to:     1. The potential risks and benefits of telemedicine (telephone visit) versus in person care;    The patient has been notified of the following:      \"We have found that certain health care needs can be provided without the need for a face to face visit.  This service lets us provide the care you need with a phone conversation.       I will have full access to your [facility] medical record during this entire phone call.   I will be taking notes for your medical record.      Since this is like an office visit, we will bill your insurance company for this service.       There are potential benefits and risks of telephone visits (e.g. limits to patient confidentiality) that differ from in-person visits.?Confidentiality still applies for telephone services, and nobody will record the visit.  It is important to be in a quiet, private space that is free of distractions (including cell phone or other devices) during the visit.??      If during the " "course of the call I believe a telephone visit is not appropriate, you will not be charged for this service\"     Consent has been obtained for this service by care team member: Yes     DATA  Interactive Complexity: No  Crisis: No      Progress Since Last Session (Related to Symptoms / Goals / Homework):   Symptoms: Some improvement    Homework: Good progress; Client has been regularly engaging in relaxation exercises, with benefit noted          Episode of Care Goals: Satisfactory progress - ACTION (Actively working towards change); Intervened by reinforcing change plan / affirming steps taken     Current / Ongoing Stressors and Concerns:   Family stressors              Work stressors     Treatment Objective(s) Addressed in This Session:   increase connection in close relationships   build skills for stress management   increase self-confidence     Intervention:   Client stated that she continues to use deep breathing exercises for relaxation, particularly before bed. Reinforced her progress and encouraged continued regular use for both in-the-moment stress management as well as long-term benefits. Discussed parenting concerns and identified Client's distortions around how she manages these. Offered some suggestions based on her daughter's developmental level, and more broadly coached Client on using a framework of \"how can I use this? What can I teach?\" with the challenging moments that inevitably arise. Continued work on building connection in the marital relationship, focused particularly on moving past Client's barriers to asking for what she needs.       Assessments completed prior to visit:  The following assessments were previously completed by patient:     PHQ-9 SCORE 12/13/2017 2/21/2018 9/13/2019 11/12/2019 4/14/2020 8/26/2021 10/25/2022   PHQ-9 Total Score MyChart - - - - - - 6 (Mild depression)   PHQ-9 Total Score 3 1 1 1 4 3 6     GAD2:   SOSA-2 10/25/2022 1/24/2023   Feeling nervous, anxious, or on edge " 0 0   Not being able to stop or control worrying 1 1   SOSA-2 Total Score 1 1     PROMIS 10-Global Health (only subscores and total score):   PROMIS-10 Scores Only 4/22/2022 7/29/2022 10/25/2022 1/24/2023   Global Mental Health Score 12 12 10 14   Global Physical Health Score 11 11 11 14   PROMIS TOTAL - SUBSCORES 23 23 21 28      ASSESSMENT: Current Emotional / Mental Status (status of significant symptoms):   Risk status (Self / Other harm or suicidal ideation)   Patient denies current fears or concerns for personal safety.   Patient denies current or recent suicidal ideation or behaviors.   Patient denies current or recent homicidal ideation or behaviors.   Patient denies current or recent self injurious behavior or ideation.   Patient denies other safety concerns.   Patient reports there has been no change in risk factors since her last session.    Patient reports there has been no change in protective factors since her last session.     Recommended that patient call 911 or go to the local ED should there be a change in any of these risk factors.     Appearance:   Unable to assess    Eye Contact:   Unable to assess    Psychomotor Behavior: Unable to assess    Attitude:   Thoughtful    Orientation:   All   Speech    Rate / Production: Normal     Volume:  Normal    Mood:    Stable overall; periods of increased worry/anxiety   Affect:    Unable to assess    Thought Content:  Intermittent distortions--catastrophizing, overgeneralizing   Thought Form:  Coherent  Logical    Insight:    Fair      Medication Review:   No changes to current psychiatric medication(s)     Medication Compliance:   Yes; infrequent use of lorazepam     Changes in Health Issues:   None reported     Chemical Use Review:   Substance Use: Chemical use reviewed, no active concerns identified      Tobacco Use: No current tobacco use.      Diagnosis:  1. Anxiety disorder, unspecified type       Collateral Reports Completed:   Telephone consent      PLAN: (Patient Tasks / Therapist Tasks / Other)  Recommended parenting book resource: Insightful Parenting by Dr. Cristian Madera.Client will experiment with action instead of conversation in her attempts to feel more connected in the marital relationship. She will practice directly expressing a need, trusting that she will be able to manage the response, whatever it may be. She will continue with regular use of deep breathing/meditation throughout the day. Return appointment scheduled for next month. Client has my contact information and is aware that she can reach out sooner if needed.     Rosa Chowdary, LP                                                      ______________________________________________________________________    Individual Treatment Plan    Patient's Name: Ijeoma Avalos  YOB: 1965    Date of Creation: 3/25/2022  Date Treatment Plan Last Reviewed/Revised: 3/3/2023    DSM5 Diagnoses: 300.00 (F41.9) Unspecified Anxiety Disorder  Psychosocial / Contextual Factors: family stressors, work stressors  PROMIS (reviewed every 90 days): 28    Referral / Collaboration:  Referral to another professional/service is not indicated at this time.    Anticipated number of session for this episode of care: estimated 12-16 sessions/year  Anticipation frequency of session: Monthly  Anticipated Duration of each session: 38-52 minutes  Treatment plan will be reviewed in 90 days or when goals have been changed.       MeasurableTreatment Goal(s) related to diagnosis / functional impairment(s)  Goal 1: Patient will build skills for stress management.    I will know I've met my goal when I am sleeping better and not waking up so often, when I don't feel worried so much of the time.   *updated on 7/1/22 (Client believes physical symptoms such as blood pressure and reflux are not the best way to measure this outcome).    Objective #A (Patient Action)    Patient will use at least 2 coping skills for  anxiety management in the next 6 weeks.  Status: Continued - Date(s): 3/3/2023  *continue as maintenance goal    Intervention(s)  Therapist will teach teach self-regulation strategies, CBT skills, mindfulness techniques.    Objective #B  Patient will use cognitive strategies identified in therapy to challenge anxious thoughts.  Status: Continued - Date(s): 3/3/2023  *continue as maintenance goal    Intervention(s)  Therapist will teach cognitive strategies, provide education.      Goal 2: Patient will increase self-confidence.    I will know I've met my goal when I think more positively about myself.   *7/1/22 progress noted, particularly at work, as well as in some aspects of parenting. Client would like to continue as ongoing goal.    Objective #A (Patient Action)    Status: Maintenance - Date(s): 3/3/2023    Patient will increase assertive communication.    Intervention(s)  Therapist will teach assertiveness skills.    Objective #B  Patient will Identify negative self-talk and behaviors: challenge core beliefs, myths, and actions.    Status: Continued - Date(s): 3/3/2023    Intervention(s)  Therapist will provide education, teach CBT skills.      Goal 3: Patiient will increase connection in close relationships.    I will know I've met my goal when I feel more close with loved ones.  *7/1/22--Client identifies this as primary goal currently.    Objective #A (Patient Action)    Status: Continued - Date(s): 3/3/2023    Patient will prioritize time for meaningful relationships.    Intervention(s)  Therapist will provide support and accountability.    Objective #B  Patient will make use of effective interpersonal communication skills.    Status: Continued - Date(s): 3/3/2023    Intervention(s)  Therapist will teach communication skills.      Patient has reviewed and agreed to the above plan.      Rosa Chowdary LP  March 25, 2022      Answers for HPI/ROS submitted by the patient on 10/25/2022  If you checked off any  problems, how difficult have these problems made it for you to do your work, take care of things at home, or get along with other people?: Not difficult at all  PHQ9 TOTAL SCORE: 6

## 2023-03-10 ENCOUNTER — ALLIED HEALTH/NURSE VISIT (OUTPATIENT)
Dept: FAMILY MEDICINE | Facility: CLINIC | Age: 58
End: 2023-03-10
Payer: COMMERCIAL

## 2023-03-10 DIAGNOSIS — E53.8 VITAMIN B12 DEFICIENCY (NON ANEMIC): Primary | ICD-10-CM

## 2023-03-10 PROCEDURE — 96372 THER/PROPH/DIAG INJ SC/IM: CPT | Performed by: INTERNAL MEDICINE

## 2023-03-10 PROCEDURE — 99207 PR NO CHARGE NURSE ONLY: CPT

## 2023-03-10 RX ADMIN — CYANOCOBALAMIN 1000 MCG: 1000 INJECTION, SOLUTION INTRAMUSCULAR; SUBCUTANEOUS at 08:36

## 2023-03-12 DIAGNOSIS — E55.9 VITAMIN D DEFICIENCY: ICD-10-CM

## 2023-03-16 RX ORDER — ERGOCALCIFEROL 1.25 MG/1
50000 CAPSULE, LIQUID FILLED ORAL WEEKLY
Qty: 12 CAPSULE | Refills: 0 | Status: SHIPPED | OUTPATIENT
Start: 2023-03-16 | End: 2023-12-05

## 2023-04-07 ENCOUNTER — ALLIED HEALTH/NURSE VISIT (OUTPATIENT)
Dept: FAMILY MEDICINE | Facility: CLINIC | Age: 58
End: 2023-04-07
Payer: COMMERCIAL

## 2023-04-07 DIAGNOSIS — E53.8 VITAMIN B12 DEFICIENCY (NON ANEMIC): Primary | ICD-10-CM

## 2023-04-07 PROCEDURE — 96372 THER/PROPH/DIAG INJ SC/IM: CPT | Performed by: INTERNAL MEDICINE

## 2023-04-07 PROCEDURE — 99207 PR NO CHARGE NURSE ONLY: CPT

## 2023-04-07 RX ADMIN — CYANOCOBALAMIN 1000 MCG: 1000 INJECTION, SOLUTION INTRAMUSCULAR; SUBCUTANEOUS at 08:46

## 2023-04-10 DIAGNOSIS — I10 ESSENTIAL HYPERTENSION: ICD-10-CM

## 2023-04-10 DIAGNOSIS — Z00.00 ROUTINE GENERAL MEDICAL EXAMINATION AT A HEALTH CARE FACILITY: ICD-10-CM

## 2023-04-10 RX ORDER — LISINOPRIL 10 MG/1
10 TABLET ORAL DAILY
Qty: 90 TABLET | Refills: 0 | Status: SHIPPED | OUTPATIENT
Start: 2023-04-10 | End: 2023-07-03

## 2023-04-14 ENCOUNTER — VIRTUAL VISIT (OUTPATIENT)
Dept: PSYCHOLOGY | Facility: CLINIC | Age: 58
End: 2023-04-14
Payer: COMMERCIAL

## 2023-04-14 DIAGNOSIS — F41.9 ANXIETY DISORDER, UNSPECIFIED TYPE: Primary | ICD-10-CM

## 2023-04-14 PROCEDURE — 90834 PSYTX W PT 45 MINUTES: CPT | Mod: 95 | Performed by: PSYCHOLOGIST

## 2023-04-14 NOTE — PROGRESS NOTES
"        [facility]                                     Progress Note    Patient Name: Ijeoma Avalos  Date: 4/14/2023         Service Type: Individual      Session Start Time: 09:00  Session End Time: 09:51     Session Length: 38-52 mins    Session #: 41 (this episode of care)    Attendees: Client attended alone    Service Modality:  Phone Visit:      Provider verified identity through the following two step process.  Patient provided:  Patient is known previously to provider    Telephone Visit: The patient's condition can be safely assessed and treated via synchronous audio telemedicine encounter.      Reason for Audio Telemedicine Visit: Patient convenience (e.g. access to timely appointments / distance to available provider)    Originating Site (Patient Location): Patient's home    Distant Site (Provider Location): Provider Remote Setting- Home Office    Consent:  The patient/guardian has verbally consented to:     The potential risks and benefits of telemedicine (telephone visit) versus in person care;    The patient has been notified of the following:      \"We have found that certain health care needs can be provided without the need for a face to face visit.  This service lets us provide the care you need with a phone conversation.       I will have full access to your [facility] medical record during this entire phone call.   I will be taking notes for your medical record.      Since this is like an office visit, we will bill your insurance company for this service.       There are potential benefits and risks of telephone visits (e.g. limits to patient confidentiality) that differ from in-person visits.?Confidentiality still applies for telephone services, and nobody will record the visit.  It is important to be in a quiet, private space that is free of distractions (including cell phone or other devices) during the visit.??      If during the course of the call I believe a " "telephone visit is not appropriate, you will not be charged for this service\"     Consent has been obtained for this service by care team member: Yes     DATA  Interactive Complexity: No  Crisis: No      Progress Since Last Session (Related to Symptoms / Goals / Homework):   Symptoms: No change    Homework: Not yet completed; Client has not yet taken action toward increased connection with her  (as an alternative to conversation), fear identified as a barrier       Episode of Care Goals: Satisfactory progress - ACTION (Actively working towards change); Intervened by reinforcing change plan / affirming steps taken     Current / Ongoing Stressors and Concerns:   Family stressors              Work stressors     Treatment Objective(s) Addressed in This Session:   increase connection in close relationships   build skills for stress management   increase self-confidence     Intervention:   Continued work on interpersonal issues. Recent family travel experience brought to the forefront for Client that she is prone to absorbing the emotions of those around her. Reviewed skills for holding interpersonal boundaries and being responsible only for her own emotion regulation (e.g., I can be OK even if someone else is frustrated/bored/annoyed; it's OK if everyone's not happy all the time). Processed Client's distress and frustration that her  doesn't seem to address or even acknowledge his issues, so change seems unlikely. Validated her experience and refocused to her \"side of the fence\"--what is within her control. Introduced the idea that she could feel more peace/acceptance/contentment, even if he doesn't change, by relieving herself from the responsibility of trying to manage/influence his issues and instead working on her own regulation in the face of imperfect and frustrating circumstances. Acknowledged that this may bring up some understandable grief and sadness. Client was open to this input.    "     Assessments completed prior to visit:  The following assessments were previously completed by patient:         12/13/2017     9:08 AM 2/21/2018     8:44 AM 9/13/2019     3:22 PM 11/12/2019     3:00 PM 4/14/2020    11:13 AM 8/26/2021     8:13 AM 10/25/2022     2:07 PM   PHQ-9 SCORE   PHQ-9 Total Score MyChart       6 (Mild depression)   PHQ-9 Total Score 3 1 1 1 4 3 6     GAD2:       10/25/2022     2:07 PM 1/24/2023     1:57 PM   SOSA-2   Feeling nervous, anxious, or on edge 0 0   Not being able to stop or control worrying 1 1   SOSA-2 Total Score 1 1     PROMIS 10-Global Health (only subscores and total score):       4/22/2022     8:06 AM 7/29/2022     8:11 PM 10/25/2022     2:08 PM 1/24/2023     1:58 PM   PROMIS-10 Scores Only   Global Mental Health Score 12 12 10 14   Global Physical Health Score 11 11 11 14   PROMIS TOTAL - SUBSCORES 23 23 21 28      ASSESSMENT: Current Emotional / Mental Status (status of significant symptoms):   Risk status (Self / Other harm or suicidal ideation)   Patient denies current fears or concerns for personal safety.   Patient denies current or recent suicidal ideation or behaviors.   Patient denies current or recent homicidal ideation or behaviors.   Patient denies current or recent self injurious behavior or ideation.   Patient denies other safety concerns.   Patient reports there has been no change in risk factors since her last session.    Patient reports there has been no change in protective factors since her last session.     Recommended that patient call 911 or go to the local ED should there be a change in any of these risk factors.     Appearance:   Unable to assess    Eye Contact:   Unable to assess    Psychomotor Behavior: Unable to assess    Attitude:   Engaged    Orientation:   All   Speech    Rate / Production: Normal/ Responsive    Volume:  Normal    Mood:    Sadness, disappointment secondary to interpersonal issues   Affect:    Unable to assess    Thought  "Content:  Tendency to try to regulate by managing other people/their behavior   Thought Form:  Coherent  Logical    Insight:    Fair      Medication Review:   No changes to current psychiatric medication(s)     Medication Compliance:   Yes; infrequent use of lorazepam     Changes in Health Issues:   None reported     Chemical Use Review:   Substance Use: Chemical use reviewed, no active concerns identified      Tobacco Use: No current tobacco use.      Diagnosis:  1. Anxiety disorder, unspecified type       Collateral Reports Completed:   Telephone consent     PLAN: (Patient Tasks / Therapist Tasks / Other)  Client will practice picturing the boundary between herself and others--the emotional \"atmosphere\" in her bubble is her responsibility. (e.g., I can be OK even if someone else is frustrated/bored/annoyed; it's OK if everyone's not happy all the time). She will work on using her energy for actions that are within her control, rather than trying to regulate herself by managing other people. She will strive for increased peace/acceptance/contentment, even if other people don't change, by relieving herself of the responsibility of trying to manage/influence anyone else and instead working on her own regulation in the face of imperfect and frustrating circumstances. She will continue with regular use of deep breathing/meditation throughout the day. Return appointment scheduled for next month. Client has my contact information and is aware that she can reach out sooner if needed.     Rosa Chowdary, KATHARINE                                                      ______________________________________________________________________    Individual Treatment Plan    Patient's Name: Ijeoma Avalos  YOB: 1965    Date of Creation: 3/25/2022  Date Treatment Plan Last Reviewed/Revised: 3/3/2023    DSM5 Diagnoses: 300.00 (F41.9) Unspecified Anxiety Disorder  Psychosocial / Contextual Factors: family stressors, work " stressors  PROMIS (reviewed every 90 days): 28    Referral / Collaboration:  Referral to another professional/service is not indicated at this time.    Anticipated number of session for this episode of care: estimated 12-16 sessions/year  Anticipation frequency of session: Monthly  Anticipated Duration of each session: 38-52 minutes  Treatment plan will be reviewed in 90 days or when goals have been changed.       MeasurableTreatment Goal(s) related to diagnosis / functional impairment(s)  Goal 1: Patient will build skills for stress management.    I will know I've met my goal when I am sleeping better and not waking up so often, when I don't feel worried so much of the time.   *updated on 7/1/22 (Client believes physical symptoms such as blood pressure and reflux are not the best way to measure this outcome).    Objective #A (Patient Action)    Patient will use at least 2 coping skills for anxiety management in the next 6 weeks.  Status: Continued - Date(s): 3/3/2023  *continue as maintenance goal    Intervention(s)  Therapist will teach teach self-regulation strategies, CBT skills, mindfulness techniques.    Objective #B  Patient will use cognitive strategies identified in therapy to challenge anxious thoughts.  Status: Continued - Date(s): 3/3/2023  *continue as maintenance goal    Intervention(s)  Therapist will teach cognitive strategies, provide education.      Goal 2: Patient will increase self-confidence.    I will know I've met my goal when I think more positively about myself.   *7/1/22 progress noted, particularly at work, as well as in some aspects of parenting. Client would like to continue as ongoing goal.    Objective #A (Patient Action)    Status: Maintenance - Date(s): 3/3/2023    Patient will increase assertive communication.    Intervention(s)  Therapist will teach assertiveness skills.    Objective #B  Patient will Identify negative self-talk and behaviors: challenge core beliefs, myths, and  actions.    Status: Continued - Date(s): 3/3/2023    Intervention(s)  Therapist will provide education, teach CBT skills.      Goal 3: Patiient will increase connection in close relationships.    I will know I've met my goal when I feel more close with loved ones.  *7/1/22--Client identifies this as primary goal currently.    Objective #A (Patient Action)    Status: Continued - Date(s): 3/3/2023    Patient will prioritize time for meaningful relationships.    Intervention(s)  Therapist will provide support and accountability.    Objective #B  Patient will make use of effective interpersonal communication skills.    Status: Continued - Date(s): 3/3/2023    Intervention(s)  Therapist will teach communication skills.      Patient has reviewed and agreed to the above plan.      Rosa Chowdary LP  March 25, 2022      Answers for HPI/ROS submitted by the patient on 10/25/2022  If you checked off any problems, how difficult have these problems made it for you to do your work, take care of things at home, or get along with other people?: Not difficult at all  PHQ9 TOTAL SCORE: 6

## 2023-05-04 ENCOUNTER — TRANSFERRED RECORDS (OUTPATIENT)
Dept: HEALTH INFORMATION MANAGEMENT | Facility: CLINIC | Age: 58
End: 2023-05-04
Payer: COMMERCIAL

## 2023-05-12 ENCOUNTER — VIRTUAL VISIT (OUTPATIENT)
Dept: PSYCHOLOGY | Facility: CLINIC | Age: 58
End: 2023-05-12
Payer: COMMERCIAL

## 2023-05-12 DIAGNOSIS — F41.9 ANXIETY DISORDER, UNSPECIFIED TYPE: Primary | ICD-10-CM

## 2023-05-12 PROCEDURE — 90834 PSYTX W PT 45 MINUTES: CPT | Mod: 95 | Performed by: PSYCHOLOGIST

## 2023-05-12 NOTE — PROGRESS NOTES
"        [facility]                                     Progress Note    Patient Name: Ijeoma Avalos  Date: 5/12/2023         Service Type: Individual      Session Start Time: 09:00  Session End Time: 09:50     Session Length: 38-52 mins    Session #: 42 (this episode of care)    Attendees: Client attended alone    Service Modality:  Phone Visit:      Provider verified identity through the following two step process.  Patient provided:  Patient is known previously to provider    Telephone Visit: The patient's condition can be safely assessed and treated via synchronous audio telemedicine encounter.      Reason for Audio Telemedicine Visit: Patient convenience (e.g. access to timely appointments / distance to available provider)    Originating Site (Patient Location): Patient's home    Distant Site (Provider Location): Provider Remote Setting- Home Office    Consent:  The patient/guardian has verbally consented to:     The potential risks and benefits of telemedicine (telephone visit) versus in person care;    The patient has been notified of the following:      \"We have found that certain health care needs can be provided without the need for a face to face visit.  This service lets us provide the care you need with a phone conversation.       I will have full access to your [facility] medical record during this entire phone call.   I will be taking notes for your medical record.      Since this is like an office visit, we will bill your insurance company for this service.       There are potential benefits and risks of telephone visits (e.g. limits to patient confidentiality) that differ from in-person visits.?Confidentiality still applies for telephone services, and nobody will record the visit.  It is important to be in a quiet, private space that is free of distractions (including cell phone or other devices) during the visit.??      If during the course of the call I believe a " "telephone visit is not appropriate, you will not be charged for this service\"     Consent has been obtained for this service by care team member: Yes     DATA  Interactive Complexity: No  Crisis: No      Progress Since Last Session (Related to Symptoms / Goals / Homework):   Symptoms: Increased anxiety    Homework: In progress       Episode of Care Goals: Satisfactory progress - ACTION (Actively working towards change); Intervened by reinforcing change plan / affirming steps taken     Current / Ongoing Stressors and Concerns:   Family stressors              Work stressors     Treatment Objective(s) Addressed in This Session:   increase connection in close relationships   build skills for stress management   increase self-confidence     Intervention:   Client described increased anxiety in recent weeks related to pending significant layoffs in her company. She noted that she is trying to work out a Plan B, in case she is laid off. She described some ambivalence about staying in her current job vs. moving on to something new. As a first step, she will explore what other opportunities may be available and evaluate the trade offs in those positions compared to her current role. She described some struggle with time management and prioritization at work, and she is aware that this difficulty containing job tasks is impacting her ability to devote time and energy to other important parts of her life. Engaged in solution-focused work around strategies to help her improve time management. Agreed I will send her some resources to review. Also identified that asking for help or saying \"I don't know\" is another skill set to learn. Client is open to this work.        Assessments completed prior to visit:  The following assessments were previously completed by patient:         12/13/2017     9:08 AM 2/21/2018     8:44 AM 9/13/2019     3:22 PM 11/12/2019     3:00 PM 4/14/2020    11:13 AM 8/26/2021     8:13 AM 10/25/2022     2:07 " PM   PHQ-9 SCORE   PHQ-9 Total Score MyChart       6 (Mild depression)   PHQ-9 Total Score 3 1 1 1 4 3 6     GAD2:       10/25/2022     2:07 PM 1/24/2023     1:57 PM   SOSA-2   Feeling nervous, anxious, or on edge 0 0   Not being able to stop or control worrying 1 1   SOSA-2 Total Score 1 1     PROMIS 10-Global Health (only subscores and total score):       4/22/2022     8:06 AM 7/29/2022     8:11 PM 10/25/2022     2:08 PM 1/24/2023     1:58 PM   PROMIS-10 Scores Only   Global Mental Health Score 12 12 10 14   Global Physical Health Score 11 11 11 14   PROMIS TOTAL - SUBSCORES 23 23 21 28      ASSESSMENT: Current Emotional / Mental Status (status of significant symptoms):   Risk status (Self / Other harm or suicidal ideation)   Patient denies current fears or concerns for personal safety.   Patient denies current or recent suicidal ideation or behaviors.   Patient denies current or recent homicidal ideation or behaviors.   Patient denies current or recent self injurious behavior or ideation.   Patient denies other safety concerns.   Patient reports there has been no change in risk factors since her last session.    Patient reports there has been no change in protective factors since her last session.     Recommended that patient call 911 or go to the local ED should there be a change in any of these risk factors.     Appearance:   Unable to assess    Eye Contact:   Unable to assess    Psychomotor Behavior: Unable to assess    Attitude:   Cooperative    Orientation:   All   Speech    Rate / Production: Normal     Volume:  Normal    Mood:    Anxious    Affect:    Unable to assess    Thought Content:  High expectations for herself, discomfort with not knowing something   Thought Form:  Coherent  Logical    Insight:    Fair      Medication Review:   No changes to current psychiatric medication(s)     Medication Compliance:   Yes; infrequent use of lorazepam     Changes in Health Issues:   Yes: increased knee  pain     Chemical Use Review:   Substance Use: Chemical use reviewed, no active concerns identified      Tobacco Use: No current tobacco use.      Diagnosis:  1. Anxiety disorder, unspecified type       Collateral Reports Completed:   Telephone consent     PLAN: (Patient Tasks / Therapist Tasks / Other)  Client agreed to complete screening questionnaires via [employer]--these were sent. I will also send some tips and strategies for time management and prioritization of tasks. She will reframe her thoughts about asking for help or acknowledging unfamiliarity with a given process/issue--think of these as valuable skills to develop, to complement the many other skills she already has (e.g., figuring out how to do something herself). Continue: she will work on using her energy for actions that are within her control, rather than trying to regulate herself by managing other people. She will strive for increased peace/acceptance/contentment, even if other people don't change, by relieving herself of the responsibility of trying to manage/influence anyone else and instead working on her own regulation in the face of imperfect and frustrating circumstances. She will continue with regular use of deep breathing/meditation throughout the day. Return appointment scheduled for next month. Client has my contact information and is aware that she can reach out sooner if needed.     Rosa Chowdary, KATHARINE                                                      ______________________________________________________________________    Individual Treatment Plan    Patient's Name: Ijeoma Avalos  YOB: 1965    Date of Creation: 3/25/2022  Date Treatment Plan Last Reviewed/Revised: 3/3/2023    DSM5 Diagnoses: 300.00 (F41.9) Unspecified Anxiety Disorder  Psychosocial / Contextual Factors: family stressors, work stressors  PROMIS (reviewed every 90 days): 28    Referral / Collaboration:  Referral to another professional/service is  not indicated at this time.    Anticipated number of session for this episode of care: estimated 12-16 sessions/year  Anticipation frequency of session: Monthly  Anticipated Duration of each session: 38-52 minutes  Treatment plan will be reviewed in 90 days or when goals have been changed.       MeasurableTreatment Goal(s) related to diagnosis / functional impairment(s)  Goal 1: Patient will build skills for stress management.    I will know I've met my goal when I am sleeping better and not waking up so often, when I don't feel worried so much of the time.   *updated on 7/1/22 (Client believes physical symptoms such as blood pressure and reflux are not the best way to measure this outcome).    Objective #A (Patient Action)    Patient will use at least 2 coping skills for anxiety management in the next 6 weeks.  Status: Continued - Date(s): 3/3/2023  *continue as maintenance goal    Intervention(s)  Therapist will teach teach self-regulation strategies, CBT skills, mindfulness techniques.    Objective #B  Patient will use cognitive strategies identified in therapy to challenge anxious thoughts.  Status: Continued - Date(s): 3/3/2023  *continue as maintenance goal    Intervention(s)  Therapist will teach cognitive strategies, provide education.      Goal 2: Patient will increase self-confidence.    I will know I've met my goal when I think more positively about myself.   *7/1/22 progress noted, particularly at work, as well as in some aspects of parenting. Client would like to continue as ongoing goal.    Objective #A (Patient Action)    Status: Maintenance - Date(s): 3/3/2023    Patient will increase assertive communication.    Intervention(s)  Therapist will teach assertiveness skills.    Objective #B  Patient will Identify negative self-talk and behaviors: challenge core beliefs, myths, and actions.    Status: Continued - Date(s): 3/3/2023    Intervention(s)  Therapist will provide education, teach CBT  skills.      Goal 3: Patiient will increase connection in close relationships.    I will know I've met my goal when I feel more close with loved ones.  *7/1/22--Client identifies this as primary goal currently.    Objective #A (Patient Action)    Status: Continued - Date(s): 3/3/2023    Patient will prioritize time for meaningful relationships.    Intervention(s)  Therapist will provide support and accountability.    Objective #B  Patient will make use of effective interpersonal communication skills.    Status: Continued - Date(s): 3/3/2023    Intervention(s)  Therapist will teach communication skills.      Patient has reviewed and agreed to the above plan.      Rosa Chowdary LP  March 25, 2022      Answers for HPI/ROS submitted by the patient on 10/25/2022  If you checked off any problems, how difficult have these problems made it for you to do your work, take care of things at home, or get along with other people?: Not difficult at all  PHQ9 TOTAL SCORE: 6

## 2023-05-20 DIAGNOSIS — E11.9 TYPE 2 DIABETES MELLITUS WITHOUT COMPLICATION, WITHOUT LONG-TERM CURRENT USE OF INSULIN (H): ICD-10-CM

## 2023-05-23 RX ORDER — METFORMIN HCL 500 MG
TABLET, EXTENDED RELEASE 24 HR ORAL
Qty: 360 TABLET | Refills: 0 | Status: SHIPPED | OUTPATIENT
Start: 2023-05-23 | End: 2023-08-24

## 2023-05-26 ENCOUNTER — VIRTUAL VISIT (OUTPATIENT)
Dept: PSYCHOLOGY | Facility: CLINIC | Age: 58
End: 2023-05-26
Payer: COMMERCIAL

## 2023-05-26 DIAGNOSIS — F41.9 ANXIETY DISORDER, UNSPECIFIED TYPE: Primary | ICD-10-CM

## 2023-05-26 PROCEDURE — 90834 PSYTX W PT 45 MINUTES: CPT | Mod: 95 | Performed by: PSYCHOLOGIST

## 2023-05-26 NOTE — PROGRESS NOTES
"        [facility]                                     Progress Note    Patient Name: Ijeoma Avalos  Date: 5/26/2023         Service Type: Individual      Session Start Time: 09:00  Session End Time: 09:50     Session Length: 38-52 mins    Session #: 43 (this episode of care)    Attendees: Client attended alone    Service Modality:  Phone Visit:      Provider verified identity through the following two step process.  Patient provided:  Patient is known previously to provider    Telephone Visit: The patient's condition can be safely assessed and treated via synchronous audio telemedicine encounter.      Reason for Audio Telemedicine Visit: Patient convenience (e.g. access to timely appointments / distance to available provider)    Originating Site (Patient Location): Patient's home    Distant Site (Provider Location): Provider Remote Setting- Home Office    Consent:  The patient/guardian has verbally consented to:     The potential risks and benefits of telemedicine (telephone visit) versus in person care;    The patient has been notified of the following:      \"We have found that certain health care needs can be provided without the need for a face to face visit.  This service lets us provide the care you need with a phone conversation.       I will have full access to your [facility] medical record during this entire phone call.   I will be taking notes for your medical record.      Since this is like an office visit, we will bill your insurance company for this service.       There are potential benefits and risks of telephone visits (e.g. limits to patient confidentiality) that differ from in-person visits.?Confidentiality still applies for telephone services, and nobody will record the visit.  It is important to be in a quiet, private space that is free of distractions (including cell phone or other devices) during the visit.??      If during the course of the call I believe a " "telephone visit is not appropriate, you will not be charged for this service\"     Consent has been obtained for this service by care team member: Yes     DATA  Interactive Complexity: No  Crisis: No      Progress Since Last Session (Related to Symptoms / Goals / Homework):   Symptoms: Increased: numb, anxious, unsettled--due to recent layoff     Homework: N/A       Episode of Care Goals: Satisfactory progress - ACTION (Actively working towards change); Intervened by reinforcing change plan / affirming steps taken     Current / Ongoing Stressors and Concerns:   Recently laid off from job   Family stressors     Treatment Objective(s) Addressed in This Session:   increase connection in close relationships   build skills for stress management   increase self-confidence     Intervention:   Session was scheduled sooner than planned after Client was laid off from her job earlier this week. Processing this event and developing a plan for coping was the focus of our session today. Normalized a wide range of feelings in this circumstance, including mixed or seemingly contradictory feelings. Client reported that she is functioning adequately--sleep is disrupted but otherwise she is engaging in appropriate self-care, able to meet daily responsibilities, etc. Engaged in initial processing of Client's experience. Encouraged her to feel whatever comes up, without judgment, allowing it to come and pass--trying to short-circuit or numb this process is ultimately not helpful. Talked about having some form of structure to her days to help with regulation. She has been walking regularly and spending time outside. Offered reinforcement and encouraged her to continue with this habit.         Assessments completed prior to visit:  The following assessments were previously completed by patient:         12/13/2017     9:08 AM 2/21/2018     8:44 AM 9/13/2019     3:22 PM 11/12/2019     3:00 PM 4/14/2020    11:13 AM 8/26/2021     8:13 AM " "10/25/2022     2:07 PM   PHQ-9 SCORE   PHQ-9 Total Score MyChart       6 (Mild depression)   PHQ-9 Total Score 3 1 1 1 4 3 6     GAD2:       10/25/2022     2:07 PM 1/24/2023     1:57 PM   SOSA-2   Feeling nervous, anxious, or on edge 0 0   Not being able to stop or control worrying 1 1   SOSA-2 Total Score 1 1     PROMIS 10-Global Health (only subscores and total score):       4/22/2022     8:06 AM 7/29/2022     8:11 PM 10/25/2022     2:08 PM 1/24/2023     1:58 PM   PROMIS-10 Scores Only   Global Mental Health Score 12 12 10 14   Global Physical Health Score 11 11 11 14   PROMIS TOTAL - SUBSCORES 23 23 21 28      ASSESSMENT: Current Emotional / Mental Status (status of significant symptoms):   Risk status (Self / Other harm or suicidal ideation)   Patient denies current fears or concerns for personal safety.   Patient denies current or recent suicidal ideation or behaviors.   Patient denies current or recent homicidal ideation or behaviors.   Patient denies current or recent self injurious behavior or ideation.   Patient denies other safety concerns.   Patient reports there has been no change in risk factors since her last session.    Patient reports there has been no change in protective factors since her last session.     Recommended that patient call 911 or go to the local ED should there be a change in any of these risk factors.     Appearance:   Unable to assess    Eye Contact:   Unable to assess    Psychomotor Behavior: Unable to assess    Attitude:   Engaged    Orientation:   All   Speech    Rate / Production: Normal     Volume:  Normal    Mood:    Anxious , \"numb\" at times   Affect:    Unable to assess    Thought Content:  Trying to sort out her own thoughts and emotions, uncertainty   Thought Form:  Coherent  Logical    Insight:    Fair      Medication Review:   No changes to current psychiatric medication(s)     Medication Compliance:   Yes; infrequent use of lorazepam     Changes in Health Issues:   None " "reported     Chemical Use Review:   Substance Use: Chemical use reviewed, no active concerns identified      Tobacco Use: No current tobacco use.      Diagnosis:  1. Anxiety disorder, unspecified type       Collateral Reports Completed:   Telephone consent     PLAN: (Patient Tasks / Therapist Tasks / Other)  As Client moves through the transition after being laid off from her job, she will allow herself time to grieve and process and decompress, noticing and feeling whatever emotions come up--without judgment--before trying to sort out next steps or find the \"silver linings.\" She will include some basic structure in her day to help with regulation. She will continue with regular walks and spending time outside. Her  and friends have been solid in their support. Return appointment scheduled in ~2 weeks. Client has my contact information and is aware that she can reach out sooner if needed.     Rosa Chowdary, KATHARINE                                                      ______________________________________________________________________    Individual Treatment Plan    Patient's Name: Ijeoma Avalos  YOB: 1965    Date of Creation: 3/25/2022  Date Treatment Plan Last Reviewed/Revised: 3/3/2023    DSM5 Diagnoses: 300.00 (F41.9) Unspecified Anxiety Disorder  Psychosocial / Contextual Factors: family stressors, work stressors  PROMIS (reviewed every 90 days): 28    Referral / Collaboration:  Referral to another professional/service is not indicated at this time.    Anticipated number of session for this episode of care: estimated 12-16 sessions/year  Anticipation frequency of session: Monthly  Anticipated Duration of each session: 38-52 minutes  Treatment plan will be reviewed in 90 days or when goals have been changed.       MeasurableTreatment Goal(s) related to diagnosis / functional impairment(s)  Goal 1: Patient will build skills for stress management.    I will know I've met my goal when I am " sleeping better and not waking up so often, when I don't feel worried so much of the time.   *updated on 7/1/22 (Client believes physical symptoms such as blood pressure and reflux are not the best way to measure this outcome).    Objective #A (Patient Action)    Patient will use at least 2 coping skills for anxiety management in the next 6 weeks.  Status: Continued - Date(s): 3/3/2023  *continue as maintenance goal    Intervention(s)  Therapist will teach teach self-regulation strategies, CBT skills, mindfulness techniques.    Objective #B  Patient will use cognitive strategies identified in therapy to challenge anxious thoughts.  Status: Continued - Date(s): 3/3/2023  *continue as maintenance goal    Intervention(s)  Therapist will teach cognitive strategies, provide education.      Goal 2: Patient will increase self-confidence.    I will know I've met my goal when I think more positively about myself.   *7/1/22 progress noted, particularly at work, as well as in some aspects of parenting. Client would like to continue as ongoing goal.    Objective #A (Patient Action)    Status: Maintenance - Date(s): 3/3/2023    Patient will increase assertive communication.    Intervention(s)  Therapist will teach assertiveness skills.    Objective #B  Patient will Identify negative self-talk and behaviors: challenge core beliefs, myths, and actions.    Status: Continued - Date(s): 3/3/2023    Intervention(s)  Therapist will provide education, teach CBT skills.      Goal 3: Patiient will increase connection in close relationships.    I will know I've met my goal when I feel more close with loved ones.  *7/1/22--Client identifies this as primary goal currently.    Objective #A (Patient Action)    Status: Continued - Date(s): 3/3/2023    Patient will prioritize time for meaningful relationships.    Intervention(s)  Therapist will provide support and accountability.    Objective #B  Patient will make use of effective interpersonal  communication skills.    Status: Continued - Date(s): 3/3/2023    Intervention(s)  Therapist will teach communication skills.      Patient has reviewed and agreed to the above plan.      Rosa Chowdary LP  March 25, 2022      Answers for HPI/ROS submitted by the patient on 10/25/2022  If you checked off any problems, how difficult have these problems made it for you to do your work, take care of things at home, or get along with other people?: Not difficult at all  PHQ9 TOTAL SCORE: 6

## 2023-06-16 ENCOUNTER — VIRTUAL VISIT (OUTPATIENT)
Dept: PSYCHOLOGY | Facility: CLINIC | Age: 58
End: 2023-06-16
Payer: COMMERCIAL

## 2023-06-16 DIAGNOSIS — F41.9 ANXIETY DISORDER, UNSPECIFIED TYPE: Primary | ICD-10-CM

## 2023-06-16 PROCEDURE — 90834 PSYTX W PT 45 MINUTES: CPT | Mod: 95 | Performed by: PSYCHOLOGIST

## 2023-06-16 NOTE — PROGRESS NOTES
"        [facility]                                     Progress Note    Patient Name: Ijeoma Avalos  Date: 6/16/2023       Service Type: Individual      Session Start Time: 09:00  Session End Time: 09:51     Session Length: 38-52 mins    Session #: 44 (this episode of care)    Attendees: Client attended alone    Service Modality:  Phone Visit:      Provider verified identity through the following two step process.  Patient provided:  Patient is known previously to provider    Telephone Visit: The patient's condition can be safely assessed and treated via synchronous audio telemedicine encounter.      Reason for Audio Telemedicine Visit: Patient convenience (e.g. access to timely appointments / distance to available provider)    Originating Site (Patient Location): Patient's home    Distant Site (Provider Location): Provider Remote Setting- Home Office    Consent:  The patient/guardian has verbally consented to:     The potential risks and benefits of telemedicine (telephone visit) versus in person care;    The patient has been notified of the following:      \"We have found that certain health care needs can be provided without the need for a face to face visit.  This service lets us provide the care you need with a phone conversation.       I will have full access to your [facility] medical record during this entire phone call.   I will be taking notes for your medical record.      Since this is like an office visit, we will bill your insurance company for this service.       There are potential benefits and risks of telephone visits (e.g. limits to patient confidentiality) that differ from in-person visits.?Confidentiality still applies for telephone services, and nobody will record the visit.  It is important to be in a quiet, private space that is free of distractions (including cell phone or other devices) during the visit.??      If during the course of the call I believe a " "telephone visit is not appropriate, you will not be charged for this service\"     Consent has been obtained for this service by care team member: Yes     DATA  Interactive Complexity: No  Crisis: No      Progress Since Last Session (Related to Symptoms / Goals / Homework):   Symptoms: Stable    Homework: Good progress: Client has been maintaining structure to her days, also spending time on activities that she didn't have time for while working; she is walking and weight training regularly       Episode of Care Goals: Satisfactory progress - ACTION (Actively working towards change); Intervened by reinforcing change plan / affirming steps taken     Current / Ongoing Stressors and Concerns:   Recently laid off from job   Family stressors     Treatment Objective(s) Addressed in This Session:   Regroup/reorganize after job layoff   increase connection in close relationships   build skills for stress management   increase self-confidence     Intervention:   Continued work on coping with unexpected layoff. Processed the emotions that Client has been experiencing, including a sense of \"betrayal.\"  Discussed the \"just world\" belief and how recent events have violated that belief for Client. Also identified some cultural context around women in the workplace that has been on Client's mind recently. Suggested that she may wish to do some writing around this topic to assist with her personal processing. Talked about her initial thoughts around next career steps and where she might begin. Client stated that she has booked a trip to walk part of [facility] in [facility] with a friend this Fall. This has been helpful to have something to look forward to as well as a goal to work towards.     Assessments completed prior to visit:  The following assessments were previously completed by patient:         12/13/2017     9:08 AM 2/21/2018     8:44 AM 9/13/2019     3:22 PM 11/12/2019     3:00 PM 4/14/2020    11:13 AM 8/26/2021     8:13 AM " "10/25/2022     2:07 PM   PHQ-9 SCORE   PHQ-9 Total Score MyChart       6 (Mild depression)   PHQ-9 Total Score 3 1 1 1 4 3 6     GAD2:       10/25/2022     2:07 PM 1/24/2023     1:57 PM   SOSA-2   Feeling nervous, anxious, or on edge 0 0   Not being able to stop or control worrying 1 1   SOSA-2 Total Score 1 1     PROMIS 10-Global Health (only subscores and total score):       4/22/2022     8:06 AM 7/29/2022     8:11 PM 10/25/2022     2:08 PM 1/24/2023     1:58 PM   PROMIS-10 Scores Only   Global Mental Health Score 12 12 10 14   Global Physical Health Score 11 11 11 14   PROMIS TOTAL - SUBSCORES 23 23 21 28      ASSESSMENT: Current Emotional / Mental Status (status of significant symptoms):   Risk status (Self / Other harm or suicidal ideation)   Patient denies current fears or concerns for personal safety.   Patient denies current or recent suicidal ideation or behaviors.   Patient denies current or recent homicidal ideation or behaviors.   Patient denies current or recent self injurious behavior or ideation.   Patient denies other safety concerns.   Patient reports there has been no change in risk factors since her last session.    Patient reports there has been no change in protective factors since her last session.     Recommended that patient call 911 or go to the local ED should there be a change in any of these risk factors.     Appearance:   Unable to assess    Eye Contact:   Unable to assess    Psychomotor Behavior: Unable to assess    Attitude:   Cooperative    Orientation:   All   Speech    Rate / Production: Normal     Volume:  Normal    Mood:    Anxious, shock, \"hard to tell what I'm feeling sometimes\"   Affect:    Unable to assess    Thought Content:  Adaptive interpretation of recent layoffs, not having insecure or \"not good enough\" thoughts   Thought Form:  Coherent  Logical    Insight:    Fair      Medication Review:   No changes to current psychiatric medication(s)     Medication Compliance:   Yes; " infrequent use of lorazepam     Changes in Health Issues:   None reported     Chemical Use Review:   Substance Use: Chemical use reviewed, no active concerns identified      Tobacco Use: No current tobacco use.      Diagnosis:  1. Anxiety disorder, unspecified type       Collateral Reports Completed:   Telephone consent     PLAN: (Patient Tasks / Therapist Tasks / Other)  Complete screening questionnaires next session. 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 She will work on being in the moment when with her daughter or engaged in physical activities such as walking or gardening. She will look for opportunities to socialize with friends/other couples & families. Continue to make space to grieve, noticing and feeling whatever emotions come up--without judgment. Return appointments scheduled. Client has my contact information and is aware that she can reach out sooner if needed.     Rosa Chowdary, KATHARINE                                                      ______________________________________________________________________    Individual Treatment Plan    Patient's Name: Ijeoma Avalos  YOB: 1965    Date of Creation: 3/25/2022  Date Treatment Plan Last Reviewed/Revised: 6/16/2023    DSM5 Diagnoses: 300.00 (F41.9) Unspecified Anxiety Disorder  Psychosocial / Contextual Factors: family stressors, work stressors  PROMIS (reviewed every 90 days): 28    Referral / Collaboration:  Referral to another professional/service is not indicated at this time.    Anticipated number of session for this episode of care: estimated 12-16 sessions/year  Anticipation frequency of session: Monthly  Anticipated Duration of each session: 38-52 minutes  Treatment plan will be reviewed in 90 days or when goals have been changed.       MeasurableTreatment Goal(s) related to diagnosis / functional  impairment(s)  Goal 1: Patient will build skills for stress management.    I will know I've met my goal when I am sleeping better and not waking up so often, when I don't feel worried so much of the time.   *updated on 7/1/22 (Client believes physical symptoms such as blood pressure and reflux are not the best way to measure this outcome).    Objective #A (Patient Action)    Patient will use at least 2 coping skills for anxiety management in the next 6 weeks. Particularly as related to recent job layoff.  Status: Continued - Date(s): 6/16/2023      Intervention(s)  Therapist will teach teach self-regulation strategies, CBT skills, mindfulness techniques.    Objective #B  Patient will use cognitive strategies identified in therapy to challenge anxious thoughts.  Status: Continued - Date(s): 6/16/2023  *continue as maintenance goal    Intervention(s)  Therapist will teach cognitive strategies, provide education.      Goal 2: Patient will increase self-confidence.    I will know I've met my goal when I think more positively about myself.   *6/16/2023 meaningful progress noted in this area.    Objective #A (Patient Action)    Status: Maintenance - Date(s): 6/16/2023    Patient will increase assertive communication.    Intervention(s)  Therapist will teach assertiveness skills.    Objective #B  Patient will Identify negative self-talk and behaviors: challenge core beliefs, myths, and actions.    Status: Maintenance: 6/16/2023    Intervention(s)  Therapist will provide education, teach CBT skills.      Goal 3: Patiient will increase connection in close relationships.    I will know I've met my goal when I feel more close with loved ones.  *Deferred in light of more immediate goal of recovering from recent professional layoff.    Objective #A (Patient Action)    Status: Continued - Date(s): 6/16/2023    Patient will prioritize time for meaningful relationships.    Intervention(s)  Therapist will provide support and  accountability.    Objective #B  Patient will make use of effective interpersonal communication skills.    Status: Continued - Date(s): 6/16/2023    Intervention(s)  Therapist will teach communication skills.    Goal 4: Client will regroup/reorganize after job layoff.     I will know I've met my goal when I'm not still wondering 'why' and I can see what direction I'm heading professionally.      Objective #A (Client Action)    Client will grieve losses associated with layoff and explore new employment options.  Status: New - Date: 6/16/2023     Intervention(s)  Therapist will provide space for emotional processing; guide Client in creating an accurate narrative about the experience; provide psychoeducation around reorganization after significant loss; provide accountability and support.      Patient has reviewed and agreed to the above plan.      Rosa Chowdary LP  March 25, 2022      Answers for HPI/ROS submitted by the patient on 10/25/2022  If you checked off any problems, how difficult have these problems made it for you to do your work, take care of things at home, or get along with other people?: Not difficult at all  PHQ9 TOTAL SCORE: 6

## 2023-06-26 DIAGNOSIS — E61.1 IRON DEFICIENCY: ICD-10-CM

## 2023-06-26 RX ORDER — FERROUS SULFATE 325(65) MG
325 TABLET, DELAYED RELEASE (ENTERIC COATED) ORAL DAILY
Qty: 90 TABLET | Refills: 0 | Status: SHIPPED | OUTPATIENT
Start: 2023-06-26 | End: 2023-11-08

## 2023-06-28 ENCOUNTER — VIRTUAL VISIT (OUTPATIENT)
Dept: PSYCHOLOGY | Facility: CLINIC | Age: 58
End: 2023-06-28
Payer: COMMERCIAL

## 2023-06-28 DIAGNOSIS — F43.23 ADJUSTMENT DISORDER WITH MIXED ANXIETY AND DEPRESSED MOOD: Primary | ICD-10-CM

## 2023-06-28 PROCEDURE — 90834 PSYTX W PT 45 MINUTES: CPT | Mod: 95 | Performed by: PSYCHOLOGIST

## 2023-06-28 ASSESSMENT — ANXIETY QUESTIONNAIRES
GAD7 TOTAL SCORE: 6
3. WORRYING TOO MUCH ABOUT DIFFERENT THINGS: SEVERAL DAYS
7. FEELING AFRAID AS IF SOMETHING AWFUL MIGHT HAPPEN: NOT AT ALL
6. BECOMING EASILY ANNOYED OR IRRITABLE: SEVERAL DAYS
1. FEELING NERVOUS, ANXIOUS, OR ON EDGE: SEVERAL DAYS
5. BEING SO RESTLESS THAT IT IS HARD TO SIT STILL: NOT AT ALL
GAD7 TOTAL SCORE: 6
2. NOT BEING ABLE TO STOP OR CONTROL WORRYING: MORE THAN HALF THE DAYS

## 2023-06-28 ASSESSMENT — PATIENT HEALTH QUESTIONNAIRE - PHQ9
SUM OF ALL RESPONSES TO PHQ QUESTIONS 1-9: 7
5. POOR APPETITE OR OVEREATING: SEVERAL DAYS
SUM OF ALL RESPONSES TO PHQ QUESTIONS 1-9: 7

## 2023-07-01 ENCOUNTER — HOSPITAL ENCOUNTER (EMERGENCY)
Facility: CLINIC | Age: 58
Discharge: HOME | End: 2023-07-01
Attending: EMERGENCY MEDICINE | Admitting: EMERGENCY MEDICINE
Payer: COMMERCIAL

## 2023-07-01 ENCOUNTER — APPOINTMENT (OUTPATIENT)
Dept: GENERAL RADIOLOGY | Facility: CLINIC | Age: 58
End: 2023-07-01
Attending: EMERGENCY MEDICINE
Payer: COMMERCIAL

## 2023-07-01 VITALS
HEIGHT: 60 IN | TEMPERATURE: 97.2 F | DIASTOLIC BLOOD PRESSURE: 86 MMHG | SYSTOLIC BLOOD PRESSURE: 150 MMHG | RESPIRATION RATE: 18 BRPM | HEART RATE: 85 BPM | WEIGHT: 138 LBS | BODY MASS INDEX: 27.09 KG/M2 | OXYGEN SATURATION: 98 %

## 2023-07-01 DIAGNOSIS — R10.13 EPIGASTRIC PAIN: ICD-10-CM

## 2023-07-01 LAB
ALBUMIN SERPL BCG-MCNC: 4.2 G/DL (ref 3.5–5.2)
ALP SERPL-CCNC: 75 U/L (ref 35–104)
ALT SERPL W P-5'-P-CCNC: 25 U/L (ref 0–50)
ANION GAP SERPL CALCULATED.3IONS-SCNC: 11 MMOL/L (ref 7–15)
AST SERPL W P-5'-P-CCNC: 18 U/L (ref 0–45)
ATRIAL RATE - MUSE: 78 BPM
BASOPHILS # BLD AUTO: 0 10E3/UL (ref 0–0.2)
BASOPHILS NFR BLD AUTO: 0 %
BILIRUB SERPL-MCNC: 0.6 MG/DL
BUN SERPL-MCNC: 12.1 MG/DL (ref 6–20)
CALCIUM SERPL-MCNC: 9.3 MG/DL (ref 8.6–10)
CHLORIDE SERPL-SCNC: 102 MMOL/L (ref 98–107)
CREAT SERPL-MCNC: 0.61 MG/DL (ref 0.51–0.95)
DEPRECATED HCO3 PLAS-SCNC: 25 MMOL/L (ref 22–29)
DIASTOLIC BLOOD PRESSURE - MUSE: NORMAL MMHG
EOSINOPHIL # BLD AUTO: 0.3 10E3/UL (ref 0–0.7)
EOSINOPHIL NFR BLD AUTO: 3 %
ERYTHROCYTE [DISTWIDTH] IN BLOOD BY AUTOMATED COUNT: 14.2 % (ref 10–15)
GFR SERPL CREATININE-BSD FRML MDRD: >90 ML/MIN/1.73M2
GLUCOSE SERPL-MCNC: 152 MG/DL (ref 70–99)
HCT VFR BLD AUTO: 36.6 % (ref 35–47)
HGB BLD-MCNC: 11.6 G/DL (ref 11.7–15.7)
HOLD SPECIMEN: NORMAL
IMM GRANULOCYTES # BLD: 0 10E3/UL
IMM GRANULOCYTES NFR BLD: 0 %
INTERPRETATION ECG - MUSE: NORMAL
LIPASE SERPL-CCNC: 24 U/L (ref 13–60)
LYMPHOCYTES # BLD AUTO: 3.5 10E3/UL (ref 0.8–5.3)
LYMPHOCYTES NFR BLD AUTO: 40 %
MCH RBC QN AUTO: 25.9 PG (ref 26.5–33)
MCHC RBC AUTO-ENTMCNC: 31.7 G/DL (ref 31.5–36.5)
MCV RBC AUTO: 82 FL (ref 78–100)
MONOCYTES # BLD AUTO: 0.5 10E3/UL (ref 0–1.3)
MONOCYTES NFR BLD AUTO: 6 %
NEUTROPHILS # BLD AUTO: 4.4 10E3/UL (ref 1.6–8.3)
NEUTROPHILS NFR BLD AUTO: 51 %
NRBC # BLD AUTO: 0 10E3/UL
NRBC BLD AUTO-RTO: 0 /100
P AXIS - MUSE: 61 DEGREES
PLATELET # BLD AUTO: 344 10E3/UL (ref 150–450)
POTASSIUM SERPL-SCNC: 4.3 MMOL/L (ref 3.4–5.3)
PR INTERVAL - MUSE: 168 MS
PROT SERPL-MCNC: 7 G/DL (ref 6.4–8.3)
QRS DURATION - MUSE: 80 MS
QT - MUSE: 360 MS
QTC - MUSE: 410 MS
R AXIS - MUSE: 88 DEGREES
RBC # BLD AUTO: 4.48 10E6/UL (ref 3.8–5.2)
SODIUM SERPL-SCNC: 138 MMOL/L (ref 136–145)
SYSTOLIC BLOOD PRESSURE - MUSE: NORMAL MMHG
T AXIS - MUSE: 45 DEGREES
TROPONIN T SERPL HS-MCNC: <6 NG/L
VENTRICULAR RATE- MUSE: 78 BPM
WBC # BLD AUTO: 8.7 10E3/UL (ref 4–11)

## 2023-07-01 PROCEDURE — 93005 ELECTROCARDIOGRAM TRACING: CPT

## 2023-07-01 PROCEDURE — 80053 COMPREHEN METABOLIC PANEL: CPT | Performed by: EMERGENCY MEDICINE

## 2023-07-01 PROCEDURE — 83690 ASSAY OF LIPASE: CPT | Performed by: EMERGENCY MEDICINE

## 2023-07-01 PROCEDURE — 36415 COLL VENOUS BLD VENIPUNCTURE: CPT | Performed by: EMERGENCY MEDICINE

## 2023-07-01 PROCEDURE — 85025 COMPLETE CBC W/AUTO DIFF WBC: CPT | Performed by: EMERGENCY MEDICINE

## 2023-07-01 PROCEDURE — 71046 X-RAY EXAM CHEST 2 VIEWS: CPT

## 2023-07-01 PROCEDURE — 84484 ASSAY OF TROPONIN QUANT: CPT | Performed by: EMERGENCY MEDICINE

## 2023-07-01 PROCEDURE — 99285 EMERGENCY DEPT VISIT HI MDM: CPT | Mod: 25

## 2023-07-01 RX ORDER — SUCRALFATE ORAL 1 G/10ML
1 SUSPENSION ORAL 4 TIMES DAILY
Qty: 420 ML | Refills: 0 | Status: SHIPPED | OUTPATIENT
Start: 2023-07-01 | End: 2023-11-08

## 2023-07-01 NOTE — ED TRIAGE NOTES
Upper abdominal pain/lower chest pain started 2 days ago. Pt did go to  and pain continues so here for further eval. Denies N/V/D or shortness of breath.      Triage Assessment     Row Name 07/01/23 1311       Triage Assessment (Adult)    Airway WDL WDL       Respiratory WDL    Respiratory WDL WDL       Skin Circulation/Temperature WDL    Skin Circulation/Temperature WDL WDL       Cardiac WDL    Cardiac WDL X  lower chest/upper abdominal pain       Peripheral/Neurovascular WDL    Peripheral Neurovascular WDL WDL       Cognitive/Neuro/Behavioral WDL    Cognitive/Neuro/Behavioral WDL WDL

## 2023-07-01 NOTE — ED PROVIDER NOTES
Visit Date: 07/01/2023    CHIEF COMPLAINT:  Abdominal/chest pain.    HISTORY OF PRESENT ILLNESS:  This is a 57-year-old woman who presents to the ED with the above symptoms that have been present now for 2 days.  She states she does have a history of reflux, although this seems a little bit different.  There has been no nausea, vomiting, change in her bowel movements, change in urination, no fevers or chills.  She is eating and this does not affect it.  She exercises.  It does not affect it.  She does not have smoking or alcohol use history.  She has had her gallbladder and her uterus taken out.  She is on Prilosec and she avoids ibuprofen.    PAST MEDICAL HISTORY:  Significant for the above medical issues.  She does have a history of gastric acidity, TMJ, chronic neck pain, adjustment disorder, nasal congestion.    MEDICATIONS:  Lipitor, Malarone, iron, Synthroid, Zestril, Glucophage.    ALLERGIES:  CILOXAN, IBUPROFEN.    FAMILY AND SOCIAL HISTORY:  She is .    REVIEW OF SYSTEMS:  Noted.  All other systems being negative.    PHYSICAL EXAMINATION:    GENERAL:  Reveals a middle-aged south  female in no respiratory distress.  HEENT:  Pupils equal, reactive.  Extraocular movements intact.  Oropharynx clear.  LUNGS:  Clear.  HEART:  Regular, no murmur.  ABDOMEN:  Soft, no tenderness or masses.  NEUROLOGIC:  Awake, alert, appropriate.  Normal motor sensation, coordination.  SKIN:  No rash.  EXTREMITIES:  No swelling or tenderness.    EMERGENCY DEPARTMENT COURSE:  The patient had an EKG done, which showed a normal sinus rhythm, no acute ischemic or hypertrophic changes. Rate 78, , QRS 80, QTc is 410.  Chest x-ray is unremarkable.  This was reviewed by me and lab work reveals normal chemistries.  White count 8.7, hemoglobin 11.6, platelet 344.  Troponin and lipase normal.  Patient's symptoms are much more suggestive of a GI then cardiac or embolic or aneurysm. I have recommend starting Carafate 4 times  daily and I have referred her to Dr. Ann for further evaluation, including possible endoscopy.    IMPRESSION:  Epigastric pain, uncertain etiology.    PLAN:  As noted.    Stefano Henriquez MD        D: 2023   T: 2023   MT: nando    Name:     SEN KELSEY  MRN:      [id number]        Account:    [id number]   :      1965           Visit Date: 2023     Document: [id number]

## 2023-07-03 DIAGNOSIS — Z00.00 ROUTINE GENERAL MEDICAL EXAMINATION AT A HEALTH CARE FACILITY: ICD-10-CM

## 2023-07-03 DIAGNOSIS — I10 ESSENTIAL HYPERTENSION: ICD-10-CM

## 2023-07-03 RX ORDER — LISINOPRIL 10 MG/1
10 TABLET ORAL DAILY
Qty: 90 TABLET | Refills: 0 | Status: SHIPPED | OUTPATIENT
Start: 2023-07-03 | End: 2023-10-17

## 2023-07-03 NOTE — LETTER
July 10, 2023      Ijeoma Avalos  [address]  [place] [address]        Hi Ijeoma,    Our records indicate that it is time to schedule a visit with your primary care provider.  You are due to be seen for a follow-up of medications.  We have sent to the pharmacy a 3 month refill of your medication until you can be seen by your provider.  You may call [phone] to schedule or via [facility] using the appointment tab.    If you are no longer a [facility] patient; please contact us and let us know that as well.  You will need to let the pharmacy know the name of your new provider so that they can send future refill requests to them.    Thank you,    [facility] [place]

## 2023-07-03 NOTE — TELEPHONE ENCOUNTER
Covering for Dr. Hernandez    Has had elevated BP at  twice in 6/2023  Borderline 2/16/23 - /88  Overdue for follow up with PCP on diabetes, hypertension, thyroid.     90 day prescription sent for lisinopril, needs appointment before further refills can be sent. Please contact patient to schedule with PCP  Harriet Godwin PA-C on 7/3/2023 at 5:11 PM

## 2023-07-05 NOTE — TELEPHONE ENCOUNTER
[employer] message sent. 1st attempt     Asia Steinberg RN on 7/5/2023 at 6:57 AM

## 2023-07-07 NOTE — TELEPHONE ENCOUNTER
Called pt, pt answered the phone saying hello, writer introduced herself and stated the reason for the call and either the pt hung up or the call dropped. Will attempt to call at a later date. 2nd attempt.    Asia Steinberg RN on 7/7/2023 at 12:07 PM

## 2023-07-18 ENCOUNTER — OFFICE VISIT (OUTPATIENT)
Dept: PSYCHOLOGY | Facility: CLINIC | Age: 58
End: 2023-07-18
Payer: COMMERCIAL

## 2023-07-18 DIAGNOSIS — F43.23 ADJUSTMENT DISORDER WITH MIXED ANXIETY AND DEPRESSED MOOD: Primary | ICD-10-CM

## 2023-07-18 PROCEDURE — 90834 PSYTX W PT 45 MINUTES: CPT | Performed by: PSYCHOLOGIST

## 2023-07-18 NOTE — PROGRESS NOTES
[facility]                                     Progress Note    Patient Name: Ijeoma Avalos  Date: 7/18/2023       Service Type: Individual      Session Start Time: 11:00  Session End Time: 11:50     Session Length: 38-52 mins    Session #: 46 (this episode of care)    Attendees: Client attended alone    Service Modality:  In person      DATA  Interactive Complexity: No  Crisis: No      Progress Since Last Session (Related to Symptoms / Goals / Homework):   Symptoms: Some improvement    Homework: In progress; Client reported that she decided she will send an email to her former boss as a way of following up, and she has tried to write it several times, but she has not yet found the words        Episode of Care Goals: Satisfactory progress - ACTION (Actively working towards change); Intervened by reinforcing change plan / affirming steps taken     Current / Ongoing Stressors and Concerns:   Recently laid off from job   Family stressors     Treatment Objective(s) Addressed in This Session:   Regroup/reorganize after job layoff   increase connection in close relationships   build skills for stress management   increase self-confidence     Intervention:   Client shared her dilemma of wanting to contact her former boss but struggling with the verbiage that best captures her message. After some exploration, identified the obstacles that are holding her back, including concern about his feelings and worry about striking the right tone. Used the bullet point exercise to distill down her message and find an opening comment. Client gave herself a deadline of Friday to complete and send the email. She stated that she is doing well with enjoying the summer with her daughter and has found plenty of activities to fill her time. Over the past week, she has been doing 20-30 minutes of meditation daily (guided by a friend) which has been helpful. She recognizes improved anxiety management as a  result. Client reported that she is formulating a plan for her next career step, likely starting to look at opportunities in the next 4-6 weeks.      Assessments completed prior to visit:  The following assessments were previously completed by patient:         2/21/2018     8:44 AM 9/13/2019     3:22 PM 11/12/2019     3:00 PM 4/14/2020    11:13 AM 8/26/2021     8:13 AM 10/25/2022     2:07 PM 6/28/2023     1:05 PM   PHQ-9 SCORE   PHQ-9 Total Score MyChart      6 (Mild depression) 7 (Mild depression)   PHQ-9 Total Score 1 1 1 4 3 6 7     GAD2:       10/25/2022     2:07 PM 1/24/2023     1:57 PM   SOSA-2   Feeling nervous, anxious, or on edge 0 0   Not being able to stop or control worrying 1 1   SOSA-2 Total Score 1 1     PROMIS 10-Global Health (only subscores and total score):       4/22/2022     8:06 AM 7/29/2022     8:11 PM 10/25/2022     2:08 PM 1/24/2023     1:58 PM 6/28/2023     1:05 PM   PROMIS-10 Scores Only   Global Mental Health Score 12 12 10 14 11   Global Physical Health Score 11 11 11 14 12   PROMIS TOTAL - SUBSCORES 23 23 21 28 23      ASSESSMENT: Current Emotional / Mental Status (status of significant symptoms):   Risk status (Self / Other harm or suicidal ideation)   Patient denies current fears or concerns for personal safety.   Patient denies current or recent suicidal ideation or behaviors.   Patient denies current or recent homicidal ideation or behaviors.   Patient denies current or recent self injurious behavior or ideation.   Patient denies other safety concerns.   Patient reports there has been no change in risk factors since her last session.    Patient reports there has been no change in protective factors since her last session.     Recommended that patient call 911 or go to the local ED should there be a change in any of these risk factors.     Appearance:   Appropriately dressed & groomed    Eye Contact:   Good    Psychomotor Behavior: Normal    Attitude:   Cooperative     Orientation:   All   Speech    Rate / Production: Normal     Volume:  Normal    Mood:    Slightly improved   Affect:    Congruent    Thought Content:  Tendency to focus on other people's feelings   Thought Form:  Coherent  Logical    Insight:    Fair      Medication Review:   No changes to current psychiatric medication(s)     Medication Compliance:   Yes; infrequent use of lorazepam     Changes in Health Issues:   Yes: Client stated that she was evaluated in urgent care and the ED for chest pain/burning last week; ultimately determined to be GERD (possibly also some contribution from hyperglycemia, hypertension, anxiety--according to Client report). This spurred her into daily meditation practice with a friend.     Chemical Use Review:   Substance Use: Chemical use reviewed, no active concerns identified      Tobacco Use: No current tobacco use.      Diagnosis:  1. Adjustment disorder with mixed anxiety and depressed mood    *given Client's reaction to recent job loss, Adjustment Disorder diagnosis is most appropriate    Collateral Reports Completed:   Telephone consent    PHQ-9   SOSA-7   PROMIS-10    PLAN: (Patient Tasks / Therapist Tasks / Other)  Client will send a follow up email to her former manager by the end of the week, using the opening statement she identified today and the 3 main bullet point messages that she wishes to convey. She will work on meeting her own emotional needs in this situation (instead of spending her energy mind reading and trying to regulate anyone else's emotions). Continue with regular walking & exercise. Continue to make space to grieve, noticing and feeling whatever emotions come up--without judgment. Return appointments scheduled. Client has my contact information and is aware that she can reach out sooner if needed.     Rosa Chowdary, LP                                                      ______________________________________________________________________    Individual  Treatment Plan    Patient's Name: Ijeoma Avalos  YOB: 1965    Date of Creation: 3/25/2022  Date Treatment Plan Last Reviewed/Revised: 6/16/2023    DSM5 Diagnoses: 300.00 (F41.9) Unspecified Anxiety Disorder  Psychosocial / Contextual Factors: family stressors, work stressors  PROMIS (reviewed every 90 days): 23    Referral / Collaboration:  Referral to another professional/service is not indicated at this time.    Anticipated number of session for this episode of care: estimated 12-16 sessions/year  Anticipation frequency of session: Monthly  Anticipated Duration of each session: 38-52 minutes  Treatment plan will be reviewed in 90 days or when goals have been changed.       MeasurableTreatment Goal(s) related to diagnosis / functional impairment(s)  Goal 1: Patient will build skills for stress management.    I will know I've met my goal when I am sleeping better and not waking up so often, when I don't feel worried so much of the time.   *updated on 7/1/22 (Client believes physical symptoms such as blood pressure and reflux are not the best way to measure this outcome).    Objective #A (Patient Action)    Patient will use at least 2 coping skills for anxiety management in the next 6 weeks. Particularly as related to recent job layoff.  Status: Continued - Date(s): 6/16/2023      Intervention(s)  Therapist will teach teach self-regulation strategies, CBT skills, mindfulness techniques.    Objective #B  Patient will use cognitive strategies identified in therapy to challenge anxious thoughts.  Status: Continued - Date(s): 6/16/2023  *continue as maintenance goal    Intervention(s)  Therapist will teach cognitive strategies, provide education.      Goal 2: Patient will increase self-confidence.    I will know I've met my goal when I think more positively about myself.   *6/16/2023 meaningful progress noted in this area.    Objective #A (Patient Action)    Status: Maintenance - Date(s):  6/16/2023    Patient will increase assertive communication.    Intervention(s)  Therapist will teach assertiveness skills.    Objective #B  Patient will Identify negative self-talk and behaviors: challenge core beliefs, myths, and actions.    Status: Maintenance: 6/16/2023    Intervention(s)  Therapist will provide education, teach CBT skills.      Goal 3: Patiient will increase connection in close relationships.    I will know I've met my goal when I feel more close with loved ones.  *Deferred in light of more immediate goal of recovering from recent professional layoff.    Objective #A (Patient Action)    Status: Continued - Date(s): 6/16/2023    Patient will prioritize time for meaningful relationships.    Intervention(s)  Therapist will provide support and accountability.    Objective #B  Patient will make use of effective interpersonal communication skills.    Status: Continued - Date(s): 6/16/2023    Intervention(s)  Therapist will teach communication skills.    Goal 4: Client will regroup/reorganize after job layoff.     I will know I've met my goal when I'm not still wondering 'why' and I can see what direction I'm heading professionally.      Objective #A (Client Action)    Client will grieve losses associated with layoff and explore new employment options.  Status: New - Date: 6/16/2023     Intervention(s)  Therapist will provide space for emotional processing; guide Client in creating an accurate narrative about the experience; provide psychoeducation around reorganization after significant loss; provide accountability and support.      Patient has reviewed and agreed to the above plan.      Rosa Chowdary LP  March 25, 2022      Answers for HPI/ROS submitted by the patient on 10/25/2022  If you checked off any problems, how difficult have these problems made it for you to do your work, take care of things at home, or get along with other people?: Not difficult at all  PHQ9 TOTAL SCORE: 6

## 2023-07-22 ENCOUNTER — HEALTH MAINTENANCE LETTER (OUTPATIENT)
Age: 58
End: 2023-07-22

## 2023-08-03 ENCOUNTER — PATIENT OUTREACH (OUTPATIENT)
Dept: CARE COORDINATION | Facility: CLINIC | Age: 58
End: 2023-08-03
Payer: COMMERCIAL

## 2023-08-11 ENCOUNTER — OFFICE VISIT (OUTPATIENT)
Dept: FAMILY MEDICINE | Facility: CLINIC | Age: 58
End: 2023-08-11
Payer: COMMERCIAL

## 2023-08-11 ENCOUNTER — HOSPITAL ENCOUNTER (OUTPATIENT)
Dept: ULTRASOUND IMAGING | Facility: CLINIC | Age: 58
Discharge: HOME | End: 2023-08-11
Attending: INTERNAL MEDICINE | Admitting: INTERNAL MEDICINE
Payer: COMMERCIAL

## 2023-08-11 VITALS
HEIGHT: 60 IN | RESPIRATION RATE: 15 BRPM | OXYGEN SATURATION: 98 % | BODY MASS INDEX: 27.25 KG/M2 | TEMPERATURE: 98.2 F | DIASTOLIC BLOOD PRESSURE: 70 MMHG | SYSTOLIC BLOOD PRESSURE: 138 MMHG | WEIGHT: 138.8 LBS | HEART RATE: 90 BPM

## 2023-08-11 DIAGNOSIS — E53.9 B-COMPLEX DEFICIENCY: ICD-10-CM

## 2023-08-11 DIAGNOSIS — E61.1 IRON DEFICIENCY: ICD-10-CM

## 2023-08-11 DIAGNOSIS — E03.9 HYPOTHYROIDISM, UNSPECIFIED TYPE: ICD-10-CM

## 2023-08-11 DIAGNOSIS — M79.605 PAIN OF LEFT LOWER EXTREMITY: ICD-10-CM

## 2023-08-11 DIAGNOSIS — I10 ESSENTIAL HYPERTENSION: ICD-10-CM

## 2023-08-11 DIAGNOSIS — E11.65 TYPE 2 DIABETES MELLITUS WITH HYPERGLYCEMIA, WITHOUT LONG-TERM CURRENT USE OF INSULIN (H): Primary | ICD-10-CM

## 2023-08-11 PROBLEM — M19.90 ARTHRITIS: Status: ACTIVE | Noted: 2022-07-26

## 2023-08-11 PROCEDURE — 99214 OFFICE O/P EST MOD 30 MIN: CPT | Mod: 25 | Performed by: INTERNAL MEDICINE

## 2023-08-11 PROCEDURE — 96372 THER/PROPH/DIAG INJ SC/IM: CPT | Performed by: INTERNAL MEDICINE

## 2023-08-11 PROCEDURE — 93971 EXTREMITY STUDY: CPT | Mod: LT

## 2023-08-11 RX ORDER — CYANOCOBALAMIN 1000 UG/ML
1000 INJECTION, SOLUTION INTRAMUSCULAR; SUBCUTANEOUS ONCE
Status: COMPLETED | OUTPATIENT
Start: 2023-08-11 | End: 2023-08-11

## 2023-08-11 RX ADMIN — CYANOCOBALAMIN 1000 MCG: 1000 INJECTION, SOLUTION INTRAMUSCULAR; SUBCUTANEOUS at 11:44

## 2023-08-11 ASSESSMENT — PAIN SCALES - GENERAL: PAINLEVEL: NO PAIN (0)

## 2023-08-11 NOTE — PROGRESS NOTES
Assessment and Plan  1. Type 2 diabetes mellitus with hyperglycemia, without long-term current use of insulin (H)  Last seen patient in September 2022 for annual physical, does have risk factors of diabetes mellitus, uterine fibroid, iron deficiency, hypothyroidism, hypertension and hyperlipidemia.  Last lab work at that time showing positive for microalbuminuria, dyslipidemia, iron deficiency, vitamin B12 deficiency and abnormal TSH, vitamin D deficiency.  - Adult Eye  Referral; Future  - HEMOGLOBIN A1C; Future  - Lipid panel reflex to direct LDL Non-fasting; Future  - Albumin Random Urine Quantitative with Creat Ratio; Future    2. Hypothyroidism, unspecified type  - TSH WITH FREE T4 REFLEX; Future    3. Iron deficiency  - Iron and iron binding capacity; Future    4. B-complex deficiency  Pt requesting for B12 shot as she is here today and making sure she will not do the lab work today .   - Vitamin B12; Future  - cyanocobalamin injection 1,000 mcg    5. Essential hypertension  Well-controlled, continue current lisinopril.    6. Pain of left lower extremity  Ongoing problem, uncontrolled.  Patient denies any trauma but does have dragging sensation on the left lower extremity extending from the upper thigh to the leg.  Physical exam positive for mild discomfort on Homans sign.but no signs of inflammation or erythema. Differential diagnosis of DVT cannot be excluded before considering this as musculoskeletal pain ,  plan for physical therapy with ibuprofen at that time.  Patient understood and agreed with the plan.  - US Lower Extremity Venous Duplex Left; Future         Please note that this note consists of symbols derived from keyboarding, dictation and/or voice recognition software. As a result, there may be errors in the script that have gone undetected. Please consider this when interpreting information found in this chart.      Patient Instructions   As discussed, please do fasting LAB only  appointment     Will check Ultrasound DVT for making sure no DVT in the Left lower extremity before treating this as musculoskeletal causes.     ================================              Return in about 4 months (around 12/11/2023), or if symptoms worsen or fail to improve, for Preventative Visit.    Norah Zamorano MD  [facility] LAZARUS Raphael is a 57 year old, presenting for the following health issues:  Follow Up and Diabetes        8/11/2023    10:50 AM   Additional Questions   Roomed by Jyoti       History of Present Illness       Diabetes:   She presents for follow up of diabetes.  She is checking home blood glucose a few times a month.   She checks blood glucose before meals.  Blood glucose is never over 200 and never under 70. She is aware of hypoglycemia symptoms including none.   She is concerned about other.    She is not experiencing numbness or burning in feet, excessive thirst, blurry vision, weight changes or redness, sores or blisters on feet. The patient has had a diabetic eye exam in the last 12 months. Eye exam performed on Feb 2023. Location of last eye exam [facility].        Hypothyroidism:     Since last visit, patient describes the following symptoms::  Anxiety    She eats 2-3 servings of fruits and vegetables daily.She consumes 0 sweetened beverage(s) daily.She exercises with enough effort to increase her heart rate 20 to 29 minutes per day.  She exercises with enough effort to increase her heart rate 3 or less days per week.   She is taking medications regularly.        Allergies   Allergen Reactions    Cephalexin Hives and Rash     Hives on hands, face and stomach.     Ibuprofen      sneezing    Nsaids Other (See Comments)    Shellfish Allergy Nausea and Nausea and Vomiting    Sulfamethoxazole Rash    Trimethoprim Rash        Past Medical History:   Diagnosis Date    Arthritis 7/26/2022    Gastric acidity     Gestational diabetes  mellitus     DIET CONTROL    Hx of previous reproductive problem     IVF    Hypothyroid     Motion sickness     Ovarian cyst     Post-operative nausea and vomiting 2017       Past Surgical History:   Procedure Laterality Date    APPENDECTOMY       SECTION  2014    Procedure:  SECTION;  Surgeon: Ru Cano MD;  Location:  [facility]    CHOLECYSTECTOMY      cyst removed      left  lower leg on bone sheath    DAVINCI HYSTERECTOMY TOTAL, SALPINGECTOMY BILATERAL N/A 2017    Procedure: DAVINCI HYSTERECTOMY TOTAL, SALPINGECTOMY BILATERAL;  DAVINCI SINGLE SITE HYSTERECTOMY, BILATERAL SALPINGECTOMY, LEFT OOPHORECTOMY, LYSIS OF ADHESIONS (LAPAROSCOPIC BAG TO RETREIVE OVARY);  Surgeon: Ru Cano MD;  Location:  OR    DAVINCI LYSIS OF ADHESIONS N/A 2017    Procedure: DAVINCI LYSIS OF ADHESIONS;;  Surgeon: Ru Cano MD;  Location:  OR    DAVINCI OOPHORECTOMY N/A 2017    Procedure: DAVINCI OOPHORECTOMY;;  Surgeon: Ru Cano MD;  Location:  OR    GI SURGERY      MYOMECTOMY UTERUS  2012    ZZ GASTROSCOPY,FL         Family History   Problem Relation Age of Onset    Hypertension Father     Diabetes Other         maternal aunt -2 of them    Breast Cancer No family hx of     Colon Cancer No family hx of     Coronary Artery Disease No family hx of        Social History     Tobacco Use    Smoking status: Never    Smokeless tobacco: Never   Substance Use Topics    Alcohol use: No     Alcohol/week: 0.0 standard drinks of alcohol     Comment: social        Current Outpatient Medications   Medication    ACCU-CHEK GINA PLUS test strip    atorvastatin (LIPITOR) 20 MG tablet    blood glucose monitoring (ACCU-CHEK GINA PLUS) meter device kit    blood glucose monitoring (SOFTCLIX) lancets    cyanocobalamin (VITAMIN B-12) 1000 MCG tablet    ferrous sulfate (FE TABS) 325 (65 Fe) MG EC tablet    levothyroxine (SYNTHROID/LEVOTHROID) 75 MCG tablet     lisinopril (ZESTRIL) 10 MG tablet    metFORMIN (GLUCOPHAGE XR) 500 MG 24 hr tablet    omeprazole (PRILOSEC) 40 MG DR capsule    sucralfate (CARAFATE) 1 GM/10ML suspension    vitamin D2 (ERGOCALCIFEROL) 84666 units (1250 mcg) capsule     Current Facility-Administered Medications   Medication    cyanocobalamin injection 1,000 mcg          Review of Systems   Constitutional, HEENT, cardiovascular, pulmonary, GI, , musculoskeletal, neuro, skin, endocrine and psych systems are negative, except as otherwise noted.      Objective    /70   Pulse 90   Temp 98.2  F (36.8  C) (Temporal)   Resp 15   Ht 1.524 m (5')   Wt 63 kg (138 lb 12.8 oz)   LMP 04/14/2017   SpO2 98%   BMI 27.11 kg/m    Body mass index is 27.11 kg/m .  Physical Exam   GENERAL: healthy, alert and no distress  NECK: no adenopathy, no asymmetry, masses, or scars and thyroid normal to palpation  RESP: lungs clear to auscultation - no rales, rhonchi or wheezes  CV: regular rate and rhythm, normal S1 S2, no S3 or S4, no murmur, click or rub, no peripheral edema and peripheral pulses strong  ABDOMEN: soft, nontender, no hepatosplenomegaly, no masses and bowel sounds normal  MS: no gross musculoskeletal defects noted, no edema  LEFT LOWER EXTREMITY EXAM :

## 2023-08-11 NOTE — PATIENT INSTRUCTIONS
As discussed, please do fasting LAB only appointment     Will check Ultrasound DVT for making sure no DVT in the Left lower extremity before treating this as musculoskeletal causes.     ================================

## 2023-08-16 ENCOUNTER — TELEPHONE (OUTPATIENT)
Dept: FAMILY MEDICINE | Facility: CLINIC | Age: 58
End: 2023-08-16
Payer: COMMERCIAL

## 2023-08-16 ENCOUNTER — MYC MEDICAL ADVICE (OUTPATIENT)
Dept: FAMILY MEDICINE | Facility: CLINIC | Age: 58
End: 2023-08-16
Payer: COMMERCIAL

## 2023-08-16 DIAGNOSIS — E11.65 TYPE 2 DIABETES MELLITUS WITH HYPERGLYCEMIA, WITHOUT LONG-TERM CURRENT USE OF INSULIN (H): Primary | ICD-10-CM

## 2023-08-16 NOTE — TELEPHONE ENCOUNTER
Patient Contact     Attempt # 1     Was call answered?    No  Left non-detailed message to call the clinic back at [phone].      On call back:      -Relay message from Dr. Zamorano, see below.    Amanda BUTT RN  [facility]

## 2023-08-16 NOTE — TELEPHONE ENCOUNTER
----- Message from Norah Zamorano MD sent at 8/13/2023  5:42 PM CDT -----  Good news! Your Ultrasound DVT is negative for any clot. Your leg pain is muscle ache. Please take Over the counter Ibuprofen 600 mg 3x/day with food as needed for pain.

## 2023-08-17 ENCOUNTER — PATIENT OUTREACH (OUTPATIENT)
Dept: CARE COORDINATION | Facility: CLINIC | Age: 58
End: 2023-08-17
Payer: COMMERCIAL

## 2023-08-17 NOTE — TELEPHONE ENCOUNTER
Davon Raphael,     Here are my comments about the recent results.     Good news! Your Ultrasound DVT is negative for any clot. Your leg pain is muscle ache. Please take Over the counter Ibuprofen 600 mg 3x/day with food as needed for pain.     Please let us know if you have any questions or concerns.     Regards,  Norah Zamorano MD   Written by Norah Zamorano MD on 8/13/2023  5:42 PM CDT  Seen by claudia Avalos on 8/17/2023 12:17 AM    Patient has read the results and provider comment. Will close the encounter.       Dariela Elmore RN  -[facility]

## 2023-08-18 RX ORDER — FLASH GLUCOSE SENSOR
1 KIT MISCELLANEOUS
Qty: 2 EACH | Refills: 5 | Status: SHIPPED | OUTPATIENT
Start: 2023-08-18 | End: 2023-08-24

## 2023-08-18 RX ORDER — FLASH GLUCOSE SCANNING READER
1 EACH MISCELLANEOUS ONCE
Qty: 1 EACH | Refills: 0 | Status: SHIPPED | OUTPATIENT
Start: 2023-08-18 | End: 2023-08-24

## 2023-08-20 DIAGNOSIS — E11.9 TYPE 2 DIABETES MELLITUS WITHOUT COMPLICATION, WITHOUT LONG-TERM CURRENT USE OF INSULIN (H): ICD-10-CM

## 2023-08-22 ENCOUNTER — OFFICE VISIT (OUTPATIENT)
Dept: PSYCHOLOGY | Facility: CLINIC | Age: 58
End: 2023-08-22
Payer: COMMERCIAL

## 2023-08-22 DIAGNOSIS — F43.23 ADJUSTMENT DISORDER WITH MIXED ANXIETY AND DEPRESSED MOOD: Primary | ICD-10-CM

## 2023-08-22 PROCEDURE — 90834 PSYTX W PT 45 MINUTES: CPT | Performed by: PSYCHOLOGIST

## 2023-08-22 ASSESSMENT — ANXIETY QUESTIONNAIRES
GAD7 TOTAL SCORE: 6
6. BECOMING EASILY ANNOYED OR IRRITABLE: SEVERAL DAYS
2. NOT BEING ABLE TO STOP OR CONTROL WORRYING: MORE THAN HALF THE DAYS
4. TROUBLE RELAXING: SEVERAL DAYS
3. WORRYING TOO MUCH ABOUT DIFFERENT THINGS: SEVERAL DAYS
IF YOU CHECKED OFF ANY PROBLEMS ON THIS QUESTIONNAIRE, HOW DIFFICULT HAVE THESE PROBLEMS MADE IT FOR YOU TO DO YOUR WORK, TAKE CARE OF THINGS AT HOME, OR GET ALONG WITH OTHER PEOPLE: NOT DIFFICULT AT ALL
1. FEELING NERVOUS, ANXIOUS, OR ON EDGE: SEVERAL DAYS
GAD7 TOTAL SCORE: 6
5. BEING SO RESTLESS THAT IT IS HARD TO SIT STILL: NOT AT ALL
7. FEELING AFRAID AS IF SOMETHING AWFUL MIGHT HAPPEN: NOT AT ALL

## 2023-08-22 NOTE — PROGRESS NOTES
"[facility] Counseling                                     Progress Note    Patient Name: Ijeoma Avalos  Date: 8/22/2023       Service Type: Individual      Session Start Time: 10:02  Session End Time: 10:53     Session Length: 38-52 mins    Session #: 47 (this episode of care)    Attendees: Client attended alone    Service Modality:  In person      DATA  Interactive Complexity: No  Crisis: No      Progress Since Last Session (Related to Symptoms / Goals / Homework):   Symptoms:  Stable    Homework:  Completed ; Client followed through in sending an email to her former manager; received a prompt response and noted that this has been helpful to her in processing her recent layoff        Episode of Care Goals: Satisfactory progress - ACTION (Actively working towards change); Intervened by reinforcing change plan / affirming steps taken     Current / Ongoing Stressors and Concerns:   Recently laid off from job   Family stressors     Treatment Objective(s) Addressed in This Session:   Regroup/reorganize after job layoff   increase connection in close relationships   build skills for stress management   increase self-confidence     Intervention:   Continued work on managing the impact of recent layoff. Client is moving into the \"reorganization\" phase. She had a job interview yesterday and has been thinking more about sequential next steps for her career. She has been making use of career counseling services to assist her in exploring a potential shift to a new industry. Identified her concerns/worries as well as her hopes/wishes. Proactively discussed avoiding the pitfall of staying busy with \"productive\" work that prevents her from working on what is most important to her (especially if the path forward is overwhelming or not clear). Continued work on refining Client's narrative about this event (layoff) and its impact on her life. Reviewed stress management strategies--Client continues to identify " "this as a priority. Being more intentional in the activities/opportunities  she agrees to participate in (as opposed to saying \"yes\" to everything indiscriminately) was noted as a skill to revisit.     Assessments completed prior to visit:  The following assessments were previously completed by patient:         2/21/2018     8:44 AM 9/13/2019     3:22 PM 11/12/2019     3:00 PM 4/14/2020    11:13 AM 8/26/2021     8:13 AM 10/25/2022     2:07 PM 6/28/2023     1:05 PM   PHQ-9 SCORE   PHQ-9 Total Score MyChart      6 (Mild depression) 7 (Mild depression)   PHQ-9 Total Score 1 1 1 4 3 6 7     GAD2:       10/25/2022     2:07 PM 1/24/2023     1:57 PM 8/22/2023     9:57 AM   SOSA-2   Feeling nervous, anxious, or on edge 0 0 1   Not being able to stop or control worrying 1 1 2   SOSA-2 Total Score 1 1 3     PROMIS 10-Global Health (only subscores and total score):       4/22/2022     8:06 AM 7/29/2022     8:11 PM 10/25/2022     2:08 PM 1/24/2023     1:58 PM 6/28/2023     1:05 PM 8/22/2023     9:58 AM   PROMIS-10 Scores Only   Global Mental Health Score 12 12 10 14 11 11   Global Physical Health Score 11 11 11 14 12 12   PROMIS TOTAL - SUBSCORES 23 23 21 28 23 23      ASSESSMENT: Current Emotional / Mental Status (status of significant symptoms):   Risk status (Self / Other harm or suicidal ideation)   Patient denies current fears or concerns for personal safety.   Patient denies current or recent suicidal ideation or behaviors.   Patient denies current or recent homicidal ideation or behaviors.   Patient denies current or recent self injurious behavior or ideation.   Patient denies other safety concerns.   Patient reports there has been no change in risk factors since her last session.    Patient reports there has been no change in protective factors since her last session.     Recommended that patient call 911 or go to the local ED should there be a change in any of these risk factors.     Appearance:   Neat    Eye " "Contact:   Good    Psychomotor Behavior: Appropriate    Attitude:   Engaged    Orientation:   All   Speech    Rate / Production: Normal     Volume:  Normal    Mood:    Stable, stressed but coping   Affect:    Appropriate    Thought Content:  Frequent worries   Thought Form:  Coherent  Logical    Insight:    Fair      Medication Review:   No changes to current psychiatric medication(s)     Medication Compliance:   Yes; infrequent use of lorazepam     Changes in Health Issues:   None reported     Chemical Use Review:   Substance Use: Chemical use reviewed, no active concerns identified      Tobacco Use: No current tobacco use.      Diagnosis:  1. Adjustment disorder with mixed anxiety and depressed mood    *given Client's reaction to recent job loss, Adjustment Disorder diagnosis is most appropriate    Collateral Reports Completed:   SOSA-2         PLAN: (Patient Tasks / Therapist Tasks / Other)  Client will use her scheduled career counseling sessions to explore a shift to a new industry that she feels passionate about. In the meantime, she is pursuing full-time work to help bridge the gap. She will avoid the pitfall of staying busy with \"productive\" work that prevents her from focusing on what is most important to her (especially when the path forward is overwhelming or not clear). In the interest of improving stress management, Client will be more intentional with the activities/opportunities she agrees to participate in (as opposed to saying \"yes\" to everything indiscriminately). Continue with regular walking & exercise. Continue to make space to grieve, noticing and feeling whatever emotions come up--without judgment. Return appointments scheduled. Client has my contact information and is aware that she can reach out sooner if needed.     Rosa Chowdary, LP                                                      ______________________________________________________________________    Individual Treatment " Plan    Patient's Name: Ijeoma Avalos  YOB: 1965    Date of Creation: 3/25/2022  Date Treatment Plan Last Reviewed/Revised: 6/16/2023    DSM5 Diagnoses: 300.00 (F41.9) Unspecified Anxiety Disorder  Psychosocial / Contextual Factors: family stressors, work stressors  PROMIS (reviewed every 90 days): 23    Referral / Collaboration:  Referral to another professional/service is not indicated at this time.    Anticipated number of session for this episode of care: estimated 12-16 sessions/year  Anticipation frequency of session: Monthly  Anticipated Duration of each session: 38-52 minutes  Treatment plan will be reviewed in 90 days or when goals have been changed.       MeasurableTreatment Goal(s) related to diagnosis / functional impairment(s)  Goal 1: Patient will build skills for stress management.    I will know I've met my goal when I am sleeping better and not waking up so often, when I don't feel worried so much of the time.   *updated on 7/1/22 (Client believes physical symptoms such as blood pressure and reflux are not the best way to measure this outcome).    Objective #A (Patient Action)    Patient will  use at least 2 coping skills for anxiety management in the next 6 weeks . Particularly as related to recent job layoff.  Status: Continued - Date(s): 6/16/2023      Intervention(s)  Therapist will teach  teach self-regulation strategies, CBT skills, mindfulness techniques .    Objective #B  Patient will  use cognitive strategies identified in therapy to challenge anxious thoughts .  Status: Continued - Date(s): 6/16/2023  *continue as maintenance goal    Intervention(s)  Therapist will  teach cognitive strategies, provide education .      Goal 2: Patient will increase self-confidence.    I will know I've met my goal when I think more positively about myself.   *6/16/2023 meaningful progress noted in this area.    Objective #A (Patient Action)    Status:  Maintenance - Date(s):   6/16/2023    Patient will  increase assertive communication .    Intervention(s)  Therapist will  teach assertiveness skills .    Objective #B  Patient will  Identify negative self-talk and behaviors: challenge core beliefs, myths, and actions .    Status:  Maintenance:  6/16/2023    Intervention(s)  Therapist will  provide education, teach CBT skills .      Goal 3: Patiient will increase connection in close relationships.    I will know I've met my goal when I feel more close with loved ones.  *Deferred in light of more immediate goal of recovering from recent professional layoff.    Objective #A (Patient Action)    Status: Continued - Date(s): 6/16/2023    Patient will  prioritize time for meaningful relationships .    Intervention(s)  Therapist will  provide support and accountability .    Objective #B  Patient will  make use of effective interpersonal communication skills .    Status: Continued - Date(s): 6/16/2023    Intervention(s)  Therapist will  teach communication skills .    Goal 4: Client will regroup/reorganize after job layoff.     I will know I've met my goal when I'm not still wondering 'why' and I can see what direction I'm heading professionally.      Objective #A (Client Action)    Client will  grieve losses associated with layoff and explore new employment options .  Status: New - Date: 6/16/2023      Intervention(s)  Therapist will  provide space for emotional processing; guide Client in creating an accurate narrative about the experience; provide psychoeducation around reorganization after significant loss; provide accountability and support .      Patient has reviewed and agreed to the above plan.      Rosa Chowdary LP  March 25, 2022      Answers for HPI/ROS submitted by the patient on 10/25/2022  If you checked off any problems, how difficult have these problems made it for you to do your work, take care of things at home, or get along with other people?: Not difficult at all  PHQ9 TOTAL  SCORE: 6Answers submitted by the patient for this visit:  SOSA-7 (Submitted on 8/22/2023)  SOSA 7 TOTAL SCORE: 6

## 2023-08-24 ENCOUNTER — MYC REFILL (OUTPATIENT)
Dept: FAMILY MEDICINE | Facility: CLINIC | Age: 58
End: 2023-08-24
Payer: COMMERCIAL

## 2023-08-24 DIAGNOSIS — E11.9 TYPE 2 DIABETES MELLITUS WITHOUT COMPLICATION, WITHOUT LONG-TERM CURRENT USE OF INSULIN (H): ICD-10-CM

## 2023-08-24 RX ORDER — ACYCLOVIR 400 MG/1
1 TABLET ORAL ONCE
Qty: 1 EACH | Refills: 0 | Status: SHIPPED | OUTPATIENT
Start: 2023-08-24 | End: 2023-08-24

## 2023-08-24 RX ORDER — METFORMIN HCL 500 MG
TABLET, EXTENDED RELEASE 24 HR ORAL
Qty: 360 TABLET | Refills: 0 | Status: SHIPPED | OUTPATIENT
Start: 2023-08-24 | End: 2023-11-29

## 2023-08-24 RX ORDER — ACYCLOVIR 400 MG/1
1 TABLET ORAL
Qty: 3 EACH | Refills: 5 | Status: SHIPPED | OUTPATIENT
Start: 2023-08-24

## 2023-08-24 RX ORDER — METFORMIN HCL 500 MG
1000 TABLET, EXTENDED RELEASE 24 HR ORAL 2 TIMES DAILY WITH MEALS
Qty: 360 TABLET | Refills: 0 | Status: SHIPPED | OUTPATIENT
Start: 2023-08-24 | End: 2024-04-17

## 2023-08-24 NOTE — TELEPHONE ENCOUNTER
Pt calling and states that she is on her last Metformin tonight (8/24/23). Pt is requesting a refill as soon as possible from either Dr. Hernandez or Dr. Zamorano. Pt states she will call pharmacy ([facility] [address]) tomorrow morning around 9-10am to check if Rx has been sent. Pt states she can also come into clinic to get a printed Rx if needed. Please send refill if appropriate.     Cynthia Hung,  [facility]

## 2023-08-24 NOTE — TELEPHONE ENCOUNTER
Patient needs refill of metformin today. Taking last dose this evening.     Refill dated 8/20, but was mislabeled as medication refill instead of refill request and was not sent to the correct pool.    Patient has recent office visit. Future labs are ordered and patient has scheduled lab only next week.  Ruth Yi RN

## 2023-08-25 ENCOUNTER — TELEPHONE (OUTPATIENT)
Dept: FAMILY MEDICINE | Facility: CLINIC | Age: 58
End: 2023-08-25
Payer: COMMERCIAL

## 2023-08-25 NOTE — TELEPHONE ENCOUNTER
Script refill faxed. Remind pt to do follow up for med check and labs, since she is due.  Virtual/ Video visit ok  If no appointment available soon enough

## 2023-08-30 NOTE — TELEPHONE ENCOUNTER
Central Prior Authorization Team   Phone: [phone]    PA Initiation    Medication: DEXCOM G7 SENSOR MISC  Insurance Company: [employer] - Phone [phone] Fax [phone]  Pharmacy Filling the Rx: [id number] - [place], [place] - [address]  Filling Pharmacy Phone: [phone]  Filling Pharmacy Fax:    Start Date: 8/30/2023

## 2023-09-01 DIAGNOSIS — E03.9 HYPOTHYROIDISM, UNSPECIFIED TYPE: ICD-10-CM

## 2023-09-01 NOTE — TELEPHONE ENCOUNTER
Prior Authorization Follow-up  Per Elis at [facility] -  step therapy requires a trial of insulin. I asked if there is any way of getting it covered without step therapy. Elis is faxing a form for exception to coverage.

## 2023-09-04 DIAGNOSIS — E78.5 HYPERLIPIDEMIA WITH TARGET LDL LESS THAN 100: ICD-10-CM

## 2023-09-05 RX ORDER — LEVOTHYROXINE SODIUM 75 UG/1
TABLET ORAL
Qty: 60 TABLET | Refills: 0 | OUTPATIENT
Start: 2023-09-05

## 2023-09-05 RX ORDER — ATORVASTATIN CALCIUM 20 MG/1
20 TABLET, FILM COATED ORAL DAILY
Qty: 90 TABLET | Refills: 0 | Status: SHIPPED | OUTPATIENT
Start: 2023-09-05 | End: 2023-11-29

## 2023-09-06 ENCOUNTER — MYC MEDICAL ADVICE (OUTPATIENT)
Dept: FAMILY MEDICINE | Facility: CLINIC | Age: 58
End: 2023-09-06

## 2023-09-06 ENCOUNTER — LAB (OUTPATIENT)
Dept: LAB | Facility: CLINIC | Age: 58
End: 2023-09-06
Payer: COMMERCIAL

## 2023-09-06 DIAGNOSIS — E11.65 TYPE 2 DIABETES MELLITUS WITH HYPERGLYCEMIA, WITHOUT LONG-TERM CURRENT USE OF INSULIN (H): ICD-10-CM

## 2023-09-06 DIAGNOSIS — E53.9 B-COMPLEX DEFICIENCY: ICD-10-CM

## 2023-09-06 DIAGNOSIS — E61.1 IRON DEFICIENCY: ICD-10-CM

## 2023-09-06 DIAGNOSIS — E03.9 HYPOTHYROIDISM, UNSPECIFIED TYPE: ICD-10-CM

## 2023-09-06 LAB
CHOLEST SERPL-MCNC: 126 MG/DL
CREAT UR-MCNC: 77.6 MG/DL
HBA1C MFR BLD: 7.2 % (ref 0–5.6)
HDLC SERPL-MCNC: 50 MG/DL
IRON BINDING CAPACITY (ROCHE): 368 UG/DL (ref 240–430)
IRON SATN MFR SERPL: 12 % (ref 15–46)
IRON SERPL-MCNC: 45 UG/DL (ref 37–145)
LDLC SERPL CALC-MCNC: 53 MG/DL
MICROALBUMIN UR-MCNC: 18.9 MG/L
MICROALBUMIN/CREAT UR: 24.36 MG/G CR (ref 0–25)
NONHDLC SERPL-MCNC: 76 MG/DL
TRIGL SERPL-MCNC: 114 MG/DL
TSH SERPL DL<=0.005 MIU/L-ACNC: 0.48 UIU/ML (ref 0.3–4.2)
VIT B12 SERPL-MCNC: 522 PG/ML (ref 232–1245)

## 2023-09-06 PROCEDURE — 82607 VITAMIN B-12: CPT

## 2023-09-06 PROCEDURE — 84443 ASSAY THYROID STIM HORMONE: CPT

## 2023-09-06 PROCEDURE — 83550 IRON BINDING TEST: CPT

## 2023-09-06 PROCEDURE — 83540 ASSAY OF IRON: CPT

## 2023-09-06 PROCEDURE — 83036 HEMOGLOBIN GLYCOSYLATED A1C: CPT

## 2023-09-06 PROCEDURE — 80061 LIPID PANEL: CPT

## 2023-09-06 PROCEDURE — 82570 ASSAY OF URINE CREATININE: CPT

## 2023-09-06 PROCEDURE — 82043 UR ALBUMIN QUANTITATIVE: CPT

## 2023-09-06 PROCEDURE — 36415 COLL VENOUS BLD VENIPUNCTURE: CPT

## 2023-09-06 NOTE — TELEPHONE ENCOUNTER
Will await for lab result before further dosage adjustment and refill levothyroxine accordingly.

## 2023-09-06 NOTE — TELEPHONE ENCOUNTER
Provider, please read the patient My Chart message and advise the triage staff.     Dariela Elmore RN  [facility]

## 2023-09-07 NOTE — TELEPHONE ENCOUNTER
Patient calling again for levothyroxine. She is out of levothyroxine. Refill run through protocol and passed per protocol. Ruth Yi RN

## 2023-09-08 DIAGNOSIS — E03.9 HYPOTHYROIDISM, UNSPECIFIED TYPE: ICD-10-CM

## 2023-09-08 RX ORDER — GLIPIZIDE 2.5 MG/1
2.5 TABLET, EXTENDED RELEASE ORAL DAILY
Qty: 90 TABLET | Refills: 1 | Status: SHIPPED | OUTPATIENT
Start: 2023-09-08 | End: 2024-04-17

## 2023-09-08 RX ORDER — FERROUS SULFATE 325(65) MG
325 TABLET, DELAYED RELEASE (ENTERIC COATED) ORAL DAILY
Qty: 90 TABLET | Refills: 1 | Status: SHIPPED | OUTPATIENT
Start: 2023-09-08 | End: 2023-11-08

## 2023-09-08 RX ORDER — LEVOTHYROXINE SODIUM 75 UG/1
TABLET ORAL
Qty: 60 TABLET | Refills: 3 | OUTPATIENT
Start: 2023-09-08

## 2023-09-08 NOTE — TELEPHONE ENCOUNTER
PRIOR AUTHORIZATION DENIED    Medication: DEXCOM G7 SENSOR MISC  Insurance Company: [employer] - Phone [phone] Fax [phone]  Denial Date: 9/8/2023  Denial Rational: COVERAGE REQUIRES CURRENT USE OF INSULIN       Appeal Information: IF PROVIDER WOULD LIKE TO APPEAL THIS DECISION PLEASE PROVIDE PA TEAM WITH LETTER OF MEDICAL NECESSITY      Patient Notified: No

## 2023-09-08 NOTE — TELEPHONE ENCOUNTER
Prior Authorization Follow-Up  Per Lynda at [facility] - request was received on 9/6 and currently in process. No due date as of yet.

## 2023-09-10 NOTE — TELEPHONE ENCOUNTER
Please let the pt know pt insurance denial of CGM she was wanting . OK to use regular glucometer supplies.     Thank you ,  Norah Zamorano MD on 9/9/2023

## 2023-09-14 ENCOUNTER — VIRTUAL VISIT (OUTPATIENT)
Dept: PSYCHOLOGY | Facility: CLINIC | Age: 58
End: 2023-09-14
Payer: COMMERCIAL

## 2023-09-14 DIAGNOSIS — F43.23 ADJUSTMENT DISORDER WITH MIXED ANXIETY AND DEPRESSED MOOD: Primary | ICD-10-CM

## 2023-09-14 PROCEDURE — 90834 PSYTX W PT 45 MINUTES: CPT | Mod: 95 | Performed by: PSYCHOLOGIST

## 2023-09-14 NOTE — PROGRESS NOTES
"        [facility]                                     Progress Note    Patient Name: Ijeoma Avalos  Date: 9/14/2023       Service Type: Individual      Session Start Time: 10:00  Session End Time: 10:50     Session Length: 38-52 mins    Session #: 48 (this episode of care)    Attendees: Client attended alone    Service Modality:  Telephone      Provider verified identity through the following two step process.  Patient provided:  Patient is known previously to provider    Telephone Visit: The patient's condition can be safely assessed and treated via synchronous audio telemedicine encounter.      Reason for Audio Telemedicine Visit: Patient convenience (e.g. access to timely appointments / distance to available provider)    Originating Site (Patient Location): Patient's home    Distant Site (Provider Location): Provider Remote Setting- Home Office    Consent:  The patient/guardian has verbally consented to:     1. The potential risks and benefits of telemedicine (telephone visit) versus in person care;    The patient has been notified of the following:      \"We have found that certain health care needs can be provided without the need for a face to face visit.  This service lets us provide the care you need with a phone conversation.       I will have full access to your [facility] medical record during this entire phone call.   I will be taking notes for your medical record.      Since this is like an office visit, we will bill your insurance company for this service.       There are potential benefits and risks of telephone visits (e.g. limits to patient confidentiality) that differ from in-person visits.?Confidentiality still applies for telephone services, and nobody will record the visit.  It is important to be in a quiet, private space that is free of distractions (including cell phone or other devices) during the visit.??      If during the course of the call I believe a " "telephone visit is not appropriate, you will not be charged for this service\"     Consent has been obtained for this service by care team member: Yes       DATA  Interactive Complexity: No  Crisis: No      Progress Since Last Session (Related to Symptoms / Goals / Homework):   Symptoms:  Lower self-confidence, increased anxiety    Homework:  Good progress ; Client has followed her plan of using career placement services to explore potential shift to a new industry, while concurrently seeking a job in her current industry to bridge the gap         Episode of Care Goals: Satisfactory progress - ACTION (Actively working towards change); Intervened by reinforcing change plan / affirming steps taken     Current / Ongoing Stressors and Concerns:   Recently laid off from job   Family stressors     Treatment Objective(s) Addressed in This Session:   Regroup/reorganize after job layoff   increase connection in close relationships   build skills for stress management   increase self-confidence     Intervention:   Client described lower confidence lately, increased worry about job hunting and if she will be hired for a new position. After some exploration, she realized the impact of a recent comment from a loved one, which reflected some of Client's own doubts. Also observed that this is a challenging situation for Client because many aspects of the situation are beyond her control--she cannot control which jobs are available or when or which candidate the hiring leaders will select. Client agreed that it is difficult for her to tolerate situations of high importance that are not within her control. Discussed how she might use this opportunity to practice new skills--soothing/reassuring/encouraging herself, allowing herself breaks to regroup when needed, seeking support from reliable sources--while focusing on the aspects of the situation that are within her control (her preparation for interviews, networking, etc). Client was " receptive to this idea. She has an additional goal of staying in the moment, appreciating the present, not letting the good things about today pass unnoticed because she is worried about tomorrow. Talked about how Client will practice these skills in the weeks ahead.      Assessments completed prior to visit:  The following assessments were previously completed by patient:         2/21/2018     8:44 AM 9/13/2019     3:22 PM 11/12/2019     3:00 PM 4/14/2020    11:13 AM 8/26/2021     8:13 AM 10/25/2022     2:07 PM 6/28/2023     1:05 PM   PHQ-9 SCORE   PHQ-9 Total Score MyChart      6 (Mild depression) 7 (Mild depression)   PHQ-9 Total Score 1 1 1 4 3 6 7     GAD2:       10/25/2022     2:07 PM 1/24/2023     1:57 PM 8/22/2023     9:57 AM   SOSA-2   Feeling nervous, anxious, or on edge 0 0 1   Not being able to stop or control worrying 1 1 2   SOSA-2 Total Score 1 1 3     PROMIS 10-Global Health (only subscores and total score):       4/22/2022     8:06 AM 7/29/2022     8:11 PM 10/25/2022     2:08 PM 1/24/2023     1:58 PM 6/28/2023     1:05 PM 8/22/2023     9:58 AM   PROMIS-10 Scores Only   Global Mental Health Score 12 12 10 14 11 11   Global Physical Health Score 11 11 11 14 12 12   PROMIS TOTAL - SUBSCORES 23 23 21 28 23 23      ASSESSMENT: Current Emotional / Mental Status (status of significant symptoms):   Risk status (Self / Other harm or suicidal ideation)   Patient denies current fears or concerns for personal safety.   Patient denies current or recent suicidal ideation or behaviors.   Patient denies current or recent homicidal ideation or behaviors.   Patient denies current or recent self injurious behavior or ideation.   Patient denies other safety concerns.   Patient reports there has been no change in risk factors since her last session.    Patient reports there has been no change in protective factors since her last session.     Recommended that patient call 911 or go to the local ED should there be a change  in any of these risk factors.     Appearance:   Unable to assess    Eye Contact:   Unable to assess    Psychomotor Behavior: Unable to assess    Attitude:   Open    Orientation:   All   Speech    Rate / Production: Normal     Volume:  Normal    Mood:    Anxious   Affect:    Unable to assess    Thought Content:  Self-doubt, worries   Thought Form:  Coherent  Logical    Insight:    Fair      Medication Review:   No changes to current psychiatric medication(s)     Medication Compliance:   Yes; infrequent use of lorazepam     Changes in Health Issues:   None reported     Chemical Use Review:   Substance Use: Chemical use reviewed, no active concerns identified      Tobacco Use: No current tobacco use.      Diagnosis:  1. Adjustment disorder with mixed anxiety and depressed mood    *given Client's reaction to recent job loss, Adjustment Disorder diagnosis is most appropriate    Collateral Reports Completed:   Telephone consent         PLAN: (Patient Tasks / Therapist Tasks / Other)  Client will use this opportunity of job hunting to practice new skills--soothing/reassuring/encouraging herself, allowing herself breaks to regroup when needed, seeking support from reliable sources--while focusing on the aspects of the situation that are within her control (her preparation for interviews, networking, etc). She also has a goal of staying in the moment, appreciating the present, not letting the good things about today pass unnoticed because she is worried about tomorrow. She will practice this intentionally while walking [facility] in [facility] with a friend in the coming weeks. Continue: Client will use her scheduled career counseling sessions to explore a shift to a new industry that she feels passionate about. In the meantime, she is pursuing full-time work to help bridge the gap. Continue with regular walking & exercise. Return appointments scheduled. Client has my contact information and is aware that she can reach out sooner  if needed.     Rosa Chowdary, LP                                                      ______________________________________________________________________    Individual Treatment Plan    Patient's Name: Ijeoma Avalos  YOB: 1965    Date of Creation: 3/25/2022  Date Treatment Plan Last Reviewed/Revised: 6/16/2023    DSM5 Diagnoses: 300.00 (F41.9) Unspecified Anxiety Disorder  Psychosocial / Contextual Factors: family stressors, work stressors  PROMIS (reviewed every 90 days): 23    Referral / Collaboration:  Referral to another professional/service is not indicated at this time.    Anticipated number of session for this episode of care: estimated 12-16 sessions/year  Anticipation frequency of session: Monthly  Anticipated Duration of each session: 38-52 minutes  Treatment plan will be reviewed in 90 days or when goals have been changed.       MeasurableTreatment Goal(s) related to diagnosis / functional impairment(s)  Goal 1: Patient will build skills for stress management.    I will know I've met my goal when I am sleeping better and not waking up so often, when I don't feel worried so much of the time.   *updated on 7/1/22 (Client believes physical symptoms such as blood pressure and reflux are not the best way to measure this outcome).    Objective #A (Patient Action)    Patient will  use at least 2 coping skills for anxiety management in the next 6 weeks . Particularly as related to recent job layoff.  Status: Continued - Date(s): 6/16/2023      Intervention(s)  Therapist will teach  teach self-regulation strategies, CBT skills, mindfulness techniques .    Objective #B  Patient will  use cognitive strategies identified in therapy to challenge anxious thoughts .  Status: Continued - Date(s): 6/16/2023  *continue as maintenance goal    Intervention(s)  Therapist will  teach cognitive strategies, provide education .      Goal 2: Patient will increase self-confidence.    I will know I've met my  goal when I think more positively about myself.   *6/16/2023 meaningful progress noted in this area.    Objective #A (Patient Action)    Status:  Maintenance - Date(s):  6/16/2023    Patient will  increase assertive communication .    Intervention(s)  Therapist will  teach assertiveness skills .    Objective #B  Patient will  Identify negative self-talk and behaviors: challenge core beliefs, myths, and actions .    Status:  Maintenance:  6/16/2023    Intervention(s)  Therapist will  provide education, teach CBT skills .      Goal 3: Patiient will increase connection in close relationships.    I will know I've met my goal when I feel more close with loved ones.  *Deferred in light of more immediate goal of recovering from recent professional layoff.    Objective #A (Patient Action)    Status: Continued - Date(s): 6/16/2023    Patient will  prioritize time for meaningful relationships .    Intervention(s)  Therapist will  provide support and accountability .    Objective #B  Patient will  make use of effective interpersonal communication skills .    Status: Continued - Date(s): 6/16/2023    Intervention(s)  Therapist will  teach communication skills .    Goal 4: Client will regroup/reorganize after job layoff.     I will know I've met my goal when I'm not still wondering 'why' and I can see what direction I'm heading professionally.      Objective #A (Client Action)    Client will  grieve losses associated with layoff and explore new employment options .  Status: New - Date: 6/16/2023      Intervention(s)  Therapist will  provide space for emotional processing; guide Client in creating an accurate narrative about the experience; provide psychoeducation around reorganization after significant loss; provide accountability and support .      Patient has reviewed and agreed to the above plan.      Rosa Chowdary LP  March 25, 2022      Answers for HPI/ROS submitted by the patient on 10/25/2022  If you checked off any  problems, how difficult have these problems made it for you to do your work, take care of things at home, or get along with other people?: Not difficult at all  PHQ9 TOTAL SCORE: 6Answers submitted by the patient for this visit:  SOSA-7 (Submitted on 8/22/2023)  SOSA 7 TOTAL SCORE: 6

## 2023-09-17 DIAGNOSIS — E11.9 NEW ONSET TYPE 2 DIABETES MELLITUS (H): ICD-10-CM

## 2023-09-18 DIAGNOSIS — E11.9 NEW ONSET TYPE 2 DIABETES MELLITUS (H): ICD-10-CM

## 2023-09-18 RX ORDER — BLOOD SUGAR DIAGNOSTIC
STRIP MISCELLANEOUS
Refills: 0 | OUTPATIENT
Start: 2023-09-18

## 2023-09-18 RX ORDER — BLOOD SUGAR DIAGNOSTIC
STRIP MISCELLANEOUS
Qty: 200 STRIP | Refills: 0 | Status: SHIPPED | OUTPATIENT
Start: 2023-09-18 | End: 2024-08-26

## 2023-09-19 RX ORDER — LANCETS
EACH MISCELLANEOUS
Qty: 100 EACH | Refills: 0 | Status: SHIPPED | OUTPATIENT
Start: 2023-09-19 | End: 2024-08-28

## 2023-09-19 NOTE — TELEPHONE ENCOUNTER
Prescription approved per [facility] Refill Protocol.  Val Katz, RN  [facility] Triage Nurse

## 2023-09-19 NOTE — TELEPHONE ENCOUNTER
[employer] message has been read by pt on 9/17/2023 at 2:06 PM.     Cynthia Hung,  [facility]

## 2023-10-15 DIAGNOSIS — I10 ESSENTIAL HYPERTENSION: ICD-10-CM

## 2023-10-15 DIAGNOSIS — Z00.00 ROUTINE GENERAL MEDICAL EXAMINATION AT A HEALTH CARE FACILITY: ICD-10-CM

## 2023-10-17 ENCOUNTER — ANCILLARY PROCEDURE (OUTPATIENT)
Dept: MAMMOGRAPHY | Facility: CLINIC | Age: 58
End: 2023-10-17
Attending: INTERNAL MEDICINE
Payer: COMMERCIAL

## 2023-10-17 ENCOUNTER — OFFICE VISIT (OUTPATIENT)
Dept: PSYCHOLOGY | Facility: CLINIC | Age: 58
End: 2023-10-17
Payer: COMMERCIAL

## 2023-10-17 DIAGNOSIS — F43.23 ADJUSTMENT DISORDER WITH MIXED ANXIETY AND DEPRESSED MOOD: Primary | ICD-10-CM

## 2023-10-17 DIAGNOSIS — Z12.31 VISIT FOR SCREENING MAMMOGRAM: ICD-10-CM

## 2023-10-17 PROCEDURE — 77063 BREAST TOMOSYNTHESIS BI: CPT | Mod: TC | Performed by: STUDENT IN AN ORGANIZED HEALTH CARE EDUCATION/TRAINING PROGRAM

## 2023-10-17 PROCEDURE — 77067 SCR MAMMO BI INCL CAD: CPT | Mod: TC | Performed by: STUDENT IN AN ORGANIZED HEALTH CARE EDUCATION/TRAINING PROGRAM

## 2023-10-17 PROCEDURE — 90834 PSYTX W PT 45 MINUTES: CPT | Performed by: PSYCHOLOGIST

## 2023-10-17 RX ORDER — LISINOPRIL 10 MG/1
10 TABLET ORAL DAILY
Qty: 90 TABLET | Refills: 0 | Status: SHIPPED | OUTPATIENT
Start: 2023-10-17 | End: 2023-11-29

## 2023-10-17 NOTE — PROGRESS NOTES
"[facility]                                     Progress Note    Patient Name: Ijeoma Avalos  Date: 10/17/2023       Service Type: Individual      Session Start Time: 09:01  Session End Time: 09:51     Session Length: 38-52 mins    Session #: 49 (this episode of care)    Attendees: Client attended alone    Service Modality:  In person        DATA  Interactive Complexity: No  Crisis: No      Progress Since Last Session (Related to Symptoms / Goals / Homework):   Symptoms:  Stable    Homework:  Good progress ; Client did well with staying in the moment, appreciating the \"now\" while walking [address] [employer] in [place] [facility]       Episode of Care Goals: Satisfactory progress - ACTION (Actively working towards change); Intervened by reinforcing change plan / affirming steps taken     Current / Ongoing Stressors and Concerns:   Recently laid off from job   Family stressors     Treatment Objective(s) Addressed in This Session:   Regroup/reorganize after job layoff   increase connection in close relationships   build skills for stress management   increase self-confidence     Intervention:   Processed Client's recent trip to [facility] to walk [address] [employer] for a week.  She shared highlights and observations and the perspective change she experienced during the week of physical activity & being outside. Since returning, her focus has been on her career shift/job search. She endorses a primary emotion of anxiety and recognizes that this is leading to avoidance. There are actions she knows she needs to take but she is finding many other (less important) tasks to do instead. Acknowledged that avoidance is a common human reaction to anxiety, particularly when the path or next steps are not clear. Took some time in session to pause, \"look at\" the anxiety, and articulate her real fears (from what if this job I'm hoping for doesn't exist or I'm not good enough to what if my inquiries annoy other people or what if I ask for " someone says no). Having voiced these specific worries, Client was able to determine how she might take action. Used strategy for increasing structure (write small goals with deadlines on a calendar) as well as accountability (Client will bring calendar to next visit) to support her in taking next steps.    Assessments completed prior to visit:  The following assessments were previously completed by patient:         2/21/2018     8:44 AM 9/13/2019     3:22 PM 11/12/2019     3:00 PM 4/14/2020    11:13 AM 8/26/2021     8:13 AM 10/25/2022     2:07 PM 6/28/2023     1:05 PM   PHQ-9 SCORE   PHQ-9 Total Score MyChart      6 (Mild depression) 7 (Mild depression)   PHQ-9 Total Score 1 1 1 4 3 6 7     GAD2:       10/25/2022     2:07 PM 1/24/2023     1:57 PM 8/22/2023     9:57 AM   SOSA-2   Feeling nervous, anxious, or on edge 0 0 1   Not being able to stop or control worrying 1 1 2   SOSA-2 Total Score 1 1 3     PROMIS 10-Global Health (only subscores and total score):       4/22/2022     8:06 AM 7/29/2022     8:11 PM 10/25/2022     2:08 PM 1/24/2023     1:58 PM 6/28/2023     1:05 PM 8/22/2023     9:58 AM   PROMIS-10 Scores Only   Global Mental Health Score 12 12 10 14 11 11   Global Physical Health Score 11 11 11 14 12 12   PROMIS TOTAL - SUBSCORES 23 23 21 28 23 23      ASSESSMENT: Current Emotional / Mental Status (status of significant symptoms):   Risk status (Self / Other harm or suicidal ideation)   Patient denies current fears or concerns for personal safety.   Patient denies current or recent suicidal ideation or behaviors.   Patient denies current or recent homicidal ideation or behaviors.   Patient denies current or recent self injurious behavior or ideation.   Patient denies other safety concerns.   Patient reports there has been no change in risk factors since her last session.    Patient reports there has been no change in protective factors since her last session.     Recommended that patient call 911 or go to  "the local ED should there be a change in any of these risk factors.     Appearance:   Casual    Eye Contact:   Good    Psychomotor Behavior: Appropriate    Attitude:   Cooperative    Orientation:   All   Speech    Rate / Production: Normal/ Responsive    Volume:  Normal    Mood:    Anxious   Affect:    Congruent    Thought Content:  Some mind-reading, frequent \"what if\" worries   Thought Form:  Coherent  Logical    Insight:    Fair      Medication Review:   No changes to current psychiatric medication(s)     Medication Compliance:   Yes; infrequent use of lorazepam     Changes in Health Issues:   None reported, coping with knee pain (exacerbated on [address] [employer])     Chemical Use Review:   Substance Use: Chemical use reviewed, no active concerns identified      Tobacco Use: No current tobacco use.      Diagnosis:  1. Adjustment disorder with mixed anxiety and depressed mood    *given Client's reaction to recent job loss, Adjustment Disorder diagnosis is most appropriate    Collateral Reports Completed:   Not applicable         PLAN: (Patient Tasks / Therapist Tasks / Other)  Client will work on action instead of avoidance to address anxiety related to job search. She will use strategies to increase structure (write small goals with deadlines on a calendar) as well as accountability (Client will bring calendar to next visit). She will continue writing as a way to process her recent experience on [employer]. Continue: Client will use this opportunity of job hunting to practice new skills--soothing/reassuring/encouraging herself, allowing herself breaks to regroup when needed, seeking support from reliable sources--while focusing on the aspects of the situation that are within her control (her preparation for interviews, networking, etc). She also has a goal of staying in the moment, appreciating the present, not letting the good things about today pass unnoticed because she is worried about tomorrow. Return appointments " scheduled. Client has my contact information and is aware that she can reach out sooner if needed.     Rosa Chowdary, LP                                                      ______________________________________________________________________    Individual Treatment Plan    Patient's Name: Ijeoma Avalos  YOB: 1965    Date of Creation: 3/25/2022  Date Treatment Plan Last Reviewed/Revised: 10/17/2023    DSM5 Diagnoses: 300.00 (F41.9) Unspecified Anxiety Disorder  Psychosocial / Contextual Factors: family stressors, work stressors  PROMIS (reviewed every 90 days): 23    Referral / Collaboration:  Referral to another professional/service is not indicated at this time.    Anticipated number of session for this episode of care: estimated 12-16 sessions/year  Anticipation frequency of session: Monthly  Anticipated Duration of each session: 38-52 minutes  Treatment plan will be reviewed in 90 days or when goals have been changed.       MeasurableTreatment Goal(s) related to diagnosis / functional impairment(s)  Goal 1: Patient will build skills for stress management.    I will know I've met my goal when I am sleeping better and not waking up so often, when I don't feel worried so much of the time.   *updated on 7/1/22 (Client believes physical symptoms such as blood pressure and reflux are not the best way to measure this outcome).    Objective #A (Patient Action)    Patient will  use at least 2 coping skills for anxiety management in the next 6 weeks . Particularly as related to recent job layoff.  Status: Continued - Date(s): 10/17/2023      Intervention(s)  Therapist will teach  teach self-regulation strategies, CBT skills, mindfulness techniques .    Objective #B  Patient will  use cognitive strategies identified in therapy to challenge anxious thoughts .  Status: Continued - Date(s): 10/17/2023  *continue as maintenance goal    Intervention(s)  Therapist will  teach cognitive strategies, provide  education .      Goal 2: Patient will increase self-confidence.    I will know I've met my goal when I think more positively about myself.   *6/16/2023 meaningful progress noted in this area.    Objective #A (Patient Action)    Status:  Maintenance - Date(s):  10/17/2023    Patient will  increase assertive communication .    Intervention(s)  Therapist will  teach assertiveness skills .    Objective #B  Patient will  Identify negative self-talk and behaviors: challenge core beliefs, myths, and actions .    Status:  Maintenance:  10/17/2023    Intervention(s)  Therapist will  provide education, teach CBT skills .      Goal 3: Patiient will increase connection in close relationships.    I will know I've met my goal when I feel more close with loved ones.  *10/17/23 incremental progress noted    Objective #A (Patient Action)    Status: Continued - Date(s): 10/17/2023    Patient will  prioritize time for meaningful relationships .    Intervention(s)  Therapist will  provide support and accountability .    Objective #B  Patient will  make use of effective interpersonal communication skills .    Status: Continued - Date(s): 10/17/2023    Intervention(s)  Therapist will  teach communication skills .    Goal 4: Client will regroup/reorganize after job layoff.     I will know I've met my goal when I'm not still wondering 'why' and I can see what direction I'm heading professionally.    *10/17/23 this remains primary goal    Objective #A (Client Action)    Client will  grieve losses associated with layoff and explore new employment options.  Status: Continued - Date(s): 10/17/2023     Intervention(s)  Therapist will  provide space for emotional processing; guide Client in creating an accurate narrative about the experience; provide psychoeducation around reorganization after significant loss; provide accountability and support .      Patient has reviewed and agreed to the above plan.      Rosa Chowdary, KATHARINE  March 25,  2022      Answers for HPI/ROS submitted by the patient on 10/25/2022  If you checked off any problems, how difficult have these problems made it for you to do your work, take care of things at home, or get along with other people?: Not difficult at all  PHQ9 TOTAL SCORE: 6Answers submitted by the patient for this visit:  SOSA-7 (Submitted on 8/22/2023)  SOSA 7 TOTAL SCORE: 6

## 2023-10-17 NOTE — TELEPHONE ENCOUNTER
Refill done. Further refills after Annual physical and labs due at this time    Please call the patient and schedule Annual physical with me, which patient is due on or after 12/11/23 , to further take care of the medical conditions and medications.OK to use same day slot / double book near another virtual slot.     Thank you,  Norah Zamorano MD on 10/17/2023 at 11:25 AM

## 2023-10-25 NOTE — TELEPHONE ENCOUNTER
Pt has read [employer] message- no appointment had been made.     Asia Steinberg RN on 10/25/2023 at 11:52 AM

## 2023-11-08 ENCOUNTER — NURSE TRIAGE (OUTPATIENT)
Dept: FAMILY MEDICINE | Facility: OTHER | Age: 58
End: 2023-11-08

## 2023-11-08 ENCOUNTER — OFFICE VISIT (OUTPATIENT)
Dept: FAMILY MEDICINE | Facility: CLINIC | Age: 58
End: 2023-11-08
Payer: COMMERCIAL

## 2023-11-08 VITALS
OXYGEN SATURATION: 97 % | TEMPERATURE: 97.8 F | BODY MASS INDEX: 27.47 KG/M2 | HEART RATE: 87 BPM | RESPIRATION RATE: 16 BRPM | WEIGHT: 139.9 LBS | SYSTOLIC BLOOD PRESSURE: 139 MMHG | HEIGHT: 60 IN | DIASTOLIC BLOOD PRESSURE: 87 MMHG

## 2023-11-08 DIAGNOSIS — R10.13 ABDOMINAL PAIN, EPIGASTRIC: Primary | ICD-10-CM

## 2023-11-08 LAB
ALBUMIN SERPL BCG-MCNC: 4.4 G/DL (ref 3.5–5.2)
ALP SERPL-CCNC: 71 U/L (ref 35–104)
ALT SERPL W P-5'-P-CCNC: 17 U/L (ref 0–50)
ANION GAP SERPL CALCULATED.3IONS-SCNC: 11 MMOL/L (ref 7–15)
AST SERPL W P-5'-P-CCNC: 19 U/L (ref 0–45)
BILIRUB SERPL-MCNC: 0.6 MG/DL
BUN SERPL-MCNC: 9.6 MG/DL (ref 6–20)
CALCIUM SERPL-MCNC: 9.1 MG/DL (ref 8.6–10)
CHLORIDE SERPL-SCNC: 103 MMOL/L (ref 98–107)
CREAT SERPL-MCNC: 0.65 MG/DL (ref 0.51–0.95)
DEPRECATED HCO3 PLAS-SCNC: 26 MMOL/L (ref 22–29)
EGFRCR SERPLBLD CKD-EPI 2021: >90 ML/MIN/1.73M2
ERYTHROCYTE [DISTWIDTH] IN BLOOD BY AUTOMATED COUNT: 14.6 % (ref 10–15)
GLUCOSE SERPL-MCNC: 157 MG/DL (ref 70–99)
HCT VFR BLD AUTO: 37.4 % (ref 35–47)
HGB BLD-MCNC: 11.7 G/DL (ref 11.7–15.7)
LIPASE SERPL-CCNC: 21 U/L (ref 13–60)
MCH RBC QN AUTO: 25.4 PG (ref 26.5–33)
MCHC RBC AUTO-ENTMCNC: 31.3 G/DL (ref 31.5–36.5)
MCV RBC AUTO: 81 FL (ref 78–100)
PLATELET # BLD AUTO: 344 10E3/UL (ref 150–450)
POTASSIUM SERPL-SCNC: 4.1 MMOL/L (ref 3.4–5.3)
PROT SERPL-MCNC: 7.2 G/DL (ref 6.4–8.3)
RBC # BLD AUTO: 4.61 10E6/UL (ref 3.8–5.2)
SODIUM SERPL-SCNC: 140 MMOL/L (ref 135–145)
WBC # BLD AUTO: 8.6 10E3/UL (ref 4–11)

## 2023-11-08 PROCEDURE — 83690 ASSAY OF LIPASE: CPT | Performed by: PHYSICIAN ASSISTANT

## 2023-11-08 PROCEDURE — 85027 COMPLETE CBC AUTOMATED: CPT | Performed by: PHYSICIAN ASSISTANT

## 2023-11-08 PROCEDURE — 99213 OFFICE O/P EST LOW 20 MIN: CPT | Mod: 25 | Performed by: PHYSICIAN ASSISTANT

## 2023-11-08 PROCEDURE — 80053 COMPREHEN METABOLIC PANEL: CPT | Performed by: PHYSICIAN ASSISTANT

## 2023-11-08 PROCEDURE — 96372 THER/PROPH/DIAG INJ SC/IM: CPT | Performed by: PHYSICIAN ASSISTANT

## 2023-11-08 PROCEDURE — 36415 COLL VENOUS BLD VENIPUNCTURE: CPT | Performed by: PHYSICIAN ASSISTANT

## 2023-11-08 RX ORDER — ONDANSETRON 4 MG/1
4 TABLET, ORALLY DISINTEGRATING ORAL EVERY 8 HOURS PRN
Qty: 15 TABLET | Refills: 0 | Status: SHIPPED | OUTPATIENT
Start: 2023-11-08

## 2023-11-08 RX ORDER — FERROUS SULFATE 324(65)MG
324 TABLET, DELAYED RELEASE (ENTERIC COATED) ORAL DAILY
COMMUNITY

## 2023-11-08 RX ORDER — CYANOCOBALAMIN 1000 UG/ML
1000 INJECTION, SOLUTION INTRAMUSCULAR; SUBCUTANEOUS
Status: COMPLETED | OUTPATIENT
Start: 2023-11-08 | End: 2023-11-08

## 2023-11-08 RX ORDER — SUCRALFATE ORAL 1 G/10ML
1 SUSPENSION ORAL 4 TIMES DAILY
Qty: 420 ML | Refills: 0 | Status: SHIPPED | OUTPATIENT
Start: 2023-11-08 | End: 2024-01-16

## 2023-11-08 RX ADMIN — CYANOCOBALAMIN 1000 MCG: 1000 INJECTION, SOLUTION INTRAMUSCULAR; SUBCUTANEOUS at 14:52

## 2023-11-08 ASSESSMENT — PAIN SCALES - GENERAL: PAINLEVEL: NO PAIN (0)

## 2023-11-08 NOTE — RESULT ENCOUNTER NOTE
Davon Raphael,    Here are your recent complete blood count results which are essentially unchanged from previous.     Please let us know if you have any questions or concerns.    Regards,  Amanda Holder PA-C

## 2023-11-08 NOTE — PROGRESS NOTES
Assessment and Plan:     (R10.13) Abdominal pain, epigastric  (primary encounter diagnosis)  Comment: onset 5 days ago, waxes and wanes, some associated nausea, no vomiting, feels bloated/gassy, no bloody/black stools, not a drinker, history of GERD, abdomen benign, sounds like GERD but other etiologies to consider choledocholithiasis, pancreatitis, DM gastroparesis  Plan: CBC with platelets, Comprehensive metabolic         panel (BMP + Alb, Alk Phos, ALT, AST, Total.         Bili, TP), Lipase, sucralfate (CARAFATE) 1         GM/10ML suspension, ondansetron (ZOFRAN ODT) 4         MG ODT tab  Increase prilosec to bid x 2 weeks  Eddy diet  Zofran as needed   Follow-up with pcp in 4-6 weeks, sooner if symptoms don't improve or worsen  Discussed when to be seen promptly     Amanda Holder PA-C      Андрей Raphael is a 57 year old, presenting for the following health issues:  Abdominal Pain      History of Present Illness       Reason for visit:  Stomach pain and nausea  Symptom onset:  3-7 days ago    She eats 2-3 servings of fruits and vegetables daily.She consumes 0 sweetened beverage(s) daily.She exercises with enough effort to increase her heart rate 10 to 19 minutes per day.  She exercises with enough effort to increase her heart rate 5 days per week.   She is taking medications regularly.     Ijeoma is here for abdominal pain  It started 5 days ago  It is upper abdomen  The pain comes and goes  It is associated with nausea, no vomiting  She has been using home remedies which have helped  Food can make it worse at times  She has noticed more belching/gassiness the last few days as well  She denies acidic taste in mouth  She hasn't noticed an increase with lying flat  She drinks etoh only rarely   She denies black/bloody stool, hematemesis   She denies sob, chest pain, diarrhea, constipation, diet changes   She has history of cholecystectomy, hysterectomy and appendectomy     She also notes she is  overdue for a B12 shot and would like one today    Review of Systems   See above      Objective    LMP 04/14/2017   There is no height or weight on file to calculate BMI.    Physical Exam     GENERAL: healthy, alert and no distress  RESP: lungs clear to auscultation - no rales, no rhonchi, no wheezes  CV: regular rates and rhythm, normal S1 S2, no S3 or S4 and no murmur, no click or rub   ABD: soft, NT, +BS, no masses   MS: extremities- no gross deformities noted, no edema

## 2023-11-08 NOTE — TELEPHONE ENCOUNTER
Spoke to patient and she was scheduled with same  day provider today.       Dariela Elmore RN  [facility]

## 2023-11-08 NOTE — TELEPHONE ENCOUNTER
Nurse Triage SBAR    Is this a 2nd Level Triage? YES, LICENSED PRACTITIONER REVIEW IS REQUIRED    Situation: Abdominal pain x 4-5 days    Background: History of GERD    Assessment: Abdominal pain started Friday night.   Pain is located mid abdomen. Does not radiate.  Rating as mild-moderate, dull.  Pain is intermittent. Worse after eating. Reports nausea, no vomiting. No changes in stools.   Stomach feels bloated.  Using Tums at home and helps for a short period of time.   Decreased appetite but is taking in adequate amount of water.    Protocol Recommended Disposition:   See in Office Today    Recommendation: Instructed patient to be seen today. She would like work in request sent to clinic. If unable to be seen she agreed to urgent care.     Routing to triage team to assist in possible work in appointment    Does the patient meet one of the following criteria for ADS visit consideration? 16+ years old, no PCP (internal or external) but seen at [facility]     TIP  Providers, please consider if this condition is appropriate for management at one of our Acute and Diagnostic Services sites.     If patient is a good candidate, please use dotphrase <dot>triageresponse and select Refer to ADS to document.    Elvi JUNE, RN  [facility]      Reason for Disposition   MODERATE pain (e.g., interferes with normal activities that comes and goes (cramps) lasts > 24 hours  (Exception: Pain with Vomiting or Diarrhea - see that Protocol.)    Additional Information   Negative: Passed out (i.e., fainted, collapsed and was not responding)   Negative: Shock suspected (e.g., cold/pale/clammy skin, too weak to stand, low BP, rapid pulse)   Negative: Sounds like a life-threatening emergency to the triager   Negative: Followed an abdomen (stomach) injury   Negative: Chest pain   Negative: Abdominal pain and pregnant < 20 weeks   Negative: Abdominal pain and pregnant 20 or more weeks   Negative:  Pain is mainly in upper abdomen (if needed ask: 'is it mainly above the belly button?')   Negative: Abdomen bloating or swelling are main symptoms   Negative: SEVERE abdominal pain (e.g., excruciating)   Negative: Vomiting red blood or black (coffee ground) material   Negative: Blood in bowel movements  (Exception: Blood on surface of BM with constipation.)   Negative: Black or tarry bowel movements  (Exception: Chronic-unchanged black-grey BMs AND is taking iron pills or Pepto-Bismol.)   Negative: MILD TO MODERATE constant pain lasting > 2 hours   Negative: Vomiting bile (green color)   Negative: Patient sounds very sick or weak to the triager   Negative: Vomiting and abdomen looks much more swollen than usual   Negative: White of the eyes have turned yellow (i.e., jaundice)   Negative: Blood in urine (red, pink, or tea-colored)   Negative: Fever > 103 F (39.4 C)   Negative: Fever > 101 F (38.3 C) and over 60 years of age   Negative: Fever > 100.0 F (37.8 C) and has diabetes mellitus or a weak immune system (e.g., HIV positive, cancer chemotherapy, organ transplant, splenectomy, chronic steroids)   Negative: Fever > 100.0 F (37.8 C) and bedridden (e.g., CVA, chronic illness, recovering from surgery)   Negative: Pregnant or could be pregnant (i.e., missed last menstrual period)    Protocols used: Abdominal Pain - Female-A-OH     Female

## 2023-11-09 ENCOUNTER — TELEPHONE (OUTPATIENT)
Dept: FAMILY MEDICINE | Facility: CLINIC | Age: 58
End: 2023-11-09
Payer: COMMERCIAL

## 2023-11-09 NOTE — TELEPHONE ENCOUNTER
Reason for Call:  Appointment Request    Pt called and said F/U appt should be scheduled in 2 weeks.     Patient requesting this type of appt:    F/U for ongoing stomach pain and nausea.    Requested provider: Dr. Zamorano    Reason patient unable to be scheduled:   Not with their preferred provider    Unable to find any appts, okay to use same day slots    When does patient want to be seen/preferred time:   2 week F/U to appt @ [facility] 11/8/23.     Error in previous message (NOT 6 week F/U)    Comments: Anytime after Thanksgiving is okay.  VV is okay.   [place]16 [place]    Could we send this information to you in [facility] [place] or would you prefer to receive a phone call?:   Patient would prefer a phone call     Okay to leave a detailed message?: Yes at Cell number on file:    Telephone Information:   Mobile [phone]     Call taken on 11/9/2023 at 1:33 PM by Emilia Garsia

## 2023-11-09 NOTE — TELEPHONE ENCOUNTER
Pt saw Amanda Holder at [facility] yeterday (11/8/23) who advises to follow-up with Dr. Zamorano in about 6 weeks. Called pt to assist with scheduling. No answer, left voice message for pt to return call.    Cynthia Hung,  [facility]

## 2023-11-09 NOTE — TELEPHONE ENCOUNTER
Reason for Call:  Appointment Request    Patient requesting this type of appt: Chronic Diease Management/Medication/Follow-Up    Requested provider: Dr Zamorano    Reason patient unable to be scheduled: Not within requested timeframe    When does patient want to be seen/preferred time:  Within 2 weeks per Dr Zamorano    Comments: above    Could we send this information to you in [employer] or would you prefer to receive a phone call?:   No preference   Okay to leave a detailed message?: Yes at Cell number on file:    Telephone Information:   Mobile [phone]       Call taken on 11/9/2023 at 12:02 PM by Felecia Clark

## 2023-11-10 NOTE — TELEPHONE ENCOUNTER
Called pt to assist with scheduling. VV scheduled for Tuesday, 12/5/23 at 9:25am (check-in) to see Dr. Zamorano.    Cynthia Hung,  [facility]

## 2023-11-10 NOTE — TELEPHONE ENCOUNTER
Provider Response to 2nd Level Triage Request    I have reviewed the RN documentation. My recommendation is:  Virtual Visit Same Day: work patient in on my schedule as an overbook. After thanks giving as requested

## 2023-11-10 NOTE — RESULT ENCOUNTER NOTE
Hello -    Here are my comments about the recent results: your comprehensive metabolic panel (liver function and enzymes, electrolytes, kidney function) was normal aside from elevated blood sugar.  Your lipase (pancreatic enzyme) was normal.     Please let us know if you have any questions or concerns.    Regards,  Amanda Holder PA-C

## 2023-11-14 ENCOUNTER — OFFICE VISIT (OUTPATIENT)
Dept: PSYCHOLOGY | Facility: CLINIC | Age: 58
End: 2023-11-14
Payer: COMMERCIAL

## 2023-11-14 DIAGNOSIS — F43.23 ADJUSTMENT DISORDER WITH MIXED ANXIETY AND DEPRESSED MOOD: Primary | ICD-10-CM

## 2023-11-14 PROCEDURE — 90834 PSYTX W PT 45 MINUTES: CPT | Performed by: PSYCHOLOGIST

## 2023-11-14 NOTE — PROGRESS NOTES
"[facility] Counsel[facility]                                     Progress Note    Patient Name: Ijeoma Avalos  Date: 11/14/2023       Service Type: Individual      Session Start Time: 10:02  Session End Time: 10:47     Session Length: 38-52 mins    Session #: 50 (this episode of care)    Attendees: Client attended alone    Service Modality:  In person        DATA  Interactive Complexity: No  Crisis: No      Progress Since Last Session (Related to Symptoms / Goals / Homework):   Symptoms:  Slight improvement    Homework:  Mixed progress ; the calendar intervention to increase structure around career development was not particularly effective, but Client has nonetheless been making progress (informally) in defining next steps       Episode of Care Goals: Satisfactory progress - ACTION (Actively working towards change); Intervened by reinforcing change plan / affirming steps taken     Current / Ongoing Stressors and Concerns:   Recently laid off from job   Family stressors     Treatment Objective(s) Addressed in This Session:   Regroup/reorganize after job layoff   increase connection in close relationships   build skills for stress management   increase self-confidence     Intervention:   Client acknowledged some continued avoidance of tasks related to job search (such as meeting with career counselor).  Upon further discussion, however, it became clear that Client has been having conversations with people in her network, reading, and thinking about next steps. She is much better able to define what characteristics she would accept in a \"bridge\" position and what she is ultimately looking for. She has had multiple discussions with her  about what sorts of arrangements might work for the family. Offered reinforcement for this legitimate progress. Talked about the emotional aspects of being in a transition phase & normalized some anticipatory apprehension about taking definitive steps. "     Assessments completed prior to visit:  The following assessments were previously completed by patient:         2/21/2018     8:44 AM 9/13/2019     3:22 PM 11/12/2019     3:00 PM 4/14/2020    11:13 AM 8/26/2021     8:13 AM 10/25/2022     2:07 PM 6/28/2023     1:05 PM   PHQ-9 SCORE   PHQ-9 Total Score MyChart      6 (Mild depression) 7 (Mild depression)   PHQ-9 Total Score 1 1 1 4 3 6 7     GAD2:       10/25/2022     2:07 PM 1/24/2023     1:57 PM 8/22/2023     9:57 AM   SOSA-2   Feeling nervous, anxious, or on edge 0 0 1   Not being able to stop or control worrying 1 1 2   SOSA-2 Total Score 1 1 3     PROMIS 10-Global Health (only subscores and total score):       4/22/2022     8:06 AM 7/29/2022     8:11 PM 10/25/2022     2:08 PM 1/24/2023     1:58 PM 6/28/2023     1:05 PM 8/22/2023     9:58 AM   PROMIS-10 Scores Only   Global Mental Health Score 12 12 10 14 11 11   Global Physical Health Score 11 11 11 14 12 12   PROMIS TOTAL - SUBSCORES 23 23 21 28 23 23      ASSESSMENT: Current Emotional / Mental Status (status of significant symptoms):   Risk status (Self / Other harm or suicidal ideation)   Patient denies current fears or concerns for personal safety.   Patient denies current or recent suicidal ideation or behaviors.   Patient denies current or recent homicidal ideation or behaviors.   Patient denies current or recent self injurious behavior or ideation.   Patient denies other safety concerns.   Patient reports there has been no change in risk factors since her last session.    Patient reports there has been no change in protective factors since her last session.     Recommended that patient call 911 or go to the local ED should there be a change in any of these risk factors.     Appearance:   Appropriate    Eye Contact:   Good    Psychomotor Behavior: Normal    Attitude:   Engaged    Orientation:   All   Speech    Rate / Production: Normal     Volume:  Normal    Mood:    Stable   Affect:    Appropriate  "   Thought Content:  Upon reflection, able to acknowledge progress that she did not initially recognize   Thought Form:  Coherent  Logical    Insight:    Fair      Medication Review:   No changes to current psychiatric medication(s)     Medication Compliance:   Yes; infrequent use of lorazepam     Changes in Health Issues:   None reported     Chemical Use Review:   Substance Use: Chemical use reviewed, no active concerns identified      Tobacco Use: No current tobacco use.      Diagnosis:  1. Adjustment disorder with mixed anxiety and depressed mood        Collateral Reports Completed:   Screening questionnaires assigned but not completed; will assign again for next session         PLAN: (Patient Tasks / Therapist Tasks / Other)  As Client continues her job search, she has defined what she will accept in a \"bridge\" position and what she will not. Concurrently, she will continue networking and reading about next career steps that she would like to take, looking for opportunities that maximize skills she already has (such as writing & synthesizing complex information). Return appointments scheduled. Client has my contact information and is aware that she can reach out sooner if needed.     Rosa Chowdary, KATHARINE                                                      ______________________________________________________________________    Individual Treatment Plan    Patient's Name: Ijeoma Avalos  YOB: 1965    Date of Creation: 3/25/2022  Date Treatment Plan Last Reviewed/Revised: 10/17/2023    DSM5 Diagnoses: 300.00 (F41.9) Unspecified Anxiety Disorder  Psychosocial / Contextual Factors: family stressors, work stressors  PROMIS (reviewed every 90 days): 23    Referral / Collaboration:  Referral to another professional/service is not indicated at this time.    Anticipated number of session for this episode of care: estimated 12-16 sessions/year  Anticipation frequency of session: Monthly  Anticipated " Duration of each session: 38-52 minutes  Treatment plan will be reviewed in 90 days or when goals have been changed.       MeasurableTreatment Goal(s) related to diagnosis / functional impairment(s)  Goal 1: Patient will build skills for stress management.    I will know I've met my goal when I am sleeping better and not waking up so often, when I don't feel worried so much of the time.   *updated on 7/1/22 (Client believes physical symptoms such as blood pressure and reflux are not the best way to measure this outcome).    Objective #A (Patient Action)    Patient will  use at least 2 coping skills for anxiety management in the next 6 weeks . Particularly as related to recent job layoff.  Status: Continued - Date(s): 10/17/2023      Intervention(s)  Therapist will teach  teach self-regulation strategies, CBT skills, mindfulness techniques .    Objective #B  Patient will  use cognitive strategies identified in therapy to challenge anxious thoughts .  Status: Continued - Date(s): 10/17/2023  *continue as maintenance goal    Intervention(s)  Therapist will  teach cognitive strategies, provide education .      Goal 2: Patient will increase self-confidence.    I will know I've met my goal when I think more positively about myself.   *6/16/2023 meaningful progress noted in this area.    Objective #A (Patient Action)    Status:  Maintenance - Date(s):  10/17/2023    Patient will  increase assertive communication .    Intervention(s)  Therapist will  teach assertiveness skills .    Objective #B  Patient will  Identify negative self-talk and behaviors: challenge core beliefs, myths, and actions .    Status:  Maintenance:  10/17/2023    Intervention(s)  Therapist will  provide education, teach CBT skills .      Goal 3: Patiient will increase connection in close relationships.    I will know I've met my goal when I feel more close with loved ones.  *10/17/23 incremental progress noted    Objective #A (Patient  Action)    Status: Continued - Date(s): 10/17/2023    Patient will  prioritize time for meaningful relationships .    Intervention(s)  Therapist will  provide support and accountability .    Objective #B  Patient will  make use of effective interpersonal communication skills .    Status: Continued - Date(s): 10/17/2023    Intervention(s)  Therapist will  teach communication skills .    Goal 4: Client will regroup/reorganize after job layoff.     I will know I've met my goal when I'm not still wondering 'why' and I can see what direction I'm heading professionally.    *10/17/23 this remains primary goal    Objective #A (Client Action)    Client will  grieve losses associated with layoff and explore new employment options.  Status: Continued - Date(s): 10/17/2023     Intervention(s)  Therapist will  provide space for emotional processing; guide Client in creating an accurate narrative about the experience; provide psychoeducation around reorganization after significant loss; provide accountability and support .      Patient has reviewed and agreed to the above plan.      Rosa Chowdary LP  March 25, 2022      Answers for HPI/ROS submitted by the patient on 10/25/2022  If you checked off any problems, how difficult have these problems made it for you to do your work, take care of things at home, or get along with other people?: Not difficult at all  PHQ9 TOTAL SCORE: 6Answers submitted by the patient for this visit:  SOSA-7 (Submitted on 8/22/2023)  SOSA 7 TOTAL SCORE: 6

## 2023-11-29 ENCOUNTER — MYC REFILL (OUTPATIENT)
Dept: FAMILY MEDICINE | Facility: CLINIC | Age: 58
End: 2023-11-29
Payer: COMMERCIAL

## 2023-11-29 DIAGNOSIS — I10 ESSENTIAL HYPERTENSION: ICD-10-CM

## 2023-11-29 DIAGNOSIS — E78.5 HYPERLIPIDEMIA WITH TARGET LDL LESS THAN 100: ICD-10-CM

## 2023-11-29 DIAGNOSIS — E11.9 TYPE 2 DIABETES MELLITUS WITHOUT COMPLICATION, WITHOUT LONG-TERM CURRENT USE OF INSULIN (H): ICD-10-CM

## 2023-11-29 DIAGNOSIS — Z00.00 ROUTINE GENERAL MEDICAL EXAMINATION AT A HEALTH CARE FACILITY: ICD-10-CM

## 2023-11-30 RX ORDER — ATORVASTATIN CALCIUM 20 MG/1
20 TABLET, FILM COATED ORAL DAILY
Qty: 90 TABLET | Refills: 0 | Status: SHIPPED | OUTPATIENT
Start: 2023-11-30 | End: 2024-03-28

## 2023-11-30 RX ORDER — METFORMIN HCL 500 MG
1000 TABLET, EXTENDED RELEASE 24 HR ORAL 2 TIMES DAILY WITH MEALS
Qty: 360 TABLET | Refills: 0 | Status: SHIPPED | OUTPATIENT
Start: 2023-11-30 | End: 2024-03-13

## 2023-11-30 RX ORDER — LISINOPRIL 10 MG/1
10 TABLET ORAL DAILY
Qty: 90 TABLET | Refills: 0 | Status: SHIPPED | OUTPATIENT
Start: 2023-11-30 | End: 2024-01-15

## 2023-12-04 DIAGNOSIS — E78.5 HYPERLIPIDEMIA WITH TARGET LDL LESS THAN 100: ICD-10-CM

## 2023-12-04 RX ORDER — ATORVASTATIN CALCIUM 20 MG/1
20 TABLET, FILM COATED ORAL DAILY
Qty: 90 TABLET | Refills: 0 | OUTPATIENT
Start: 2023-12-04

## 2023-12-05 ENCOUNTER — TELEPHONE (OUTPATIENT)
Dept: FAMILY MEDICINE | Facility: CLINIC | Age: 58
End: 2023-12-05

## 2023-12-05 ENCOUNTER — VIRTUAL VISIT (OUTPATIENT)
Dept: FAMILY MEDICINE | Facility: CLINIC | Age: 58
End: 2023-12-05
Payer: COMMERCIAL

## 2023-12-05 DIAGNOSIS — K21.9 GASTROESOPHAGEAL REFLUX DISEASE, UNSPECIFIED WHETHER ESOPHAGITIS PRESENT: Primary | ICD-10-CM

## 2023-12-05 DIAGNOSIS — J30.2 SEASONAL ALLERGIC RHINITIS, UNSPECIFIED TRIGGER: ICD-10-CM

## 2023-12-05 DIAGNOSIS — E11.69 TYPE 2 DIABETES MELLITUS WITH OTHER SPECIFIED COMPLICATION, WITHOUT LONG-TERM CURRENT USE OF INSULIN (H): ICD-10-CM

## 2023-12-05 DIAGNOSIS — E55.9 VITAMIN D DEFICIENCY: ICD-10-CM

## 2023-12-05 PROCEDURE — 99214 OFFICE O/P EST MOD 30 MIN: CPT | Mod: VID | Performed by: INTERNAL MEDICINE

## 2023-12-05 RX ORDER — ESTRADIOL 0.1 MG/G
CREAM VAGINAL
COMMUNITY
End: 2024-01-16

## 2023-12-05 RX ORDER — FLUTICASONE PROPIONATE 50 MCG
1 SPRAY, SUSPENSION (ML) NASAL DAILY
Qty: 18.2 ML | Refills: 1 | Status: SHIPPED | OUTPATIENT
Start: 2023-12-05

## 2023-12-05 RX ORDER — FEXOFENADINE HCL 60 MG/1
60 TABLET, FILM COATED ORAL 2 TIMES DAILY
Qty: 60 TABLET | Refills: 1 | Status: SHIPPED | OUTPATIENT
Start: 2023-12-05

## 2023-12-05 RX ORDER — ERGOCALCIFEROL 1.25 MG/1
50000 CAPSULE, LIQUID FILLED ORAL WEEKLY
Qty: 12 CAPSULE | Refills: 0 | Status: SHIPPED | OUTPATIENT
Start: 2023-12-05

## 2023-12-05 NOTE — PATIENT INSTRUCTIONS
As discussed , your GERD is uncontrolled with current medications.     Sent in medication refill as requested.     Placed referral to gastroenterology    Started on Allegra and also Flonase for your allergies and rhinitis causing your symptoms

## 2023-12-05 NOTE — COMMUNITY RESOURCES LIST (ENGLISH)
12/05/2023   [facility]  N/A  For questions about this resource list or additional care needs, please contact your primary care clinic or care manager.  Phone: [phone]   Email: N/A   Address: [address] [place] [id number]   Hours: N/A        Hotlines and Helplines       Hotline - Housing crisis  1  [facility] (Main Office) Distance: 8.68 miles      Phone/Virtual   [address] [place] [address]  Language: English  Hours: Mon - Sun Open 24 Hours   Phone: (755) 867-4777 Email: info@[employer] Website: http://[employer]     2  [facility] Distance: 10.45 miles      Phone/Virtual   [address] [place] [address]  Language: English  Hours: Mon - Sun Open 24 Hours   Phone: (317) 399-4717 Email: info@[employer] Website: http://www.[employer].org          Housing       Coordinated Entry access point  3  [facility] ([facility] - [facility] Services Distance: 11.46 miles      In-Person   [address] [place] [address]  Language: English  Hours: Mon - Fri 9:00 AM - 5:00 PM  Fees: Free   Phone: (460) 653-9330 Email: housing@[facility].org Website: http://www.[facility].org/housing     4  [facility] ([facility]) Distance: 11.87 miles      In-Person, Phone/Virtual   [address] [place] [address]  Language: English, Samoan  Hours: Mon - Fri 10:00 AM - 5:30 PM , Mon - Sun 7:30 PM - 10:20 PM , Sat - Sun 1:00 PM - 5:30 PM  Fees: Free   Phone: (377) 559-4807 Email: info@[employer].org Website: https://www.[facility].org/our-programs/adult-shelter-connect-luu-shelter/     Drop-in center or day shelter  5  [facility] Distance: 11.67 miles      In-Person   [address] [place] [address]  Language: English  Hours: Mon - Fri 12:00 PM - 3:00 PM  Fees: Free   Phone: (220) 742-2273 Email: [employer] Website: http://[employer].org/      6  [facility] Distance: 11.94 miles      In-Person   [address] [place] [address]  Language: English, Burundian, North Korean  Hours: Mon - Sat 7:00 AM - 3:00 PM  Fees: Free, Self Pay   Phone: (718) 801-5489 Email: info@[employer] Website: https://www.[employer]/locations/opportunity-center/     Housing search assistance  7  [place] [employer] - Rental Homes for Future Homebuyers Program Distance: 6.72 miles      Phone/Virtual   [address] [place] [address]  Language: English  Hours: Mon - Fri 8:00 AM - 4:30 PM  Fees: Free   Phone: (277) 410-2008 Email: hra@[facility] Website: https://www.[facility]/hra/[place]-housing-and-jyxmbydilamxq-cznbwxvck-wts     8  [facility] Distance: 8.91 miles      In-Person   [address] [place] [address]  Language: English  Hours: Mon - Fri 8:30 AM - 4:00 PM  Fees: Free   Phone: (743) 519-7745 Email: reception@[facility] Website: http://www.[facility].org     Shelter for individuals  9  [facility] Distance: 11.67 miles      In-Person   [address] [place] [address]  Language: English  Hours: Mon - Sun Open 24 Hours  Fees: Free   Phone: (242) 291-1151 Email: communications@[facility].org Website: https://[facility].org/oursaviourshousing/     10  [facility] Distance: 11.98 miles      In-Person   [address] [place] [address]  Language: English  Hours: Mon - Fri 4:00 PM - 9:00 AM  Fees: Free   Phone: (197) 159-4274 Email: dixie@[facility].org Website: https://[place].[facility].org/northern/[facility]/          Important Numbers & Websites       Emergency Services   911  City Services   311  Poison Control   (959) 685-6260  Suicide Prevention Lifeline   (846) 997-8920 (TALK)  Child Abuse Hotline   (359) 297-3557 (4-A-Child)  Sexual  Assault Hotline   (902) 542-7487 (HOPE)  National Runaway Safeline   (245) 504-5191 (RUNAWAY)  All-Options Talkline   (655) 393-3758  Substance Abuse Referral   (330) 569-6632 (HELP)

## 2023-12-05 NOTE — PROGRESS NOTES
Ijeoma is a 58 year old who is being evaluated via a billable video visit.      How would you like to obtain your AVS? MyChart  If the video visit is dropped, the invitation should be resent by: Text to cell phone: [phone]  Will anyone else be joining your video visit? No        Assessment and Plan  1. Gastroesophageal reflux disease, unspecified whether esophagitis present  Ongoing problem, uncontrolled though mildly improved with the current increase in omeprazole twice daily with recent office visit with another provider.    -  Recently seen ARAM Jackson on 11/8/2023 for Epigastric pain for which all labs done including CMP, Lipase was normal and sent home with increased Prilosec  to twice daily , Sucralfate and as needed Zofran.   -Discussed on pill induced gastritis which is the possible cause on her current medication list-iron tabs , NSAIDS causing this precautions given which patient understood and agreed with the plan.  - Shared decision to place gastroenterology referral for improvement.  - Adult GI  Referral - Procedure Only; Future    2. Type 2 diabetes mellitus with other specified complication, without long-term current use of insulin (H)  Chronic problem, Last A1C in 9/2023 at 7.2% mildly worsened compared to the past .  Recommend to follow diet modifications along with the medications as mentioned in the medication list below.    3. Vitamin D deficiency  - vitamin D2 (ERGOCALCIFEROL) 78423 units (1250 mcg) capsule; Take 1 capsule (50,000 Units) by mouth once a week  Dispense: 12 capsule; Refill: 0    4. Seasonal allergic rhinitis, unspecified trigger  Ongoing problem, uncontrolled.  Recommend Flonase nasal spray and Allegra sent to the pharmacy.  Patient understood and agreed with the plan.  - fluticasone (FLONASE) 50 MCG/ACT nasal spray; Spray 1 spray into both nostrils daily  Dispense: 18.2 mL; Refill: 1  - fexofenadine (ALLEGRA) 60 MG tablet; Take 1 tablet (60 mg) by mouth 2 times daily   Dispense: 60 tablet; Refill: 1         Please note that this note consists of symbols derived from keyboarding, dictation and/or voice recognition software. As a result, there may be errors in the script that have gone undetected. Please consider this when interpreting information found in this chart.    Patient Instructions   As discussed , your GERD is uncontrolled with current medications.     Sent in medication refill as requested.     Placed referral to gastroenterology    Started on Allegra and also Flonase for your allergies and rhinitis causing your symptoms       Return in about 6 weeks (around 1/16/2024), or if symptoms worsen or fail to improve, for Preventative Visit.    Norah Zamorano MD  [facility] [place] is a 58 year old, presenting for the following health issues:  Follow Up (Nausea and stomach pain/)        12/5/2023     9:22 AM   Additional Questions   Roomed by Lani BENSON       History of Present Illness       Reason for visit:  Followup for prolonged stomach issue    She eats 2-3 servings of fruits and vegetables daily.She consumes 0 sweetened beverage(s) daily.She exercises with enough effort to increase her heart rate 10 to 19 minutes per day.  She exercises with enough effort to increase her heart rate 3 or less days per week.   She is taking medications regularly.     Last seen pt on 8/2023 for diabetes follow up at that time. Last A1C in 9/2023 at 7.2% mildly worsened compared to the past .Pt is Going out of the country 12[place]16 - [place]06[place]24 and here for follow up .       Review of Systems   Constitutional, HEENT, cardiovascular, pulmonary, GI, , musculoskeletal, neuro, skin, endocrine and psych systems are negative, except as otherwise noted.      Objective           Vitals:  No vitals were obtained today due to virtual visit.    Physical Exam   GENERAL: Healthy, alert and no distress  EYES: Eyes grossly normal to inspection.  No discharge or  erythema, or obvious scleral/conjunctival abnormalities.  RESP: No audible wheeze, cough, or visible cyanosis.  No visible retractions or increased work of breathing.    SKIN: Visible skin clear. No significant rash, abnormal pigmentation or lesions.  NEURO: Cranial nerves grossly intact.  Mentation and speech appropriate for age.  PSYCH: Mentation appears normal, affect normal/bright, judgement and insight intact, normal speech and appearance well-groomed.        Video-Visit Details    Type of service:  Video Visit     Originating Location (pt. Location): Home    Distant Location (provider location):  On-site  Platform used for Video Visit: How[employer]

## 2023-12-05 NOTE — TELEPHONE ENCOUNTER
Prior Authorization Retail Medication Request    Medication/Dose: fluticasone (FLONASE) 50 MCG/ACT nasal spray  Diagnosis and ICD code (if different than what is on RX):    New/renewal/insurance change PA/secondary ins. PA:  Previously Tried and Failed:    Rationale:      Insurance   Primary: [id number]  Insurance ID:      Secondary (if applicable):  Insurance ID:      Pharmacy Information (if different than what is on RX)  Name:  same  Phone:  same  Fax:

## 2023-12-08 NOTE — TELEPHONE ENCOUNTER
Prior Authorization Not Needed per Insurance    Medication: FLUTICASONE PROPIONATE 50 MCG/ACT NA SUSP  Insurance Company: [employer] - Phone [phone] Fax [phone]  Expected CoPay: $    Pharmacy Filling the Rx: [id number] - [place], [place] - [address]  Pharmacy Notified: Yes  Patient Notified: Yes    Patients insurance will only cover 1 NDC of this medication and that is unavailable at the moment- Patient may need to check other Pharmacies to see if they have it in stock.

## 2023-12-08 NOTE — TELEPHONE ENCOUNTER
PA Initiation    Medication: FLUTICASONE PROPIONATE 50 MCG/ACT NA SUSP  Insurance Company: [employer] - Phone [phone] Fax [phone]  Pharmacy Filling the Rx: [id number] - [place], [place] - [address]  Filling Pharmacy Phone: [phone]  Filling Pharmacy Fax:    Start Date: 12/8/2023

## 2023-12-09 ENCOUNTER — HEALTH MAINTENANCE LETTER (OUTPATIENT)
Age: 58
End: 2023-12-09

## 2023-12-12 ENCOUNTER — OFFICE VISIT (OUTPATIENT)
Dept: PSYCHOLOGY | Facility: CLINIC | Age: 58
End: 2023-12-12
Payer: COMMERCIAL

## 2023-12-12 DIAGNOSIS — F43.22 ADJUSTMENT DISORDER WITH ANXIOUS MOOD: Primary | ICD-10-CM

## 2023-12-12 PROCEDURE — 90834 PSYTX W PT 45 MINUTES: CPT | Performed by: PSYCHOLOGIST

## 2023-12-12 NOTE — PROGRESS NOTES
[facility]                                     Progress Note    Patient Name: Ijeoma Avalos  Date: 12/12/2023       Service Type: Individual      Session Start Time: 09:01  Session End Time: 09:50     Session Length: 38-52 mins    Session #: 51 (this episode of care)    Attendees: Client attended alone    Service Modality:  In person        DATA  Interactive Complexity: No  Crisis: No      Progress Since Last Session (Related to Symptoms / Goals / Homework):   Symptoms:  Stable    Homework:  Good progress ; Client has taken steps toward a new position she is interested in        Episode of Care Goals: Satisfactory progress - ACTION (Actively working towards change); Intervened by reinforcing change plan / affirming steps taken     Current / Ongoing Stressors and Concerns:   Recently laid off from job   Family stressors     Treatment Objective(s) Addressed in This Session:   Regroup/reorganize after job layoff   increase connection in close relationships   build skills for stress management   increase self-confidence     Intervention:   Continued work on stress management related to job hunting/layoff.  Reviewed recent interview for a position that Client feels excited about. Helped Client process an uncomfortable experience that she believed she was taking too personally. Looked at her interpretation of the situation (the story she's telling herself) and how she might interpret the situation differently if it had happened to a friend. With some added critical context, Client was able to recognize that she felt bad because someone treated her badly, not because of anything she did. Client shared a recent poem she wrote about a personal event in her youth. She described this event for the first time. Offered safe space for this expression. Client will consider if this is something she would like to discuss further in future sessions.      Assessments completed prior to visit:  The  following assessments were previously completed by patient:         2/21/2018     8:44 AM 9/13/2019     3:22 PM 11/12/2019     3:00 PM 4/14/2020    11:13 AM 8/26/2021     8:13 AM 10/25/2022     2:07 PM 6/28/2023     1:05 PM   PHQ-9 SCORE   PHQ-9 Total Score MyChart      6 (Mild depression) 7 (Mild depression)   PHQ-9 Total Score 1 1 1 4 3 6 7     GAD2:       10/25/2022     2:07 PM 1/24/2023     1:57 PM 8/22/2023     9:57 AM 12/12/2023     8:59 AM   SOSA-2   Feeling nervous, anxious, or on edge 0 0 1 1   Not being able to stop or control worrying 1 1 2 1   SOSA-2 Total Score 1 1 3 2     PROMIS 10-Global Health (only subscores and total score):       4/22/2022     8:06 AM 7/29/2022     8:11 PM 10/25/2022     2:08 PM 1/24/2023     1:58 PM 6/28/2023     1:05 PM 8/22/2023     9:58 AM 12/12/2023     9:00 AM   PROMIS-10 Scores Only   Global Mental Health Score 12 12 10 14 11 11 13   Global Physical Health Score 11 11 11 14 12 12 13   PROMIS TOTAL - SUBSCORES 23 23 21 28 23 23 26      ASSESSMENT: Current Emotional / Mental Status (status of significant symptoms):   Risk status (Self / Other harm or suicidal ideation)   Patient denies current fears or concerns for personal safety.   Patient denies current or recent suicidal ideation or behaviors.   Patient denies current or recent homicidal ideation or behaviors.   Patient denies current or recent self injurious behavior or ideation.   Patient denies other safety concerns.   Patient reports there has been no change in risk factors since her last session.    Patient reports there has been no change in protective factors since her last session.     Recommended that patient call 911 or go to the local ED should there be a change in any of these risk factors.     Appearance:   Appropriately dressed & groomed    Eye Contact:   Good    Psychomotor Behavior: Appropriate    Attitude:   Open    Orientation:   All   Speech    Rate / Production: Normal     Volume:  Normal     Mood:    Anxious   Affect:    Congruent    Thought Content:  Some tendency toward personalization    Thought Form:  Coherent  Logical    Insight:    Fair      Medication Review:   No changes to current psychiatric medication(s)     Medication Compliance:   Yes; infrequent use of lorazepam     Changes in Health Issues:   None reported     Chemical Use Review:   Substance Use: Chemical use reviewed, no active concerns identified      Tobacco Use: No current tobacco use.      Diagnosis:  1. Adjustment disorder with anxious mood    *diagnosis updated--mood has improved    Collateral Reports Completed:  PHQ-2  SOSA-2  PROMIS-10        PLAN: (Patient Tasks / Therapist Tasks / Other)  As Client interprets interactions that leave her with a negative feeling, she will consider how she would assess the same situation if it happened to a friend.  She will consider that her emotion may be related to how she has been treated (e.g. I feel bad because that person treated me badly). Client will consider if/to what degree she would like to process a significant event from her youth. She is awaiting word about a job following a series of interviews. Return appointment scheduled. Client has my contact information and is aware that she can reach out sooner if needed.     Rosa Chowdary, LP                                                      ______________________________________________________________________    Individual Treatment Plan    Patient's Name: Ijeoma Avalos  YOB: 1965    Date of Creation: 3/25/2022  Date Treatment Plan Last Reviewed/Revised: 10/17/2023    DSM5 Diagnoses: 300.00 (F41.9) Unspecified Anxiety Disorder  Psychosocial / Contextual Factors: family stressors, work stressors  PROMIS (reviewed every 90 days): 26    Referral / Collaboration:  Referral to another professional/service is not indicated at this time.    Anticipated number of session for this episode of care: estimated 12-16  sessions/year  Anticipation frequency of session: Monthly  Anticipated Duration of each session: 38-52 minutes  Treatment plan will be reviewed in 90 days or when goals have been changed.       MeasurableTreatment Goal(s) related to diagnosis / functional impairment(s)  Goal 1: Patient will build skills for stress management.    I will know I've met my goal when I am sleeping better and not waking up so often, when I don't feel worried so much of the time.   *updated on 7/1/22 (Client believes physical symptoms such as blood pressure and reflux are not the best way to measure this outcome).    Objective #A (Patient Action)    Patient will  use at least 2 coping skills for anxiety management in the next 6 weeks . Particularly as related to recent job layoff.  Status: Continued - Date(s): 10/17/2023      Intervention(s)  Therapist will teach  teach self-regulation strategies, CBT skills, mindfulness techniques .    Objective #B  Patient will  use cognitive strategies identified in therapy to challenge anxious thoughts .  Status: Continued - Date(s): 10/17/2023  *continue as maintenance goal    Intervention(s)  Therapist will  teach cognitive strategies, provide education .      Goal 2: Patient will increase self-confidence.    I will know I've met my goal when I think more positively about myself.   *6/16/2023 meaningful progress noted in this area.    Objective #A (Patient Action)    Status:  Maintenance - Date(s):  10/17/2023    Patient will  increase assertive communication .    Intervention(s)  Therapist will  teach assertiveness skills .    Objective #B  Patient will  Identify negative self-talk and behaviors: challenge core beliefs, myths, and actions .    Status:  Maintenance:  10/17/2023    Intervention(s)  Therapist will  provide education, teach CBT skills .      Goal 3: Patiient will increase connection in close relationships.    I will know I've met my goal when I feel more close with loved ones.   *10/17/23 incremental progress noted    Objective #A (Patient Action)    Status: Continued - Date(s): 10/17/2023    Patient will  prioritize time for meaningful relationships .    Intervention(s)  Therapist will  provide support and accountability .    Objective #B  Patient will  make use of effective interpersonal communication skills .    Status: Continued - Date(s): 10/17/2023    Intervention(s)  Therapist will  teach communication skills .    Goal 4: Client will regroup/reorganize after job layoff.     I will know I've met my goal when I'm not still wondering 'why' and I can see what direction I'm heading professionally.    *10/17/23 this remains primary goal    Objective #A (Client Action)    Client will  grieve losses associated with layoff and explore new employment options.  Status: Continued - Date(s): 10/17/2023     Intervention(s)  Therapist will  provide space for emotional processing; guide Client in creating an accurate narrative about the experience; provide psychoeducation around reorganization after significant loss; provide accountability and support .      Patient has reviewed and agreed to the above plan.      Rosa Chowdary LP  March 25, 2022      Answers for HPI/ROS submitted by the patient on 10/25/2022  If you checked off any problems, how difficult have these problems made it for you to do your work, take care of things at home, or get along with other people?: Not difficult at all  PHQ9 TOTAL SCORE: 6Answers submitted by the patient for this visit:  SOSA-7 (Submitted on 8/22/2023)  SOSA 7 TOTAL SCORE: 6

## 2023-12-13 DIAGNOSIS — K21.9 GASTROESOPHAGEAL REFLUX DISEASE: ICD-10-CM

## 2023-12-13 RX ORDER — OMEPRAZOLE 40 MG/1
CAPSULE, DELAYED RELEASE ORAL
Qty: 90 CAPSULE | Refills: 2 | Status: SHIPPED | OUTPATIENT
Start: 2023-12-13

## 2023-12-14 ENCOUNTER — TELEPHONE (OUTPATIENT)
Dept: GASTROENTEROLOGY | Facility: CLINIC | Age: 58
End: 2023-12-14
Payer: COMMERCIAL

## 2023-12-14 ENCOUNTER — MYC MEDICAL ADVICE (OUTPATIENT)
Dept: FAMILY MEDICINE | Facility: CLINIC | Age: 58
End: 2023-12-14
Payer: COMMERCIAL

## 2023-12-14 ENCOUNTER — HOSPITAL ENCOUNTER (OUTPATIENT)
Facility: CLINIC | Age: 58
End: 2023-12-14
Attending: INTERNAL MEDICINE | Admitting: INTERNAL MEDICINE
Payer: COMMERCIAL

## 2023-12-14 NOTE — TELEPHONE ENCOUNTER
Gastroenterology Referral faxed to JE[facility] [place] fax: [phone] as requested by pt. MyChart response sent to pt to inform.     Cynthia Hung,  [place] PichardoProvidence City Hospitaldaisha Arredondo

## 2023-12-14 NOTE — TELEPHONE ENCOUNTER
"Endoscopy Scheduling Screen    Have you had a positive Covid test in the last 14 days?  No    Are you active on MyChart?   Yes    What insurance is in the chart?  Other:  [employer]    Ordering/Referring Provider:     ANABELA RUDOLPH      (If ordering provider performs procedure, schedule with ordering provider unless otherwise instructed. )    BMI: Estimated body mass index is 27.32 kg/m  as calculated from the following:    Height as of 11/8/23: 1.524 m (5').    Weight as of 11/8/23: 63.5 kg (139 lb 14.4 oz).     Sedation Ordered  moderate sedation.   If patient BMI > 50 do not schedule in [facility].    If patient BMI > 45 do not schedule at [facility].    Are you taking methadone or Suboxone?  No    Are you taking any prescription medications for pain 3 or more times per week?   NO - No RN review required.    Do you have a history of malignant hyperthermia or adverse reaction to anesthesia?  No    (Females) Are you currently pregnant?   No     Have you been diagnosed or told you have pulmonary hypertension?   No    Do you have an LVAD?  No    Have you been told you have moderate to severe sleep apnea?  No    Have you been told you have COPD, asthma, or any other lung disease?  No    Do you have any heart conditions?  No     Have you ever had an organ transplant?   No    Have you ever had or are you awaiting a heart or lung transplant?   No    Have you had a stroke or transient ischemic attack (TIA aka \"mini stroke\" in the last 6 months?   No    Have you been diagnosed with or been told you have cirrhosis of the liver?   No    Are you currently on dialysis?   No    Do you need assistance transferring?   No    BMI: Estimated body mass index is 27.32 kg/m  as calculated from the following:    Height as of 11/8/23: 1.524 m (5').    Weight as of 11/8/23: 63.5 kg (139 lb 14.4 oz).     Is patients BMI > 40 and scheduling location [facility]?  No    Do you take an injectable medication for weight loss or diabetes (excluding insulin)?  No    Do " you take the medication Naltrexone?  No    Do you take blood thinners?  No       Prep   Are you currently on dialysis or do you have chronic kidney disease?  No    Do you have a diagnosis of diabetes?  Yes (Golytely Prep)    Do you have a diagnosis of cystic fibrosis (CF)?  No    On a regular basis do you go 3 -5 days between bowel movements?  No    BMI > 40?  No    Preferred Pharmacy:    [id number] - [place], [place] - [address]  [address] [place] [address]  Phone: [phone] Fax: [phone]        Final Scheduling Details   Colonoscopy prep sent?  N/A    Procedure scheduled  Upper endoscopy (EGD)    Surgeon:  Marissa     Date of procedure:  3/14     Pre-OP / PAC:   No - Not required for this site.    Location  [facility] - Patient preference.    Sedation   Moderate Sedation - Per order.      Patient Reminders:   You will receive a call from a Nurse to review instructions and health history.  This assessment must be completed prior to your procedure.  Failure to complete the Nurse assessment may result in the procedure being cancelled.      On the day of your procedure, please designate an adult(s) who can drive you home stay with you for the next 24 hours. The medicines used in the exam will make you sleepy. You will not be able to drive.      You cannot take public transportation, ride share services, or non-medical taxi service without a responsible caregiver.  Medical transport services are allowed with the requirement that a responsible caregiver will receive you at your destination.  We require that drivers and caregivers are confirmed prior to your procedure.

## 2024-01-08 NOTE — TELEPHONE ENCOUNTER
Patient called in, stated that she called called [facility] and to schedule her procedure. They said that they did not receive her referral from dr montejo. I have faxed it to the fax number below.

## 2024-01-10 ENCOUNTER — VIRTUAL VISIT (OUTPATIENT)
Dept: PSYCHOLOGY | Facility: CLINIC | Age: 59
End: 2024-01-10
Payer: COMMERCIAL

## 2024-01-10 DIAGNOSIS — F43.22 ADJUSTMENT DISORDER WITH ANXIOUS MOOD: Primary | ICD-10-CM

## 2024-01-10 PROCEDURE — 90834 PSYTX W PT 45 MINUTES: CPT | Mod: 93 | Performed by: PSYCHOLOGIST

## 2024-01-10 NOTE — PROGRESS NOTES
[facility]                                     Progress Note    Patient Name: Ijeoma Avalos  Date: 1/10/2024       Service Type: Individual      Session Start Time: 13:02  Session End Time: 13:48     Session Length: 38-52 mins    Session #: 1 (this year, continued episode of care)    Attendees: Client attended alone    Service Modality:  Telephone visit    Telemedicine Visit: The patient's condition can be safely assessed and treated via synchronous audio and visual telemedicine encounter.      Reason for Telemedicine Visit: Patient convenience (e.g. access to timely appointments / distance to available provider)    Originating Site (Patient Location): Patient's home      Distant Location (provider location):  Off-site    Consent:  The patient/guardian has verbally consented to: the potential risks and benefits of telemedicine (video visit) versus in person care; bill my insurance or make self-payment for services provided; and responsibility for payment of non-covered services.     Mode of Communication:  Video Conference via [employer]    As the provider I attest to compliance with applicable laws and regulations related to telemedicine.         DATA  Interactive Complexity: No  Crisis: No      Progress Since Last Session (Related to Symptoms / Goals / Homework):   Symptoms:  Variable--increasing anxiety around job search; able to enjoy holiday travel out of the country    Homework:  In progress ; Client is not yet inclined toward choosing self-compassionate interpretations       Episode of Care Goals: Satisfactory progress - ACTION (Actively working towards change); Intervened by reinforcing change plan / affirming steps taken     Current / Ongoing Stressors and Concerns:   Recently laid off from job   Family stressors     Treatment Objective(s) Addressed in This Session:   Regroup/reorganize after job layoff   increase connection in close relationships   build skills for stress  management   increase self-confidence     Intervention:   Client endorsed growing stress/anxiety around seeking employment. She reported that GERD symptoms have increased as well, which she believes is related to stress. Acknowledged the challenge inherent in situations of uncertainty. Examined her narrative and discovered some unfair and inaccurate thoughts that are contributing to her anxiety. Identified alternative, more adaptive self-talk to practice instead. Also reviewed strategies for managing physiological anxiety that have been helpful in the past. Client stated that resuming a regular yoga practice does not feel within reach currently, but she is open to adding some meditation into her daily routine. Pointed out the effective next steps that Client has already put in place with in her job search.      Assessments completed prior to visit:  The following assessments were previously completed by patient:         2/21/2018     8:44 AM 9/13/2019     3:22 PM 11/12/2019     3:00 PM 4/14/2020    11:13 AM 8/26/2021     8:13 AM 10/25/2022     2:07 PM 6/28/2023     1:05 PM   PHQ-9 SCORE   PHQ-9 Total Score MyChart      6 (Mild depression) 7 (Mild depression)   PHQ-9 Total Score 1 1 1 4 3 6 7     GAD2:       10/25/2022     2:07 PM 1/24/2023     1:57 PM 8/22/2023     9:57 AM 12/12/2023     8:59 AM   SOSA-2   Feeling nervous, anxious, or on edge 0 0 1 1   Not being able to stop or control worrying 1 1 2 1   SOSA-2 Total Score 1 1 3 2     PROMIS 10-Global Health (only subscores and total score):       4/22/2022     8:06 AM 7/29/2022     8:11 PM 10/25/2022     2:08 PM 1/24/2023     1:58 PM 6/28/2023     1:05 PM 8/22/2023     9:58 AM 12/12/2023     9:00 AM   PROMIS-10 Scores Only   Global Mental Health Score 12 12 10 14 11 11 13   Global Physical Health Score 11 11 11 14 12 12 13   PROMIS TOTAL - SUBSCORES 23 23 21 28 23 23 26      ASSESSMENT: Current Emotional / Mental Status (status of significant symptoms):   Risk status  "(Self / Other harm or suicidal ideation)   Patient denies current fears or concerns for personal safety.   Patient denies current or recent suicidal ideation or behaviors.   Patient denies current or recent homicidal ideation or behaviors.   Patient denies current or recent self injurious behavior or ideation.   Patient denies other safety concerns.   Patient reports there has been no change in risk factors since her last session.    Patient reports there has been no change in protective factors since her last session.     Recommended that patient call 911 or go to the local ED should there be a change in any of these risk factors.     Appearance:   Unable to assess    Eye Contact:   Unable to assess    Psychomotor Behavior: Unable to assess    Attitude:   Delfino    Orientation:   All   Speech    Rate / Production: Normal/ Responsive    Volume:  Slightly soft over the phone    Mood:    Increased anxiety   Affect:    Unable to assess    Thought Content:  Inclined toward \"not good enough\" interpretations   Thought Form:  Coherent  Logical    Insight:    Fair      Medication Review:   No changes to current psychiatric medication(s)     Medication Compliance:   Yes; infrequent use of lorazepam     Changes in Health Issues:   Yes: increased GERD reported, endoscopy scheduled in 2 months     Chemical Use Review:   Substance Use: Chemical use reviewed, no active concerns identified      Tobacco Use: No current tobacco use.      Diagnosis:  1. Adjustment disorder with anxious mood        Collateral Reports Completed:  Telephone consent       PLAN: (Patient Tasks / Therapist Tasks / Other)  Client will resume daily use of brief meditation for physiological regulation. She will practice adaptive and neutral self-talk around her job search. She has articulated a few different work scenarios that would be acceptable to her and has set up next steps to move in these directions. Client will consider if/to what degree she would " like to process a significant event from her youth. Return appointments scheduled. Client has my contact information and is aware that she can reach out sooner if needed.     Rosa Chowdary, LP                                                      ______________________________________________________________________    Individual Treatment Plan    Patient's Name: Ijeoma Avalos  YOB: 1965    Date of Creation: 3/25/2022  Date Treatment Plan Last Reviewed/Revised: 10/17/2023    DSM5 Diagnoses: 300.00 (F41.9) Unspecified Anxiety Disorder  Psychosocial / Contextual Factors: family stressors, work stressors  PROMIS (reviewed every 90 days): 26    Referral / Collaboration:  Referral to another professional/service is not indicated at this time.    Anticipated number of session for this episode of care: estimated 12-16 sessions/year  Anticipation frequency of session: Monthly  Anticipated Duration of each session: 38-52 minutes  Treatment plan will be reviewed in 90 days or when goals have been changed.       MeasurableTreatment Goal(s) related to diagnosis / functional impairment(s)  Goal 1: Patient will build skills for stress management.    I will know I've met my goal when I am sleeping better and not waking up so often, when I don't feel worried so much of the time.   *updated on 7/1/22 (Client believes physical symptoms such as blood pressure and reflux are not the best way to measure this outcome).    Objective #A (Patient Action)    Patient will  use at least 2 coping skills for anxiety management in the next 6 weeks . Particularly as related to recent job layoff.  Status: Continued - Date(s): 10/17/2023      Intervention(s)  Therapist will teach  teach self-regulation strategies, CBT skills, mindfulness techniques .    Objective #B  Patient will  use cognitive strategies identified in therapy to challenge anxious thoughts .  Status: Continued - Date(s): 10/17/2023  *continue as maintenance  goal    Intervention(s)  Therapist will  teach cognitive strategies, provide education .      Goal 2: Patient will increase self-confidence.    I will know I've met my goal when I think more positively about myself.   *6/16/2023 meaningful progress noted in this area.    Objective #A (Patient Action)    Status:  Maintenance - Date(s):  10/17/2023    Patient will  increase assertive communication .    Intervention(s)  Therapist will  teach assertiveness skills .    Objective #B  Patient will  Identify negative self-talk and behaviors: challenge core beliefs, myths, and actions .    Status:  Maintenance:  10/17/2023    Intervention(s)  Therapist will  provide education, teach CBT skills .      Goal 3: Patiient will increase connection in close relationships.    I will know I've met my goal when I feel more close with loved ones.  *10/17/23 incremental progress noted    Objective #A (Patient Action)    Status: Continued - Date(s): 10/17/2023    Patient will  prioritize time for meaningful relationships .    Intervention(s)  Therapist will  provide support and accountability .    Objective #B  Patient will  make use of effective interpersonal communication skills .    Status: Continued - Date(s): 10/17/2023    Intervention(s)  Therapist will  teach communication skills .    Goal 4: Client will regroup/reorganize after job layoff.     I will know I've met my goal when I'm not still wondering 'why' and I can see what direction I'm heading professionally.    *10/17/23 this remains primary goal    Objective #A (Client Action)    Client will  grieve losses associated with layoff and explore new employment options.  Status: Continued - Date(s): 10/17/2023     Intervention(s)  Therapist will  provide space for emotional processing; guide Client in creating an accurate narrative about the experience; provide psychoeducation around reorganization after significant loss; provide accountability and support .      Patient has  reviewed and agreed to the above plan.      Rosa Chowdary, KATHARINE  March 25, 2022      Answers for HPI/ROS submitted by the patient on 10/25/2022  If you checked off any problems, how difficult have these problems made it for you to do your work, take care of things at home, or get along with other people?: Not difficult at all  PHQ9 TOTAL SCORE: 6Answers submitted by the patient for this visit:  SOSA-7 (Submitted on 8/22/2023)  SOSA 7 TOTAL SCORE: 6

## 2024-01-14 DIAGNOSIS — Z00.00 ROUTINE GENERAL MEDICAL EXAMINATION AT A HEALTH CARE FACILITY: ICD-10-CM

## 2024-01-14 DIAGNOSIS — I10 ESSENTIAL HYPERTENSION: ICD-10-CM

## 2024-01-15 RX ORDER — LISINOPRIL 10 MG/1
10 TABLET ORAL DAILY
Qty: 90 TABLET | Refills: 0 | Status: SHIPPED | OUTPATIENT
Start: 2024-01-15 | End: 2024-04-22

## 2024-01-16 ENCOUNTER — OFFICE VISIT (OUTPATIENT)
Dept: FAMILY MEDICINE | Facility: CLINIC | Age: 59
End: 2024-01-16
Attending: INTERNAL MEDICINE
Payer: COMMERCIAL

## 2024-01-16 VITALS
WEIGHT: 139.2 LBS | BODY MASS INDEX: 26.28 KG/M2 | OXYGEN SATURATION: 100 % | DIASTOLIC BLOOD PRESSURE: 82 MMHG | SYSTOLIC BLOOD PRESSURE: 124 MMHG | TEMPERATURE: 98.2 F | RESPIRATION RATE: 16 BRPM | HEIGHT: 61 IN | HEART RATE: 76 BPM

## 2024-01-16 DIAGNOSIS — E55.9 VITAMIN D DEFICIENCY: ICD-10-CM

## 2024-01-16 DIAGNOSIS — E53.8 VITAMIN B12 DEFICIENCY (NON ANEMIC): ICD-10-CM

## 2024-01-16 DIAGNOSIS — Z23 NEED FOR VACCINATION: ICD-10-CM

## 2024-01-16 DIAGNOSIS — E11.69 TYPE 2 DIABETES MELLITUS WITH OTHER SPECIFIED COMPLICATION, WITHOUT LONG-TERM CURRENT USE OF INSULIN (H): ICD-10-CM

## 2024-01-16 DIAGNOSIS — Z00.00 ROUTINE GENERAL MEDICAL EXAMINATION AT A HEALTH CARE FACILITY: Primary | ICD-10-CM

## 2024-01-16 DIAGNOSIS — K21.9 GASTROESOPHAGEAL REFLUX DISEASE, UNSPECIFIED WHETHER ESOPHAGITIS PRESENT: ICD-10-CM

## 2024-01-16 LAB — HBA1C MFR BLD: 6.8 % (ref 0–5.6)

## 2024-01-16 PROCEDURE — 96372 THER/PROPH/DIAG INJ SC/IM: CPT | Performed by: INTERNAL MEDICINE

## 2024-01-16 PROCEDURE — 36415 COLL VENOUS BLD VENIPUNCTURE: CPT | Performed by: INTERNAL MEDICINE

## 2024-01-16 PROCEDURE — 83036 HEMOGLOBIN GLYCOSYLATED A1C: CPT | Performed by: INTERNAL MEDICINE

## 2024-01-16 PROCEDURE — 99214 OFFICE O/P EST MOD 30 MIN: CPT | Mod: 25 | Performed by: INTERNAL MEDICINE

## 2024-01-16 PROCEDURE — 90750 HZV VACC RECOMBINANT IM: CPT | Performed by: INTERNAL MEDICINE

## 2024-01-16 PROCEDURE — 90471 IMMUNIZATION ADMIN: CPT | Performed by: INTERNAL MEDICINE

## 2024-01-16 PROCEDURE — 99396 PREV VISIT EST AGE 40-64: CPT | Mod: 25 | Performed by: INTERNAL MEDICINE

## 2024-01-16 PROCEDURE — 99207 PR FOOT EXAM NO CHARGE: CPT | Performed by: INTERNAL MEDICINE

## 2024-01-16 PROCEDURE — 82306 VITAMIN D 25 HYDROXY: CPT | Performed by: INTERNAL MEDICINE

## 2024-01-16 RX ORDER — CYANOCOBALAMIN 1000 UG/ML
1000 INJECTION, SOLUTION INTRAMUSCULAR; SUBCUTANEOUS
Status: ACTIVE | OUTPATIENT
Start: 2024-01-16 | End: 2025-01-10

## 2024-01-16 RX ORDER — SUCRALFATE 1 G/1
1 TABLET ORAL 4 TIMES DAILY
Qty: 90 TABLET | Refills: 1 | Status: SHIPPED | OUTPATIENT
Start: 2024-01-16

## 2024-01-16 RX ADMIN — CYANOCOBALAMIN 1000 MCG: 1000 INJECTION, SOLUTION INTRAMUSCULAR; SUBCUTANEOUS at 09:16

## 2024-01-16 ASSESSMENT — ENCOUNTER SYMPTOMS
NAUSEA: 1
ARTHRALGIAS: 1
HEMATURIA: 0
JOINT SWELLING: 0
CONSTIPATION: 0
DIZZINESS: 0
CHILLS: 0
NERVOUS/ANXIOUS: 0
MYALGIAS: 0
FREQUENCY: 0
HEADACHES: 0
ABDOMINAL PAIN: 1
HEARTBURN: 1
PARESTHESIAS: 0
PALPITATIONS: 0
BREAST MASS: 0
DIARRHEA: 0
SORE THROAT: 0
WEAKNESS: 0
COUGH: 0
FEVER: 0
HEMATOCHEZIA: 0
SHORTNESS OF BREATH: 0
EYE PAIN: 0
DYSURIA: 0

## 2024-01-16 NOTE — PATIENT INSTRUCTIONS
As discussed , since all the labs checked in 9/2023 and 11/2023 , none of them are due until next year except A1C as opted 3 monthly checks.     =============================  Preventive Health Recommendations  Female Ages 50 - 64    Yearly exam: See your health care provider every year in order to  Review health changes.   Discuss preventive care.    Review your medicines if your doctor has prescribed any.    Get a Pap test every three years (unless you have an abnormal result and your provider advises testing more often).  If you get Pap tests with HPV test, you only need to test every 5 years, unless you have an abnormal result.   You do not need a Pap test if your uterus was removed (hysterectomy) and you have not had cancer.  You should be tested each year for STDs (sexually transmitted diseases) if you're at risk.   Have a mammogram every 1 to 2 years.  Have a colonoscopy at age 45, or have a yearly FIT test (stool test). These exams screen for colon cancer.    Have a cholesterol test every 5 years, or more often if advised.  Have a diabetes test (fasting glucose) every three years. If you are at risk for diabetes, you should have this test more often.   If you are at risk for osteoporosis (brittle bone disease), think about having a bone density scan (DEXA).    Shots: Get a flu shot each year. Get a tetanus shot every 10 years.    Nutrition:   Eat at least 5 servings of fruits and vegetables each day.  Eat whole-grain bread, whole-wheat pasta and brown rice instead of white grains and rice.  Get adequate Calcium and Vitamin D.     Lifestyle  Exercise at least 150 minutes a week (30 minutes a day, 5 days a week). This will help you control your weight and prevent disease.  Limit alcohol to one drink per day.  No smoking.   Wear sunscreen to prevent skin cancer.   See your dentist every six months for an exam and cleaning.  See your eye doctor every 1 to 2 years.

## 2024-01-16 NOTE — PROGRESS NOTES
SUBJECTIVE:   Ijeoma is a 58 year old, presenting for the following:  Physical        2024     8:30 AM   Additional Questions   Roomed by Lani BENSON       Healthy Habits:     Getting at least 3 servings of Calcium per day:  Yes    Bi-annual eye exam:  Yes    Dental care twice a year:  Yes    Sleep apnea or symptoms of sleep apnea:  None    Diet:  Diabetic    Frequency of exercise:  2-3 days/week    Duration of exercise:  Less than 15 minutes    Taking medications regularly:  Yes    Medication side effects:  None    Additional concerns today:  No      Today's PHQ-2 Score:       2024     8:23 AM   PHQ-2 (  [employer])   Q1: Little interest or pleasure in doing things 0   Q2: Feeling down, depressed or hopeless 0   PHQ-2 Score 0   Q1: Little interest or pleasure in doing things Not at all   Q2: Feeling down, depressed or hopeless Not at all   PHQ-2 Score 0       Social History     Tobacco Use    Smoking status: Never    Smokeless tobacco: Never   Substance Use Topics    Alcohol use: No     Alcohol/week: 0.0 standard drinks of alcohol     Comment: social             2024     8:22 AM   Alcohol Use   Prescreen: >3 drinks/day or >7 drinks/week? No          No data to display              Reviewed orders with patient.  Reviewed health maintenance and updated orders accordingly - Yes  Lab work is in process  Labs reviewed in EPIC    Breast Cancer Screenin/23/2021     9:32 PM   Breast CA Risk Assessment (FHS-7)   Do you have a family history of breast, colon, or ovarian cancer? No / Unknown       click delete button to remove this line now  Mammogram Screening: Recommended mammography every 1-2 years with patient discussion and risk factor consideration  Pertinent mammograms are reviewed under the imaging tab.    History of abnormal Pap smear: NO - age 30-65 PAP every 5 years with negative HPV co-testing recommended      Latest Ref Rng & Units 2016     9:22 AM 2016    12:00 AM   PAP / HPV    PAP (Historical)   OTHER-NIL, See Result    HPV 16 DNA NEG Negative     HPV 18 DNA NEG Negative     Other HR HPV NEG Negative       Reviewed and updated as needed this visit by clinical staff   Tobacco  Allergies  Meds  Problems  Med Hx  Surg Hx  Fam Hx          Reviewed and updated as needed this visit by Provider   Tobacco  Allergies  Meds  Problems  Med Hx  Surg Hx  Fam Hx         Past Medical History:   Diagnosis Date    Arthritis 2022    Gastric acidity     Gestational diabetes mellitus     DIET CONTROL    Hx of previous reproductive problem     IVF    Hypothyroid     Motion sickness     Ovarian cyst     Post-operative nausea and vomiting 2017      Past Surgical History:   Procedure Laterality Date    APPENDECTOMY       SECTION  2014    Procedure:  SECTION;  Surgeon: Ru Cano MD;  Location:  L+D    CHOLECYSTECTOMY      cyst removed      left  lower leg on bone sheath    DAVINCI HYSTERECTOMY TOTAL, SALPINGECTOMY BILATERAL N/A 2017    Procedure: DAVINCI HYSTERECTOMY TOTAL, SALPINGECTOMY BILATERAL;  DAVINCI SINGLE SITE HYSTERECTOMY, BILATERAL SALPINGECTOMY, LEFT OOPHORECTOMY, LYSIS OF ADHESIONS (LAPAROSCOPIC BAG TO RETREIVE OVARY);  Surgeon: Ru Cano MD;  Location:  OR    DAVINCI LYSIS OF ADHESIONS N/A 2017    Procedure: DAVINCI LYSIS OF ADHESIONS;;  Surgeon: Ru Cano MD;  Location:  OR    DAVINCI OOPHORECTOMY N/A 2017    Procedure: DAVINCI OOPHORECTOMY;;  Surgeon: Ru Cano MD;  Location:  OR    GI SURGERY      MYOMECTOMY UTERUS  2012    ZZ GASTROSCOPY,FL       OB History    Para Term  AB Living   1 1 1 0 0 1   SAB IAB Ectopic Multiple Live Births   0 0 0 0 1      # Outcome Date GA Lbr Chung/2nd Weight Sex Delivery Anes PTL Lv   1 Term 14 37w3d  3.725 kg (8 lb 3.4 oz) F    LINA      Name: VIRGINIA KELSEY SEN      Apgar1: 9  Apgar5: 9      Obstetric Comments   Gestational  "Diabetic, , + for GBS, , BP  elevated times one week., Carpel Tunnel both wrists       Review of Systems   Constitutional:  Negative for chills and fever.   HENT:  Negative for congestion, ear pain, hearing loss and sore throat.    Eyes:  Negative for pain and visual disturbance.   Respiratory:  Negative for cough and shortness of breath.    Cardiovascular:  Negative for chest pain, palpitations and peripheral edema.   Gastrointestinal:  Positive for abdominal pain, heartburn and nausea. Negative for constipation, diarrhea and hematochezia.   Breasts:  Negative for tenderness, breast mass and discharge.   Genitourinary:  Negative for dysuria, frequency, genital sores, hematuria, pelvic pain, vaginal bleeding and vaginal discharge.   Musculoskeletal:  Positive for arthralgias. Negative for joint swelling and myalgias.   Skin:  Negative for rash.   Neurological:  Negative for dizziness, weakness, headaches and paresthesias.   Psychiatric/Behavioral:  Negative for mood changes. The patient is not nervous/anxious.      CONSTITUTIONAL: NEGATIVE for fever, chills, change in weight  INTEGUMENTARU/SKIN: NEGATIVE for worrisome rashes, moles or lesions  EYES: NEGATIVE for vision changes or irritation  ENT: NEGATIVE for ear, mouth and throat problems  RESP: NEGATIVE for significant cough or SOB  BREAST: NEGATIVE for masses, tenderness or discharge  CV: NEGATIVE for chest pain, palpitations or peripheral edema  GI: NEGATIVE for nausea, abdominal pain, heartburn, or change in bowel habits  : NEGATIVE for unusual urinary or vaginal symptoms. Periods are regular.  MUSCULOSKELETAL: NEGATIVE for significant arthralgias or myalgia  NEURO: NEGATIVE for weakness, dizziness or paresthesias  PSYCHIATRIC: NEGATIVE for changes in mood or affect     OBJECTIVE:   /82 (BP Location: Left arm, Patient Position: Sitting, Cuff Size: Adult Regular)   Pulse 76   Temp 98.2  F (36.8  C) (Tympanic)   Resp 16   Ht 1.537 m (5' 0.5\")   Wt " 63.1 kg (139 lb 3.2 oz)   LMP 04/14/2017   SpO2 100%   BMI 26.74 kg/m    Physical Exam  GENERAL: healthy, alert and no distress  EYES: Eyes grossly normal to inspection, PERRL and conjunctivae and sclerae normal  HENT: ear canals and TM's normal, nose and mouth without ulcers or lesions  NECK: no adenopathy, no asymmetry, masses, or scars and thyroid normal to palpation  RESP: lungs clear to auscultation - no rales, rhonchi or wheezes  BREAST: Deferred , Mammogram  CV: regular rate and rhythm, normal S1 S2, no S3 or S4, no murmur, click or rub, no peripheral edema and peripheral pulses strong  ABDOMEN: soft, nontender, no hepatosplenomegaly, no masses and bowel sounds normal  MS: no gross musculoskeletal defects noted, no edema  SKIN: no suspicious lesions or rashes  NEURO: Normal strength and tone, mentation intact and speech normal  PSYCH: mentation appears normal, affect normal/bright    FOOT exam : Sensations on monofilament exam intact bilaterally. No ulcers or macerations on the foot.      Diagnostic Test Results:  Labs reviewed in Epic    ASSESSMENT/PLAN:       Assessment and Plan  1. Routine general medical examination at a health care facility  Last seen pt on 12/2023 for ongoing GERD inspite of all medical management >> for which she was placed referral to GI. Has upcoming EGD scheduled in 3/2024.     - Pt is here for Annual physical. Patient does have medical conditions of diabetes, low iron saturation index in the past, dyslipidemia,  vitamin D deficiency, B12 deficiency .  Last A1c in September 2023 showing 7.2% mildly worsened compared to the past remaining at 6.6 at baseline.      - Currently on metformin XR 1000 mg twice daily, glipizide 2.5 mg daily.  Recent CMP, CBC, B12, TSH , lipid panel were all normal in 11/2023.   No need of recheck at this time on these labs. Pt preferance of A1C checks every 3 months. .       2. Type 2 diabetes mellitus with other specified complication, without long-term  current use of insulin (H)  - Adult Eye  Referral; Future  - Hemoglobin A1c; Future  - FOOT EXAM  - Hemoglobin A1c    3. Gastroesophageal reflux disease, unspecified whether esophagitis present  - sucralfate (CARAFATE) 1 GM tablet; Take 1 tablet (1 g) by mouth 4 times daily  Dispense: 90 tablet; Refill: 1    4. Vitamin B12 deficiency (non anemic)  - cyanocobalamin injection 1,000 mcg    5. Vitamin D deficiency  - Vitamin D deficiency screening; Future  - Vitamin D deficiency screening    6. Need for vaccination  - ZOSTER RECOMBINANT ADJUVANTED (SHINGRIX)         Please note that this note consists of symbols derived from keyboarding, dictation and/or voice recognition software. As a result, there may be errors in the script that have gone undetected. Please consider this when interpreting information found in this chart.    Patient Instructions   As discussed , since all the labs checked in 9/2023 and 11/2023 , none of them are due until next year except A1C as opted 3 monthly checks.     =============================  Preventive Health Recommendations  Female Ages 50 - 64    Yearly exam: See your health care provider every year in order to  Review health changes.   Discuss preventive care.    Review your medicines if your doctor has prescribed any.    Get a Pap test every three years (unless you have an abnormal result and your provider advises testing more often).  If you get Pap tests with HPV test, you only need to test every 5 years, unless you have an abnormal result.   You do not need a Pap test if your uterus was removed (hysterectomy) and you have not had cancer.  You should be tested each year for STDs (sexually transmitted diseases) if you're at risk.   Have a mammogram every 1 to 2 years.  Have a colonoscopy at age 45, or have a yearly FIT test (stool test). These exams screen for colon cancer.    Have a cholesterol test every 5 years, or more often if advised.  Have a diabetes test (fasting  "glucose) every three years. If you are at risk for diabetes, you should have this test more often.   If you are at risk for osteoporosis (brittle bone disease), think about having a bone density scan (DEXA).    Shots: Get a flu shot each year. Get a tetanus shot every 10 years.    Nutrition:   Eat at least 5 servings of fruits and vegetables each day.  Eat whole-grain bread, whole-wheat pasta and brown rice instead of white grains and rice.  Get adequate Calcium and Vitamin D.     Lifestyle  Exercise at least 150 minutes a week (30 minutes a day, 5 days a week). This will help you control your weight and prevent disease.  Limit alcohol to one drink per day.  No smoking.   Wear sunscreen to prevent skin cancer.   See your dentist every six months for an exam and cleaning.  See your eye doctor every 1 to 2 years.    Return in about 3 months (around 4/16/2024) for diabetes, E-Visit, Follow up of last visit, If symptoms persist, video visit.    Norah Zamorano MD  [facility]        Patient has been advised of split billing requirements and indicates understanding: Yes      COUNSELING:  Reviewed preventive health counseling, as reflected in patient instructions  Special attention given to:        Regular exercise       Healthy diet/nutrition       Vision screening       Hearing screening       Immunizations  Vaccinated for: Zoster and Declined: Influenza due to Concerns about side effects/safety             Osteoporosis prevention/bone health       (Juani)menopause management      BMI:   Estimated body mass index is 26.74 kg/m  as calculated from the following:    Height as of this encounter: 1.537 m (5' 0.5\").    Weight as of this encounter: 63.1 kg (139 lb 3.2 oz).   Weight management plan: Discussed healthy diet and exercise guidelines      She reports that she has never smoked. She has never used smokeless tobacco.          Norah Zamorano MD  [facility] [place]  "

## 2024-01-17 LAB — VIT D+METAB SERPL-MCNC: 21 NG/ML (ref 20–50)

## 2024-01-17 NOTE — RESULT ENCOUNTER NOTE
Your A1C improved compared to the past though mildly above goal of 6.5, continue the current medications and additional diabetic diet modifications as below for normalization.    Please let me know if you have any questions.    Thank you,  Norah Zamorano MD on 1/16/2024  29-Jan-2018 13:10 29-Jan-2018 12:06

## 2024-01-24 ENCOUNTER — TRANSFERRED RECORDS (OUTPATIENT)
Dept: HEALTH INFORMATION MANAGEMENT | Facility: CLINIC | Age: 59
End: 2024-01-24
Payer: COMMERCIAL

## 2024-01-30 ENCOUNTER — TRANSFERRED RECORDS (OUTPATIENT)
Dept: HEALTH INFORMATION MANAGEMENT | Facility: CLINIC | Age: 59
End: 2024-01-30
Payer: COMMERCIAL

## 2024-02-01 ENCOUNTER — TRANSFERRED RECORDS (OUTPATIENT)
Dept: MULTI SPECIALTY CLINIC | Facility: CLINIC | Age: 59
End: 2024-02-01

## 2024-02-01 LAB — RETINOPATHY: NORMAL

## 2024-02-06 ENCOUNTER — MYC MEDICAL ADVICE (OUTPATIENT)
Dept: FAMILY MEDICINE | Facility: CLINIC | Age: 59
End: 2024-02-06
Payer: COMMERCIAL

## 2024-02-06 NOTE — TELEPHONE ENCOUNTER
Dr. Zamorano,    Please see pt MyChart message and advise.  Per chart review, pt had endoscopy on 1/24/24.    Anything further to advise as pt reports symptoms have not subsided?    Thank you,  Esthela Bains RN

## 2024-02-13 ENCOUNTER — OFFICE VISIT (OUTPATIENT)
Dept: PSYCHOLOGY | Facility: CLINIC | Age: 59
End: 2024-02-13
Payer: COMMERCIAL

## 2024-02-13 DIAGNOSIS — F43.22 ADJUSTMENT DISORDER WITH ANXIOUS MOOD: Primary | ICD-10-CM

## 2024-02-13 PROCEDURE — 90834 PSYTX W PT 45 MINUTES: CPT | Performed by: PSYCHOLOGIST

## 2024-02-13 NOTE — PROGRESS NOTES
[facility]                                     Progress Note    Patient Name: Ijeoma Avalos  Date: 2/13/2024       Service Type: Individual      Session Start Time: 09:05  Session End Time: 09:55     Session Length: 38-52 mins    Session #: 2 (this year, continued episode of care)    Attendees: Client attended alone    Service Modality:  In person        DATA  Interactive Complexity: No  Crisis: No      Progress Since Last Session (Related to Symptoms / Goals / Homework):   Symptoms:  Increased--anxiety    Homework:  Limited progress ; Client reported that she has attempted meditation exercises but finds herself distracted, not able to benefit       Episode of Care Goals: Satisfactory progress - ACTION (Actively working towards change); Intervened by reinforcing change plan / affirming steps taken     Current / Ongoing Stressors and Concerns:  Laid off from job (2023)  Job hunting   Family stressors     Treatment Objective(s) Addressed in This Session:   Regroup/reorganize after job layoff   increase connection in close relationships   build skills for stress management   increase self-confidence     Intervention:   Client presents with increased anxiety and associated physical symptoms (primarily GERD/GI distress). She stated that job searching and interviews have not yet resulted lissette job offer, and she is aware of increased worry related to not bringing in any income since her severance ended 2 months ago. When she looks at facts, she understands that finances are secure for at least 6 more months, but the worry persists nonetheless. Looked at the narrative she is telling herself and how she might reframe it to find a more comfortable emotional outcome (e.g., I'm shifting gears with my career path; it makes sense that would take a while; our earnings and choices from previous years have put me in the position that this is a reasonable option). Practiced in session how she can  "release muscle tension (particularly in her stomach) and reduce physiological activation. Client shared 2 potential paths she is considering as next steps for employment and highlighted great indecision about which to pursue. Worked on moving past the distortion that there is one \"right\" decision to be made. Practiced using \"wise mind,\" integrating information from her brain and her heart to help her decide where to direct her energy at this time. When taking into account information from her body and her emotional responses, Client developed clarity about which path she feels inclined to. She will talk this over with her . Also discussed options for intervening with physical symptoms of anxiety.      Assessments completed prior to visit:  The following assessments were previously completed by patient:         2/21/2018     8:44 AM 9/13/2019     3:22 PM 11/12/2019     3:00 PM 4/14/2020    11:13 AM 8/26/2021     8:13 AM 10/25/2022     2:07 PM 6/28/2023     1:05 PM   PHQ-9 SCORE   PHQ-9 Total Score MyChart      6 (Mild depression) 7 (Mild depression)   PHQ-9 Total Score 1 1 1 4 3 6 7     GAD2:       10/25/2022     2:07 PM 1/24/2023     1:57 PM 8/22/2023     9:57 AM 12/12/2023     8:59 AM   SOSA-2   Feeling nervous, anxious, or on edge 0 0 1 1   Not being able to stop or control worrying 1 1 2 1   SOSA-2 Total Score 1 1 3 2     PROMIS 10-Global Health (only subscores and total score):       4/22/2022     8:06 AM 7/29/2022     8:11 PM 10/25/2022     2:08 PM 1/24/2023     1:58 PM 6/28/2023     1:05 PM 8/22/2023     9:58 AM 12/12/2023     9:00 AM   PROMIS-10 Scores Only   Global Mental Health Score 12 12 10 14 11 11 13   Global Physical Health Score 11 11 11 14 12 12 13   PROMIS TOTAL - SUBSCORES 23 23 21 28 23 23 26      ASSESSMENT: Current Emotional / Mental Status (status of significant symptoms):   Risk status (Self / Other harm or suicidal ideation)   Patient denies current fears or concerns for personal " safety.   Patient denies current or recent suicidal ideation or behaviors.   Patient denies current or recent homicidal ideation or behaviors.   Patient denies current or recent self injurious behavior or ideation.   Patient denies other safety concerns.   Patient reports there has been no change in risk factors since her last session.    Patient reports there has been no change in protective factors since her last session.     Recommended that patient call 911 or go to the local ED should there be a change in any of these risk factors.     Appearance:   Casual, wearing winter cap    Eye Contact:   Good    Psychomotor Behavior: Appropriate    Attitude:   Open    Orientation:   All   Speech    Rate / Production: Normal     Volume:  Appropriate    Mood:    Anxious   Affect:    Congruent    Thought Content:  All-or-nothing distortions, reduced self-confidence   Thought Form:  Coherent  Logical    Insight:    Fair      Medication Review:   No changes to current psychiatric medication(s)     Medication Compliance:   Yes; infrequent use of lorazepam     Changes in Health Issues:   Yes: increased GERD reported, endoscopy scheduled in 2 months     Chemical Use Review:   Substance Use: Chemical use reviewed, no active concerns identified      Tobacco Use: No current tobacco use.      Diagnosis:  1. Adjustment disorder with anxious mood      Collateral Reports Completed:  Telephone consent       PLAN: (Patient Tasks / Therapist Tasks / Other)  Client will talk with her  about her plan to pursue technical writing classes/certification. She will give herself 3 months to focus on this work and then will re-evaluate options. She will look for a [employer] class for calming and physiological regulation. As weather permits, she will resume regular walking. Client will consider if/to what degree she would like to process a significant event from her youth. Return appointments scheduled. Client has my contact information and is  aware that she can reach out sooner if needed.     Rosa Chowdary, LP                                                      ______________________________________________________________________    Individual Treatment Plan    Patient's Name: Ijeoma Avalos  YOB: 1965    Date of Creation: 3/25/2022  Date Treatment Plan Last Reviewed/Revised: 2/13/2024    DSM5 Diagnoses: 300.00 (F41.9) Unspecified Anxiety Disorder  Psychosocial / Contextual Factors: family stressors, work stressors  PROMIS (reviewed every 90 days): 26    Referral / Collaboration:  Referral to another professional/service is not indicated at this time.    Anticipated number of session for this episode of care: estimated 12-16 sessions/year  Anticipation frequency of session: Monthly  Anticipated Duration of each session: 38-52 minutes  Treatment plan will be reviewed in 90 days or when goals have been changed.       MeasurableTreatment Goal(s) related to diagnosis / functional impairment(s)  Goal 1: Patient will build skills for stress management.    I will know I've met my goal when I am sleeping better and not waking up so often, when I don't feel worried so much of the time.   *updated on 7/1/22 (Client believes physical symptoms such as blood pressure and reflux are not the best way to measure this outcome).    Objective #A (Patient Action)    Patient will  use at least 2 coping skills for anxiety management in the next 6 weeks . Particularly as related to recent job layoff.  Status: Continued - Date(s): 2/13/2024      Intervention(s)  Therapist will teach  teach self-regulation strategies, CBT skills, mindfulness techniques .    Objective #B  Patient will  use cognitive strategies identified in therapy to challenge anxious thoughts .  Status: Continued - Date(s): 2/13/2024  *continue as maintenance goal    Intervention(s)  Therapist will  teach cognitive strategies, provide education .      Goal 2: Patient will increase  self-confidence.    I will know I've met my goal when I think more positively about myself.   *6/16/2023 meaningful progress noted in this area.    Objective #A (Patient Action)    Status:  Maintenance - Date(s):  2/13/2024    Patient will  increase assertive communication .    Intervention(s)  Therapist will  teach assertiveness skills .    Objective #B  Patient will  Identify negative self-talk and behaviors: challenge core beliefs, myths, and actions .    Status:  Maintenance:  2/13/2024    Intervention(s)  Therapist will  provide education, teach CBT skills .      Goal 3: Patient will increase connection in close relationships.    I will know I've met my goal when I feel more close with loved ones.  *10/17/23 incremental progress noted    Objective #A (Patient Action)    Status: Deferred - Date: 2/13/2024  *other goals are primary at this time    Patient will  prioritize time for meaningful relationships .    Intervention(s)  Therapist will  provide support and accountability .    Objective #B  Patient will  make use of effective interpersonal communication skills .    Status: Deferred - Date: 2/13/2024  *other goals are primary at this time    Intervention(s)  Therapist will  teach communication skills .    Goal 4: Client will regroup/reorganize after job layoff.     I will know I've met my goal when I'm not still wondering 'why' and I can see what direction I'm heading professionally.    *2/13/24 this remains primary goal    Objective #A (Client Action)    Client will  grieve losses associated with layoff and explore new employment options.  Status: Continued - Date(s): 2/13/2024     Intervention(s)  Therapist will  provide space for emotional processing; guide Client in creating an accurate narrative about the experience; provide psychoeducation around reorganization after significant loss; provide accountability and support .      Patient has reviewed and agreed to the above plan.      Rosa Chowdary,  KATHARINE  March 25, 2022      Answers for HPI/ROS submitted by the patient on 10/25/2022  If you checked off any problems, how difficult have these problems made it for you to do your work, take care of things at home, or get along with other people?: Not difficult at all  PHQ9 TOTAL SCORE: 6Answers submitted by the patient for this visit:  SOSA-7 (Submitted on 8/22/2023)  SOSA 7 TOTAL SCORE: 6

## 2024-02-16 ENCOUNTER — ALLIED HEALTH/NURSE VISIT (OUTPATIENT)
Dept: FAMILY MEDICINE | Facility: CLINIC | Age: 59
End: 2024-02-16
Payer: COMMERCIAL

## 2024-02-16 ENCOUNTER — TELEPHONE (OUTPATIENT)
Dept: GASTROENTEROLOGY | Facility: CLINIC | Age: 59
End: 2024-02-16

## 2024-02-16 DIAGNOSIS — E53.8 VITAMIN B12 DEFICIENCY (NON ANEMIC): Primary | ICD-10-CM

## 2024-02-16 PROCEDURE — 99207 PR NO CHARGE NURSE ONLY: CPT

## 2024-02-16 PROCEDURE — 96372 THER/PROPH/DIAG INJ SC/IM: CPT | Performed by: INTERNAL MEDICINE

## 2024-02-16 RX ADMIN — CYANOCOBALAMIN 1000 MCG: 1000 INJECTION, SOLUTION INTRAMUSCULAR; SUBCUTANEOUS at 09:19

## 2024-02-16 NOTE — TELEPHONE ENCOUNTER
Caller: No Call Made  Reason for Reschedule/Cancellation (please be detailed, any staff messages or encounters to note?): Case removed by site RN. Pt completed procedure at [facility]      Prior to reschedule please review:  Ordering Provider: ANABELA RUDOLPH   Sedation Determined: Moderate  Does patient have any ASC Exclusions, please identify?: No      Notes on Cancelled Procedure:  Procedure: Upper Endoscopy [EGD]   Date: 03/14/2024  Location: [facility]; [facility] [place] [address]  Surgeon: CHATO      Rescheduled: No

## 2024-02-26 ENCOUNTER — TRANSFERRED RECORDS (OUTPATIENT)
Dept: HEALTH INFORMATION MANAGEMENT | Facility: CLINIC | Age: 59
End: 2024-02-26
Payer: COMMERCIAL

## 2024-03-12 ENCOUNTER — TRANSFERRED RECORDS (OUTPATIENT)
Dept: HEALTH INFORMATION MANAGEMENT | Facility: CLINIC | Age: 59
End: 2024-03-12
Payer: COMMERCIAL

## 2024-03-13 DIAGNOSIS — E11.9 TYPE 2 DIABETES MELLITUS WITHOUT COMPLICATION, WITHOUT LONG-TERM CURRENT USE OF INSULIN (H): ICD-10-CM

## 2024-03-13 RX ORDER — METFORMIN HCL 500 MG
1000 TABLET, EXTENDED RELEASE 24 HR ORAL 2 TIMES DAILY WITH MEALS
Qty: 360 TABLET | Refills: 0 | Status: SHIPPED | OUTPATIENT
Start: 2024-03-13 | End: 2024-04-22

## 2024-03-15 ENCOUNTER — ALLIED HEALTH/NURSE VISIT (OUTPATIENT)
Dept: FAMILY MEDICINE | Facility: CLINIC | Age: 59
End: 2024-03-15
Payer: COMMERCIAL

## 2024-03-15 DIAGNOSIS — E53.8 VITAMIN B12 DEFICIENCY (NON ANEMIC): Primary | ICD-10-CM

## 2024-03-15 PROCEDURE — 99207 PR NO CHARGE NURSE ONLY: CPT

## 2024-03-15 PROCEDURE — 96372 THER/PROPH/DIAG INJ SC/IM: CPT | Performed by: INTERNAL MEDICINE

## 2024-03-15 RX ADMIN — CYANOCOBALAMIN 1000 MCG: 1000 INJECTION, SOLUTION INTRAMUSCULAR; SUBCUTANEOUS at 10:34

## 2024-03-19 ENCOUNTER — OFFICE VISIT (OUTPATIENT)
Dept: PSYCHOLOGY | Facility: CLINIC | Age: 59
End: 2024-03-19
Payer: COMMERCIAL

## 2024-03-19 DIAGNOSIS — F43.22 ADJUSTMENT DISORDER WITH ANXIOUS MOOD: Primary | ICD-10-CM

## 2024-03-19 PROCEDURE — 90834 PSYTX W PT 45 MINUTES: CPT | Performed by: PSYCHOLOGIST

## 2024-03-19 NOTE — PROGRESS NOTES
[facility]                                     Progress Note    Patient Name: Ijeoma Avalos  Date: 3/19/2024       Service Type: Individual      Session Start Time: 13:00  Session End Time: 13:51     Session Length: 38-52 mins    Session #: 3 (this year, continued episode of care)    Attendees: Client attended alone    Service Modality:  In person        DATA  Interactive Complexity: No  Crisis: No      Progress Since Last Session (Related to Symptoms / Goals / Homework):   Symptoms:  Anxiety; GI symptoms    Homework:  Completed ; Client has been attending a Edu[employer] class regularly, with benefit noted        Episode of Care Goals: Satisfactory progress - ACTION (Actively working towards change); Intervened by reinforcing change plan / affirming steps taken     Current / Ongoing Stressors and Concerns:  Laid off from job (2023)  Job hunting   Family stressors     Treatment Objective(s) Addressed in This Session:   Regroup/reorganize after job layoff   increase connection in close relationships   build skills for stress management   increase self-confidence     Intervention:   Continued work on managing stress associated with job search/career choices. Client endorsed increased physiological (GI) symptoms along with pervasive fatigue. Medical workup thus far has not revealed organic cause, and Client suspects her symptoms may be at least partly psychosomatic & exacerbated by stress. Discussed the impact of external expectations which Client has wholly internalized. Introduced strategies for worry containment and suggested use of a loving kindness meditation before bed. Client was open to this input. Offered reinforcement for the efforts she is making, including regular Eduar Chi practice.       Assessments completed prior to visit:  The following assessments were previously completed by patient:         2/21/2018     8:44 AM 9/13/2019     3:22 PM 11/12/2019     3:00 PM 4/14/2020    11:13  AM 8/26/2021     8:13 AM 10/25/2022     2:07 PM 6/28/2023     1:05 PM   PHQ-9 SCORE   PHQ-9 Total Score MyChart      6 (Mild depression) 7 (Mild depression)   PHQ-9 Total Score 1 1 1 4 3 6 7     GAD2:       10/25/2022     2:07 PM 1/24/2023     1:57 PM 8/22/2023     9:57 AM 12/12/2023     8:59 AM 3/19/2024    12:54 PM   SOSA-2   Feeling nervous, anxious, or on edge 0 0 1 1 1   Not being able to stop or control worrying 1 1 2 1 1   SOSA-2 Total Score 1 1 3 2 2     PROMIS 10-Global Health (only subscores and total score):       7/29/2022     8:11 PM 10/25/2022     2:08 PM 1/24/2023     1:58 PM 6/28/2023     1:05 PM 8/22/2023     9:58 AM 12/12/2023     9:00 AM 3/19/2024    12:56 PM   PROMIS-10 Scores Only   Global Mental Health Score 12 10 14 11 11 13 12   Global Physical Health Score 11 11 14 12 12 13 13   PROMIS TOTAL - SUBSCORES 23 21 28 23 23 26 25      ASSESSMENT: Current Emotional / Mental Status (status of significant symptoms):   Risk status (Self / Other harm or suicidal ideation)   Patient denies current fears or concerns for personal safety.   Patient denies current or recent suicidal ideation or behaviors.   Patient denies current or recent homicidal ideation or behaviors.   Patient denies current or recent self injurious behavior or ideation.   Patient denies other safety concerns.   Patient reports there has been no change in risk factors since her last session.    Patient reports there has been no change in protective factors since her last session.     Recommended that patient call 911 or go to the local ED should there be a change in any of these risk factors.     Appearance:   Appropriate; appears tired    Eye Contact:   Good    Psychomotor Behavior: Normal    Attitude:   Engaged    Orientation:   All   Speech    Rate / Production: Normal     Volume:  Appropriate    Mood:    Stressed   Affect:    Serious    Thought Content:  Fairly consumed by worries about health/physical symptoms   Thought  "Form:  Coherent  Logical    Insight:    Fair      Medication Review:   No changes to current psychiatric medication(s)     Medication Compliance:   Yes; infrequent use of lorazepam     Changes in Health Issues:   Yes: GERD symptoms increased; fatigue     Chemical Use Review:   Substance Use: Chemical use reviewed, no active concerns identified      Tobacco Use: No current tobacco use.      Diagnosis:  1. Adjustment disorder with anxious mood      Collateral Reports Completed:  Telephone consent       PLAN: (Patient Tasks / Therapist Tasks / Other)  Client will give herself a limited time each day to think/worry about her job search. After that, she will use a visual image to \"put away\" those worries (e.g., closing a drawer). She will practice a loving kindness meditation before going to sleep. Continue with Edu[employer] classes. As weather permits, she will resume regular walking. Client will consider if/to what degree she would like to process a significant event from her youth. Return appointments scheduled. Client has my contact information and is aware that she can reach out sooner if needed.     Rosa Chowdary, LP                                                      ______________________________________________________________________    Individual Treatment Plan    Patient's Name: Ijeoma Avalos  YOB: 1965    Date of Creation: 3/25/2022  Date Treatment Plan Last Reviewed/Revised: 2/13/2024    DSM5 Diagnoses: 300.00 (F41.9) Unspecified Anxiety Disorder  Psychosocial / Contextual Factors: family stressors, work stressors  PROMIS (reviewed every 90 days): 26    Referral / Collaboration:  Referral to another professional/service is not indicated at this time.    Anticipated number of session for this episode of care: estimated 12-16 sessions/year  Anticipation frequency of session: Monthly  Anticipated Duration of each session: 38-52 minutes  Treatment plan will be reviewed in 90 days or when goals " have been changed.       MeasurableTreatment Goal(s) related to diagnosis / functional impairment(s)  Goal 1: Patient will build skills for stress management.    I will know I've met my goal when I am sleeping better and not waking up so often, when I don't feel worried so much of the time.   *updated on 7/1/22 (Client believes physical symptoms such as blood pressure and reflux are not the best way to measure this outcome).    Objective #A (Patient Action)    Patient will  use at least 2 coping skills for anxiety management in the next 6 weeks . Particularly as related to recent job layoff.  Status: Continued - Date(s): 2/13/2024      Intervention(s)  Therapist will teach  teach self-regulation strategies, CBT skills, mindfulness techniques .    Objective #B  Patient will  use cognitive strategies identified in therapy to challenge anxious thoughts .  Status: Continued - Date(s): 2/13/2024  *continue as maintenance goal    Intervention(s)  Therapist will  teach cognitive strategies, provide education .      Goal 2: Patient will increase self-confidence.    I will know I've met my goal when I think more positively about myself.   *6/16/2023 meaningful progress noted in this area.    Objective #A (Patient Action)    Status:  Maintenance - Date(s):  2/13/2024    Patient will  increase assertive communication .    Intervention(s)  Therapist will  teach assertiveness skills .    Objective #B  Patient will  Identify negative self-talk and behaviors: challenge core beliefs, myths, and actions .    Status:  Maintenance:  2/13/2024    Intervention(s)  Therapist will  provide education, teach CBT skills .      Goal 3: Patient will increase connection in close relationships.    I will know I've met my goal when I feel more close with loved ones.  *10/17/23 incremental progress noted    Objective #A (Patient Action)    Status: Deferred - Date: 2/13/2024  *other goals are primary at this time    Patient will  prioritize time  for meaningful relationships .    Intervention(s)  Therapist will  provide support and accountability .    Objective #B  Patient will  make use of effective interpersonal communication skills .    Status: Deferred - Date: 2/13/2024  *other goals are primary at this time    Intervention(s)  Therapist will  teach communication skills .    Goal 4: Client will regroup/reorganize after job layoff.     I will know I've met my goal when I'm not still wondering 'why' and I can see what direction I'm heading professionally.    *2/13/24 this remains primary goal    Objective #A (Client Action)    Client will  grieve losses associated with layoff and explore new employment options.  Status: Continued - Date(s): 2/13/2024     Intervention(s)  Therapist will  provide space for emotional processing; guide Client in creating an accurate narrative about the experience; provide psychoeducation around reorganization after significant loss; provide accountability and support .      Patient has reviewed and agreed to the above plan.      Rosa Chowdary LP  March 25, 2022      Answers for HPI/ROS submitted by the patient on 10/25/2022  If you checked off any problems, how difficult have these problems made it for you to do your work, take care of things at home, or get along with other people?: Not difficult at all  PHQ9 TOTAL SCORE: 6Answers submitted by the patient for this visit:  SOSA-7 (Submitted on 8/22/2023)  SOSA 7 TOTAL SCORE: 6

## 2024-03-27 DIAGNOSIS — E78.5 HYPERLIPIDEMIA WITH TARGET LDL LESS THAN 100: ICD-10-CM

## 2024-03-28 DIAGNOSIS — E03.9 HYPOTHYROIDISM, UNSPECIFIED TYPE: ICD-10-CM

## 2024-03-28 RX ORDER — ATORVASTATIN CALCIUM 20 MG/1
20 TABLET, FILM COATED ORAL DAILY
Qty: 90 TABLET | Refills: 2 | Status: SHIPPED | OUTPATIENT
Start: 2024-03-28

## 2024-03-28 RX ORDER — LEVOTHYROXINE SODIUM 75 UG/1
TABLET ORAL
Qty: 102 TABLET | Refills: 1 | Status: SHIPPED | OUTPATIENT
Start: 2024-03-28 | End: 2024-09-23

## 2024-04-03 ENCOUNTER — HOSPITAL ENCOUNTER (OUTPATIENT)
Dept: NUCLEAR MEDICINE | Facility: CLINIC | Age: 59
Setting detail: NUCLEAR MEDICINE
Discharge: HOME | End: 2024-04-03
Attending: INTERNAL MEDICINE | Admitting: INTERNAL MEDICINE
Payer: COMMERCIAL

## 2024-04-03 DIAGNOSIS — R14.0 ABDOMINAL BLOATING: ICD-10-CM

## 2024-04-03 DIAGNOSIS — R10.13 EPIGASTRIC PAIN: ICD-10-CM

## 2024-04-03 DIAGNOSIS — R11.0 NAUSEA: ICD-10-CM

## 2024-04-03 DIAGNOSIS — R63.0 DECREASED APPETITE: ICD-10-CM

## 2024-04-03 PROCEDURE — 78264 GASTRIC EMPTYING IMG STUDY: CPT

## 2024-04-03 PROCEDURE — 343N000001 HC RX 343: Performed by: INTERNAL MEDICINE

## 2024-04-03 PROCEDURE — A9541 TC99M SULFUR COLLOID: HCPCS | Performed by: INTERNAL MEDICINE

## 2024-04-03 RX ADMIN — Medication 1 MILLICURIE: at 08:00

## 2024-04-07 ENCOUNTER — MYC MEDICAL ADVICE (OUTPATIENT)
Dept: FAMILY MEDICINE | Facility: CLINIC | Age: 59
End: 2024-04-07
Payer: COMMERCIAL

## 2024-04-08 ENCOUNTER — TRANSFERRED RECORDS (OUTPATIENT)
Dept: HEALTH INFORMATION MANAGEMENT | Facility: CLINIC | Age: 59
End: 2024-04-08
Payer: COMMERCIAL

## 2024-04-09 NOTE — TELEPHONE ENCOUNTER
Hi Dr. Zamorano,    Patient referral pended for your review if appropriate. If you prefer a visit first, please route back to triage or directly message patient.    Thank you,  JAZ Pizano Triage

## 2024-04-10 NOTE — TELEPHONE ENCOUNTER
I would prefer to discuss and do the requested orders . Please cancel the lab appointment as A1C doesn't need fasting. She can do on the visit with me next week anyway as scheduled.    Thank you  Norah Zamorano MD on 4/9/2024

## 2024-04-12 ENCOUNTER — ALLIED HEALTH/NURSE VISIT (OUTPATIENT)
Dept: FAMILY MEDICINE | Facility: CLINIC | Age: 59
End: 2024-04-12
Payer: COMMERCIAL

## 2024-04-12 ENCOUNTER — LAB (OUTPATIENT)
Dept: LAB | Facility: CLINIC | Age: 59
End: 2024-04-12
Payer: COMMERCIAL

## 2024-04-12 DIAGNOSIS — E11.69 TYPE 2 DIABETES MELLITUS WITH OTHER SPECIFIED COMPLICATION, WITHOUT LONG-TERM CURRENT USE OF INSULIN (H): Primary | ICD-10-CM

## 2024-04-12 DIAGNOSIS — E53.8 VITAMIN B12 DEFICIENCY (NON ANEMIC): Primary | ICD-10-CM

## 2024-04-12 LAB — HBA1C MFR BLD: 7 % (ref 0–5.6)

## 2024-04-12 PROCEDURE — 83036 HEMOGLOBIN GLYCOSYLATED A1C: CPT

## 2024-04-12 PROCEDURE — 36415 COLL VENOUS BLD VENIPUNCTURE: CPT

## 2024-04-12 PROCEDURE — 99207 PR NO CHARGE NURSE ONLY: CPT

## 2024-04-12 NOTE — PROGRESS NOTES
Clinic Administered Medication Documentation        Patient was given Cyanocobalamin B12. Prior to medication administration, verified patient's identity using patient s name and date of birth. Please see MAR and medication order for additional information. Patient instructed to remain in clinic for 15 minutes and report any adverse reaction to staff immediately.    Vial/Syringe: Single dose vial. Was entire vial of medication used? Yes      Krystal Slaughter MA on 4/12/2024 at 3:01 PM   Left deltoid

## 2024-04-17 ENCOUNTER — VIRTUAL VISIT (OUTPATIENT)
Dept: FAMILY MEDICINE | Facility: CLINIC | Age: 59
End: 2024-04-17
Payer: COMMERCIAL

## 2024-04-17 DIAGNOSIS — K31.84 GASTROPARESIS DUE TO DM (H): Primary | ICD-10-CM

## 2024-04-17 DIAGNOSIS — E11.69 TYPE 2 DIABETES MELLITUS WITH OTHER SPECIFIED COMPLICATION, WITHOUT LONG-TERM CURRENT USE OF INSULIN (H): ICD-10-CM

## 2024-04-17 DIAGNOSIS — E11.43 GASTROPARESIS DUE TO DM (H): Primary | ICD-10-CM

## 2024-04-17 PROCEDURE — 99214 OFFICE O/P EST MOD 30 MIN: CPT | Mod: 95 | Performed by: INTERNAL MEDICINE

## 2024-04-17 RX ORDER — GLIPIZIDE 5 MG/1
5 TABLET, FILM COATED, EXTENDED RELEASE ORAL DAILY
Qty: 90 TABLET | Refills: 1 | Status: SHIPPED | OUTPATIENT
Start: 2024-04-17

## 2024-04-17 RX ORDER — METOCLOPRAMIDE 5 MG/1
5 TABLET ORAL 4 TIMES DAILY PRN
Qty: 30 TABLET | Refills: 1 | Status: SHIPPED | OUTPATIENT
Start: 2024-04-17

## 2024-04-17 NOTE — PROGRESS NOTES
Ijeoma is a 58 year old who is being evaluated via a billable video visit.    How would you like to obtain your AVS? MyChart  If the video visit is dropped, the invitation should be resent by: Text to cell phone: [phone]  Will anyone else be joining your video visit? No          Assessment and Plan  1. Gastroparesis due to DM (H)  New problem added to patient problem list, after recent NM gastric emptying study done by [facility].  Open the scanned reports and discussed with the patient, answered all the questions.  Given that patient is already following the diet modifications, discussed on options of medications which she was interested in as well.  Shared decision to start off on Reglan with all side effects explained, will start at low-dose as needed which she understood and agreed with the plan.  -Patient requesting for gastroenterology with [facility] as she could not get complete understanding from [facility] on her recent experiences after this gastric emptying study and positive gastroparesis which was probably not well-managed by the team as she endorses.  -Discussed on following dilation with diet modifications for improvement as well.  - Adult GI  Referral - Consult Only; Future  - metoclopramide (REGLAN) 5 MG tablet; Take 1 tablet (5 mg) by mouth 4 times daily as needed (fullness of stomach)  Dispense: 30 tablet; Refill: 1    2. Type 2 diabetes mellitus with other specified complication, without long-term current use of insulin (H)  Uncontrolled, A1c check ordered before the patient appointment with me.  It is remaining uncontrolled at 7% mildly worsened compared to the past 6.8%.  Patient is already taking metformin 2000 mg daily extended release, glipizide 2.5 mg daily extended release.  Shared decision to increase the dose of glipizide to 5 mg daily.  Patient understood and agreed with the plan.  - glipiZIDE (GLUCOTROL XL) 5 MG 24 hr tablet; Take 1 tablet (5 mg) by mouth daily  Dispense: 90  tablet; Refill: 1         Please note that this note consists of symbols derived from keyboarding, dictation and/or voice recognition software. As a result, there may be errors in the script that have gone undetected. Please consider this when interpreting information found in this chart.    Patient Instructions   As discussed given your uncontrolled diabetes will go ahead and increase the dose of your glipizide to 5 mg daily, continue current metformin at the same dose.    Given the recent diagnosis of gastroparesis with [facility] on board and as requested for second opinion with referral at [facility] will go ahead and place a new referral as requested.    Sent in Reglan as per shared decision to use as needed for any symptoms of fullness in your stomach, emphasized to follow-up on diet modifications before you plan on starting medication again.  Sent to the medication to pharmacy.  Return in about 3 months (around 7/17/2024), or if symptoms worsen or fail to improve, for diabetes, video visit.    Norah Zamorano MD  [facility] LAZARUS Raphael is a 58 year old, presenting for the following health issues:  Diabetes        4/17/2024     5:53 PM   Additional Questions   Roomed by Philip     History of Present Illness       Diabetes:   She presents for follow up of diabetes.  She is checking home blood glucose one time daily.   She checks blood glucose before meals.  Blood glucose is never over 200 and never under 70.     She has no concerns regarding her diabetes at this time.   She is not experiencing numbness or burning in feet, excessive thirst, blurry vision, weight changes or redness, sores or blisters on feet. The patient has had a diabetic eye exam in the last 12 months. Eye exam performed on 02/01/2024 - n/a but in feb. Location of last eye exam [facility].        She eats 2-3 servings of fruits and vegetables daily.She consumes 0 sweetened beverage(s) daily.She  exercises with enough effort to increase her heart rate 20 to 29 minutes per day.  She exercises with enough effort to increase her heart rate 7 days per week.   She is taking medications regularly.       Last seen patient in 2024 for annual physical, she is here for diabetes follow-up.  In the interim she had endoscopy with [facility].       Allergies   Allergen Reactions    Cephalexin Hives and Rash     Hives on hands, face and stomach.     Ibuprofen      sneezing    Nsaids Other (See Comments)    Shellfish Allergy Nausea and Nausea and Vomiting    Sulfamethoxazole Rash    Trimethoprim Rash        Past Medical History:   Diagnosis Date    Arthritis 2022    Gastric acidity     Gestational diabetes mellitus     DIET CONTROL    Hx of previous reproductive problem     IVF    Hypothyroid     Motion sickness     Ovarian cyst     Post-operative nausea and vomiting 2017       Past Surgical History:   Procedure Laterality Date    APPENDECTOMY       SECTION  2014    Procedure:  SECTION;  Surgeon: Ru Cano MD;  Location:  L+D    CHOLECYSTECTOMY      cyst removed      left  lower leg on bone sheath    DAVINCI HYSTERECTOMY TOTAL, SALPINGECTOMY BILATERAL N/A 2017    Procedure: DAVINCI HYSTERECTOMY TOTAL, SALPINGECTOMY BILATERAL;  DAVINCI SINGLE SITE HYSTERECTOMY, BILATERAL SALPINGECTOMY, LEFT OOPHORECTOMY, LYSIS OF ADHESIONS (LAPAROSCOPIC BAG TO RETREIVE OVARY);  Surgeon: Ru Cano MD;  Location:  OR    DAVINCI LYSIS OF ADHESIONS N/A 2017    Procedure: DAVINCI LYSIS OF ADHESIONS;;  Surgeon: Ru Cano MD;  Location:  OR    DAVINCI OOPHORECTOMY N/A 2017    Procedure: DAVINCI OOPHORECTOMY;;  Surgeon: Ru Cano MD;  Location:  OR    GI SURGERY      MYOMECTOMY UTERUS  2012    ZZ GASTROSCOPY,FL         Family History   Problem Relation Age of Onset    Hypertension Father     Diabetes Other         maternal aunt -2 of them     Breast Cancer No family hx of     Colon Cancer No family hx of     Coronary Artery Disease No family hx of        Social History     Tobacco Use    Smoking status: Never    Smokeless tobacco: Never   Substance Use Topics    Alcohol use: No     Alcohol/week: 0.0 standard drinks of alcohol     Comment: social        Current Outpatient Medications   Medication Sig Dispense Refill    atorvastatin (LIPITOR) 20 MG tablet Take 1 tablet (20 mg) by mouth daily 90 tablet 2    blood glucose (ACCU-CHEK GUIDE) test strip USE TO TEST BLOOD SUGAR 1 TO 2 TIMES DAILY OR AS DIRECTED 200 strip 0    blood glucose monitoring (ACCU-CHEK GINA PLUS) meter device kit Use to test blood sugar 1-2 times daily or as directed. 1 kit 0    blood glucose monitoring (SOFTCLIX) lancets Use to test blood sugar 1-2 times daily or as directed. 100 each 0    Continuous Blood Gluc Sensor (DEXCOM G7 SENSOR) MISC 1 each every 10 days Change sensor every 10 days 3 each 5    cyanocobalamin (VITAMIN B-12) 1000 MCG tablet Take 1 tablet (1,000 mcg) by mouth daily 90 tablet 1    Ferrous Sulfate 324 (65 Fe) MG TBEC Take 324 mg by mouth daily      fexofenadine (ALLEGRA) 60 MG tablet Take 1 tablet (60 mg) by mouth 2 times daily 60 tablet 1    fluticasone (FLONASE) 50 MCG/ACT nasal spray Spray 1 spray into both nostrils daily 18.2 mL 1    glipiZIDE (GLUCOTROL XL) 5 MG 24 hr tablet Take 1 tablet (5 mg) by mouth daily 90 tablet 1    levothyroxine (SYNTHROID/LEVOTHROID) 75 MCG tablet TAKE 1 TABLET BY MOUTH DAILY AND 2 TABLETS EVERY SUNDAY 102 tablet 1    lisinopril (ZESTRIL) 10 MG tablet Take 1 tablet (10 mg) by mouth daily 90 tablet 0    metFORMIN (GLUCOPHAGE XR) 500 MG 24 hr tablet Take 2 tablets (1,000 mg) by mouth 2 times daily (with meals) 360 tablet 0    metoclopramide (REGLAN) 5 MG tablet Take 1 tablet (5 mg) by mouth 4 times daily as needed (fullness of stomach) 30 tablet 1    omeprazole (PRILOSEC) 40 MG DR capsule Take 1 capsule by mouth once daily 90 capsule  2    ondansetron (ZOFRAN ODT) 4 MG ODT tab Take 1 tablet (4 mg) by mouth every 8 hours as needed for nausea 15 tablet 0    sucralfate (CARAFATE) 1 GM tablet Take 1 tablet (1 g) by mouth 4 times daily 90 tablet 1    vitamin D2 (ERGOCALCIFEROL) 04264 units (1250 mcg) capsule Take 1 capsule (50,000 Units) by mouth once a week 12 capsule 0     Current Facility-Administered Medications   Medication Dose Route Frequency Provider Last Rate Last Admin    cyanocobalamin injection 1,000 mcg  1,000 mcg Intramuscular Q30 Days Norah Zamorano MD   1,000 mcg at 03/15/24 1034          Review of Systems  Constitutional, HEENT, cardiovascular, pulmonary, GI, , musculoskeletal, neuro, skin, endocrine and psych systems are negative, except as otherwise noted.      Objective           Vitals:  No vitals were obtained today due to virtual visit.    Physical Exam   GENERAL: alert and no distress  EYES: Eyes grossly normal to inspection.  No discharge or erythema, or obvious scleral/conjunctival abnormalities.  RESP: No audible wheeze, cough, or visible cyanosis.    SKIN: Visible skin clear. No significant rash, abnormal pigmentation or lesions.  NEURO: Cranial nerves grossly intact.  Mentation and speech appropriate for age.  PSYCH: Appropriate affect, tone, and pace of words      Video-Visit Details    Type of service:  Video Visit   Originating Location (pt. Location): Home    Distant Location (provider location):  On-site  Platform used for Video Visit: Delfin  Signed Electronically by: Norah Zamorano MD

## 2024-04-17 NOTE — PATIENT INSTRUCTIONS
As discussed given your uncontrolled diabetes will go ahead and increase the dose of your glipizide to 5 mg daily, continue current metformin at the same dose.    Given the recent diagnosis of gastroparesis with [facility] on board and as requested for second opinion with referral at [facility] will go ahead and place a new referral as requested.    Sent in Reglan as per shared decision to use as needed for any symptoms of fullness in your stomach, emphasized to follow-up on diet modifications before you plan on starting medication again.  Sent to the medication to pharmacy.

## 2024-04-21 DIAGNOSIS — Z00.00 ROUTINE GENERAL MEDICAL EXAMINATION AT A HEALTH CARE FACILITY: ICD-10-CM

## 2024-04-21 DIAGNOSIS — E11.9 TYPE 2 DIABETES MELLITUS WITHOUT COMPLICATION, WITHOUT LONG-TERM CURRENT USE OF INSULIN (H): ICD-10-CM

## 2024-04-21 DIAGNOSIS — I10 ESSENTIAL HYPERTENSION: ICD-10-CM

## 2024-04-22 RX ORDER — METFORMIN HCL 500 MG
1000 TABLET, EXTENDED RELEASE 24 HR ORAL 2 TIMES DAILY WITH MEALS
Qty: 360 TABLET | Refills: 1 | Status: SHIPPED | OUTPATIENT
Start: 2024-04-22

## 2024-04-22 RX ORDER — LISINOPRIL 10 MG/1
10 TABLET ORAL DAILY
Qty: 90 TABLET | Refills: 1 | Status: SHIPPED | OUTPATIENT
Start: 2024-04-22

## 2024-04-25 ENCOUNTER — MYC REFILL (OUTPATIENT)
Dept: FAMILY MEDICINE | Facility: CLINIC | Age: 59
End: 2024-04-25
Payer: COMMERCIAL

## 2024-04-25 DIAGNOSIS — I10 ESSENTIAL HYPERTENSION: ICD-10-CM

## 2024-04-25 DIAGNOSIS — Z00.00 ROUTINE GENERAL MEDICAL EXAMINATION AT A HEALTH CARE FACILITY: ICD-10-CM

## 2024-04-25 DIAGNOSIS — E11.9 TYPE 2 DIABETES MELLITUS WITHOUT COMPLICATION, WITHOUT LONG-TERM CURRENT USE OF INSULIN (H): ICD-10-CM

## 2024-04-25 RX ORDER — METFORMIN HCL 500 MG
1000 TABLET, EXTENDED RELEASE 24 HR ORAL 2 TIMES DAILY WITH MEALS
Qty: 360 TABLET | Refills: 1 | OUTPATIENT
Start: 2024-04-25

## 2024-04-25 RX ORDER — LISINOPRIL 10 MG/1
10 TABLET ORAL DAILY
Qty: 90 TABLET | Refills: 1 | OUTPATIENT
Start: 2024-04-25

## 2024-05-13 ENCOUNTER — ALLIED HEALTH/NURSE VISIT (OUTPATIENT)
Dept: FAMILY MEDICINE | Facility: CLINIC | Age: 59
End: 2024-05-13
Payer: COMMERCIAL

## 2024-05-13 DIAGNOSIS — E53.8 VITAMIN B12 DEFICIENCY (NON ANEMIC): Primary | ICD-10-CM

## 2024-05-13 PROCEDURE — 96372 THER/PROPH/DIAG INJ SC/IM: CPT | Performed by: INTERNAL MEDICINE

## 2024-05-13 PROCEDURE — 99207 PR NO CHARGE NURSE ONLY: CPT

## 2024-05-13 RX ADMIN — CYANOCOBALAMIN 1000 MCG: 1000 INJECTION, SOLUTION INTRAMUSCULAR; SUBCUTANEOUS at 08:01

## 2024-05-13 NOTE — PROGRESS NOTES
Clinic Administered Medication Documentation        Patient was given Cyanocobalamin B12. Prior to medication administration, verified patient's identity using patient s name and date of birth. Please see MAR and medication order for additional information. Patient instructed to remain in clinic for 15 minutes and report any adverse reaction to staff immediately.    Vial/Syringe: Single dose vial. Was entire vial of medication used? Yes      Krystal Slaughter MA on 5/13/2024 at 8:04 AM

## 2024-06-06 ENCOUNTER — VIRTUAL VISIT (OUTPATIENT)
Dept: PSYCHOLOGY | Facility: CLINIC | Age: 59
End: 2024-06-06
Payer: COMMERCIAL

## 2024-06-06 DIAGNOSIS — F43.22 ADJUSTMENT DISORDER WITH ANXIOUS MOOD: Primary | ICD-10-CM

## 2024-06-06 PROCEDURE — 90834 PSYTX W PT 45 MINUTES: CPT | Mod: 93 | Performed by: PSYCHOLOGIST

## 2024-06-06 NOTE — PROGRESS NOTES
"        [facility]                                     Progress Note    Patient Name: Ijeoma Avalos  Date: 6/6/2024       Service Type: Individual      Session Start Time: 14:03  Session End Time: 14:54     Session Length: 38-52 mins    Session #: 4 (this year, continued episode of care)    Attendees: Client attended alone    Service Modality: Telephone visit    Provider verified identity through the following two step process.  Patient provided:  Patient is known previously to provider    Telephone Visit: The patient's condition can be safely assessed and treated via synchronous audio telemedicine encounter.      Reason for Audio Telemedicine Visit: Patient convenience (e.g. access to timely appointments / distance to available provider)    Originating Site (Patient Location): Patient's place of employment    Distant Site (Provider Location): Provider Remote Setting- Home Office    Consent:  The patient/guardian has verbally consented to:     1. The potential risks and benefits of telemedicine (telephone visit) versus in person care;    The patient has been notified of the following:      \"We have found that certain health care needs can be provided without the need for a face to face visit.  This service lets us provide the care you need with a phone conversation.       I will have full access to your [facility] medical record during this entire phone call.   I will be taking notes for your medical record.      Since this is like an office visit, we will bill your insurance company for this service.       There are potential benefits and risks of telephone visits (e.g. limits to patient confidentiality) that differ from in-person visits.?Confidentiality still applies for telephone services, and nobody will record the visit.  It is important to be in a quiet, private space that is free of distractions (including cell phone or other devices) during the visit.??      If during the " "course of the call I believe a telephone visit is not appropriate, you will not be charged for this service\"     Consent has been obtained for this service by care team member: Yes            DATA  Interactive Complexity: No  Crisis: No      Progress Since Last Session (Related to Symptoms / Goals / Homework):   Symptoms:  Some improvement    Homework:  Variable progress ;Client is still working on containing worried thoughts        Episode of Care Goals: Satisfactory progress - ACTION (Actively working towards change); Intervened by reinforcing change plan / affirming steps taken     Current / Ongoing Stressors and Concerns:  New job  Medical concerns     Treatment Objective(s) Addressed in This Session:  increase connection in close relationships   build skills for stress management   increase self-confidence     Intervention:   Since our last session, Client accepted a new job. Processed her experience thus far and how she is navigating the required changes. Worked on realistic expectations around this major transition, having some patience with herself as she figures out new routines and systems for work and home. She described some positive changes in her health--improved gut functioning, decreased discomfort, and able to reduce some medications. She is walking more and exercising at the gym regularly. Pointed out that she can give herself credit for keeping up with these health behaviors even while making the shift to a new job. She does note some benefit to mood and sleep as a result of these health changes. Discussed Client's questions related to talking with her daughter about reproductive issues.        Assessments completed prior to visit:  The following assessments were previously completed by patient:         2/21/2018     8:44 AM 9/13/2019     3:22 PM 11/12/2019     3:00 PM 4/14/2020    11:13 AM 8/26/2021     8:13 AM 10/25/2022     2:07 PM 6/28/2023     1:05 PM   PHQ-9 SCORE   PHQ-9 Total Score Jose "      6 (Mild depression) 7 (Mild depression)   PHQ-9 Total Score 1 1 1 4 3 6 7     GAD2:       10/25/2022     2:07 PM 1/24/2023     1:57 PM 8/22/2023     9:57 AM 12/12/2023     8:59 AM 3/19/2024    12:54 PM   SOSA-2   Feeling nervous, anxious, or on edge 0 0 1 1 1   Not being able to stop or control worrying 1 1 2 1 1   SOSA-2 Total Score 1 1 3 2 2     PROMIS 10-Global Health (only subscores and total score):       7/29/2022     8:11 PM 10/25/2022     2:08 PM 1/24/2023     1:58 PM 6/28/2023     1:05 PM 8/22/2023     9:58 AM 12/12/2023     9:00 AM 3/19/2024    12:56 PM   PROMIS-10 Scores Only   Global Mental Health Score 12 10 14 11 11 13 12   Global Physical Health Score 11 11 14 12 12 13 13   PROMIS TOTAL - SUBSCORES 23 21 28 23 23 26 25      ASSESSMENT: Current Emotional / Mental Status (status of significant symptoms):   Risk status (Self / Other harm or suicidal ideation)   Patient denies current fears or concerns for personal safety.   Patient denies current or recent suicidal ideation or behaviors.   Patient denies current or recent homicidal ideation or behaviors.   Patient denies current or recent self injurious behavior or ideation.   Patient denies other safety concerns.   Patient reports there has been no change in risk factors since her last session.    Patient reports there has been no change in protective factors since her last session.     Recommended that patient call 911 or go to the local ED should there be a change in any of these risk factors.     Appearance:   Unable to assess    Eye Contact:   Unable to assess    Psychomotor Behavior: Unable to assess    Attitude:   Open, interested    Orientation:   All   Speech    Rate / Production: Normal     Volume:  Appropriate    Mood:    Some improvement   Affect:    Unable to assess    Thought Content:  Worries, hard on herself   Thought Form:  Coherent  Logical    Insight:    Fair      Medication Review:   No changes to current psychiatric  medication(s)     Medication Compliance:   Yes; infrequent use of lorazepam     Changes in Health Issues:   Yes: improved gut health, decreased digestive pain noted     Chemical Use Review:   Substance Use: Chemical use reviewed, no active concerns identified      Tobacco Use: No current tobacco use.      Diagnosis:  1. Adjustment disorder with anxious mood      Collateral Reports Completed:  Telephone consent       PLAN: (Patient Tasks / Therapist Tasks / Other)  Complete treatment plan review next session. Client will modify her expectations and allow herself some bobby as she adjusts to new routines and systems at home and school, reminding herself that she doesn't have to have everything figured out today. In discussing important issues with her daughter, Client will reassure herself that this will be a series of conversations (not just one where she has to get everything right). She will start with basic facts in a limited amount and allow her daughter to react or ask questions. She will use these conversations as a way to convey her values.She will continue to prioritize regular physical activity. Continue: use loving kindness meditation at bedtime. Client will consider if/to what degree she would like to process a significant event from her youth. Return appointments scheduled. Client has my contact information and is aware that she can reach out sooner if needed.     Rosa Chowdary, KATHARINE                                                      ______________________________________________________________________    Individual Treatment Plan    Patient's Name: Ijeoma Avalos  YOB: 1965    Date of Creation: 3/25/2022  Date Treatment Plan Last Reviewed/Revised: 2/13/2024    DSM5 Diagnoses: 300.00 (F41.9) Unspecified Anxiety Disorder  Psychosocial / Contextual Factors: family stressors, work stressors  PROMIS (reviewed every 90 days): 26    Referral / Collaboration:  Referral to another  professional/service is not indicated at this time.    Anticipated number of session for this episode of care: estimated 12-16 sessions/year  Anticipation frequency of session: Monthly  Anticipated Duration of each session: 38-52 minutes  Treatment plan will be reviewed in 90 days or when goals have been changed.       MeasurableTreatment Goal(s) related to diagnosis / functional impairment(s)  Goal 1: Patient will build skills for stress management.    I will know I've met my goal when I am sleeping better and not waking up so often, when I don't feel worried so much of the time.   *updated on 7/1/22 (Client believes physical symptoms such as blood pressure and reflux are not the best way to measure this outcome).    Objective #A (Patient Action)    Patient will  use at least 2 coping skills for anxiety management in the next 6 weeks . Particularly as related to recent job layoff.  Status: Continued - Date(s): 2/13/2024      Intervention(s)  Therapist will teach  teach self-regulation strategies, CBT skills, mindfulness techniques .    Objective #B  Patient will  use cognitive strategies identified in therapy to challenge anxious thoughts .  Status: Continued - Date(s): 2/13/2024  *continue as maintenance goal    Intervention(s)  Therapist will  teach cognitive strategies, provide education .      Goal 2: Patient will increase self-confidence.    I will know I've met my goal when I think more positively about myself.   *6/16/2023 meaningful progress noted in this area.    Objective #A (Patient Action)    Status:  Maintenance - Date(s):  2/13/2024    Patient will  increase assertive communication .    Intervention(s)  Therapist will  teach assertiveness skills .    Objective #B  Patient will  Identify negative self-talk and behaviors: challenge core beliefs, myths, and actions .    Status:  Maintenance:  2/13/2024    Intervention(s)  Therapist will  provide education, teach CBT skills .      Goal 3: Patient will  increase connection in close relationships.    I will know I've met my goal when I feel more close with loved ones.  *10/17/23 incremental progress noted    Objective #A (Patient Action)    Status: Deferred - Date: 2/13/2024  *other goals are primary at this time    Patient will  prioritize time for meaningful relationships .    Intervention(s)  Therapist will  provide support and accountability .    Objective #B  Patient will  make use of effective interpersonal communication skills .    Status: Deferred - Date: 2/13/2024  *other goals are primary at this time    Intervention(s)  Therapist will  teach communication skills .    Goal 4: Client will regroup/reorganize after job layoff.     I will know I've met my goal when I'm not still wondering 'why' and I can see what direction I'm heading professionally.    *2/13/24 this remains primary goal    Objective #A (Client Action)    Client will  grieve losses associated with layoff and explore new employment options.  Status: Continued - Date(s): 2/13/2024     Intervention(s)  Therapist will  provide space for emotional processing; guide Client in creating an accurate narrative about the experience; provide psychoeducation around reorganization after significant loss; provide accountability and support .      Patient has reviewed and agreed to the above plan.      Rosa Chowdary LP  March 25, 2022      Answers for HPI/ROS submitted by the patient on 10/25/2022  If you checked off any problems, how difficult have these problems made it for you to do your work, take care of things at home, or get along with other people?: Not difficult at all  PHQ9 TOTAL SCORE: 6Answers submitted by the patient for this visit:  SOSA-7 (Submitted on 8/22/2023)  SOSA 7 TOTAL SCORE: 6

## 2024-07-08 ENCOUNTER — MYC MEDICAL ADVICE (OUTPATIENT)
Dept: FAMILY MEDICINE | Facility: CLINIC | Age: 59
End: 2024-07-08
Payer: COMMERCIAL

## 2024-07-10 NOTE — TELEPHONE ENCOUNTER
Please let pt know that for A1C we normally require diabetes follow up visit as requested in her instructions of last VV.     OK to use same day slot in 2-3 weeks.    Thank you  Norah Zamorano MD on 7/9/2024

## 2024-07-25 ENCOUNTER — LAB (OUTPATIENT)
Dept: LAB | Facility: CLINIC | Age: 59
End: 2024-07-25
Payer: COMMERCIAL

## 2024-07-25 DIAGNOSIS — E11.69 TYPE 2 DIABETES MELLITUS WITH OTHER SPECIFIED COMPLICATION, WITHOUT LONG-TERM CURRENT USE OF INSULIN (H): Primary | ICD-10-CM

## 2024-07-25 LAB — HBA1C MFR BLD: 6.4 % (ref 0–5.6)

## 2024-07-25 PROCEDURE — 83036 HEMOGLOBIN GLYCOSYLATED A1C: CPT

## 2024-07-25 PROCEDURE — 36415 COLL VENOUS BLD VENIPUNCTURE: CPT

## 2024-07-26 NOTE — RESULT ENCOUNTER NOTE
Your A1C at goal , continue the current medications.    Please let me know if you have any questions.    Thank you,  Norah Zamorano MD on 7/26/2024 at 6:11 PM

## 2024-08-24 DIAGNOSIS — E11.9 NEW ONSET TYPE 2 DIABETES MELLITUS (H): ICD-10-CM

## 2024-08-26 RX ORDER — BLOOD SUGAR DIAGNOSTIC
STRIP MISCELLANEOUS
Qty: 200 STRIP | Refills: 0 | Status: SHIPPED | OUTPATIENT
Start: 2024-08-26

## 2024-08-27 ENCOUNTER — OFFICE VISIT (OUTPATIENT)
Dept: PSYCHOLOGY | Facility: CLINIC | Age: 59
End: 2024-08-27
Payer: COMMERCIAL

## 2024-08-27 DIAGNOSIS — F41.1 GENERALIZED ANXIETY DISORDER: Primary | ICD-10-CM

## 2024-08-27 PROCEDURE — 90834 PSYTX W PT 45 MINUTES: CPT | Performed by: PSYCHOLOGIST

## 2024-08-27 NOTE — PROGRESS NOTES
"[facility]                                     Progress Note    Patient Name: Ijeoma Avalos  Date: 8/27/2024       Service Type: Individual      Session Start Time: 14:00  Session End Time: 14:51     Session Length: 38-52 mins    Session #: 5 (this year, continued episode of care)    Attendees: Client attended alone    Service Modality: In person        DATA  Interactive Complexity: No  Crisis: No      Progress Since Last Session (Related to Symptoms / Goals / Homework):   Symptoms:  Improved    Homework:  Good progress ;Client has internalized the message that it's OK if she doesn't know everything about her new job right away, she is realistic in her expectations for how much she can learn and how quickly, not comparing herself to others--this is significant progress         Episode of Care Goals: Satisfactory progress - ACTION (Actively working towards change); Intervened by reinforcing change plan / affirming steps taken     Current / Ongoing Stressors and Concerns:  New job  Medical concerns--improving     Treatment Objective(s) Addressed in This Session:  increase self-confidence   build skills for stress management      Intervention:   Completed treatment plan review. Identified areas of growth and agreed upon goals for continued therapeutic work. Client notes that increasing self confidence is a primary goal, along with stress/anxiety management. Client noted that her stress level overall is lower with her current job and given some improvements in her health and relationships, but she often still feels anxious (without specific identifiable cause). Discussed working toward feelings of \"enough\" and \"contentment\" and how to cultivate these, with both physiological and cognitive interventions. Observed that despite changes in the external environment over the years, anxiety has persisted (to varying degrees)--seeking internal peace, despite external conditions.    Assessments " completed prior to visit:  The following assessments were previously completed by patient:         2/21/2018     8:44 AM 9/13/2019     3:22 PM 11/12/2019     3:00 PM 4/14/2020    11:13 AM 8/26/2021     8:13 AM 10/25/2022     2:07 PM 6/28/2023     1:05 PM   PHQ-9 SCORE   PHQ-9 Total Score MyChart      6 (Mild depression) 7 (Mild depression)   PHQ-9 Total Score 1 1 1 4 3 6 7     GAD2:       10/25/2022     2:07 PM 1/24/2023     1:57 PM 8/22/2023     9:57 AM 12/12/2023     8:59 AM 3/19/2024    12:54 PM 8/27/2024     1:51 PM   SOSA-2   Feeling nervous, anxious, or on edge 0 0 1 1 1 0   Not being able to stop or control worrying 1 1 2 1 1 1   SOSA-2 Total Score 1 1 3 2 2 1     PROMIS 10-Global Health (only subscores and total score):       10/25/2022     2:08 PM 1/24/2023     1:58 PM 6/28/2023     1:05 PM 8/22/2023     9:58 AM 12/12/2023     9:00 AM 3/19/2024    12:56 PM 8/27/2024     1:52 PM   PROMIS-10 Scores Only   Global Mental Health Score 10 14 11 11 13 12 14   Global Physical Health Score 11 14 12 12 13 13 15   PROMIS TOTAL - SUBSCORES 21 28 23 23 26 25 29      ASSESSMENT: Current Emotional / Mental Status (status of significant symptoms):   Risk status (Self / Other harm or suicidal ideation)   Patient denies current fears or concerns for personal safety.   Patient denies current or recent suicidal ideation or behaviors.   Patient denies current or recent homicidal ideation or behaviors.   Patient denies current or recent self injurious behavior or ideation.   Patient denies other safety concerns.   Patient reports there has been no change in risk factors since her last session.    Patient reports there has been no change in protective factors since her last session.     Recommended that patient call 911 or go to the local ED should there be a change in any of these risk factors.     Appearance:   Appropriate    Eye Contact:   Good    Psychomotor Behavior: Normal    Attitude:   Engaged  "   Orientation:   All   Speech    Rate / Production: Normal/ Responsive    Volume:  Appropriate    Mood:    Improved; lower stress but still anxious   Affect:    Appropriate    Thought Content:  Anxious thoughts, \"busy\" brain   Thought Form:  Coherent  Logical    Insight:    Fair      Medication Review:   No changes to current psychiatric medication(s)     Medication Compliance:   Yes; infrequent use of lorazepam     Changes in Health Issues:   Yes: A1C improved, gut health continues to be better     Chemical Use Review:   Substance Use: Chemical use reviewed, no active concerns identified      Tobacco Use: No current tobacco use.      Diagnosis:  1. Generalized anxiety disorder    *given resolution of acute stressor related to job loss and persistence of anxiety/worry symptoms, working diagnosis of SOSA is most appropriate    Collateral Reports Completed:  PHQ-2  SOSA-2  PROMIS-10       PLAN: (Patient Tasks / Therapist Tasks / Other)  Client will use breathing/yoga to practice physiological relaxation & release of muscle tension. At the same time, she will use calming words (e.g., in this moment, everything is fine; there is nothing else expected of me; I have all I need, etc.). As her brain starts drifting to worries or other thoughts, she will gently bring it back to the present. Even if she is only able to hold the moment for a few seconds at a time, this is valuable practice. She will continue with regular walks midday at work. Return appointment scheduled in ~2 months. Client has my contact information and is aware that she can reach out sooner if needed.     Rosa Chowdary LP                                                      ______________________________________________________________________    Individual Treatment Plan    Patient's Name: Ijeoma Avalos  YOB: 1965    Date of Creation: 3/25/2022  Date Treatment Plan Last Reviewed/Revised: 8/27/2024    DSM5 Diagnoses: 300.00 (F41.9) " Unspecified Anxiety Disorder  Psychosocial / Contextual Factors: family stressors, work stressors  PROMIS (reviewed every 90 days): 29    Referral / Collaboration:  Referral to another professional/service is not indicated at this time.    Anticipated number of session for this episode of care: estimated 12-16 sessions/year  Anticipation frequency of session: Monthly  Anticipated Duration of each session: 38-52 minutes  Treatment plan will be reviewed in 90 days or when goals have been changed.       MeasurableTreatment Goal(s) related to diagnosis / functional impairment(s)  Goal 1: Patient will build skills for stress management.    I will know I've met my goal when I am sleeping better and not waking up so often, when I don't feel worried so much of the time.   *updated on 7/1/22 (Client believes physical symptoms such as blood pressure and reflux are not the best way to measure this outcome).    Objective #A (Patient Action)    Patient will  use at least 2 coping skills for anxiety management in the next 6 weeks . Particularly as related to recent job layoff.  Status: Continued - Date(s): 8/27/2024      Intervention(s)  Therapist will teach  teach self-regulation strategies, CBT skills, mindfulness techniques .    Objective #B  Patient will  use cognitive strategies identified in therapy to challenge anxious thoughts .  Status: Continued - Date(s): 8/27/2024  *continue as maintenance goal    Intervention(s)  Therapist will  teach cognitive strategies, provide education .      Goal 2: Patient will increase self-confidence.    I will know I've met my goal when I think more positively about myself.   *8/27/24 Patient identifies this as a primary goal    Objective #A (Patient Action)    Status:  Maintenance - Date(s):  8/27/2024    Patient will  increase assertive communication .    Intervention(s)  Therapist will  teach assertiveness skills .    Objective #B  Patient will  Identify negative self-talk and behaviors:  challenge core beliefs, myths, and actions .    Status:  Maintenance:  8/27/2024    Intervention(s)  Therapist will  provide education, teach CBT skills .      Goal 3: Patient will increase connection in close relationships.    I will know I've met my goal when I feel more close with loved ones.  *Patient is satisfied with progress in this area, no longer a source of significant distress.    Objective #A (Patient Action)    Status: Completed - Date: 8/27/24      Patient will  prioritize time for meaningful relationships .    Intervention(s)  Therapist will  provide support and accountability .    Objective #B  Patient will  make use of effective interpersonal communication skills .    Status: Completed - Date: 8/27/24      Intervention(s)  Therapist will  teach communication skills .    Goal 4: Client will regroup/reorganize after job layoff.     I will know I've met my goal when I'm not still wondering 'why' and I can see what direction I'm heading professionally.       Objective #A (Client Action)    Client will  grieve losses associated with layoff and explore new employment options.  Status: Completed - Date: 8/27/24      Intervention(s)  Therapist will  provide space for emotional processing; guide Client in creating an accurate narrative about the experience; provide psychoeducation around reorganization after significant loss; provide accountability and support .      Patient has reviewed and agreed to the above plan.      Rosa Chowdary LP  March 25, 2022      Answers for HPI/ROS submitted by the patient on 10/25/2022  If you checked off any problems, how difficult have these problems made it for you to do your work, take care of things at home, or get along with other people?: Not difficult at all  PHQ9 TOTAL SCORE: 6Answers submitted by the patient for this visit:  SOSA-7 (Submitted on 8/22/2023)  SOSA 7 TOTAL SCORE: 6

## 2024-08-28 DIAGNOSIS — E11.9 NEW ONSET TYPE 2 DIABETES MELLITUS (H): ICD-10-CM

## 2024-08-28 RX ORDER — LANCETS
EACH MISCELLANEOUS
Qty: 100 EACH | Refills: 2 | Status: SHIPPED | OUTPATIENT
Start: 2024-08-28

## 2024-09-18 ENCOUNTER — TRANSFERRED RECORDS (OUTPATIENT)
Dept: HEALTH INFORMATION MANAGEMENT | Facility: CLINIC | Age: 59
End: 2024-09-18
Payer: COMMERCIAL

## 2024-09-22 DIAGNOSIS — E03.9 HYPOTHYROIDISM, UNSPECIFIED TYPE: ICD-10-CM

## 2024-09-23 RX ORDER — LEVOTHYROXINE SODIUM 75 UG/1
TABLET ORAL
Qty: 102 TABLET | Refills: 0 | Status: SHIPPED | OUTPATIENT
Start: 2024-09-23

## 2024-09-24 ENCOUNTER — TELEPHONE (OUTPATIENT)
Dept: FAMILY MEDICINE | Facility: CLINIC | Age: 59
End: 2024-09-24
Payer: COMMERCIAL

## 2024-09-24 NOTE — TELEPHONE ENCOUNTER
Chief Complaint   Patient presents with    Appointment     Per provider's request to call patient and assist patient with follow up DM visit, virtual. Provider had opening. NEREYDAM.      Jacqueline Weber CMA 9/24/2024

## 2024-09-24 NOTE — TELEPHONE ENCOUNTER
Refill done.    Due for Video visit of diabetes follow up on or after 10/25/24 - Please assist to schedule. OK to use same day slot    Thank you,  Norah Zamorano MD on 9/23/2024

## 2024-10-10 ENCOUNTER — TELEPHONE (OUTPATIENT)
Dept: FAMILY MEDICINE | Facility: CLINIC | Age: 59
End: 2024-10-10
Payer: COMMERCIAL

## 2024-10-10 NOTE — TELEPHONE ENCOUNTER
General Call    Contacts       Contact Date/Time Type Contact Phone/Fax    10/10/2024 04:54 PM CDT Phone (Incoming) Ijeoma Avalos (Self) [phone] (M)          Reason for Call:  lab orders    What are your questions or concerns:  pt has a follow up for meds on 10/23 and she was wondering if she should do the labs before the follow up    Date of last appointment with provider:     Could we send this information to you in [facility] or would you prefer to receive a phone call?:   No preference   Okay to leave a detailed message?: Yes at Cell number on file:    Telephone Information:   Mobile [phone]

## 2024-10-11 ENCOUNTER — DOCUMENTATION ONLY (OUTPATIENT)
Dept: FAMILY MEDICINE | Facility: CLINIC | Age: 59
End: 2024-10-11
Payer: COMMERCIAL

## 2024-10-11 NOTE — PROGRESS NOTES
Ijeoma Avalos has an upcoming lab appointment:    Future Appointments   Date Time Provider Department Center   10/18/2024 11:45 AM [facility]   10/23/2024  5:00 PM Norah Zamorano MD [facility]   10/29/2024  2:00 PM Rosa Chowdary LP [facility]     Patient is scheduled for the following lab(s): pre-appt labs    There is no order available. Please review and place either future orders or HMPO (Review of Health Maintenance Protocol Orders), as appropriate.    Health Maintenance Due   Topic    ANNUAL REVIEW OF HM ORDERS     LIPID     MICROALBUMIN     TSH W/FREE T4 REFLEX     A1C     PRIYA Joe

## 2024-10-14 NOTE — TELEPHONE ENCOUNTER
Please cancel the lab only appt.   Will order labs in pt upcoming OV as scheduled after discussing.     Thank you  Norah Zamorano MD on 10/13/2024

## 2024-10-15 DIAGNOSIS — K21.9 GASTROESOPHAGEAL REFLUX DISEASE: ICD-10-CM

## 2024-10-16 RX ORDER — OMEPRAZOLE 40 MG/1
CAPSULE, DELAYED RELEASE ORAL
Qty: 90 CAPSULE | Refills: 1 | Status: SHIPPED | OUTPATIENT
Start: 2024-10-16

## 2024-10-23 ENCOUNTER — VIRTUAL VISIT (OUTPATIENT)
Dept: FAMILY MEDICINE | Facility: CLINIC | Age: 59
End: 2024-10-23
Payer: COMMERCIAL

## 2024-10-23 DIAGNOSIS — E03.9 HYPOTHYROIDISM, UNSPECIFIED TYPE: Chronic | ICD-10-CM

## 2024-10-23 DIAGNOSIS — E11.69 TYPE 2 DIABETES MELLITUS WITH OTHER SPECIFIED COMPLICATION, WITHOUT LONG-TERM CURRENT USE OF INSULIN (H): Primary | ICD-10-CM

## 2024-10-23 DIAGNOSIS — I10 ESSENTIAL HYPERTENSION: ICD-10-CM

## 2024-10-23 DIAGNOSIS — E53.8 VITAMIN B12 DEFICIENCY (NON ANEMIC): ICD-10-CM

## 2024-10-23 DIAGNOSIS — E55.9 VITAMIN D DEFICIENCY: ICD-10-CM

## 2024-10-23 DIAGNOSIS — E78.5 HYPERLIPIDEMIA WITH TARGET LDL LESS THAN 100: ICD-10-CM

## 2024-10-23 PROCEDURE — G2211 COMPLEX E/M VISIT ADD ON: HCPCS | Mod: 95 | Performed by: INTERNAL MEDICINE

## 2024-10-23 PROCEDURE — 99214 OFFICE O/P EST MOD 30 MIN: CPT | Mod: 95 | Performed by: INTERNAL MEDICINE

## 2024-10-23 RX ORDER — ESOMEPRAZOLE MAGNESIUM 40 MG/1
CAPSULE, DELAYED RELEASE ORAL
COMMUNITY
Start: 2024-04-03 | End: 2024-10-23

## 2024-10-23 RX ORDER — ESOMEPRAZOLE MAGNESIUM 40 MG/1
CAPSULE, DELAYED RELEASE ORAL
COMMUNITY
Start: 2024-03-04 | End: 2024-10-23

## 2024-10-23 NOTE — PATIENT INSTRUCTIONS
Please do fasting lab only appointment as discussed on all the labs placed as opted which are due at this time.    Refills will be taken care.  ============================

## 2024-10-23 NOTE — PROGRESS NOTES
Ijeoma is a 58 year old who is being evaluated via a billable video visit.    How would you like to obtain your AVS? MyChart  If the video visit is dropped, the invitation should be resent by: Text to cell phone: [phone]  Will anyone else be joining your video visit? No        Assessment and Plan  1. Type 2 diabetes mellitus with other specified complication, without long-term current use of insulin (H) (Primary)  Chronic problem, well-controlled with recent A1c  in July 2024 showing 6.4%., improved from previous 7% patient is currently on metformin 1000 mg twice daily, supposed to be on glipizide 5 mg daily which she states that she has not been taking.  -Will consider checking her A1c as requested, emphasized that if A1c remains well-controlled we can make it every 6 monthly follow-ups which she understood and agreed with the plan.  - REVIEW OF HEALTH MAINTENANCE PROTOCOL ORDERS  - Hemoglobin A1c; Future  - Albumin Random Urine Quantitative with Creat Ratio; Future    2. Hypothyroidism, unspecified type  - TSH with free T4 reflex; Future    3. Essential hypertension  - Comprehensive metabolic panel (BMP + Alb, Alk Phos, ALT, AST, Total. Bili, TP); Future    4. Hyperlipidemia with target LDL less than 100  - Lipid panel reflex to direct LDL Fasting; Future    5. Vitamin B12 deficiency (non anemic)  - Vitamin B12; Future    6. Vitamin D deficiency  - Vitamin D deficiency screening; Future       The longitudinal plan of care for the diagnosis(es)/condition(s) as documented were addressed during this visit. Due to the added complexity in care, I will continue to support Ijeoma in the subsequent management and with ongoing continuity of care.    Please note that this note consists of symbols derived from keyboarding, dictation and/or voice recognition software. As a result, there may be errors in the script that have gone undetected. Please consider this when interpreting information found in this  chart.    Patient Instructions   Please do fasting lab only appointment as discussed on all the labs placed as opted which are due at this time.    Refills will be taken care.  ============================          Return in about 3 months (around 1/23/2025), or if symptoms worsen or fail to improve, for Preventative Visit, diabetes.    Norah Zamorano MD  [facility]AR[facility] Raphael is a 58 year old, presenting for the following health issues:  Diabetes        5/13/2024     8:05 AM   Additional Questions   Roomed by Krystal LOPEZ     History of Present Illness       Diabetes:   She presents for follow up of diabetes.  She is checking home blood glucose two times daily.   She checks blood glucose before and after meals.  Blood glucose is never over 200 and never under 70.  When her blood glucose is low, the patient is asymptomatic for confusion, blurred vision, lethargy and reports not feeling dizzy, shaky, or weak.  She is concerned about other.    She is not experiencing numbness or burning in feet, excessive thirst, blurry vision, weight changes or redness, sores or blisters on feet.           Hypothyroidism:     Since last visit, patient describes the following symptoms::  None    She eats 2-3 servings of fruits and vegetables daily.She consumes 0 sweetened beverage(s) daily.She exercises with enough effort to increase her heart rate 10 to 19 minutes per day.  She exercises with enough effort to increase her heart rate 4 days per week.   She is taking medications regularly.       Last seen pt on 4/2024 for VV of diabetes follow up.  Last A1c in July 2024 showing 6.4%., improved from previous 7% patient is currently on metformin 1000 mg twice daily, supposed to be on glipizide 5 mg daily which she states that she has not been taking.       Allergies   Allergen Reactions    Cephalexin Hives and Rash     Hives on hands, face and stomach.     Ibuprofen      sneezing    Nsaids Other  (See Comments)    Shellfish Allergy Nausea and Nausea and Vomiting    Sulfamethoxazole Rash    Trimethoprim Rash        Past Medical History:   Diagnosis Date    Arthritis 2022    Gastric acidity     Gestational diabetes mellitus     DIET CONTROL    Hx of previous reproductive problem     IVF    Hypothyroid     Motion sickness     Ovarian cyst     Post-operative nausea and vomiting 2017       Past Surgical History:   Procedure Laterality Date    APPENDECTOMY       SECTION  2014    Procedure:  SECTION;  Surgeon: Ru Cano MD;  Location:  [facility]    CHOLECYSTECTOMY      cyst removed      left  lower leg on bone sheath    DAVINCI HYSTERECTOMY TOTAL, SALPINGECTOMY BILATERAL N/A 2017    Procedure: DAVINCI HYSTERECTOMY TOTAL, SALPINGECTOMY BILATERAL;  DAVINCI SINGLE SITE HYSTERECTOMY, BILATERAL SALPINGECTOMY, LEFT OOPHORECTOMY, LYSIS OF ADHESIONS (LAPAROSCOPIC BAG TO RETREIVE OVARY);  Surgeon: Ru Cano MD;  Location: [facility]    DAVINCI LYSIS OF ADHESIONS N/A 2017    Procedure: DAVINCI LYSIS OF ADHESIONS;;  Surgeon: Ru Cano MD;  Location:  OR    DAVINCI OOPHORECTOMY N/A 2017    Procedure: DAVINCI OOPHORECTOMY;;  Surgeon: Ru Cano MD;  Location:  OR    GI SURGERY      MYOMECTOMY UTERUS  2012    ZZ GASTROSCOPY,FL         Family History   Problem Relation Age of Onset    Hypertension Father     Diabetes Other         maternal aunt -2 of them    Breast Cancer No family hx of     Colon Cancer No family hx of     Coronary Artery Disease No family hx of        Social History     Tobacco Use    Smoking status: Never    Smokeless tobacco: Never   Substance Use Topics    Alcohol use: No     Alcohol/week: 0.0 standard drinks of alcohol     Comment: social        Current Outpatient Medications   Medication Sig Dispense Refill    atorvastatin (LIPITOR) 20 MG tablet Take 1 tablet (20 mg) by mouth daily 90 tablet 2    blood glucose  (ACCU-CHEK GUIDE) test strip USE TO TEST BLOOD SUGAR 1 TO 2 TIMES DAILY OR AS DIRECTED 200 strip 0    blood glucose monitoring (ACCU-CHEK GINA PLUS) meter device kit Use to test blood sugar 1-2 times daily or as directed. 1 kit 0    blood glucose monitoring (SOFTCLIX) lancets USE TO TEST BLOOD SUGAR 1 TO 2 TIMES DAILY OR AS DIRECTED. 100 each 2    Continuous Blood Gluc Sensor (DEXCOM G7 SENSOR) MISC 1 each every 10 days Change sensor every 10 days 3 each 5    cyanocobalamin (VITAMIN B-12) 1000 MCG tablet Take 1 tablet (1,000 mcg) by mouth daily 90 tablet 1    Ferrous Sulfate 324 (65 Fe) MG TBEC Take 324 mg by mouth daily      fexofenadine (ALLEGRA) 60 MG tablet Take 1 tablet (60 mg) by mouth 2 times daily 60 tablet 1    fluticasone (FLONASE) 50 MCG/ACT nasal spray Spray 1 spray into both nostrils daily 18.2 mL 1    levothyroxine (SYNTHROID/LEVOTHROID) 75 MCG tablet TAKE 1 TABLET BY MOUTH DAILY AND 2 TABLETS EVERY SUNDAY 102 tablet 0    lisinopril (ZESTRIL) 10 MG tablet Take 1 tablet (10 mg) by mouth daily 90 tablet 1    metFORMIN (GLUCOPHAGE XR) 500 MG 24 hr tablet Take 2 tablets (1,000 mg) by mouth 2 times daily (with meals) 360 tablet 1    omeprazole (PRILOSEC) 40 MG DR capsule Take 1 capsule by mouth once daily 90 capsule 1    ondansetron (ZOFRAN ODT) 4 MG ODT tab Take 1 tablet (4 mg) by mouth every 8 hours as needed for nausea 15 tablet 0    sucralfate (CARAFATE) 1 GM tablet Take 1 tablet (1 g) by mouth 4 times daily 90 tablet 1    vitamin D2 (ERGOCALCIFEROL) 75613 units (1250 mcg) capsule Take 1 capsule (50,000 Units) by mouth once a week 12 capsule 0     Current Facility-Administered Medications   Medication Dose Route Frequency Provider Last Rate Last Admin    cyanocobalamin injection 1,000 mcg  1,000 mcg Intramuscular Q30 Days Norah Zamorano MD   1,000 mcg at 05/13/24 0801          Review of Systems  Constitutional, HEENT, cardiovascular, pulmonary, GI, , musculoskeletal, neuro, skin, endocrine and  psych systems are negative, except as otherwise noted.      Objective           Vitals:  No vitals were obtained today due to virtual visit.    Physical Exam   GENERAL: alert and no distress  EYES: Eyes grossly normal to inspection.  No discharge or erythema, or obvious scleral/conjunctival abnormalities.  RESP: No audible wheeze, cough, or visible cyanosis.    SKIN: Visible skin clear. No significant rash, abnormal pigmentation or lesions.  NEURO: Cranial nerves grossly intact.  Mentation and speech appropriate for age.  PSYCH: Appropriate affect, tone, and pace of words      Video-Visit Details    Type of service:  Video Visit   Originating Location (pt. Location): Home    Distant Location (provider location):  On-site  Platform used for Video Visit: [place]  Signed Electronically by: Norah Zamorano MD

## 2024-10-29 ENCOUNTER — OFFICE VISIT (OUTPATIENT)
Dept: PSYCHOLOGY | Facility: CLINIC | Age: 59
End: 2024-10-29
Payer: COMMERCIAL

## 2024-10-29 DIAGNOSIS — F41.1 GENERALIZED ANXIETY DISORDER: Primary | ICD-10-CM

## 2024-10-29 PROCEDURE — 90834 PSYTX W PT 45 MINUTES: CPT | Performed by: PSYCHOLOGIST

## 2024-10-29 NOTE — PROGRESS NOTES
"[facility]                                     Progress Note    Patient Name: Ijeoma Avalos  Date: 10/29/2024       Service Type: Individual      Session Start Time: 14:01  Session End Time: 14:52     Session Length: 38-52 mins    Session #: 6 (this year, continued episode of care)    Attendees: Client attended alone    Service Modality: In person        DATA  Interactive Complexity: No  Crisis: No      Progress Since Last Session (Related to Symptoms / Goals / Homework):   Symptoms:  Stable    Homework:  Gradual progress ;Client is trying to cultivate contentment, calming her brain and her body to be in the moment and recognize \"right now everything is OK.\" This is largely unfamiliar territory but she continues to practice         Episode of Care Goals: Satisfactory progress - ACTION (Actively working towards change); Intervened by reinforcing change plan / affirming steps taken     Current / Ongoing Stressors and Concerns:  New job  Medical concerns--improving     Treatment Objective(s) Addressed in This Session:  increase self-confidence   build skills for stress management      Intervention:   Client presents with stable mood and functioning. She reports that improvements to her physical health have been maintained, and job expectations are manageable, so stress has been lower overall. She is doing well with eating and sleeping habits. She endorses intermittent worry about \"am I doing enough?\" And wondering if she should be striving for something more. Discussed the ongoing process of unlearning messages that are not helpful or serving her well. When using a values-based decision model, Client readily identifies that the tradeoffs with her current job are clearly preferable to the situation she was previous in. 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 Discussed some recent examples of situations that allowed Client to model her values for her pre-teen daughter.    Assessments completed prior to visit:  The following assessments were previously completed by patient:         2/21/2018     8:44 AM 9/13/2019     3:22 PM 11/12/2019     3:00 PM 4/14/2020    11:13 AM 8/26/2021     8:13 AM 10/25/2022     2:07 PM 6/28/2023     1:05 PM   PHQ-9 SCORE   PHQ-9 Total Score MyChart      6 (Mild depression) 7 (Mild depression)   PHQ-9 Total Score 1 1 1 4 3 6 7     GAD2:       10/25/2022     2:07 PM 1/24/2023     1:57 PM 8/22/2023     9:57 AM 12/12/2023     8:59 AM 3/19/2024    12:54 PM 8/27/2024     1:51 PM   SOSA-2   Feeling nervous, anxious, or on edge 0  0  1  1  1  0    Not being able to stop or control worrying 1  1  2  1  1  1    SOSA-2 Total Score 1 1 3 2 2 1       Patient-reported     PROMIS 10-Global Health (only subscores and total score):       10/25/2022     2:08 PM 1/24/2023     1:58 PM 6/28/2023     1:05 PM 8/22/2023     9:58 AM 12/12/2023     9:00 AM 3/19/2024    12:56 PM 8/27/2024     1:52 PM   PROMIS-10 Scores Only   Global Mental Health Score 10 14 11 11 13 12 14   Global Physical Health Score 11 14 12 12 13 13 15   PROMIS TOTAL - SUBSCORES 21 28 23 23 26 25 29      ASSESSMENT: Current Emotional / Mental Status (status of significant symptoms):   Risk status (Self / Other harm or suicidal ideation)   Patient denies current fears or concerns for personal safety.   Patient denies current or recent suicidal ideation or behaviors.   Patient denies current or recent homicidal ideation or behaviors.   Patient denies current or recent self injurious behavior or ideation.   Patient denies other safety concerns.   Patient reports there has been no change in risk factors since her last session.    Patient reports there has been no change in protective factors since her last  session.     Recommended that patient call 911 or go to the local ED should there be a change in any of these risk factors     Appearance:   Appropriately dressed & groomed    Eye Contact:   Good    Psychomotor Behavior: Normal    Attitude:   Open    Orientation:   All   Speech    Rate / Production: Normal     Volume:  Appropriate    Mood:    Stable, anxiety somewhat lower   Affect:    Appropriate    Thought Content:  Beliefs related to productivity and achievement as tied to worthiness   Thought Form:  Coherent  Logical    Insight:    Fair      Medication Review:   No changes to current psychiatric medication(s)     Medication Compliance:   Yes     Changes in Health Issues:   None reported     Chemical Use Review:   Substance Use: Chemical use reviewed, no active concerns identified      Tobacco Use: No current tobacco use.      Diagnosis:  1. Generalized anxiety disorder    *given resolution of acute stressor related to job loss and persistence of anxiety/worry symptoms, working diagnosis of SOSA is most appropriate    Collateral Reports Completed:  None       PLAN: (Patient Tasks / Therapist Tasks / Other)  Client will give herself some time and some bobby as she adjusts to a more calm, present-focused mindset (peace over achievement). Continue to work on cultivating contentment and recognizing moments where everything is OK. She is developing a 2-3 year timeline to prepare for her next career move, into writing. Continue: Client will use breathing/yoga to practice physiological relaxation & release of muscle tension. At the same time, she will use calming words (e.g., in this moment, everything is fine; there is nothing else expected of me; I have all I need, etc.). As her brain starts drifting to worries or other thoughts, she will gently bring it back to the present. Even if she is only able to hold the moment for a few seconds at a time, this is valuable practice. She will continue with regular walks midday at  work. Given current symptom level and functioning, return appointment scheduled in ~2 months. Client has my contact information and is aware that she can reach out sooner if needed.     Rosa MILLERMikie Epi, LP                                                      ______________________________________________________________________    Individual Treatment Plan    Patient's Name: Ijeoma Avalos  YOB: 1965    Date of Creation: 3/25/2022  Date Treatment Plan Last Reviewed/Revised: 8/27/2024    DSM5 Diagnoses: 300.00 (F41.9) Unspecified Anxiety Disorder  Psychosocial / Contextual Factors: family stressors, work stressors  PROMIS (reviewed every 90 days): 29    Referral / Collaboration:  Referral to another professional/service is not indicated at this time.    Anticipated number of session for this episode of care: estimated 12-16 sessions/year  Anticipation frequency of session: Monthly  Anticipated Duration of each session: 38-52 minutes  Treatment plan will be reviewed in 90 days or when goals have been changed.       MeasurableTreatment Goal(s) related to diagnosis / functional impairment(s)  Goal 1: Patient will build skills for stress management.    I will know I've met my goal when I am sleeping better and not waking up so often, when I don't feel worried so much of the time.   *updated on 7/1/22 (Client believes physical symptoms such as blood pressure and reflux are not the best way to measure this outcome).    Objective #A (Patient Action)    Patient will  use at least 2 coping skills for anxiety management in the next 6 weeks . Particularly as related to recent job layoff.  Status: Continued - Date(s): 8/27/2024      Intervention(s)  Therapist will teach  teach self-regulation strategies, CBT skills, mindfulness techniques .    Objective #B  Patient will  use cognitive strategies identified in therapy to challenge anxious thoughts .  Status: Continued - Date(s): 8/27/2024  *continue as  maintenance goal    Intervention(s)  Therapist will  teach cognitive strategies, provide education .      Goal 2: Patient will increase self-confidence.    I will know I've met my goal when I think more positively about myself.   *8/27/24 Patient identifies this as a primary goal    Objective #A (Patient Action)    Status:  Maintenance - Date(s):  8/27/2024    Patient will  increase assertive communication .    Intervention(s)  Therapist will  teach assertiveness skills .    Objective #B  Patient will  Identify negative self-talk and behaviors: challenge core beliefs, myths, and actions .    Status:  Maintenance:  8/27/2024    Intervention(s)  Therapist will  provide education, teach CBT skills .      Goal 3: Patient will increase connection in close relationships.    I will know I've met my goal when I feel more close with loved ones.  *Patient is satisfied with progress in this area, no longer a source of significant distress.    Objective #A (Patient Action)    Status: Completed - Date: 8/27/24      Patient will  prioritize time for meaningful relationships .    Intervention(s)  Therapist will  provide support and accountability .    Objective #B  Patient will  make use of effective interpersonal communication skills .    Status: Completed - Date: 8/27/24      Intervention(s)  Therapist will  teach communication skills .    Goal 4: Client will regroup/reorganize after job layoff.     I will know I've met my goal when I'm not still wondering 'why' and I can see what direction I'm heading professionally.       Objective #A (Client Action)    Client will  grieve losses associated with layoff and explore new employment options.  Status: Completed - Date: 8/27/24      Intervention(s)  Therapist will  provide space for emotional processing; guide Client in creating an accurate narrative about the experience; provide psychoeducation around reorganization after significant loss; provide accountability and support  .      Patient has reviewed and agreed to the above plan.      Rosa Chowdary LP  March 25, 2022      Answers for HPI/ROS submitted by the patient on 10/25/2022  If you checked off any problems, how difficult have these problems made it for you to do your work, take care of things at home, or get along with other people?: Not difficult at all  PHQ9 TOTAL SCORE: 6Answers submitted by the patient for this visit:  SOSA-7 (Submitted on 8/22/2023)  SOSA 7 TOTAL SCORE: 6

## 2024-11-06 ENCOUNTER — LAB (OUTPATIENT)
Dept: LAB | Facility: CLINIC | Age: 59
End: 2024-11-06
Payer: COMMERCIAL

## 2024-11-06 DIAGNOSIS — E53.8 VITAMIN B12 DEFICIENCY (NON ANEMIC): ICD-10-CM

## 2024-11-06 DIAGNOSIS — E55.9 VITAMIN D DEFICIENCY: ICD-10-CM

## 2024-11-06 DIAGNOSIS — I10 ESSENTIAL HYPERTENSION: ICD-10-CM

## 2024-11-06 DIAGNOSIS — E78.5 HYPERLIPIDEMIA WITH TARGET LDL LESS THAN 100: ICD-10-CM

## 2024-11-06 DIAGNOSIS — E03.9 HYPOTHYROIDISM, UNSPECIFIED TYPE: Chronic | ICD-10-CM

## 2024-11-06 DIAGNOSIS — E11.69 TYPE 2 DIABETES MELLITUS WITH OTHER SPECIFIED COMPLICATION, WITHOUT LONG-TERM CURRENT USE OF INSULIN (H): ICD-10-CM

## 2024-11-06 LAB
ALBUMIN SERPL BCG-MCNC: 4.4 G/DL (ref 3.5–5.2)
ALP SERPL-CCNC: 72 U/L (ref 40–150)
ALT SERPL W P-5'-P-CCNC: 13 U/L (ref 0–50)
ANION GAP SERPL CALCULATED.3IONS-SCNC: 13 MMOL/L (ref 7–15)
AST SERPL W P-5'-P-CCNC: 18 U/L (ref 0–45)
BILIRUB SERPL-MCNC: 0.7 MG/DL
BUN SERPL-MCNC: 10 MG/DL (ref 6–20)
CALCIUM SERPL-MCNC: 9.4 MG/DL (ref 8.8–10.4)
CHLORIDE SERPL-SCNC: 103 MMOL/L (ref 98–107)
CHOLEST SERPL-MCNC: 198 MG/DL
CREAT SERPL-MCNC: 0.66 MG/DL (ref 0.51–0.95)
CREAT UR-MCNC: 300 MG/DL
EGFRCR SERPLBLD CKD-EPI 2021: >90 ML/MIN/1.73M2
EST. AVERAGE GLUCOSE BLD GHB EST-MCNC: 146 MG/DL
FASTING STATUS PATIENT QL REPORTED: YES
FASTING STATUS PATIENT QL REPORTED: YES
GLUCOSE SERPL-MCNC: 111 MG/DL (ref 70–99)
HBA1C MFR BLD: 6.7 % (ref 0–5.6)
HCO3 SERPL-SCNC: 24 MMOL/L (ref 22–29)
HDLC SERPL-MCNC: 51 MG/DL
LDLC SERPL CALC-MCNC: 108 MG/DL
MICROALBUMIN UR-MCNC: 132 MG/L
MICROALBUMIN/CREAT UR: 44 MG/G CR (ref 0–25)
NONHDLC SERPL-MCNC: 147 MG/DL
POTASSIUM SERPL-SCNC: 4.2 MMOL/L (ref 3.4–5.3)
PROT SERPL-MCNC: 7.5 G/DL (ref 6.4–8.3)
SODIUM SERPL-SCNC: 140 MMOL/L (ref 135–145)
TRIGL SERPL-MCNC: 196 MG/DL
TSH SERPL DL<=0.005 MIU/L-ACNC: 1.07 UIU/ML (ref 0.3–4.2)
VIT B12 SERPL-MCNC: 406 PG/ML (ref 232–1245)
VIT D+METAB SERPL-MCNC: 21 NG/ML (ref 20–50)

## 2024-11-06 PROCEDURE — 80061 LIPID PANEL: CPT

## 2024-11-06 PROCEDURE — 82607 VITAMIN B-12: CPT

## 2024-11-06 PROCEDURE — 82043 UR ALBUMIN QUANTITATIVE: CPT

## 2024-11-06 PROCEDURE — 80053 COMPREHEN METABOLIC PANEL: CPT

## 2024-11-06 PROCEDURE — 82306 VITAMIN D 25 HYDROXY: CPT

## 2024-11-06 PROCEDURE — 84443 ASSAY THYROID STIM HORMONE: CPT

## 2024-11-06 PROCEDURE — 83036 HEMOGLOBIN GLYCOSYLATED A1C: CPT

## 2024-11-06 PROCEDURE — 36415 COLL VENOUS BLD VENIPUNCTURE: CPT

## 2024-11-06 PROCEDURE — 82570 ASSAY OF URINE CREATININE: CPT

## 2024-12-17 ENCOUNTER — PATIENT OUTREACH (OUTPATIENT)
Dept: CARE COORDINATION | Facility: CLINIC | Age: 59
End: 2024-12-17
Payer: COMMERCIAL

## 2024-12-21 ENCOUNTER — TRANSFERRED RECORDS (OUTPATIENT)
Dept: HEALTH INFORMATION MANAGEMENT | Facility: CLINIC | Age: 59
End: 2024-12-21
Payer: COMMERCIAL

## 2024-12-22 DIAGNOSIS — E03.9 HYPOTHYROIDISM, UNSPECIFIED TYPE: ICD-10-CM

## 2024-12-22 DIAGNOSIS — E11.9 TYPE 2 DIABETES MELLITUS WITHOUT COMPLICATION, WITHOUT LONG-TERM CURRENT USE OF INSULIN (H): ICD-10-CM

## 2024-12-23 RX ORDER — LEVOTHYROXINE SODIUM 75 UG/1
TABLET ORAL
Qty: 102 TABLET | Refills: 0 | Status: SHIPPED | OUTPATIENT
Start: 2024-12-23

## 2024-12-23 RX ORDER — METFORMIN HYDROCHLORIDE 500 MG/1
1000 TABLET, EXTENDED RELEASE ORAL 2 TIMES DAILY WITH MEALS
Qty: 360 TABLET | Refills: 0 | Status: SHIPPED | OUTPATIENT
Start: 2024-12-23

## 2024-12-31 ENCOUNTER — PATIENT OUTREACH (OUTPATIENT)
Dept: CARE COORDINATION | Facility: CLINIC | Age: 59
End: 2024-12-31
Payer: COMMERCIAL

## 2025-02-09 ENCOUNTER — HEALTH MAINTENANCE LETTER (OUTPATIENT)
Age: 60
End: 2025-02-09

## 2025-02-26 ENCOUNTER — VIRTUAL VISIT (OUTPATIENT)
Dept: FAMILY MEDICINE | Facility: CLINIC | Age: 60
End: 2025-02-26
Payer: COMMERCIAL

## 2025-02-26 DIAGNOSIS — I10 ESSENTIAL HYPERTENSION: ICD-10-CM

## 2025-02-26 DIAGNOSIS — E78.5 HYPERLIPIDEMIA WITH TARGET LDL LESS THAN 100: ICD-10-CM

## 2025-02-26 DIAGNOSIS — E11.69 TYPE 2 DIABETES MELLITUS WITH OTHER SPECIFIED COMPLICATION, WITHOUT LONG-TERM CURRENT USE OF INSULIN (H): Primary | ICD-10-CM

## 2025-02-26 PROCEDURE — 98006 SYNCH AUDIO-VIDEO EST MOD 30: CPT | Performed by: INTERNAL MEDICINE

## 2025-02-26 RX ORDER — ATORVASTATIN CALCIUM 20 MG/1
20 TABLET, FILM COATED ORAL DAILY
Qty: 90 TABLET | Refills: 2 | Status: SHIPPED | OUTPATIENT
Start: 2025-02-26

## 2025-02-26 NOTE — PROGRESS NOTES
Ijeoma is a 59 year old who is being evaluated via a billable video visit.    How would you like to obtain your AVS? MyChart  If the video visit is dropped, the invitation should be resent by: Text to cell phone: [phone]  Will anyone else be joining your video visit? No        Assessment and Plan  1. Type 2 diabetes mellitus with other specified complication, without long-term current use of insulin (H) (Primary)  Chronic problem, mildly worsened on the last A1c check at 6.7% compared to the past.  Continue current metformin 1000 mg twice daily, please recheck the A1c ordered for further adjustment if needed.  Patient understanding with plan.  - Last Eye exam [facility] , last check 3 weeks back 2/5/2025 which was normal as per the patient, records awaited..   - Hemoglobin A1c; Future    2. Hyperlipidemia with target LDL less than 100  Chronic problem, remaining above goal for diabetes risk. Pt states that she has not been taking her Lipitor for past 2 months.  -Shared decision to recheck lipid panel at this time before further needs of adjusting the dose if needed.  Patient understood and will plan.  - Lipid panel reflex to direct LDL Fasting; Future  - atorvastatin (LIPITOR) 20 MG tablet; Take 1 tablet (20 mg) by mouth daily.  Dispense: 90 tablet; Refill: 2  - Hepatic panel (Albumin, ALT, AST, Bili, Alk Phos, TP); Future    3. Essential hypertension  Chronic stable, continue current lisinopril which is also helping and renal protection for microalbuminuria.     The longitudinal plan of care for the diagnosis(es)/condition(s) as documented were addressed during this visit. Due to the added complexity in care, I will continue to support Ijeoma in the subsequent management and with ongoing continuity of care.      Please note that this note consists of symbols derived from keyboarding, dictation and/or voice recognition software. As a result, there may be errors in the script that have gone undetected.  Please consider this when interpreting information found in this chart.    Patient Instructions   As discussed, please make a lab only appointment in fasting for rechecking your A1c and cholesterol due at this time.  Will do further adjustment depending on your levels.      Return in about 3 months (around 2025), or if symptoms worsen or fail to improve, for Preventative Visit.    Norah Zamorano MD  [facility] LAZARUS Raphael is a 59 year old, presenting for the following health issues:  Diabetes        2024     8:05 AM   Additional Questions   Roomed by Krystal LOPEZ     HPI        Allergies   Allergen Reactions    Cephalexin Hives and Rash     Hives on hands, face and stomach.     Ibuprofen      sneezing    Nsaids Other (See Comments)    Shellfish Allergy Nausea and Nausea and Vomiting    Sulfamethoxazole Rash    Trimethoprim Rash        Past Medical History:   Diagnosis Date    Arthritis 2022    Gastric acidity     Gestational diabetes mellitus     DIET CONTROL    Hx of previous reproductive problem     IVF    Hypothyroid     Motion sickness     Ovarian cyst     Post-operative nausea and vomiting 2017       Past Surgical History:   Procedure Laterality Date    APPENDECTOMY       SECTION  2014    Procedure:  SECTION;  Surgeon: Ru Cano MD;  Location:  [facility]    CHOLECYSTECTOMY      cyst removed      left  lower leg on bone sheath    DAVINCI HYSTERECTOMY TOTAL, SALPINGECTOMY BILATERAL N/A 2017    Procedure: DAVINCI HYSTERECTOMY TOTAL, SALPINGECTOMY BILATERAL;  DAVINCI SINGLE SITE HYSTERECTOMY, BILATERAL SALPINGECTOMY, LEFT OOPHORECTOMY, LYSIS OF ADHESIONS (LAPAROSCOPIC BAG TO RETREIVE OVARY);  Surgeon: Ru Cano MD;  Location:  OR    DAVINCI LYSIS OF ADHESIONS N/A 2017    Procedure: DAVINCI LYSIS OF ADHESIONS;;  Surgeon: Ru Cano MD;  Location:  OR    DAVINCI OOPHORECTOMY N/A 2017     Procedure: DAVINCI OOPHORECTOMY;;  Surgeon: Ru Cano MD;  Location: [facility] OR    GI SURGERY      MYOMECTOMY UTERUS  sept 2012    ZZ GASTROSCOPY,FL         Family History   Problem Relation Age of Onset    Hypertension Father     Diabetes Other         maternal aunt -2 of them    Breast Cancer No family hx of     Colon Cancer No family hx of     Coronary Artery Disease No family hx of        Social History     Tobacco Use    Smoking status: Never    Smokeless tobacco: Never   Substance Use Topics    Alcohol use: No     Alcohol/week: 0.0 standard drinks of alcohol     Comment: social        Current Outpatient Medications   Medication Sig Dispense Refill    atorvastatin (LIPITOR) 20 MG tablet Take 1 tablet (20 mg) by mouth daily. 90 tablet 2    blood glucose (ACCU-CHEK GUIDE) test strip USE TO TEST BLOOD SUGAR 1 TO 2 TIMES DAILY OR AS DIRECTED 200 strip 0    blood glucose monitoring (ACCU-CHEK GINA PLUS) meter device kit Use to test blood sugar 1-2 times daily or as directed. 1 kit 0    blood glucose monitoring (SOFTCLIX) lancets USE TO TEST BLOOD SUGAR 1 TO 2 TIMES DAILY OR AS DIRECTED. 100 each 2    Continuous Blood Gluc Sensor (DEXCOM G7 SENSOR) MISC 1 each every 10 days Change sensor every 10 days 3 each 5    cyanocobalamin (VITAMIN B-12) 1000 MCG tablet Take 1 tablet (1,000 mcg) by mouth daily 90 tablet 1    Ferrous Sulfate 324 (65 Fe) MG TBEC Take 324 mg by mouth daily      fexofenadine (ALLEGRA) 60 MG tablet Take 1 tablet (60 mg) by mouth 2 times daily 60 tablet 1    fluticasone (FLONASE) 50 MCG/ACT nasal spray Spray 1 spray into both nostrils daily 18.2 mL 1    levothyroxine (SYNTHROID/LEVOTHROID) 75 MCG tablet TAKE 1 TABLET BY MOUTH DAILY AND 2 TABLETS EVERY SUNDAY 102 tablet 0    lisinopril (ZESTRIL) 10 MG tablet Take 1 tablet (10 mg) by mouth daily 90 tablet 1    metFORMIN (GLUCOPHAGE XR) 500 MG 24 hr tablet Take 2 tablets (1,000 mg) by mouth 2 times daily (with meals) 360 tablet 0    omeprazole  (PRILOSEC) 40 MG DR capsule Take 1 capsule by mouth once daily 90 capsule 1    ondansetron (ZOFRAN ODT) 4 MG ODT tab Take 1 tablet (4 mg) by mouth every 8 hours as needed for nausea 15 tablet 0    sucralfate (CARAFATE) 1 GM tablet Take 1 tablet (1 g) by mouth 4 times daily 90 tablet 1    vitamin D2 (ERGOCALCIFEROL) 62616 units (1250 mcg) capsule Take 1 capsule (50,000 Units) by mouth once a week 12 capsule 0     No current facility-administered medications for this visit.        Review of Systems  Constitutional, HEENT, cardiovascular, pulmonary, GI, , musculoskeletal, neuro, skin, endocrine and psych systems are negative, except as otherwise noted.      Objective           Vitals:  No vitals were obtained today due to virtual visit.    Physical Exam   GENERAL: alert and no distress  EYES: Eyes grossly normal to inspection.  No discharge or erythema, or obvious scleral/conjunctival abnormalities.  RESP: No audible wheeze, cough, or visible cyanosis.    SKIN: Visible skin clear. No significant rash, abnormal pigmentation or lesions.  NEURO: Cranial nerves grossly intact.  Mentation and speech appropriate for age.  PSYCH: Appropriate affect, tone, and pace of words    Video-Visit Details    Type of service:  Video Visit   Originating Location (pt. Location): Home    Distant Location (provider location):  On-site  Platform used for Video Visit: [place]  Signed Electronically by: Norah Zamorano MD

## 2025-02-26 NOTE — PATIENT INSTRUCTIONS
As discussed, please make a lab only appointment in fasting for rechecking your A1c and cholesterol due at this time.  Will do further adjustment depending on your levels.

## 2025-03-03 ENCOUNTER — TRANSFERRED RECORDS (OUTPATIENT)
Dept: HEALTH INFORMATION MANAGEMENT | Facility: CLINIC | Age: 60
End: 2025-03-03
Payer: COMMERCIAL

## 2025-03-09 ENCOUNTER — HEALTH MAINTENANCE LETTER (OUTPATIENT)
Age: 60
End: 2025-03-09

## 2025-03-17 ENCOUNTER — LAB (OUTPATIENT)
Dept: LAB | Facility: CLINIC | Age: 60
End: 2025-03-17
Payer: COMMERCIAL

## 2025-03-17 DIAGNOSIS — E11.69 TYPE 2 DIABETES MELLITUS WITH OTHER SPECIFIED COMPLICATION, WITHOUT LONG-TERM CURRENT USE OF INSULIN (H): ICD-10-CM

## 2025-03-17 DIAGNOSIS — E78.5 HYPERLIPIDEMIA WITH TARGET LDL LESS THAN 100: ICD-10-CM

## 2025-03-17 LAB
ALBUMIN SERPL BCG-MCNC: 4.6 G/DL (ref 3.5–5.2)
ALP SERPL-CCNC: 68 U/L (ref 40–150)
ALT SERPL W P-5'-P-CCNC: 14 U/L (ref 0–50)
AST SERPL W P-5'-P-CCNC: 19 U/L (ref 0–45)
BILIRUB DIRECT SERPL-MCNC: 0.28 MG/DL (ref 0–0.3)
BILIRUB SERPL-MCNC: 0.7 MG/DL
CHOLEST SERPL-MCNC: 174 MG/DL
EST. AVERAGE GLUCOSE BLD GHB EST-MCNC: 131 MG/DL
FASTING STATUS PATIENT QL REPORTED: YES
HBA1C MFR BLD: 6.2 % (ref 0–5.6)
HDLC SERPL-MCNC: 55 MG/DL
LDLC SERPL CALC-MCNC: 91 MG/DL
NONHDLC SERPL-MCNC: 119 MG/DL
PROT SERPL-MCNC: 7.6 G/DL (ref 6.4–8.3)
TRIGL SERPL-MCNC: 142 MG/DL

## 2025-03-17 PROCEDURE — 83036 HEMOGLOBIN GLYCOSYLATED A1C: CPT

## 2025-03-17 PROCEDURE — 36415 COLL VENOUS BLD VENIPUNCTURE: CPT

## 2025-03-17 PROCEDURE — 80061 LIPID PANEL: CPT

## 2025-03-17 PROCEDURE — 80076 HEPATIC FUNCTION PANEL: CPT

## 2025-03-17 NOTE — RESULT ENCOUNTER NOTE
Your A1C at goal , continue the current medications.    Please let me know if you have any questions.    Thank you,  Norah Zamorano MD on 3/17/2025 at 3:02 PM

## 2025-03-23 DIAGNOSIS — E03.9 HYPOTHYROIDISM, UNSPECIFIED TYPE: ICD-10-CM

## 2025-03-24 DIAGNOSIS — E11.69 TYPE 2 DIABETES MELLITUS WITH OTHER SPECIFIED COMPLICATION, WITHOUT LONG-TERM CURRENT USE OF INSULIN (H): ICD-10-CM

## 2025-03-24 RX ORDER — GLIPIZIDE 5 MG/1
5 TABLET, FILM COATED, EXTENDED RELEASE ORAL DAILY
Qty: 90 TABLET | Refills: 1 | OUTPATIENT
Start: 2025-03-24

## 2025-03-24 RX ORDER — LEVOTHYROXINE SODIUM 75 UG/1
TABLET ORAL
Qty: 102 TABLET | Refills: 0 | Status: SHIPPED | OUTPATIENT
Start: 2025-03-24

## 2025-03-24 NOTE — TELEPHONE ENCOUNTER
Prescription approved per [facility] Refill Protocol.  Liliane Stahl RN  [facility]

## 2025-03-24 NOTE — TELEPHONE ENCOUNTER
Clinic RN: Please investigate patient's chart or contact patient if the information cannot be found because  glipizide not on med list

## 2025-03-24 NOTE — TELEPHONE ENCOUNTER
Pt returned call to the clinic.     She states she has been off of this medication (glipizide 5 mg) for the last few months.     She is hoping to go back on this medication given her recent lab values.     Med pended as historically ordered, please review and advise.     Can we leave a detailed message on this number? YES  Phone number patient can be reached at: Cell number on file:    Telephone Information:   Mobile [phone]     Esthela Bains, RN  [facility] Triage

## 2025-03-24 NOTE — TELEPHONE ENCOUNTER
In patient history Glipizide 5 mg was last ordered on 4/14/24.     From November 2024 Result note.     Result Notes     Norah Zamorano MD  11/10/2024  4:32 PM CST Back to Top      Your A1C is mildly worsened compared to the past inspite of current Metformin. Recommend to start off on Glipizide which was started in the past but you have mentioned that you have not been taking it.     Cholesterol remaining OK with current Lipitor, no changes needed at this time . It will improve further once your diabetes will gets normalized.     Your Urine is losing proteins due to diabetes. You are already on Lisinopril for kidney protection.       Triage Patient Outreach    Attempt # 1    Was call answered?  No.  Left voicemail to return call to Triage at Primary Clinic    On call back verify if the patient has been taking the Glipizide or note. If patient is taking please route to provider.     Asia Steinberg RN

## 2025-03-25 NOTE — TELEPHONE ENCOUNTER
"Pt A1C well controlled at 6.2 actually . Adding Glipizide may cause \" Hypoglycemia \" I would recommend to continue current Metformin which us taking care of this well.     Will discuss further on this in pt physical and start on it if needed depending on A1C at that time.     Please assist pt to schedule physical on or after 5/26/2025.     Thank you  Norah Zamorano MD on 3/24/2025           "

## 2025-03-26 NOTE — TELEPHONE ENCOUNTER
Spoke to patient and relayed provider message. Patient verbalized understanding of the message. No questions at this time. Scheduled annual with patient.    Jerica Bernard RN

## 2025-03-31 DIAGNOSIS — E11.9 TYPE 2 DIABETES MELLITUS WITHOUT COMPLICATION, WITHOUT LONG-TERM CURRENT USE OF INSULIN (H): ICD-10-CM

## 2025-03-31 RX ORDER — METFORMIN HYDROCHLORIDE 500 MG/1
1000 TABLET, EXTENDED RELEASE ORAL 2 TIMES DAILY WITH MEALS
Qty: 360 TABLET | Refills: 0 | Status: SHIPPED | OUTPATIENT
Start: 2025-03-31

## 2025-04-20 DIAGNOSIS — E03.9 HYPOTHYROIDISM, UNSPECIFIED TYPE: ICD-10-CM

## 2025-04-21 RX ORDER — LEVOTHYROXINE SODIUM 75 UG/1
TABLET ORAL
Qty: 102 TABLET | Refills: 0 | OUTPATIENT
Start: 2025-04-21

## 2025-04-24 NOTE — PROGRESS NOTES
"        [facility]                                     Progress Note    Patient Name: Ijeoma Avalos  Date: 6/28/2023       Service Type: Individual      Session Start Time: 13:01  Session End Time: 13:52     Session Length: 38-52 mins    Session #: 45 (this episode of care)    Attendees: Client attended alone    Service Modality:  Phone Visit:      Provider verified identity through the following two step process.  Patient provided:  Patient is known previously to provider    Telephone Visit: The patient's condition can be safely assessed and treated via synchronous audio telemedicine encounter.      Reason for Audio Telemedicine Visit: Patient convenience (e.g. access to timely appointments / distance to available provider)    Originating Site (Patient Location): Patient's home    Distant Site (Provider Location): Provider Remote Setting- Home Office    Consent:  The patient/guardian has verbally consented to:     The potential risks and benefits of telemedicine (telephone visit) versus in person care;    The patient has been notified of the following:      \"We have found that certain health care needs can be provided without the need for a face to face visit.  This service lets us provide the care you need with a phone conversation.       I will have full access to your [facility] medical record during this entire phone call.   I will be taking notes for your medical record.      Since this is like an office visit, we will bill your insurance company for this service.       There are potential benefits and risks of telephone visits (e.g. limits to patient confidentiality) that differ from in-person visits.?Confidentiality still applies for telephone services, and nobody will record the visit.  It is important to be in a quiet, private space that is free of distractions (including cell phone or other devices) during the visit.??      If during the course of the call I believe a " DIONICIO [facility] Pamela with [facility] no [facility]/ DIONICIO left a VM.    "telephone visit is not appropriate, you will not be charged for this service\"     Consent has been obtained for this service by care team member: Yes     DATA  Interactive Complexity: No  Crisis: No      Progress Since Last Session (Related to Symptoms / Goals / Homework):   Symptoms: Stable    Homework: Not yet completed; Client stated that she has not found the emotional energy to write as a way of processing her recent job layoff       Episode of Care Goals: Satisfactory progress - ACTION (Actively working towards change); Intervened by reinforcing change plan / affirming steps taken     Current / Ongoing Stressors and Concerns:   Recently laid off from job   Family stressors     Treatment Objective(s) Addressed in This Session:   Regroup/reorganize after job layoff   increase connection in close relationships   build skills for stress management   increase self-confidence     Intervention:   Completed screening questionnaires. Client stated that she has been feeling frustrated with herself that \"I'm not over it\" [recent job layoff]. Explored this frustration and ultimately Client determined that there are some aspects of her former job that feel unfinished to her, including the fact that she's had no contact from her former boss since the layoff, which has been a surprise to her. Noted that it is now easier to determine how to move forward once she has articulated the problem more specifically (it's not just that she's globally \"not handling it well\"). Talked through options of what to do with these \"unfinished\" feelings, including the pros/cons of reaching out to her former boss. Also labeled the bargaining thoughts that are present (if only I had done X then I wouldn't be in this situation now and I wouldn't have these uncomfortable feelings to deal with). Client is doing well with self-care, walking regularly (she is noticing improved stamina), spending time on hobbies, and enjoying time with her daughter.  "     Assessments completed prior to visit:  The following assessments were previously completed by patient:         12/13/2017     9:08 AM 2/21/2018     8:44 AM 9/13/2019     3:22 PM 11/12/2019     3:00 PM 4/14/2020    11:13 AM 8/26/2021     8:13 AM 10/25/2022     2:07 PM   PHQ-9 SCORE   PHQ-9 Total Score MyChart       6 (Mild depression)   PHQ-9 Total Score 3 1 1 1 4 3 6     GAD2:       10/25/2022     2:07 PM 1/24/2023     1:57 PM   SOSA-2   Feeling nervous, anxious, or on edge 0 0   Not being able to stop or control worrying 1 1   SOSA-2 Total Score 1 1     PROMIS 10-Global Health (only subscores and total score):       4/22/2022     8:06 AM 7/29/2022     8:11 PM 10/25/2022     2:08 PM 1/24/2023     1:58 PM   PROMIS-10 Scores Only   Global Mental Health Score 12 12 10 14   Global Physical Health Score 11 11 11 14   PROMIS TOTAL - SUBSCORES 23 23 21 28      ASSESSMENT: Current Emotional / Mental Status (status of significant symptoms):   Risk status (Self / Other harm or suicidal ideation)   Patient denies current fears or concerns for personal safety.   Patient denies current or recent suicidal ideation or behaviors.   Patient denies current or recent homicidal ideation or behaviors.   Patient denies current or recent self injurious behavior or ideation.   Patient denies other safety concerns.   Patient reports there has been no change in risk factors since her last session.    Patient reports there has been no change in protective factors since her last session.     Recommended that patient call 911 or go to the local ED should there be a change in any of these risk factors.     Appearance:   Unable to assess    Eye Contact:   Unable to assess    Psychomotor Behavior: Unable to assess    Attitude:   Open    Orientation:   All   Speech    Rate / Production: Normal     Volume:  Normal    Mood:    Frustrated, anxious   Affect:    Unable to assess    Thought Content:  Some bargaining distortions   Thought  Form:  Coherent  Logical    Insight:    Fair      Medication Review:   No changes to current psychiatric medication(s)     Medication Compliance:   Yes; infrequent use of lorazepam     Changes in Health Issues:   None reported     Chemical Use Review:   Substance Use: Chemical use reviewed, no active concerns identified      Tobacco Use: No current tobacco use.      Diagnosis:  1. Adjustment disorder with mixed anxiety and depressed mood    *given Client's reaction to recent job loss, Adjustment Disorder diagnosis is most appropriate    Collateral Reports Completed:   Telephone consent    PHQ-9   SOSA-7   PROMIS-10    PLAN: (Patient Tasks / Therapist Tasks / Other)  Client will label distortions as they come up, recognizing that bargaining thoughts are a form of fantasy, and she really can't know if any alternative scenario would have been better or worse, but mostly her energy will be best spent managing current feelings rather than trying to figure out how she could have avoided them in the first place. She will weigh the pros/cons of reaching out to her former boss for a follow up conversation vs choosing to find internal resolution. Continue with regular walking & exercise. Continue to make space to grieve, noticing and feeling whatever emotions come up--without judgment. Return appointments scheduled. Client has my contact information and is aware that she can reach out sooner if needed.     Rosa Chowdary, KATHARINE                                                      ______________________________________________________________________    Individual Treatment Plan    Patient's Name: Ijeoma Avalos  YOB: 1965    Date of Creation: 3/25/2022  Date Treatment Plan Last Reviewed/Revised: 6/16/2023    DSM5 Diagnoses: 300.00 (F41.9) Unspecified Anxiety Disorder  Psychosocial / Contextual Factors: family stressors, work stressors  PROMIS (reviewed every 90 days): 28    Referral / Collaboration:  Referral  to another professional/service is not indicated at this time.    Anticipated number of session for this episode of care: estimated 12-16 sessions/year  Anticipation frequency of session: Monthly  Anticipated Duration of each session: 38-52 minutes  Treatment plan will be reviewed in 90 days or when goals have been changed.       MeasurableTreatment Goal(s) related to diagnosis / functional impairment(s)  Goal 1: Patient will build skills for stress management.    I will know I've met my goal when I am sleeping better and not waking up so often, when I don't feel worried so much of the time.   *updated on 7/1/22 (Client believes physical symptoms such as blood pressure and reflux are not the best way to measure this outcome).    Objective #A (Patient Action)    Patient will use at least 2 coping skills for anxiety management in the next 6 weeks. Particularly as related to recent job layoff.  Status: Continued - Date(s): 6/16/2023      Intervention(s)  Therapist will teach teach self-regulation strategies, CBT skills, mindfulness techniques.    Objective #B  Patient will use cognitive strategies identified in therapy to challenge anxious thoughts.  Status: Continued - Date(s): 6/16/2023  *continue as maintenance goal    Intervention(s)  Therapist will teach cognitive strategies, provide education.      Goal 2: Patient will increase self-confidence.    I will know I've met my goal when I think more positively about myself.   *6/16/2023 meaningful progress noted in this area.    Objective #A (Patient Action)    Status: Maintenance - Date(s): 6/16/2023    Patient will increase assertive communication.    Intervention(s)  Therapist will teach assertiveness skills.    Objective #B  Patient will Identify negative self-talk and behaviors: challenge core beliefs, myths, and actions.    Status: Maintenance: 6/16/2023    Intervention(s)  Therapist will provide education, teach CBT skills.      Goal 3: Patiient will increase  connection in close relationships.    I will know I've met my goal when I feel more close with loved ones.  *Deferred in light of more immediate goal of recovering from recent professional layoff.    Objective #A (Patient Action)    Status: Continued - Date(s): 6/16/2023    Patient will prioritize time for meaningful relationships.    Intervention(s)  Therapist will provide support and accountability.    Objective #B  Patient will make use of effective interpersonal communication skills.    Status: Continued - Date(s): 6/16/2023    Intervention(s)  Therapist will teach communication skills.    Goal 4: Client will regroup/reorganize after job layoff.     I will know I've met my goal when I'm not still wondering 'why' and I can see what direction I'm heading professionally.      Objective #A (Client Action)    Client will grieve losses associated with layoff and explore new employment options.  Status: New - Date: 6/16/2023     Intervention(s)  Therapist will provide space for emotional processing; guide Client in creating an accurate narrative about the experience; provide psychoeducation around reorganization after significant loss; provide accountability and support.      Patient has reviewed and agreed to the above plan.      Rosa Chowdary LP  March 25, 2022      Answers for HPI/ROS submitted by the patient on 10/25/2022  If you checked off any problems, how difficult have these problems made it for you to do your work, take care of things at home, or get along with other people?: Not difficult at all  PHQ9 TOTAL SCORE: 6

## 2025-04-29 ENCOUNTER — OFFICE VISIT (OUTPATIENT)
Dept: PSYCHOLOGY | Facility: CLINIC | Age: 60
End: 2025-04-29
Payer: COMMERCIAL

## 2025-04-29 DIAGNOSIS — F41.1 GENERALIZED ANXIETY DISORDER: Primary | ICD-10-CM

## 2025-04-29 PROCEDURE — 90834 PSYTX W PT 45 MINUTES: CPT | Performed by: PSYCHOLOGIST

## 2025-04-29 NOTE — PROGRESS NOTES
"[facility]                                     Progress Note    Patient Name: Ijeoma Avalos  Date: 4/29/2025         Service Type: Individual      Session Start Time: 13:55  Session End Time: 14:45     Session Length: 38-52 mins    Session #: 2 this year (continued episode of care)    Attendees: Patient attended alone    Service Modality:  In-person    DATA  Interactive Complexity: No  Crisis: No      Progress Since Last Session (Related to Symptoms / Goals / Homework):   Symptoms:  Stable, frequent worry    Homework:  Review treatment goals      Episode of Care Goals: Satisfactory progress - ACTION (Actively working towards change); Intervened by reinforcing change plan / affirming steps taken     Current / Ongoing Stressors and Concerns:   Medical concerns   Aging parents in declining health (in [place])   Interpersonal issues     Treatment Objective(s) Addressed in This Session:   Increase self-confidence  Improve anxiety management  Feel more calm/content in close relationships     Intervention:   Anxiety management; CBT; interpersonal effectiveness: Discussed Patient's experience of frequent worry, also of feeling exhausted by trying to read and anticipate everyone's needs. Reviewed basic cognitive distortions, including mind reading, \"shoulds,\" and catastrophizing. Encouraged Patient to label these distortions as she becomes aware of them and then work on stepping back from that particular interpretation. Handout with additional information was provided. Discussed how it might feel to experiment with communicating simply and directly, rather than \"walking on eggshells\" with certain loved ones. She can then separate from whatever reaction they may have--but she has conserved most of her energy for regulating herself rather than for trying to regulate someone else through her actions. Patient was open to this input.    Assessments completed prior to visit:  The following assessments " were completed by patient for this visit:  PHQ2:   Phq2 (   1999 [employer],All Rights Reserved. Used With Permission. Developed By Aravind Rodriguez,Neto Mccain And Colleagues,With An Educational Zachary From [employer].)    8/27/2024  1:51 PM CDT - Filed by Patient   The following questionnaire should only be answered by the patient. Are you the patient? Yes   Over the last 2 weeks, how often have you been bothered by any of the following problems?    Q1: Little interest or pleasure in doing things Not at all   Q2: Feeling down, depressed or hopeless Not at all   PHQ2 (   1999 [employer],ALL RIGHTS RESERVED. USED WITH PERMISSION. DEVELOPED BY ARAVIND RODRIGUEZ,NETO MCCAIN AND COLLEAGUES,WITH AN EDUCATIONAL ZACHARY FROM [employer].)    PHQ-2 Score (range: 0 - 6) 0       PHQ9:       2/21/2018     8:44 AM 9/13/2019     3:22 PM 11/12/2019     3:00 PM 4/14/2020    11:13 AM 8/26/2021     8:13 AM 10/25/2022     2:07 PM 6/28/2023     1:05 PM   PHQ-9 SCORE   PHQ-9 Total Score MyChart      6 (Mild depression) 7 (Mild depression)   PHQ-9 Total Score 1 1 1 4 3 6 7     GAD2:       10/25/2022     2:07 PM 1/24/2023     1:57 PM 8/22/2023     9:57 AM 12/12/2023     8:59 AM 3/19/2024    12:54 PM 8/27/2024     1:51 PM   SOSA-2   Feeling nervous, anxious, or on edge 0  0  1  1 1 0   Not being able to stop or control worrying 1  1  2  1 1 1   SOSA-2 Total Score 1 1 3 2 2 1       Proxy-reported     GAD7:       2/21/2018     8:44 AM 9/13/2019     3:22 PM 11/12/2019     3:00 PM 4/14/2020    11:13 AM 8/26/2021     8:13 AM 6/28/2023     1:05 PM 8/22/2023     9:57 AM   SOSA-7 SCORE   Total Score       6 (mild anxiety)   Total Score 4 4 3 5 4 6 6     PROMIS 10-Global Health (only subscores and total score):       10/25/2022     2:08 PM 1/24/2023     1:58 PM 6/28/2023     1:05 PM 8/22/2023     9:58 AM 12/12/2023     9:00 AM 3/19/2024    12:56 PM 8/27/2024     1:52 PM   PROMIS-10 Scores Only    Global Mental Health Score 10 14 11 11 13 12 14   Global Physical Health Score 11 14 12 12 13 13 15   PROMIS TOTAL - SUBSCORES 21 28 23 23 26 25 29         ASSESSMENT: Current Emotional / Mental Status (status of significant symptoms):   Risk status (Self / Other harm or suicidal ideation)   Patient denies current fears or concerns for personal safety.   Patient denies current or recent suicidal ideation or behaviors.   Patient denies current or recent homicidal ideation or behaviors.   Patient denies current or recent self injurious behavior or ideation.   Patient denies other safety concerns.   Patient reports there has been no change in risk factors since her last session.     Patient reports there has been no change in protective factors since her last session.     Recommended that patient call 911 or go to the local ED should there be a change in any of these risk factors     Appearance:   Appropriate    Eye Contact:   Good    Psychomotor Behavior: Normal    Attitude:   Open  Receptive    Orientation:   All   Speech    Rate / Production: Normal/ Responsive    Volume:  Normal    Mood:    Anxious    Affect:    Appropriate    Thought Content:  Frequent worries   Thought Form:  Coherent  Logical    Insight:    Good      Medication Review:   No changes to current psychiatric medication(s)     Medication Compliance:   Yes     Changes in Health Issues:   None reported     Chemical Use Review:   Substance Use: Chemical use reviewed, no active concerns identified      Tobacco Use: No current tobacco use.      Diagnosis:  1. Generalized anxiety disorder      Collateral Reports Completed:   Not Applicable    PLAN: (Patient Tasks / Therapist Tasks / Other)  Patient will read handout on common thought distortions and we will discuss next session. She will practice labeling thought distortions as she becomes aware of them. She will also experiment with communicating simply and directly, rather than expending a lot of  "energy trying to determine the \"right\" way to approach someone so they won't be upset by what she has to say. She can let go of trying to manage their reaction and retain her energy for regulating herself.  Return appointments scheduled (~once/month). Patient has my contact information and is aware that she can contact me sooner if needed.      Rosa Chowdary, LP                                               ______________________________________________________________________    Individual Treatment Plan    Patient's Name: Ijeoma Avalos  YOB: 1965    Date of Creation: 4/29/2025  Date Treatment Plan Last Reviewed/Revised: 4/29/2025    DSM5 Diagnoses: 300.02 (F41.1) Generalized Anxiety Disorder  Psychosocial / Contextual Factors: family stressors, chronic health conditions  PROMIS (reviewed every 90 days): 29    Referral / Collaboration:  Referral to another professional/service is not indicated at this time.    Anticipated number of session for this episode of care:  estimated 12 sessions/year  Anticipation frequency of session: Monthly  Anticipated Duration of each session: 38-52 minutes  Treatment plan will be reviewed in 90 days or when goals have been changed.       MeasurableTreatment Goal(s) related to diagnosis / functional impairment(s)  Goal 1: Patient will increase anxiety management.    I will know I've met my goal when my brain is not just constantly spinning with worries.      Objective #A (Patient Action)    Patient will use at least 2 coping skills for anxiety management in the next 8 weeks.  Status: New - Date: 4/29/2025      Intervention(s)  Therapist will teach self-regulation strategies, CBT skills, mindfulness techniques .    Objective #B  Patient will use cognitive strategies identified in therapy to challenge anxious thoughts.  Status: New - Date: 4/29/2025      Intervention(s)  Therapist will  teach cognitive strategies, provide psychoeducation; provide accountability and " support .      Goal 2: Patient will increase self-confidence.    I will know I've met my goal when I think more positively about myself.      Objective #A (Patient Action)    Status: New - Date: 4/29/2025      Patient will increase assertive communication .    Intervention(s)  Therapist will teach assertiveness skills .    Objective #B  Patient will Identify negative self-talk and behaviors: challenge core beliefs, myths, and actions.    Status: New - Date: 4/29/2025      Intervention(s)  Therapist will  provide psychoeducation, teach CBT skills .      Goal 3: Patiient will feel more calm/content in close relationships    I will know I've met my goal when I don't feel like I'm walking on eggshells, when I'm not so worried about how the other person will respond.      Objective #A (Patient Action)    Status: New - Date: 4/29/2025      Patient will  make use of effective interpersonal skills .    Intervention(s)  Therapist will  teach assertive communication, interpersonal effectiveness strategies .    Objective #B  Patient will  focus on her own regulation, decrease pull to try to regulate others through her actions .    Status: New - Date: 4/29/2025      Intervention(s)  Therapist will  teach emotional boundaries, distress tolerance & self-soothing skills .      Patient has reviewed and agreed to the above plan.      Rosa Chowdary LP  April 29, 2025

## 2025-06-04 ENCOUNTER — OFFICE VISIT (OUTPATIENT)
Dept: FAMILY MEDICINE | Facility: CLINIC | Age: 60
End: 2025-06-04
Payer: COMMERCIAL

## 2025-06-04 VITALS
TEMPERATURE: 97.2 F | HEIGHT: 60 IN | WEIGHT: 130 LBS | BODY MASS INDEX: 25.52 KG/M2 | RESPIRATION RATE: 20 BRPM | SYSTOLIC BLOOD PRESSURE: 152 MMHG | OXYGEN SATURATION: 98 % | HEART RATE: 79 BPM | DIASTOLIC BLOOD PRESSURE: 86 MMHG

## 2025-06-04 DIAGNOSIS — E78.5 HYPERLIPIDEMIA WITH TARGET LDL LESS THAN 100: ICD-10-CM

## 2025-06-04 DIAGNOSIS — I10 ESSENTIAL HYPERTENSION: ICD-10-CM

## 2025-06-04 DIAGNOSIS — E03.9 HYPOTHYROIDISM, UNSPECIFIED TYPE: Chronic | ICD-10-CM

## 2025-06-04 DIAGNOSIS — K21.9 GASTROESOPHAGEAL REFLUX DISEASE, UNSPECIFIED WHETHER ESOPHAGITIS PRESENT: ICD-10-CM

## 2025-06-04 DIAGNOSIS — E55.9 VITAMIN D DEFICIENCY: ICD-10-CM

## 2025-06-04 DIAGNOSIS — Z00.00 ROUTINE GENERAL MEDICAL EXAMINATION AT A HEALTH CARE FACILITY: Primary | ICD-10-CM

## 2025-06-04 DIAGNOSIS — Z12.31 ENCOUNTER FOR SCREENING MAMMOGRAM FOR BREAST CANCER: ICD-10-CM

## 2025-06-04 DIAGNOSIS — E11.69 TYPE 2 DIABETES MELLITUS WITH OTHER SPECIFIED COMPLICATION, WITHOUT LONG-TERM CURRENT USE OF INSULIN (H): ICD-10-CM

## 2025-06-04 DIAGNOSIS — K31.84 GASTROPARESIS: ICD-10-CM

## 2025-06-04 LAB
ANION GAP SERPL CALCULATED.3IONS-SCNC: 13 MMOL/L (ref 7–15)
BUN SERPL-MCNC: 10 MG/DL (ref 6–20)
CALCIUM SERPL-MCNC: 9 MG/DL (ref 8.8–10.4)
CHLORIDE SERPL-SCNC: 101 MMOL/L (ref 98–107)
CREAT SERPL-MCNC: 0.67 MG/DL (ref 0.51–0.95)
EGFRCR SERPLBLD CKD-EPI 2021: >90 ML/MIN/1.73M2
ERYTHROCYTE [DISTWIDTH] IN BLOOD BY AUTOMATED COUNT: 14.3 % (ref 10–15)
EST. AVERAGE GLUCOSE BLD GHB EST-MCNC: 134 MG/DL
GLUCOSE SERPL-MCNC: 116 MG/DL (ref 70–99)
HBA1C MFR BLD: 6.3 % (ref 0–5.6)
HCO3 SERPL-SCNC: 23 MMOL/L (ref 22–29)
HCT VFR BLD AUTO: 36.2 % (ref 35–47)
HGB BLD-MCNC: 12.2 G/DL (ref 11.7–15.7)
MCH RBC QN AUTO: 27.1 PG (ref 26.5–33)
MCHC RBC AUTO-ENTMCNC: 33.7 G/DL (ref 31.5–36.5)
MCV RBC AUTO: 80 FL (ref 78–100)
PLATELET # BLD AUTO: 355 10E3/UL (ref 150–450)
POTASSIUM SERPL-SCNC: 4.2 MMOL/L (ref 3.4–5.3)
RBC # BLD AUTO: 4.5 10E6/UL (ref 3.8–5.2)
SODIUM SERPL-SCNC: 137 MMOL/L (ref 135–145)
VIT D+METAB SERPL-MCNC: 27 NG/ML (ref 20–50)
WBC # BLD AUTO: 7.6 10E3/UL (ref 4–11)

## 2025-06-04 RX ORDER — LISINOPRIL 10 MG/1
10 TABLET ORAL DAILY
Qty: 90 TABLET | Refills: 1 | Status: SHIPPED | OUTPATIENT
Start: 2025-06-04

## 2025-06-04 RX ORDER — LISINOPRIL 5 MG/1
5 TABLET ORAL DAILY
Qty: 90 TABLET | Refills: 1 | Status: SHIPPED | OUTPATIENT
Start: 2025-06-04

## 2025-06-04 SDOH — HEALTH STABILITY: PHYSICAL HEALTH: ON AVERAGE, HOW MANY DAYS PER WEEK DO YOU ENGAGE IN MODERATE TO STRENUOUS EXERCISE (LIKE A BRISK WALK)?: 3 DAYS

## 2025-06-04 SDOH — HEALTH STABILITY: PHYSICAL HEALTH: ON AVERAGE, HOW MANY MINUTES DO YOU ENGAGE IN EXERCISE AT THIS LEVEL?: 10 MIN

## 2025-06-04 ASSESSMENT — PAIN SCALES - GENERAL: PAINLEVEL_OUTOF10: NO PAIN (0)

## 2025-06-04 ASSESSMENT — SOCIAL DETERMINANTS OF HEALTH (SDOH): HOW OFTEN DO YOU GET TOGETHER WITH FRIENDS OR RELATIVES?: ONCE A WEEK

## 2025-06-04 NOTE — PROGRESS NOTES
Preventive Care Visit  [facility] LAZARUS Zamorano MD, Internal Medicine  Jun 4, 2025        Assessment and Plan  1. Routine general medical examination at a health care facility (Primary)    Last seen pt on 2/2025 for follow up of diabetes , she is here for annual physical.     Patient is already following her psychology for anxiety depression.  Last  lab work in 11/2024 showing normal CMP, A1c at 6.2%, CBC not done after 2023.  Can recheck at this time.    - HEMOGLOBIN A1C; Future  - REVIEW OF HEALTH MAINTENANCE PROTOCOL ORDERS  - lisinopril (ZESTRIL) 10 MG tablet; Take 1 tablet (10 mg) by mouth daily.  Dispense: 90 tablet; Refill: 1  - MA Screen Bilateral w/Jose; Future  - CBC with platelets; Future  - Basic metabolic panel  (Ca, Cl, CO2, Creat, Gluc, K, Na, BUN); Future  - PRIMARY CARE FOLLOW-UP SCHEDULING; Future    2. Type 2 diabetes mellitus with other specified complication, without long-term current use of insulin (H)  - HEMOGLOBIN A1C; Future    3. Essential hypertension  Chronic problem, Uncontrolled. Will consider mild increase in Lisinopril given diabetes risk factor and renal protection. Will increase the dose of Lisinopril to 15 mg daily.   - lisinopril (ZESTRIL) 5 MG tablet; Take 1 tablet (5 mg) by mouth daily. To be taken along with 10 mg lisinopril to make it 15 mg  Dispense: 90 tablet; Refill: 1  - lisinopril (ZESTRIL) 10 MG tablet; Take 1 tablet (10 mg) by mouth daily.  Dispense: 90 tablet; Refill: 1  - Basic metabolic panel  (Ca, Cl, CO2, Creat, Gluc, K, Na, BUN); Future    4. Gastroesophageal reflux disease, unspecified whether esophagitis present  5. Gastroparesis  - CBC with platelets; Future    6. Hyperlipidemia with target LDL less than 100  Chronic stable, continue current medications.     7. Hypothyroidism, unspecified type  Chronic stable, continue current medications.     8. Vitamin D deficiency  - Vitamin D deficiency screening; Future    9. Encounter for  screening mammogram for breast cancer  - MA Screen Bilateral w/Jose; Future       The longitudinal plan of care for the diagnosis(es)/condition(s) as documented were addressed during this visit. Due to the added complexity in care, I will continue to support Ijeoma in the subsequent management and with ongoing continuity of care.      Please note that this note consists of symbols derived from keyboarding, dictation and/or voice recognition software. As a result, there may be errors in the script that have gone undetected. Please consider this when interpreting information found in this chart.    Patient Instructions   As discussed, please do fasting labs placed   Refills will be taken care     Will increase the dose of Lisinopril to 15 mg daily for improvement.     Recommend to start on Reglan given your diagnosis of Gastroparesis which will help in improvement of symptoms.     ======================    Patient Education  Preventive Care Advice   This is general advice given by our system to help you stay healthy. However, your care team may have specific advice just for you. Please talk to your care team about your preventive care needs.  Nutrition  Eat 5 or more servings of fruits and vegetables each day.  Try wheat bread, brown rice and whole grain pasta (instead of white bread, rice, and pasta).  Get enough calcium and vitamin D. Check the label on foods and aim for 100% of the RDA (recommended daily allowance).  Lifestyle  Exercise at least 150 minutes each week  (30 minutes a day, 5 days a week).  Do muscle strengthening activities 2 days a week. These help control your weight and prevent disease.  No smoking.  Wear sunscreen to prevent skin cancer.  Have a dental exam and cleaning every 6 months.  Yearly exams  See your health care team every year to talk about:  Any changes in your health.  Any medicines your care team has prescribed.  Preventive care, family planning, and ways to prevent chronic  diseases.  Shots (vaccines)   HPV shots (up to age 26), if you've never had them before.  Hepatitis B shots (up to age 59), if you've never had them before.  COVID-19 shot: Get this shot when it's due.  Flu shot: Get a flu shot every year.  Tetanus shot: Get a tetanus shot every 10 years.  Pneumococcal, hepatitis A, and RSV shots: Ask your care team if you need these based on your risk.  Shingles shot (for age 50 and up)  General health tests  Diabetes screening:  Starting at age 35, Get screened for diabetes at least every 3 years.  If you are younger than age 35, ask your care team if you should be screened for diabetes.  Cholesterol test: At age 39, start having a cholesterol test every 5 years, or more often if advised.  Bone density scan (DEXA): At age 50, ask your care team if you should have this scan for osteoporosis (brittle bones).  Hepatitis C: Get tested at least once in your life.  STIs (sexually transmitted infections)  Before age 24: Ask your care team if you should be screened for STIs.  After age 24: Get screened for STIs if you're at risk. You are at risk for STIs (including HIV) if:  You are sexually active with more than one person.  You don't use condoms every time.  You or a partner was diagnosed with a sexually transmitted infection.  If you are at risk for HIV, ask about PrEP medicine to prevent HIV.  Get tested for HIV at least once in your life, whether you are at risk for HIV or not.  Cancer screening tests  Cervical cancer screening: If you have a cervix, begin getting regular cervical cancer screening tests starting at age 21.  Breast cancer scan (mammogram): If you've ever had breasts, begin having regular mammograms starting at age 40. This is a scan to check for breast cancer.  Colon cancer screening: It is important to start screening for colon cancer at age 45.  Have a colonoscopy test every 10 years (or more often if you're at risk) Or, ask your provider about stool tests like a  FIT test every year or Cologuard test every 3 years.  To learn more about your testing options, visit:   .  For help making a decision, visit:   https://bit.ly/kh54398.  Prostate cancer screening test: If you have a prostate, ask your care team if a prostate cancer screening test (PSA) at age 55 is right for you.  Lung cancer screening: If you are a current or former smoker ages 50 to 80, ask your care team if ongoing lung cancer screenings are right for you.  For informational purposes only. Not to replace the advice of your health care provider. Copyright   2023 [employer]. All rights reserved. Clinically reviewed by the [facility] Transitions Program. [employer] [id number] - REV 01/24.     Return in about 6 months (around 12/4/2025), or if symptoms worsen or fail to improve, for diabetes, video visit.    Norah Zamorano MD  [facility] LAZARUS Raphael is a 59 year old, presenting for the following:  Physical        6/4/2025     8:29 AM   Additional Questions   Roomed by Philip          \[facility]\""    Advance Care Planning    Discussed advance care planning with patient; however, patient declined at this time.        6/4/2025   General Health   How would you rate your overall physical health? (!) FAIR   Feel stress (tense, anxious, or unable to sleep) Only a little   (!) STRESS CONCERN      6/4/2025   Nutrition   Three or more servings of calcium each day? Yes   Diet: Diabetic   How many servings of fruit and vegetables per day? (!) 2-3   How many sweetened beverages each day? 0-1         6/4/2025   Exercise   Days per week of moderate/strenous exercise 3 days   Average minutes spent exercising at this level 10 min         6/4/2025   Social Factors   Frequency of gathering with friends or relatives Once a week   Worry food won't last until get money to buy more No   Food not last or not have enough money for food? No   Do you have housing? (Housing is defined as  stable permanent housing and does not include staying outside in a car, in a tent, in an abandoned building, in an overnight shelter, or couch-surfing.) Yes   Are you worried about losing your housing? No   Lack of transportation? No   Unable to get utilities (heat,electricity)? No         6/4/2025   Fall Risk   Fallen 2 or more times in the past year? No   Trouble with walking or balance? No          6/4/2025   Dental   Dentist two times every year? Yes         Today's PHQ-2 Score:       6/4/2025     8:12 AM   PHQ-2 ( 1999 [employer])   Q1: Little interest or pleasure in doing things 0   Q2: Feeling down, depressed or hopeless 0   PHQ-2 Score 0    Q1: Little interest or pleasure in doing things Not at all   Q2: Feeling down, depressed or hopeless Not at all   PHQ-2 Score 0       Patient-reported           6/4/2025   Substance Use   Alcohol more than 3/day or more than 7/wk No   Do you use any other substances recreationally? No     Social History     Tobacco Use    Smoking status: Never    Smokeless tobacco: Never   Vaping Use    Vaping status: Never Used   Substance Use Topics    Alcohol use: No     Alcohol/week: 0.0 standard drinks of alcohol     Comment: social    Drug use: No           10/17/2023   LAST FHS-7 RESULTS   1st degree relative breast or ovarian cancer No   Any relative bilateral breast cancer No   Any male have breast cancer No   Any ONE woman have BOTH breast AND ovarian cancer No   Any woman with breast cancer before 50yrs No   2 or more relatives with breast AND/OR ovarian cancer No   2 or more relatives with breast AND/OR bowel cancer No        Mammogram Screening - Annual screen due to greater than 20% lifetime risk as estimated by Breast Cancer Risk Calculator        6/4/2025   STI Screening   New sexual partner(s) since last STI/HIV test? No     History of abnormal Pap smear: No - age 30- 64 PAP with HPV every 5 years recommended        Latest Ref Rng & Units 11/7/2016     9:22 AM 11/7/2016     12:00 AM   PAP / HPV   PAP (Historical)   OTHER-NIL, See Result    HPV 16 DNA NEG Negative     HPV 18 DNA NEG Negative     Other HR HPV NEG Negative       ASCVD Risk   The 10-year ASCVD risk score (Carla CID, et al., 2019) is: 7.7%    Values used to calculate the score:      Age: 59 years      Sex: Female      Is Non- : No      Diabetic: Yes      Tobacco smoker: No      Systolic Blood Pressure: 152 mmHg      Is BP treated: Yes      HDL Cholesterol: 55 mg/dL      Total Cholesterol: 174 mg/dL      Reviewed and updated as needed this visit by Provider   Tobacco  Allergies  Meds  Problems  Med Hx  Surg Hx  Fam Hx            Past Medical History:   Diagnosis Date    Arthritis 2022    Gastric acidity     Gestational diabetes mellitus     DIET CONTROL    Hx of previous reproductive problem     IVF    Hypothyroid     Motion sickness     Ovarian cyst     Post-operative nausea and vomiting 2017     Past Surgical History:   Procedure Laterality Date    APPENDECTOMY       SECTION  2014    Procedure:  SECTION;  Surgeon: Ru Cano MD;  Location:  [facility]    CHOLECYSTECTOMY      cyst removed      left  lower leg on bone sheath    DAVINCI HYSTERECTOMY TOTAL, SALPINGECTOMY BILATERAL N/A 2017    Procedure: DAVINCI HYSTERECTOMY TOTAL, SALPINGECTOMY BILATERAL;  DAVINCI SINGLE SITE HYSTERECTOMY, BILATERAL SALPINGECTOMY, LEFT OOPHORECTOMY, LYSIS OF ADHESIONS (LAPAROSCOPIC BAG TO RETREIVE OVARY);  Surgeon: Ru Cano MD;  Location:  OR    DAVINCI LYSIS OF ADHESIONS N/A 2017    Procedure: DAVINCI LYSIS OF ADHESIONS;;  Surgeon: uR Cano MD;  Location:  OR    DAVINCI OOPHORECTOMY N/A 2017    Procedure: DAVINCI OOPHORECTOMY;;  Surgeon: Ru Cano MD;  Location:  OR    GI SURGERY      MYOMECTOMY UTERUS  2012    ZZ GASTROSCOPY,FL       OB History    Para Term  AB Living   1 1 1 0 0 1   SAB IAB  Ectopic Multiple Live Births   0 0 0 0 1      # Outcome Date GA Lbr Chung/2nd Weight Sex Type Anes PTL Lv   1 Term 14 37w3d  3.725 kg (8 lb 3.4 oz) F    LINA      Name: VIRGINIA KELSEY      Apgar1: 9  Apgar5: 9      Obstetric Comments   Gestational Diabetic, , + for GBS, , BP  elevated times one week., Carpel Tunnel both wrists     Lab work is in process  Labs reviewed in EPIC  BP Readings from Last 3 Encounters:   25 (!) 152/86   24 124/82   23 139/87    Wt Readings from Last 3 Encounters:   25 59 kg (130 lb)   24 63.1 kg (139 lb 3.2 oz)   23 63.5 kg (139 lb 14.4 oz)                  Patient Active Problem List   Diagnosis    Gastric acidity    Plantar fasciitis    Gastroesophageal reflux disease, esophagitis presence not specified    TMJ (temporomandibular joint syndrome)    Neck pain    Other headache syndrome    Adjustment disorder with mixed anxiety and depressed mood    Low hemoglobin    Dysmenorrhea    Right knee pain, unspecified chronicity    Type 2 diabetes mellitus with other specified complication, without long-term current use of insulin (H)    Essential hypertension    Hyperlipidemia with target LDL less than 100    Atopic rhinitis    Nasal congestion    Somatic dysfunction of lumbar region    Chronic right-sided low back pain without sciatica    Somatic dysfunction of sacral region    Sacral pain    Tendonitis    Microscopic hematuria    Dysuria    High-tone pelvic floor dysfunction    Hypothyroidism    Reflux esophagitis    Arthritis    Gastroparesis     Past Surgical History:   Procedure Laterality Date    APPENDECTOMY       SECTION  2014    Procedure:  SECTION;  Surgeon: Ru Cano MD;  Location:  [facility]    CHOLECYSTECTOMY      cyst removed      left  lower leg on bone sheath    DAVINCI HYSTERECTOMY TOTAL, SALPINGECTOMY BILATERAL N/A 2017    Procedure: DAVINCI HYSTERECTOMY TOTAL, SALPINGECTOMY BILATERAL;  DAVINCI  SINGLE SITE HYSTERECTOMY, BILATERAL SALPINGECTOMY, LEFT OOPHORECTOMY, LYSIS OF ADHESIONS (LAPAROSCOPIC BAG TO RETREIVE OVARY);  Surgeon: Ru Cano MD;  Location: [facility] OR    DAVINCI LYSIS OF ADHESIONS N/A 4/21/2017    Procedure: DAVINCI LYSIS OF ADHESIONS;;  Surgeon: Ru Cano MD;  Location: [facility] OR    DAVINCI OOPHORECTOMY N/A 4/21/2017    Procedure: DAVINCI OOPHORECTOMY;;  Surgeon: Ru Cano MD;  Location: [facility] OR    GI SURGERY      MYOMECTOMY UTERUS  sept 2012    ZZ GASTROSCOPY,FL         Social History     Tobacco Use    Smoking status: Never    Smokeless tobacco: Never   Substance Use Topics    Alcohol use: No     Alcohol/week: 0.0 standard drinks of alcohol     Comment: social     Family History   Problem Relation Age of Onset    Hypertension Father     Breast Cancer No family hx of     Colon Cancer No family hx of     Coronary Artery Disease No family hx of     Diabetes No family hx of         Maternal aunt         Current Outpatient Medications   Medication Sig Dispense Refill    atorvastatin (LIPITOR) 20 MG tablet Take 1 tablet (20 mg) by mouth daily. 90 tablet 2    blood glucose (ACCU-CHEK GUIDE) test strip USE TO TEST BLOOD SUGAR 1 TO 2 TIMES DAILY OR AS DIRECTED 200 strip 0    blood glucose monitoring (ACCU-CHEK GINA PLUS) meter device kit Use to test blood sugar 1-2 times daily or as directed. 1 kit 0    blood glucose monitoring (SOFTCLIX) lancets USE TO TEST BLOOD SUGAR 1 TO 2 TIMES DAILY OR AS DIRECTED. 100 each 2    Continuous Blood Gluc Sensor (DEXCOM G7 SENSOR) MISC 1 each every 10 days Change sensor every 10 days 3 each 5    cyanocobalamin (VITAMIN B-12) 1000 MCG tablet Take 1 tablet (1,000 mcg) by mouth daily 90 tablet 1    Ferrous Sulfate 324 (65 Fe) MG TBEC Take 324 mg by mouth daily      fexofenadine (ALLEGRA) 60 MG tablet Take 1 tablet (60 mg) by mouth 2 times daily 60 tablet 1    fluticasone (FLONASE) 50 MCG/ACT nasal spray Spray 1 spray into both nostrils daily 18.2  mL 1    levothyroxine (SYNTHROID/LEVOTHROID) 75 MCG tablet TAKE 1 TABLET BY MOUTH DAILY AND 2 TABLETS EVERY SUNDAY 102 tablet 0    lisinopril (ZESTRIL) 10 MG tablet Take 1 tablet (10 mg) by mouth daily. 90 tablet 1    lisinopril (ZESTRIL) 5 MG tablet Take 1 tablet (5 mg) by mouth daily. To be taken along with 10 mg lisinopril to make it 15 mg 90 tablet 1    metFORMIN (GLUCOPHAGE XR) 500 MG 24 hr tablet Take 2 tablets (1,000 mg) by mouth 2 times daily (with meals). 360 tablet 0    omeprazole (PRILOSEC) 40 MG DR capsule Take 1 capsule by mouth once daily 90 capsule 2    ondansetron (ZOFRAN ODT) 4 MG ODT tab Take 1 tablet (4 mg) by mouth every 8 hours as needed for nausea 15 tablet 0    sucralfate (CARAFATE) 1 GM tablet Take 1 tablet (1 g) by mouth 4 times daily 90 tablet 1    vitamin D2 (ERGOCALCIFEROL) 50247 units (1250 mcg) capsule Take 1 capsule (50,000 Units) by mouth once a week 12 capsule 0     Allergies   Allergen Reactions    Cephalexin Hives and Rash     Hives on hands, face and stomach.     Ibuprofen      sneezing    Nsaids Other (See Comments)    Shellfish Allergy Nausea and Nausea and Vomiting    Sulfamethoxazole Rash    Trimethoprim Rash     Recent Labs   Lab Test 03/17/25  0838 11/06/24  0908 07/25/24  0739 01/16/24  0929 11/08/23  1459 09/06/23  1339 12/22/20  1138 08/21/20  0905 05/07/20  0937 12/26/19  0803   A1C 6.2* 6.7* 6.4*   < >  --  7.2*   < > 5.9*   < > 6.0*   LDL 91 108*  --   --   --  53   < > 98  --  86   HDL 55 51  --   --   --  50   < > 46*  --  52   TRIG 142 196*  --   --   --  114   < > 172*  --  139   ALT 14 13  --   --  17  --    < >  --   --  19   CR  --  0.66  --   --  0.65  --    < > 0.61  --  0.70   GFRESTIMATED  --  >90  --   --  >90  --    < > >90  --  >90   GFRESTBLACK  --   --   --   --   --   --   --  >90  --  >90   POTASSIUM  --  4.2  --   --  4.1  --    < > 3.7  --  4.0   TSH  --  1.07  --   --   --  0.48   < > 7.19*   < >  --     < > = values in this interval not displayed.  "         Review of Systems  Constitutional, HEENT, cardiovascular, pulmonary, GI, , musculoskeletal, neuro, skin, endocrine and psych systems are negative, except as otherwise noted.     Objective    Exam  BP (!) 152/86   Pulse 79   Temp 97.2  F (36.2  C) (Temporal)   Resp 20   Ht 1.513 m (4' 11.55\")   Wt 59 kg (130 lb)   LMP 04/14/2017   SpO2 98%   BMI 25.77 kg/m     Estimated body mass index is 25.77 kg/m  as calculated from the following:    Height as of this encounter: 1.513 m (4' 11.55\").    Weight as of this encounter: 59 kg (130 lb).    Physical Exam  GENERAL: alert and no distress  EYES: Eyes grossly normal to inspection, PERRL and conjunctivae and sclerae normal  HENT: ear canals and TM's normal, nose and mouth without ulcers or lesions  NECK: no adenopathy, no asymmetry, masses, or scars  RESP: lungs clear to auscultation - no rales, rhonchi or wheezes  BREAST: Deferred , Mammogram  CV: regular rate and rhythm, normal S1 S2, no S3 or S4, no murmur, click or rub, no peripheral edema  ABDOMEN: soft, nontender, no hepatosplenomegaly, no masses and bowel sounds normal  MS: no gross musculoskeletal defects noted, no edema  SKIN: no suspicious lesions or rashes  NEURO: Normal strength and tone, mentation intact and speech normal  PSYCH: mentation appears normal, affect normal/bright        Signed Electronically by: Norah Zamorano MD    "

## 2025-06-04 NOTE — PATIENT INSTRUCTIONS
As discussed, please do fasting labs placed   Refills will be taken care     Will increase the dose of Lisinopril to 15 mg daily for improvement.     Recommend to start on Reglan given your diagnosis of Gastroparesis which will help in improvement of symptoms.     ======================    Patient Education   Preventive Care Advice   This is general advice given by our system to help you stay healthy. However, your care team may have specific advice just for you. Please talk to your care team about your preventive care needs.  Nutrition  Eat 5 or more servings of fruits and vegetables each day.  Try wheat bread, brown rice and whole grain pasta (instead of white bread, rice, and pasta).  Get enough calcium and vitamin D. Check the label on foods and aim for 100% of the RDA (recommended daily allowance).  Lifestyle  Exercise at least 150 minutes each week  (30 minutes a day, 5 days a week).  Do muscle strengthening activities 2 days a week. These help control your weight and prevent disease.  No smoking.  Wear sunscreen to prevent skin cancer.  Have a dental exam and cleaning every 6 months.  Yearly exams  See your health care team every year to talk about:  Any changes in your health.  Any medicines your care team has prescribed.  Preventive care, family planning, and ways to prevent chronic diseases.  Shots (vaccines)   HPV shots (up to age 26), if you've never had them before.  Hepatitis B shots (up to age 59), if you've never had them before.  COVID-19 shot: Get this shot when it's due.  Flu shot: Get a flu shot every year.  Tetanus shot: Get a tetanus shot every 10 years.  Pneumococcal, hepatitis A, and RSV shots: Ask your care team if you need these based on your risk.  Shingles shot (for age 50 and up)  General health tests  Diabetes screening:  Starting at age 35, Get screened for diabetes at least every 3 years.  If you are younger than age 35, ask your care team if you should be screened for  diabetes.  Cholesterol test: At age 39, start having a cholesterol test every 5 years, or more often if advised.  Bone density scan (DEXA): At age 50, ask your care team if you should have this scan for osteoporosis (brittle bones).  Hepatitis C: Get tested at least once in your life.  STIs (sexually transmitted infections)  Before age 24: Ask your care team if you should be screened for STIs.  After age 24: Get screened for STIs if you're at risk. You are at risk for STIs (including HIV) if:  You are sexually active with more than one person.  You don't use condoms every time.  You or a partner was diagnosed with a sexually transmitted infection.  If you are at risk for HIV, ask about PrEP medicine to prevent HIV.  Get tested for HIV at least once in your life, whether you are at risk for HIV or not.  Cancer screening tests  Cervical cancer screening: If you have a cervix, begin getting regular cervical cancer screening tests starting at age 21.  Breast cancer scan (mammogram): If you've ever had breasts, begin having regular mammograms starting at age 40. This is a scan to check for breast cancer.  Colon cancer screening: It is important to start screening for colon cancer at age 45.  Have a colonoscopy test every 10 years (or more often if you're at risk) Or, ask your provider about stool tests like a FIT test every year or Cologuard test every 3 years.  To learn more about your testing options, visit:   .  For help making a decision, visit:   https://bit.ly/rg83581.  Prostate cancer screening test: If you have a prostate, ask your care team if a prostate cancer screening test (PSA) at age 55 is right for you.  Lung cancer screening: If you are a current or former smoker ages 50 to 80, ask your care team if ongoing lung cancer screenings are right for you.  For informational purposes only. Not to replace the advice of your health care provider. Copyright   2023 [employer]. All rights reserved.  Clinically reviewed by the [facility] Transitions Program. [employer] [id number] - REV 01/24.

## 2025-06-05 ENCOUNTER — RESULTS FOLLOW-UP (OUTPATIENT)
Dept: FAMILY MEDICINE | Facility: CLINIC | Age: 60
End: 2025-06-05

## 2025-06-13 ENCOUNTER — TRANSFERRED RECORDS (OUTPATIENT)
Dept: HEALTH INFORMATION MANAGEMENT | Facility: CLINIC | Age: 60
End: 2025-06-13
Payer: COMMERCIAL

## 2025-06-24 ENCOUNTER — OFFICE VISIT (OUTPATIENT)
Dept: PSYCHOLOGY | Facility: CLINIC | Age: 60
End: 2025-06-24
Payer: COMMERCIAL

## 2025-06-24 DIAGNOSIS — F41.1 GENERALIZED ANXIETY DISORDER: Primary | ICD-10-CM

## 2025-06-24 PROCEDURE — 90834 PSYTX W PT 45 MINUTES: CPT | Performed by: PSYCHOLOGIST

## 2025-06-24 NOTE — PROGRESS NOTES
[facility]                                     Progress Note    Patient Name: Ijeoma Avalos  Date: 6/24/2025         Service Type: Individual      Session Start Time: 14:03  Session End Time: 14:55     Session Length: 38-52 mins    Session #: 3 this year (continued episode of care)    Attendees: Patient attended alone    Service Modality:  In-person    DATA  Interactive Complexity: No  Crisis: No      Progress Since Last Session (Related to Symptoms / Goals / Homework):   Symptoms: Some improvement    Homework: Not yet completed; Patient stated that she did not yet finish reading the handout on common thought distortions; she intends to read it before the next session      Episode of Care Goals: Satisfactory progress - ACTION (Actively working towards change); Intervened by reinforcing change plan / affirming steps taken     Current / Ongoing Stressors and Concerns:   Medical concerns   Aging parents in declining health (in [place])   Interpersonal issues     Treatment Objective(s) Addressed in This Session:   Increase self-confidence  Improve anxiety management  Feel more calm/content in close relationships     Intervention:   Anxiety management; CBT; interpersonal effectiveness:Patient reported several steps of progress. She has been doing well with self-care and making time for activities that are rejuvenating for her (including a pottery class that has been enjoyable, also writing, gardening, walking). She is also reporting lower levels of stress at work and with family dynamics. She provided examples indicating greater acceptance/less distress around marital communication, with several gratitude statements as well. Patient described a recent medical issue that increased anxiety and some catastrophizing thoughts. Processed this experience and how she was able to mobilize and take productive action despite the worry. She is now feeling more hopeful and reports strong motivation to  follow through with the recommended physical therapy.  Discussed disposition for future services, given Patient's reduced symptoms and improved functioning. Agreed that we will meet less frequently (~quarterly) if functioning remains stable.    Assessments completed prior to visit:  The following assessments were completed by patient for this visit:  PHQ2:   Phq2 (   1999 [employer],All Rights Reserved. Used With Permission. Developed By Aravind Rodriguez,Marisa Martin,Neto Guzman And Colleagues,With An Educational Zachary From [employer].)    8/27/2024  1:51 PM CDT - Filed by Patient   The following questionnaire should only be answered by the patient. Are you the patient? Yes   Over the last 2 weeks, how often have you been bothered by any of the following problems?    Q1: Little interest or pleasure in doing things Not at all   Q2: Feeling down, depressed or hopeless Not at all   PHQ2 (   1999 [employer],ALL RIGHTS RESERVED. USED WITH PERMISSION. DEVELOPED BY ARAVIND RODRIGUEZ,MARISA MARTIN,NETO GUZMAN AND COLLEAGUES,WITH AN EDUCATIONAL ZACHARY FROM [employer].)    PHQ-2 Score (range: 0 - 6) 0       PHQ9:       2/21/2018     8:44 AM 9/13/2019     3:22 PM 11/12/2019     3:00 PM 4/14/2020    11:13 AM 8/26/2021     8:13 AM 10/25/2022     2:07 PM 6/28/2023     1:05 PM   PHQ-9 SCORE   PHQ-9 Total Score MyChart      6 (Mild depression) 7 (Mild depression)   PHQ-9 Total Score 1  1 1 4 3 6 7       Data saved with a previous flowsheet row definition     GAD2:       10/25/2022     2:07 PM 1/24/2023     1:57 PM 8/22/2023     9:57 AM 12/12/2023     8:59 AM 3/19/2024    12:54 PM 8/27/2024     1:51 PM   SOSA-2   Feeling nervous, anxious, or on edge 0  0  1  1 1 0   Not being able to stop or control worrying 1  1  2  1 1 1   SOSA-2 Total Score 1 1 3 2 2 1       Proxy-reported     GAD7:       2/21/2018     8:44 AM 9/13/2019     3:22 PM 11/12/2019     3:00 PM 4/14/2020    11:13 AM 8/26/2021     8:13 AM 6/28/2023  "    1:05 PM 8/22/2023     9:57 AM   SOSA-7 SCORE   Total Score       6 (mild anxiety)   Total Score 4 4 3 5 4 6 6     PROMIS 10-Global Health (only subscores and total score):       10/25/2022     2:08 PM 1/24/2023     1:58 PM 6/28/2023     1:05 PM 8/22/2023     9:58 AM 12/12/2023     9:00 AM 3/19/2024    12:56 PM 8/27/2024     1:52 PM   PROMIS-10 Scores Only   Global Mental Health Score 10 14 11 11 13 12 14   Global Physical Health Score 11 14 12 12 13 13 15   PROMIS TOTAL - SUBSCORES 21 28 23 23 26 25 29         ASSESSMENT: Current Emotional / Mental Status (status of significant symptoms):   Risk status (Self / Other harm or suicidal ideation)   Patient denies current fears or concerns for personal safety.   Patient denies current or recent suicidal ideation or behaviors.   Patient denies current or recent homicidal ideation or behaviors.   Patient denies current or recent self injurious behavior or ideation.   Patient denies other safety concerns.   Patient reports there has been no change in risk factors since her last session.     Patient reports there has been no change in protective factors since her last session.     Recommended that patient call 911 or go to the local ED should there be a change in any of these risk factors     Appearance:   Appropriate    Eye Contact:   Good    Psychomotor Behavior: Normal, seated throughout    Attitude:   Cooperative    Orientation:   All   Speech    Rate / Production: Normal     Volume:  Normal    Mood:    Some improvement, less anxious & distressed overall   Affect:    Congruent    Thought Content:  Increased acceptance, reduced personalizing   Thought Form:  Coherent  Logical    Insight:    Good      Medication Review:   No changes to current psychiatric medication(s)     Medication Compliance:   Yes     Changes in Health Issues:   Yes: receiving treatment for compression of radial nerve in right arm (\"Saturday night palsy\")     Chemical Use Review:   Substance Use: " "Chemical use reviewed, no active concerns identified      Tobacco Use: No current tobacco use.      Diagnosis:  1. Generalized anxiety disorder      Collateral Reports Completed:   Not Applicable    PLAN: (Patient Tasks / Therapist Tasks / Other)  Screening questionnaires were missed upon check in today. Will send to Patient via [employer] to complete. Patient expressed her intention to read the handout on common thought distortions that was provided  last session. She will consider if the information resonates/is useful for her and we will discuss further. She will continue to prioritize time for rejuvenating activities to help balance out life stressors. She will follow up with physical therapy sessions as advised. Continue: She will also experiment with communicating simply and directly, rather than expending a lot of energy trying to determine the \"right\" way to approach someone so they won't be upset by what she has to say. She can let go of trying to manage their reaction and retain her energy for regulating herself. Given current symptom level and functioning, plan to meet less frequently (~quarterly). Return appointment scheduled for 9/16/25. Patient has my contact information and is aware that she can contact me sooner if needed.      Rosa Chowdary, KATHARINE                                               ______________________________________________________________________    Individual Treatment Plan    Patient's Name: Ijeoma Avalos  YOB: 1965    Date of Creation: 4/29/2025  Date Treatment Plan Last Reviewed/Revised: 4/29/2025    DSM5 Diagnoses: 300.02 (F41.1) Generalized Anxiety Disorder  Psychosocial / Contextual Factors: family stressors, chronic health conditions  PROMIS (reviewed every 90 days): 29    Referral / Collaboration:  Referral to another professional/service is not indicated at this time.    Anticipated number of session for this episode of care: estimated 12 " sessions/year  Anticipation frequency of session: Monthly  Anticipated Duration of each session: 38-52 minutes  Treatment plan will be reviewed in 90 days or when goals have been changed.       MeasurableTreatment Goal(s) related to diagnosis / functional impairment(s)  Goal 1: Patient will increase anxiety management.    I will know I've met my goal when my brain is not just constantly spinning with worries.      Objective #A (Patient Action)    Patient will use at least 2 coping skills for anxiety management in the next 8 weeks.  Status: New - Date: 4/29/2025     Intervention(s)  Therapist willteach self-regulation strategies, CBT skills, mindfulness techniques.    Objective #B  Patient will use cognitive strategies identified in therapy to challenge anxious thoughts.  Status: New - Date: 4/29/2025     Intervention(s)  Therapist will teach cognitive strategies, provide psychoeducation; provide accountability and support.      Goal 2: Patient will increase self-confidence.    I will know I've met my goal when I think more positively about myself.      Objective #A (Patient Action)    Status: New - Date: 4/29/2025     Patient willincrease assertive communication.    Intervention(s)  Therapist willteach assertiveness skills.    Objective #B  Patient will Identify negative self-talk and behaviors: challenge core beliefs, myths, and actions.    Status: New - Date: 4/29/2025     Intervention(s)  Therapist will provide psychoeducation, teach CBT skills.      Goal 3: Patiient will feel more calm/content in close relationships    I will know I've met my goal when I don't feel like I'm walking on eggshells, when I'm not so worried about how the other person will respond.      Objective #A (Patient Action)    Status: New - Date: 4/29/2025     Patient will make use of effective interpersonal skills.    Intervention(s)  Therapist will teach assertive communication, interpersonal effectiveness strategies.    Objective  #B  Patient will focus on her own regulation, decrease pull to try to regulate others through her actions.    Status: New - Date: 4/29/2025     Intervention(s)  Therapist will teach emotional boundaries, distress tolerance & self-soothing skills.      Patient has reviewed and agreed to the above plan.      Rosa Chowdary, KATHARINE  April 29, 2025

## 2025-07-01 ENCOUNTER — ANCILLARY PROCEDURE (OUTPATIENT)
Dept: MAMMOGRAPHY | Facility: CLINIC | Age: 60
End: 2025-07-01
Attending: INTERNAL MEDICINE
Payer: COMMERCIAL

## 2025-07-01 DIAGNOSIS — Z12.31 ENCOUNTER FOR SCREENING MAMMOGRAM FOR BREAST CANCER: ICD-10-CM

## 2025-07-01 DIAGNOSIS — Z00.00 ROUTINE GENERAL MEDICAL EXAMINATION AT A HEALTH CARE FACILITY: ICD-10-CM

## 2025-07-01 PROCEDURE — 77067 SCR MAMMO BI INCL CAD: CPT | Mod: TC | Performed by: RADIOLOGY

## 2025-07-01 PROCEDURE — 77063 BREAST TOMOSYNTHESIS BI: CPT | Mod: TC | Performed by: RADIOLOGY

## 2025-07-02 ENCOUNTER — TRANSFERRED RECORDS (OUTPATIENT)
Dept: HEALTH INFORMATION MANAGEMENT | Facility: CLINIC | Age: 60
End: 2025-07-02
Payer: COMMERCIAL

## 2025-07-06 DIAGNOSIS — E03.9 HYPOTHYROIDISM, UNSPECIFIED TYPE: ICD-10-CM

## 2025-07-07 RX ORDER — LEVOTHYROXINE SODIUM 75 UG/1
TABLET ORAL
Qty: 102 TABLET | Refills: 0 | Status: SHIPPED | OUTPATIENT
Start: 2025-07-07

## 2025-07-31 ENCOUNTER — LAB (OUTPATIENT)
Dept: LAB | Facility: CLINIC | Age: 60
End: 2025-07-31
Payer: COMMERCIAL

## 2025-07-31 ENCOUNTER — VIRTUAL VISIT (OUTPATIENT)
Dept: FAMILY MEDICINE | Facility: CLINIC | Age: 60
End: 2025-07-31
Payer: COMMERCIAL

## 2025-07-31 DIAGNOSIS — I10 ESSENTIAL HYPERTENSION: ICD-10-CM

## 2025-07-31 DIAGNOSIS — E53.8 VITAMIN B12 DEFICIENCY (NON ANEMIC): ICD-10-CM

## 2025-07-31 DIAGNOSIS — R20.2 PARESTHESIAS: ICD-10-CM

## 2025-07-31 DIAGNOSIS — E11.69 TYPE 2 DIABETES MELLITUS WITH OTHER SPECIFIED COMPLICATION, WITHOUT LONG-TERM CURRENT USE OF INSULIN (H): Primary | ICD-10-CM

## 2025-07-31 RX ORDER — CYANOCOBALAMIN 1000 UG/ML
1000 INJECTION, SOLUTION INTRAMUSCULAR; SUBCUTANEOUS ONCE
Status: ACTIVE | OUTPATIENT
Start: 2025-08-01

## 2025-07-31 NOTE — PROGRESS NOTES
Ijeoma is a 59 year old who is being evaluated via a billable video visit.    How would you like to obtain your AVS? MyChart  If the video visit is dropped, the invitation should be resent by: Text to cell phone: [phone]  Will anyone else be joining your video visit? No        Assessment and Plan  1. Type 2 diabetes mellitus with other specified complication, without long-term current use of insulin (H) (Primary)  Chronic stable, continue current metformin.  Future orders for A1c placed so she can make a lab only appointment when it is due at that time.  Patient understands the plan.  - Hemoglobin A1c; Future    2. Essential hypertension  Chronic problem, uncontrolled as per the last BP check.  Patient currently on lisinopril 15 mg daily which she emphasizes that she is taking it regularly.  Recommend to get a RN BP check appointment and get this documented on the blood pressure so further titration can be done, which could be causing the below balance issues if uncontrolled blood pressure.    3. Paresthesias  New problem, patient endorses that she has developed this vague tingling sensation all over her face as well as a little finger recently, discussed on various possibilities which could be ranging from dehydration versus B12 deficiency which she already has.  Possible diabetic neuropathy cannot be excluded.  Please see AVS below for details on the plan.  - Vitamin B12; Future  - cyanocobalamin injection 1,000 mcg    4. Vitamin B12 deficiency (non anemic)  Chronic problem, patient endorses that she has been missing the B12 shots recently, will consider checking the B12 levels and later receive a B12 shot with nurse only appointment.  Which can help in controlling the above paresthesias.  - Vitamin B12; Future  - cyanocobalamin injection 1,000 mcg     The longitudinal plan of care for the diagnosis(es)/condition(s) as documented were addressed during this visit. Due to the added complexity in care, I will  continue to support Ijeoma in the subsequent management and with ongoing continuity of care.      Please note that this note consists of symbols derived from keyboarding, dictation and/or voice recognition software. As a result, there may be errors in the script that have gone undetected. Please consider this when interpreting information found in this chart.    Patient Instructions   As discussed your symptoms appear as most likely related to dehydration, uncontrolled blood pressure, possible B12 deficiency cannot be excluded.      Please hydrate well at least 2 L of water per day, check your blood pressure with RN BP check appointment when you come to the lab appointment as well.     I have ordered a one-time B12 shot as well, which you are supposed to take after doing the lab visit first.    Placed future orders for A1c in September 4, 2025 which she will take a lab only nonfasting appointment at that time as well.  Will take care of the refills      Please make an in person appointment If no improvement of tingling sensation on the face and next  1 to 2 weeks so we can consider further evaluation.      Return in about 6 months (around 1/20/2026), or if symptoms worsen or fail to improve, for diabetes, video visit.    oNrah Zamorano MD  [facility] Raphael is a 59 year old, presenting for the following health issues:  Circulatory Problem (Tingling face and fingers.)        7/31/2025     3:27 PM   Additional Questions   [facility] K[facility]     History of Present Illness       Reason for visit:  Tingling in fingers and face  Symptom onset:  1-3 days ago  Symptoms include:  Tingling  Symptom intensity:  Mild  Symptom progression:  Worsening  Had these symptoms before:  No   She is taking medications regularly.           Allergies   Allergen Reactions    Cephalexin Hives and Rash     Hives on hands, face and stomach.     Ibuprofen      sneezing     Nsaids Other (See Comments)    Shellfish Allergy Nausea and Nausea and Vomiting    Sulfamethoxazole Rash    Trimethoprim Rash        Past Medical History:   Diagnosis Date    Arthritis 2022    Gastric acidity     Gestational diabetes mellitus     DIET CONTROL    Hx of previous reproductive problem     IVF    Hypothyroid     Motion sickness     Ovarian cyst     Post-operative nausea and vomiting 2017       Past Surgical History:   Procedure Laterality Date    APPENDECTOMY       SECTION  2014    Procedure:  SECTION;  Surgeon: Ru Cano MD;  Location:  L+D    CHOLECYSTECTOMY      cyst removed      left  lower leg on bone sheath    DAVINCI HYSTERECTOMY TOTAL, SALPINGECTOMY BILATERAL N/A 2017    Procedure: DAVINCI HYSTERECTOMY TOTAL, SALPINGECTOMY BILATERAL;  DAVINCI SINGLE SITE HYSTERECTOMY, BILATERAL SALPINGECTOMY, LEFT OOPHORECTOMY, LYSIS OF ADHESIONS (LAPAROSCOPIC BAG TO RETREIVE OVARY);  Surgeon: Ru Cano MD;  Location:  OR    DAVINCI LYSIS OF ADHESIONS N/A 2017    Procedure: DAVINCI LYSIS OF ADHESIONS;;  Surgeon: Ru Cano MD;  Location:  OR    DAVINCI OOPHORECTOMY N/A 2017    Procedure: DAVINCI OOPHORECTOMY;;  Surgeon: Ru Cano MD;  Location:  OR    GI SURGERY      MYOMECTOMY UTERUS  2012    ZZ GASTROSCOPY,FL         Family History   Problem Relation Age of Onset    Hypertension Father     Breast Cancer No family hx of     Colon Cancer No family hx of     Coronary Artery Disease No family hx of     Diabetes No family hx of         Maternal aunt       Social History     Tobacco Use    Smoking status: Never    Smokeless tobacco: Never   Substance Use Topics    Alcohol use: No     Alcohol/week: 0.0 standard drinks of alcohol     Comment: social        Current Outpatient Medications   Medication Sig Dispense Refill    atorvastatin (LIPITOR) 20 MG tablet Take 1 tablet (20 mg) by mouth daily. 90 tablet 2    blood  glucose (ACCU-CHEK GUIDE) test strip USE TO TEST BLOOD SUGAR 1 TO 2 TIMES DAILY OR AS DIRECTED 200 strip 0    blood glucose monitoring (ACCU-CHEK GINA PLUS) meter device kit Use to test blood sugar 1-2 times daily or as directed. 1 kit 0    blood glucose monitoring (SOFTCLIX) lancets USE TO TEST BLOOD SUGAR 1 TO 2 TIMES DAILY OR AS DIRECTED. 100 each 2    Continuous Blood Gluc Sensor (DEXCOM G7 SENSOR) MISC 1 each every 10 days Change sensor every 10 days 3 each 5    cyanocobalamin (VITAMIN B-12) 1000 MCG tablet Take 1 tablet (1,000 mcg) by mouth daily 90 tablet 1    Ferrous Sulfate 324 (65 Fe) MG TBEC Take 324 mg by mouth daily      levothyroxine (SYNTHROID/LEVOTHROID) 75 MCG tablet TAKE 1 TABLET BY MOUTH DAILY AND 2 TABLETS EVERY SUNDAY 102 tablet 0    lisinopril (ZESTRIL) 10 MG tablet Take 1 tablet (10 mg) by mouth daily. 90 tablet 1    lisinopril (ZESTRIL) 5 MG tablet Take 1 tablet (5 mg) by mouth daily. To be taken along with 10 mg lisinopril to make it 15 mg 90 tablet 1    metFORMIN (GLUCOPHAGE XR) 500 MG 24 hr tablet Take 2 tablets (1,000 mg) by mouth 2 times daily (with meals). 360 tablet 0    omeprazole (PRILOSEC) 40 MG DR capsule Take 1 capsule by mouth once daily 90 capsule 2    ondansetron (ZOFRAN ODT) 4 MG ODT tab Take 1 tablet (4 mg) by mouth every 8 hours as needed for nausea 15 tablet 0    sucralfate (CARAFATE) 1 GM tablet Take 1 tablet (1 g) by mouth 4 times daily 90 tablet 1    vitamin D2 (ERGOCALCIFEROL) 62123 units (1250 mcg) capsule Take 1 capsule (50,000 Units) by mouth once a week 12 capsule 0     Current Facility-Administered Medications   Medication Dose Route Frequency Provider Last Rate Last Admin    [START ON 8/1/2025] cyanocobalamin injection 1,000 mcg  1,000 mcg Intramuscular Once               Review of Systems  Constitutional, HEENT, cardiovascular, pulmonary, GI, , musculoskeletal, neuro, skin, endocrine and psych systems are negative, except as otherwise noted.      Objective            Vitals:  No vitals were obtained today due to virtual visit.    Physical Exam   GENERAL: alert and no distress  EYES: Eyes grossly normal to inspection.  No discharge or erythema, or obvious scleral/conjunctival abnormalities.  RESP: No audible wheeze, cough, or visible cyanosis.    SKIN: Visible skin clear. No significant rash, abnormal pigmentation or lesions.  NEURO: Cranial nerves grossly intact.  Mentation and speech appropriate for age.  PSYCH: Appropriate affect, tone, and pace of words      Video-Visit Details    Type of service:  Video Visit   Originating Location (pt. Location): Home    Distant Location (provider location):  On-site  Platform used for Video Visit: [place]  Signed Electronically by: Norah Zamorano MD

## 2025-07-31 NOTE — PATIENT INSTRUCTIONS
As discussed your symptoms appear as most likely related to dehydration, uncontrolled blood pressure, possible B12 deficiency cannot be excluded.      Please hydrate well at least 2 L of water per day, check your blood pressure with RN BP check appointment when you come to the lab appointment as well.     I have ordered a one-time B12 shot as well, which you are supposed to take after doing the lab visit first.    Placed future orders for A1c in September 4, 2025 which she will take a lab only nonfasting appointment at that time as well.  Will take care of the refills      Please make an in person appointment If no improvement of tingling sensation on the face and next  1 to 2 weeks so we can consider further evaluation.

## 2025-08-06 ENCOUNTER — MYC MEDICAL ADVICE (OUTPATIENT)
Dept: FAMILY MEDICINE | Facility: CLINIC | Age: 60
End: 2025-08-06
Payer: COMMERCIAL

## 2025-08-11 ENCOUNTER — MYC MEDICAL ADVICE (OUTPATIENT)
Dept: FAMILY MEDICINE | Facility: CLINIC | Age: 60
End: 2025-08-11
Payer: COMMERCIAL

## 2025-08-11 DIAGNOSIS — E53.8 VITAMIN B12 DEFICIENCY (NON ANEMIC): Primary | ICD-10-CM

## 2025-08-13 ENCOUNTER — TELEPHONE (OUTPATIENT)
Dept: FAMILY MEDICINE | Facility: CLINIC | Age: 60
End: 2025-08-13

## 2025-08-13 ENCOUNTER — ALLIED HEALTH/NURSE VISIT (OUTPATIENT)
Dept: FAMILY MEDICINE | Facility: CLINIC | Age: 60
End: 2025-08-13
Payer: COMMERCIAL

## 2025-08-13 VITALS — DIASTOLIC BLOOD PRESSURE: 80 MMHG | SYSTOLIC BLOOD PRESSURE: 126 MMHG

## 2025-08-13 DIAGNOSIS — E53.8 VITAMIN B12 DEFICIENCY (NON ANEMIC): Primary | ICD-10-CM

## 2025-08-13 PROCEDURE — 99207 PR NO CHARGE NURSE ONLY: CPT

## 2025-08-13 PROCEDURE — 3079F DIAST BP 80-89 MM HG: CPT

## 2025-08-13 PROCEDURE — 96372 THER/PROPH/DIAG INJ SC/IM: CPT | Performed by: INTERNAL MEDICINE

## 2025-08-13 PROCEDURE — 3074F SYST BP LT 130 MM HG: CPT

## 2025-08-13 RX ORDER — CYANOCOBALAMIN 1000 UG/ML
1000 INJECTION, SOLUTION INTRAMUSCULAR; SUBCUTANEOUS DAILY
Status: ACTIVE | OUTPATIENT
Start: 2025-08-14 | End: 2025-08-19

## 2025-08-13 RX ADMIN — CYANOCOBALAMIN 1000 MCG: 1000 INJECTION, SOLUTION INTRAMUSCULAR; SUBCUTANEOUS at 14:21

## 2025-08-14 ENCOUNTER — ALLIED HEALTH/NURSE VISIT (OUTPATIENT)
Dept: FAMILY MEDICINE | Facility: CLINIC | Age: 60
End: 2025-08-14
Payer: COMMERCIAL

## 2025-08-14 DIAGNOSIS — E53.8 VITAMIN B12 DEFICIENCY (NON ANEMIC): Primary | ICD-10-CM

## 2025-08-14 RX ADMIN — CYANOCOBALAMIN 1000 MCG: 1000 INJECTION, SOLUTION INTRAMUSCULAR; SUBCUTANEOUS at 15:02

## 2025-08-18 ENCOUNTER — ALLIED HEALTH/NURSE VISIT (OUTPATIENT)
Dept: FAMILY MEDICINE | Facility: CLINIC | Age: 60
End: 2025-08-18
Payer: COMMERCIAL

## 2025-08-18 DIAGNOSIS — E53.8 VITAMIN B12 DEFICIENCY (NON ANEMIC): Primary | ICD-10-CM

## 2025-08-18 PROCEDURE — 99207 PR NO CHARGE NURSE ONLY: CPT

## 2025-08-18 PROCEDURE — 96372 THER/PROPH/DIAG INJ SC/IM: CPT | Performed by: INTERNAL MEDICINE

## 2025-08-18 RX ADMIN — CYANOCOBALAMIN 1000 MCG: 1000 INJECTION, SOLUTION INTRAMUSCULAR; SUBCUTANEOUS at 07:36

## 2025-08-19 ENCOUNTER — ALLIED HEALTH/NURSE VISIT (OUTPATIENT)
Dept: FAMILY MEDICINE | Facility: CLINIC | Age: 60
End: 2025-08-19
Payer: COMMERCIAL

## 2025-08-19 VITALS — TEMPERATURE: 97.6 F

## 2025-08-19 DIAGNOSIS — E53.8 VITAMIN B12 DEFICIENCY (NON ANEMIC): Primary | ICD-10-CM

## 2025-08-19 PROCEDURE — 99207 PR NO CHARGE NURSE ONLY: CPT

## 2025-08-19 PROCEDURE — 96372 THER/PROPH/DIAG INJ SC/IM: CPT | Performed by: INTERNAL MEDICINE

## 2025-08-19 RX ADMIN — CYANOCOBALAMIN 1000 MCG: 1000 INJECTION, SOLUTION INTRAMUSCULAR; SUBCUTANEOUS at 07:44

## 2025-08-20 ENCOUNTER — ALLIED HEALTH/NURSE VISIT (OUTPATIENT)
Dept: FAMILY MEDICINE | Facility: CLINIC | Age: 60
End: 2025-08-20
Payer: COMMERCIAL

## 2025-08-20 DIAGNOSIS — E53.8 VITAMIN B12 DEFICIENCY (NON ANEMIC): Primary | ICD-10-CM

## 2025-08-20 PROCEDURE — 99207 PR NO CHARGE NURSE ONLY: CPT

## 2025-08-20 PROCEDURE — 96372 THER/PROPH/DIAG INJ SC/IM: CPT | Performed by: INTERNAL MEDICINE

## 2025-08-20 RX ORDER — CYANOCOBALAMIN 1000 UG/ML
1000 INJECTION, SOLUTION INTRAMUSCULAR; SUBCUTANEOUS
Status: ACTIVE | OUTPATIENT
Start: 2025-08-20 | End: 2026-08-15

## 2025-08-20 RX ADMIN — CYANOCOBALAMIN 1000 MCG: 1000 INJECTION, SOLUTION INTRAMUSCULAR; SUBCUTANEOUS at 10:25

## 2025-08-31 ENCOUNTER — MYC MEDICAL ADVICE (OUTPATIENT)
Dept: FAMILY MEDICINE | Facility: CLINIC | Age: 60
End: 2025-08-31
Payer: COMMERCIAL

## 2025-09-02 ENCOUNTER — OFFICE VISIT (OUTPATIENT)
Dept: PSYCHOLOGY | Facility: CLINIC | Age: 60
End: 2025-09-02
Payer: COMMERCIAL

## 2025-09-02 DIAGNOSIS — Z71.89 BEREAVEMENT COUNSELING: ICD-10-CM

## 2025-09-02 DIAGNOSIS — F41.1 GENERALIZED ANXIETY DISORDER: Primary | ICD-10-CM

## 2025-09-02 PROCEDURE — 90834 PSYTX W PT 45 MINUTES: CPT | Performed by: PSYCHOLOGIST

## 2025-09-03 ENCOUNTER — VIRTUAL VISIT (OUTPATIENT)
Dept: FAMILY MEDICINE | Facility: CLINIC | Age: 60
End: 2025-09-03
Payer: COMMERCIAL

## 2025-09-03 DIAGNOSIS — E11.69 TYPE 2 DIABETES MELLITUS WITH OTHER SPECIFIED COMPLICATION, WITHOUT LONG-TERM CURRENT USE OF INSULIN (H): ICD-10-CM

## 2025-09-03 DIAGNOSIS — R20.2 PARESTHESIAS: Primary | ICD-10-CM

## 2025-09-03 DIAGNOSIS — E53.8 VITAMIN B12 DEFICIENCY (NON ANEMIC): ICD-10-CM

## 2025-09-03 DIAGNOSIS — I10 ESSENTIAL HYPERTENSION: ICD-10-CM

## 2025-09-03 PROCEDURE — 98006 SYNCH AUDIO-VIDEO EST MOD 30: CPT | Performed by: INTERNAL MEDICINE

## 2025-09-03 RX ORDER — CYANOCOBALAMIN 1000 UG/ML
1000 INJECTION, SOLUTION INTRAMUSCULAR; SUBCUTANEOUS
Status: ACTIVE | OUTPATIENT
Start: 2025-09-03 | End: 2025-10-01

## 2025-09-03 RX ORDER — LISINOPRIL 20 MG/1
20 TABLET ORAL DAILY
Qty: 90 TABLET | Refills: 1 | Status: SHIPPED | OUTPATIENT
Start: 2025-09-03

## 2025-09-03 RX ORDER — CYANOCOBALAMIN 1000 UG/ML
1000 INJECTION, SOLUTION INTRAMUSCULAR; SUBCUTANEOUS
Status: ACTIVE | OUTPATIENT
Start: 2025-09-03 | End: 2026-08-29

## 2025-09-03 RX ORDER — METFORMIN HYDROCHLORIDE 500 MG/1
TABLET, EXTENDED RELEASE ORAL
Qty: 360 TABLET | Refills: 0 | Status: SHIPPED | OUTPATIENT
Start: 2025-09-03

## (undated) DEVICE — SU DERMABOND MINI DHVM12

## (undated) DEVICE — ESU GROUND PAD ADULT W/CORD E7507

## (undated) DEVICE — DAVINCI S CANNULA SEAL 8.5-13MM 420206

## (undated) DEVICE — GLOVE PROTEXIS W/NEU-THERA 8.0  2D73TE80

## (undated) DEVICE — PACK DAVINCI GYN SMA15GDFS1

## (undated) DEVICE — ESU CORD BIPOLAR 12' E0509

## (undated) DEVICE — GOWN IMPERVIOUS SPECIALTY XLG/XLONG 32474

## (undated) DEVICE — KIT PATIENT POSITIONING PIGAZZI LATEX FREE 40580

## (undated) DEVICE — Device

## (undated) DEVICE — SU VICRYL 0 UR-6 27" J603H

## (undated) DEVICE — SOL NACL 0.9% INJ 1000ML BAG 2B1324X

## (undated) DEVICE — DAVINCI S CANNULA SEAL 8MM 400077

## (undated) DEVICE — BLADE KNIFE SURG 11 371111

## (undated) DEVICE — LUBRICANT INST KIT ENDO-LUBE 220-90

## (undated) DEVICE — DAVINCI DRAPE KIT 4-ARM 420291

## (undated) DEVICE — ESU CORD MONOPOLAR 10'  E0510

## (undated) DEVICE — CATH TRAY FOLEY SURESTEP 16FR WDRAIN BAG STLK LATEX A300316A

## (undated) DEVICE — SOL NACL 0.9% IRRIG 1000ML BOTTLE 07138-09

## (undated) DEVICE — RETR ELEV / UTERINE MANIPULATOR V-CARE MED CUP 60-6085-201

## (undated) DEVICE — SU VICRYL 4-0 PS-2 18" UND J496H

## (undated) DEVICE — DRAPE ISOLATION BAG 1003

## (undated) DEVICE — WIPES FOLEY CARE SURESTEP PROVON DFC100

## (undated) DEVICE — SUCTION IRRIGATION STRYKFLOW II W/TIP DISP 250-070-520

## (undated) DEVICE — LINEN TOWEL PACK X5 5464

## (undated) DEVICE — ESU PENCIL W/HOLSTER E2350H

## (undated) DEVICE — DAVINCI PORT SINGLE SITE 8.55MM 428065

## (undated) DEVICE — SUCTION CANISTER MEDIVAC LINER 3000ML W/LID 65651-530

## (undated) DEVICE — DAVINCI SEAL CANNULA 5MM 400161

## (undated) DEVICE — GLOVE PROTEXIS BLUE W/NEU-THERA 7.0  2D73EB70

## (undated) DEVICE — GLOVE PROTEXIS W/NEU-THERA 7.0  2D73TE70

## (undated) RX ORDER — KETOROLAC TROMETHAMINE 30 MG/ML
INJECTION, SOLUTION INTRAMUSCULAR; INTRAVENOUS
Status: DISPENSED
Start: 2017-04-21

## (undated) RX ORDER — DEXAMETHASONE SODIUM PHOSPHATE 4 MG/ML
INJECTION, SOLUTION INTRA-ARTICULAR; INTRALESIONAL; INTRAMUSCULAR; INTRAVENOUS; SOFT TISSUE
Status: DISPENSED
Start: 2017-04-21

## (undated) RX ORDER — VECURONIUM BROMIDE 1 MG/ML
INJECTION, POWDER, LYOPHILIZED, FOR SOLUTION INTRAVENOUS
Status: DISPENSED
Start: 2017-04-21

## (undated) RX ORDER — ONDANSETRON 2 MG/ML
INJECTION INTRAMUSCULAR; INTRAVENOUS
Status: DISPENSED
Start: 2017-04-21

## (undated) RX ORDER — FENTANYL CITRATE 50 UG/ML
INJECTION, SOLUTION INTRAMUSCULAR; INTRAVENOUS
Status: DISPENSED
Start: 2017-04-21

## (undated) RX ORDER — PROPOFOL 10 MG/ML
INJECTION, EMULSION INTRAVENOUS
Status: DISPENSED
Start: 2017-04-21

## (undated) RX ORDER — BUPIVACAINE HYDROCHLORIDE AND EPINEPHRINE 2.5; 5 MG/ML; UG/ML
INJECTION, SOLUTION EPIDURAL; INFILTRATION; INTRACAUDAL; PERINEURAL
Status: DISPENSED
Start: 2017-04-21

## (undated) RX ORDER — MEPERIDINE HYDROCHLORIDE 25 MG/ML
INJECTION INTRAMUSCULAR; INTRAVENOUS; SUBCUTANEOUS
Status: DISPENSED
Start: 2017-04-21

## (undated) RX ORDER — CLINDAMYCIN PHOSPHATE 900 MG/50ML
INJECTION, SOLUTION INTRAVENOUS
Status: DISPENSED
Start: 2017-04-21

## (undated) RX ORDER — GLYCOPYRROLATE 0.2 MG/ML
INJECTION, SOLUTION INTRAMUSCULAR; INTRAVENOUS
Status: DISPENSED
Start: 2017-04-21

## (undated) RX ORDER — LIDOCAINE HYDROCHLORIDE 20 MG/ML
INJECTION, SOLUTION EPIDURAL; INFILTRATION; INTRACAUDAL; PERINEURAL
Status: DISPENSED
Start: 2017-04-21